# Patient Record
Sex: MALE | Race: WHITE | Employment: OTHER
[De-identification: names, ages, dates, MRNs, and addresses within clinical notes are randomized per-mention and may not be internally consistent; named-entity substitution may affect disease eponyms.]

---

## 2024-03-18 ENCOUNTER — APPOINTMENT (OUTPATIENT)
Facility: HOSPITAL | Age: 79
DRG: 853 | End: 2024-03-18
Payer: MEDICARE

## 2024-03-18 ENCOUNTER — HOSPITAL ENCOUNTER (INPATIENT)
Facility: HOSPITAL | Age: 79
LOS: 5 days | Discharge: HOME | DRG: 853 | End: 2024-03-23
Attending: EMERGENCY MEDICINE | Admitting: STUDENT IN AN ORGANIZED HEALTH CARE EDUCATION/TRAINING PROGRAM
Payer: MEDICARE

## 2024-03-18 DIAGNOSIS — J98.11 COLLAPSE OF LEFT LUNG: Primary | ICD-10-CM

## 2024-03-18 DIAGNOSIS — J69.0 ASPIRATION PNEUMONIA OF LEFT LOWER LOBE, UNSPECIFIED ASPIRATION PNEUMONIA TYPE (HCC): ICD-10-CM

## 2024-03-18 LAB
ALBUMIN SERPL-MCNC: 2.9 G/DL (ref 3.5–5)
ALBUMIN/GLOB SERPL: 1 (ref 1.1–2.2)
ALP SERPL-CCNC: 104 U/L (ref 45–117)
ALT SERPL-CCNC: 21 U/L (ref 12–78)
ANION GAP SERPL CALC-SCNC: 3 MMOL/L (ref 5–15)
AST SERPL-CCNC: 14 U/L (ref 15–37)
BASOPHILS # BLD: 0.1 K/UL (ref 0–0.1)
BASOPHILS NFR BLD: 0 % (ref 0–1)
BILIRUB SERPL-MCNC: 0.3 MG/DL (ref 0.2–1)
BUN SERPL-MCNC: 24 MG/DL (ref 6–20)
BUN/CREAT SERPL: 24 (ref 12–20)
CALCIUM SERPL-MCNC: 8.3 MG/DL (ref 8.5–10.1)
CHLORIDE SERPL-SCNC: 106 MMOL/L (ref 97–108)
CO2 SERPL-SCNC: 26 MMOL/L (ref 21–32)
CREAT SERPL-MCNC: 1.01 MG/DL (ref 0.7–1.3)
DIFFERENTIAL METHOD BLD: ABNORMAL
EOSINOPHIL # BLD: 0 K/UL (ref 0–0.4)
EOSINOPHIL NFR BLD: 0 % (ref 0–7)
ERYTHROCYTE [DISTWIDTH] IN BLOOD BY AUTOMATED COUNT: 14.5 % (ref 11.5–14.5)
GLOBULIN SER CALC-MCNC: 2.9 G/DL (ref 2–4)
GLUCOSE SERPL-MCNC: 122 MG/DL (ref 65–100)
HCT VFR BLD AUTO: 42.4 % (ref 36.6–50.3)
HGB BLD-MCNC: 13.6 G/DL (ref 12.1–17)
IMM GRANULOCYTES # BLD AUTO: 0.1 K/UL (ref 0–0.04)
IMM GRANULOCYTES NFR BLD AUTO: 0 % (ref 0–0.5)
LACTATE BLD-SCNC: 0.51 MMOL/L (ref 0.4–2)
LYMPHOCYTES # BLD: 1.2 K/UL (ref 0.8–3.5)
LYMPHOCYTES NFR BLD: 9 % (ref 12–49)
MAGNESIUM SERPL-MCNC: 1.9 MG/DL (ref 1.6–2.4)
MCH RBC QN AUTO: 32.7 PG (ref 26–34)
MCHC RBC AUTO-ENTMCNC: 32.1 G/DL (ref 30–36.5)
MCV RBC AUTO: 101.9 FL (ref 80–99)
MONOCYTES # BLD: 1 K/UL (ref 0–1)
MONOCYTES NFR BLD: 7 % (ref 5–13)
NEUTS SEG # BLD: 11 K/UL (ref 1.8–8)
NEUTS SEG NFR BLD: 84 % (ref 32–75)
NRBC # BLD: 0 K/UL (ref 0–0.01)
NRBC BLD-RTO: 0 PER 100 WBC
PLATELET # BLD AUTO: 272 K/UL (ref 150–400)
PMV BLD AUTO: 8.2 FL (ref 8.9–12.9)
POTASSIUM SERPL-SCNC: 4.1 MMOL/L (ref 3.5–5.1)
PROCALCITONIN SERPL-MCNC: <0.05 NG/ML
PROT SERPL-MCNC: 5.8 G/DL (ref 6.4–8.2)
RBC # BLD AUTO: 4.16 M/UL (ref 4.1–5.7)
SODIUM SERPL-SCNC: 135 MMOL/L (ref 136–145)
TROPONIN I SERPL HS-MCNC: 9 NG/L (ref 0–76)
WBC # BLD AUTO: 13.4 K/UL (ref 4.1–11.1)

## 2024-03-18 PROCEDURE — 6370000000 HC RX 637 (ALT 250 FOR IP): Performed by: STUDENT IN AN ORGANIZED HEALTH CARE EDUCATION/TRAINING PROGRAM

## 2024-03-18 PROCEDURE — 96374 THER/PROPH/DIAG INJ IV PUSH: CPT

## 2024-03-18 PROCEDURE — 71045 X-RAY EXAM CHEST 1 VIEW: CPT

## 2024-03-18 PROCEDURE — 6360000002 HC RX W HCPCS: Performed by: STUDENT IN AN ORGANIZED HEALTH CARE EDUCATION/TRAINING PROGRAM

## 2024-03-18 PROCEDURE — 84484 ASSAY OF TROPONIN QUANT: CPT

## 2024-03-18 PROCEDURE — 87040 BLOOD CULTURE FOR BACTERIA: CPT

## 2024-03-18 PROCEDURE — 80053 COMPREHEN METABOLIC PANEL: CPT

## 2024-03-18 PROCEDURE — 84145 PROCALCITONIN (PCT): CPT

## 2024-03-18 PROCEDURE — 99285 EMERGENCY DEPT VISIT HI MDM: CPT

## 2024-03-18 PROCEDURE — 83735 ASSAY OF MAGNESIUM: CPT

## 2024-03-18 PROCEDURE — 94640 AIRWAY INHALATION TREATMENT: CPT

## 2024-03-18 PROCEDURE — 2580000003 HC RX 258: Performed by: EMERGENCY MEDICINE

## 2024-03-18 PROCEDURE — 71275 CT ANGIOGRAPHY CHEST: CPT

## 2024-03-18 PROCEDURE — 2580000003 HC RX 258: Performed by: STUDENT IN AN ORGANIZED HEALTH CARE EDUCATION/TRAINING PROGRAM

## 2024-03-18 PROCEDURE — 83605 ASSAY OF LACTIC ACID: CPT

## 2024-03-18 PROCEDURE — 94760 N-INVAS EAR/PLS OXIMETRY 1: CPT

## 2024-03-18 PROCEDURE — 36415 COLL VENOUS BLD VENIPUNCTURE: CPT

## 2024-03-18 PROCEDURE — 6360000002 HC RX W HCPCS: Performed by: EMERGENCY MEDICINE

## 2024-03-18 PROCEDURE — 93005 ELECTROCARDIOGRAM TRACING: CPT | Performed by: EMERGENCY MEDICINE

## 2024-03-18 PROCEDURE — 85025 COMPLETE CBC W/AUTO DIFF WBC: CPT

## 2024-03-18 PROCEDURE — 1100000003 HC PRIVATE W/ TELEMETRY

## 2024-03-18 PROCEDURE — 6360000004 HC RX CONTRAST MEDICATION: Performed by: EMERGENCY MEDICINE

## 2024-03-18 RX ORDER — CALCIUM CARBONATE 500 MG/1
500 TABLET, CHEWABLE ORAL 3 TIMES DAILY PRN
Status: DISCONTINUED | OUTPATIENT
Start: 2024-03-18 | End: 2024-03-23 | Stop reason: HOSPADM

## 2024-03-18 RX ORDER — DONEPEZIL HYDROCHLORIDE 5 MG/1
5 TABLET, FILM COATED ORAL NIGHTLY
Status: DISCONTINUED | OUTPATIENT
Start: 2024-03-18 | End: 2024-03-23 | Stop reason: HOSPADM

## 2024-03-18 RX ORDER — ACETAMINOPHEN 325 MG/1
650 TABLET ORAL EVERY 6 HOURS PRN
Status: DISCONTINUED | OUTPATIENT
Start: 2024-03-18 | End: 2024-03-23 | Stop reason: HOSPADM

## 2024-03-18 RX ORDER — ALBUTEROL SULFATE 90 UG/1
1 AEROSOL, METERED RESPIRATORY (INHALATION) EVERY 6 HOURS PRN
COMMUNITY
Start: 2024-02-13

## 2024-03-18 RX ORDER — ACETAMINOPHEN 325 MG/1
650 TABLET ORAL EVERY 4 HOURS PRN
Status: DISCONTINUED | OUTPATIENT
Start: 2024-03-18 | End: 2024-03-18

## 2024-03-18 RX ORDER — SODIUM CHLORIDE 0.9 % (FLUSH) 0.9 %
5-40 SYRINGE (ML) INJECTION PRN
Status: DISCONTINUED | OUTPATIENT
Start: 2024-03-18 | End: 2024-03-23 | Stop reason: HOSPADM

## 2024-03-18 RX ORDER — ENOXAPARIN SODIUM 100 MG/ML
30 INJECTION SUBCUTANEOUS DAILY
Status: DISCONTINUED | OUTPATIENT
Start: 2024-03-19 | End: 2024-03-23 | Stop reason: HOSPADM

## 2024-03-18 RX ORDER — MIRTAZAPINE 30 MG/1
30 TABLET, FILM COATED ORAL NIGHTLY
COMMUNITY
Start: 2024-03-12

## 2024-03-18 RX ORDER — MIRTAZAPINE 15 MG/1
30 TABLET, FILM COATED ORAL NIGHTLY
Status: DISCONTINUED | OUTPATIENT
Start: 2024-03-18 | End: 2024-03-23 | Stop reason: HOSPADM

## 2024-03-18 RX ORDER — ACETYLCYSTEINE 200 MG/ML
600 SOLUTION ORAL; RESPIRATORY (INHALATION)
Status: DISCONTINUED | OUTPATIENT
Start: 2024-03-18 | End: 2024-03-23 | Stop reason: HOSPADM

## 2024-03-18 RX ORDER — ACETAMINOPHEN 325 MG/1
650 TABLET ORAL EVERY 4 HOURS PRN
Status: DISCONTINUED | OUTPATIENT
Start: 2024-03-18 | End: 2024-03-23 | Stop reason: HOSPADM

## 2024-03-18 RX ORDER — SODIUM CHLORIDE 9 MG/ML
INJECTION, SOLUTION INTRAVENOUS PRN
Status: DISCONTINUED | OUTPATIENT
Start: 2024-03-18 | End: 2024-03-23 | Stop reason: HOSPADM

## 2024-03-18 RX ORDER — MONTELUKAST SODIUM 10 MG/1
10 TABLET ORAL NIGHTLY
COMMUNITY
Start: 2024-03-12

## 2024-03-18 RX ORDER — IPRATROPIUM BROMIDE AND ALBUTEROL SULFATE 2.5; .5 MG/3ML; MG/3ML
1 SOLUTION RESPIRATORY (INHALATION)
Status: DISCONTINUED | OUTPATIENT
Start: 2024-03-18 | End: 2024-03-22

## 2024-03-18 RX ORDER — ONDANSETRON 4 MG/1
4 TABLET, ORALLY DISINTEGRATING ORAL EVERY 8 HOURS PRN
Status: DISCONTINUED | OUTPATIENT
Start: 2024-03-18 | End: 2024-03-23 | Stop reason: HOSPADM

## 2024-03-18 RX ORDER — DONEPEZIL HYDROCHLORIDE 5 MG/1
5 TABLET, FILM COATED ORAL NIGHTLY
COMMUNITY
Start: 2024-03-12

## 2024-03-18 RX ORDER — MONTELUKAST SODIUM 10 MG/1
10 TABLET ORAL NIGHTLY
Status: DISCONTINUED | OUTPATIENT
Start: 2024-03-18 | End: 2024-03-23 | Stop reason: HOSPADM

## 2024-03-18 RX ORDER — MORPHINE SULFATE 2 MG/ML
2 INJECTION, SOLUTION INTRAMUSCULAR; INTRAVENOUS
Status: DISCONTINUED | OUTPATIENT
Start: 2024-03-18 | End: 2024-03-23 | Stop reason: HOSPADM

## 2024-03-18 RX ORDER — TRIAMCINOLONE ACETONIDE 5 MG/G
CREAM TOPICAL PRN
COMMUNITY

## 2024-03-18 RX ORDER — SODIUM CHLORIDE 0.9 % (FLUSH) 0.9 %
5-40 SYRINGE (ML) INJECTION EVERY 12 HOURS SCHEDULED
Status: DISCONTINUED | OUTPATIENT
Start: 2024-03-18 | End: 2024-03-23 | Stop reason: HOSPADM

## 2024-03-18 RX ORDER — FAMOTIDINE 20 MG/1
20 TABLET, FILM COATED ORAL 2 TIMES DAILY
COMMUNITY

## 2024-03-18 RX ORDER — BUDESONIDE AND FORMOTEROL FUMARATE DIHYDRATE 160; 4.5 UG/1; UG/1
2 AEROSOL RESPIRATORY (INHALATION) 2 TIMES DAILY
COMMUNITY
Start: 2024-02-14

## 2024-03-18 RX ORDER — OXYCODONE HYDROCHLORIDE 5 MG/1
5 TABLET ORAL EVERY 4 HOURS PRN
Status: DISCONTINUED | OUTPATIENT
Start: 2024-03-18 | End: 2024-03-23 | Stop reason: HOSPADM

## 2024-03-18 RX ORDER — POLYETHYLENE GLYCOL 3350 17 G/17G
17 POWDER, FOR SOLUTION ORAL DAILY PRN
Status: DISCONTINUED | OUTPATIENT
Start: 2024-03-18 | End: 2024-03-23 | Stop reason: HOSPADM

## 2024-03-18 RX ORDER — ACETAMINOPHEN 650 MG/1
650 SUPPOSITORY RECTAL EVERY 6 HOURS PRN
Status: DISCONTINUED | OUTPATIENT
Start: 2024-03-18 | End: 2024-03-23 | Stop reason: HOSPADM

## 2024-03-18 RX ORDER — KETOROLAC TROMETHAMINE 30 MG/ML
15 INJECTION, SOLUTION INTRAMUSCULAR; INTRAVENOUS
Status: COMPLETED | OUTPATIENT
Start: 2024-03-18 | End: 2024-03-18

## 2024-03-18 RX ORDER — ONDANSETRON 2 MG/ML
4 INJECTION INTRAMUSCULAR; INTRAVENOUS EVERY 6 HOURS PRN
Status: DISCONTINUED | OUTPATIENT
Start: 2024-03-18 | End: 2024-03-23 | Stop reason: HOSPADM

## 2024-03-18 RX ADMIN — MIRTAZAPINE 30 MG: 15 TABLET, FILM COATED ORAL at 20:39

## 2024-03-18 RX ADMIN — ARFORMOTEROL TARTRATE: 15 SOLUTION RESPIRATORY (INHALATION) at 20:45

## 2024-03-18 RX ADMIN — DONEPEZIL HYDROCHLORIDE 5 MG: 5 TABLET, FILM COATED ORAL at 20:39

## 2024-03-18 RX ADMIN — OXYCODONE 5 MG: 5 TABLET ORAL at 22:11

## 2024-03-18 RX ADMIN — MONTELUKAST 10 MG: 10 TABLET, FILM COATED ORAL at 20:39

## 2024-03-18 RX ADMIN — PIPERACILLIN AND TAZOBACTAM 3375 MG: 3; .375 INJECTION, POWDER, LYOPHILIZED, FOR SOLUTION INTRAVENOUS at 20:54

## 2024-03-18 RX ADMIN — SODIUM CHLORIDE, PRESERVATIVE FREE 10 ML: 5 INJECTION INTRAVENOUS at 20:40

## 2024-03-18 RX ADMIN — PIPERACILLIN AND TAZOBACTAM 4500 MG: 4; .5 INJECTION, POWDER, LYOPHILIZED, FOR SOLUTION INTRAVENOUS; PARENTERAL at 16:02

## 2024-03-18 RX ADMIN — ACETYLCYSTEINE 600 MG: 200 INHALANT RESPIRATORY (INHALATION) at 20:40

## 2024-03-18 RX ADMIN — ACETAMINOPHEN 650 MG: 325 TABLET ORAL at 20:39

## 2024-03-18 RX ADMIN — CALCIUM CARBONATE (ANTACID) CHEW TAB 500 MG 500 MG: 500 CHEW TAB at 22:11

## 2024-03-18 RX ADMIN — IOPAMIDOL 100 ML: 755 INJECTION, SOLUTION INTRAVENOUS at 13:59

## 2024-03-18 RX ADMIN — KETOROLAC TROMETHAMINE 15 MG: 30 INJECTION, SOLUTION INTRAMUSCULAR at 16:39

## 2024-03-18 RX ADMIN — IPRATROPIUM BROMIDE AND ALBUTEROL SULFATE 1 DOSE: .5; 3 SOLUTION RESPIRATORY (INHALATION) at 20:40

## 2024-03-18 ASSESSMENT — PAIN DESCRIPTION - PAIN TYPE: TYPE: ACUTE PAIN

## 2024-03-18 ASSESSMENT — PAIN SCALES - GENERAL
PAINLEVEL_OUTOF10: 5
PAINLEVEL_OUTOF10: 5
PAINLEVEL_OUTOF10: 0
PAINLEVEL_OUTOF10: 4

## 2024-03-18 ASSESSMENT — PAIN DESCRIPTION - DESCRIPTORS: DESCRIPTORS: CRAMPING

## 2024-03-18 ASSESSMENT — PAIN DESCRIPTION - FREQUENCY: FREQUENCY: INTERMITTENT

## 2024-03-18 ASSESSMENT — LIFESTYLE VARIABLES
HOW MANY STANDARD DRINKS CONTAINING ALCOHOL DO YOU HAVE ON A TYPICAL DAY: 1 OR 2
HOW OFTEN DO YOU HAVE A DRINK CONTAINING ALCOHOL: 2-4 TIMES A MONTH

## 2024-03-18 ASSESSMENT — PAIN - FUNCTIONAL ASSESSMENT
PAIN_FUNCTIONAL_ASSESSMENT: 0-10
PAIN_FUNCTIONAL_ASSESSMENT: ACTIVITIES ARE NOT PREVENTED

## 2024-03-18 ASSESSMENT — PAIN DESCRIPTION - LOCATION
LOCATION: CHEST
LOCATION: ABDOMEN;CHEST

## 2024-03-18 ASSESSMENT — PAIN DESCRIPTION - ORIENTATION: ORIENTATION: LEFT

## 2024-03-18 NOTE — H&P
Hospitalist Admission Note    NAME:   Anuel Bermudez   : 1945   MRN: [id number]     Date/Time: 3/18/2024 4:08 PM    Patient PCP: Bonifacio Bassett MD    ______________________________________________________________________  Given the patient's current clinical presentation, I have a high level of concern for decompensation if discharged from the emergency department.  Complex decision making was performed, which includes reviewing the patient's available past medical records, laboratory results, and x-ray films.       My assessment of this patient's clinical condition and my plan of care is as follows.    Assessment / Plan:    Left lower lobe lung collapse  Suspected aspiration pneumonia    CT reviewed,  1. No evidence of pulmonary embolus.     2. Left lower lobe collapse. There appears to be secretions within the left  lower lobe bronchus as well as possible mucous plugging in the lingular  bronchus. However the presence of and endobronchial mass is not excluded.    Pulmonary consult  Currently on room air, keep oxygen saturation greater than 90%  Zosyn suspected aspiration pneumonia, reported allergy to penicillin but already received zosyn, which he tolerated.  DuoNebs as needed  Mucomyst inhalation  SLP evaluation    Chronic difficulty swallowing:  Due to cervical verterbal pathology, follows Dr. Guerra  Recommend outpatient follow up  SLP eval here  Had work up done at [facility] during admission in 2024  Takes regular diet with nectar thick liquid at home, continue this for now    History of asthma  Continue nebs/ switched to hospital formulary    Suspected history of dementia:  On Donepezil, continue with this.  Continue mirtazapine      Medical Decision Making:   I personally reviewed labs: CBC, BMP  I personally reviewed imaging:CTA chest  I personally reviewed EKG:  Toxic drug monitoring: iv abx  Discussed case with: ED provider. After discussion I am in agreement that acuity of  K/UL    Differential Type AUTOMATED     Comprehensive Metabolic Panel    Collection Time: 03/18/24 12:26 PM   Result Value Ref Range    Sodium 135 (L) 136 - 145 mmol/L    Potassium 4.1 3.5 - 5.1 mmol/L    Chloride 106 97 - 108 mmol/L    CO2 26 21 - 32 mmol/L    Anion Gap 3 (L) 5 - 15 mmol/L    Glucose 122 (H) 65 - 100 mg/dL    BUN 24 (H) 6 - 20 MG/DL    Creatinine 1.01 0.70 - 1.30 MG/DL    Bun/Cre Ratio 24 (H) 12 - 20      Est, Glom Filt Rate >60 >60 ml/min/1.73m2    Calcium 8.3 (L) 8.5 - 10.1 MG/DL    Total Bilirubin 0.3 0.2 - 1.0 MG/DL    ALT 21 12 - 78 U/L    AST 14 (L) 15 - 37 U/L    Alk Phosphatase 104 45 - 117 U/L    Total Protein 5.8 (L) 6.4 - 8.2 g/dL    Albumin 2.9 (L) 3.5 - 5.0 g/dL    Globulin 2.9 2.0 - 4.0 g/dL    Albumin/Globulin Ratio 1.0 (L) 1.1 - 2.2     Magnesium    Collection Time: 03/18/24 12:26 PM   Result Value Ref Range    Magnesium 1.9 1.6 - 2.4 mg/dL   POC Lactic Acid    Collection Time: 03/18/24  3:48 PM   Result Value Ref Range    POC Lactic Acid 0.51 0.40 - 2.00 mmol/L         CTA CHEST W WO CONTRAST PE Eval    Result Date: 3/18/2024  EXAM:  CTA CHEST W WO CONTRAST INDICATION: Shortness of breath. Left lower lobe collapse COMPARISON: None TECHNIQUE: Helical thin section chest CT following intravenous administration of nonionic contrast 100 mL of isovue 370 according to departmental PE protocol. Coronal and sagittal reformats were performed. 3D post processing was performed.  CT dose reduction was achieved through the use of a standardized protocol tailored for this examination and automatic exposure control for dose modulation. FINDINGS: This is a good quality study for the evaluation of pulmonary embolism to the first subsegmental arterial level. There is no pulmonary embolism to this level. THYROID: No nodule. MEDIASTINUM: No mass or lymphadenopathy. JEFF: No mass or lymphadenopathy. THORACIC AORTA: No aneurysm. HEART: Normal in size. ESOPHAGUS: No wall thickening or dilatation.

## 2024-03-18 NOTE — PLAN OF CARE
Problem: Respiratory - Adult  Goal: Achieves optimal ventilation and oxygenation  Outcome: Progressing  Flowsheets (Taken 3/18/2024 1806)  Achieves optimal ventilation and oxygenation:   Assess for changes in respiratory status   Respiratory therapy support as indicated   Position to facilitate oxygenation and minimize respiratory effort

## 2024-03-18 NOTE — ED NOTES
Frantz Served MD Nye the following:    \"Pt already received Zosyn 4,500 mg at 1602. Did you want to administer additional Zosyn 3,375 mg as ordered now?\"

## 2024-03-18 NOTE — ED NOTES
Attempted to call report to nurse for Rm 3211; JOSE DE JESUS Milner Ext. [phone] is with a pt at this time and will call back.

## 2024-03-18 NOTE — ED PROVIDER NOTES
[facility] EMERGENCY DEPT  EMERGENCY DEPARTMENT ENCOUNTER       Pt Name: Anuel Bermudez  MRN: [id number]  Birthdate 1945  Date of evaluation: 3/18/2024  Provider: Anuel Wood DO   PCP: Bonifacio Bassett MD  Note Started: 3:03 PM EDT 3/18/24     CHIEF COMPLAINT       Chief Complaint   Patient presents with    Shortness of Breath     Pt presents to ED via EMS from home with c/o worsening SOB x1 day. Pt reports he was hospitalized a month ago for SOB and suspect for pneumonia. Pt also c/o chest pain with deep breaths and cramping on right abdomen/rib. H/o COPD and asthma. Pt baseline ambulatory with cane and A&OX4.    Chest Pain     EMS noted ST elevation on EKG; pt given aspirin 342 mg and 1 dose of nitro. Pt denies any blood thinners.        HISTORY OF PRESENT ILLNESS: 1 or more elements      History From: Patient, History limited by: none     Anuel Bermudez is a 78 y.o. male presents to the emergency department for evaluation of shortness of breath       Please See MDM for Additional Details of the HPI/PMH  Nursing Notes were all reviewed and agreed with or any disagreements were addressed in the HPI.     REVIEW OF SYSTEMS        Positives and Pertinent negatives as per HPI.    PAST HISTORY     Past Medical History:  No past medical history on file.    Past Surgical History:  No past surgical history on file.    Family History:  No family history on file.    Social History:       Allergies:  No Known Allergies    CURRENT MEDICATIONS      Previous Medications    No medications on file       SCREENINGS               No data recorded         PHYSICAL EXAM      ED Triage Vitals [03/18/24 1211]   Enc Vitals Group      BP 90/64      Pulse 94      Respirations 23      Temp 97.6 °F (36.4 °C)      Temp Source Oral      SpO2 95 %      Weight - Scale 50.3 kg (110 lb 14.3 oz)      Height 1.651 m (5' 5\")      Head Circumference       Peak Flow       Pain Score       Pain Loc       Pain Edu?       Excl. in GC?

## 2024-03-19 LAB
ANION GAP SERPL CALC-SCNC: 5 MMOL/L (ref 5–15)
BASOPHILS # BLD: 0.1 K/UL (ref 0–0.1)
BASOPHILS NFR BLD: 1 % (ref 0–1)
BUN SERPL-MCNC: 25 MG/DL (ref 6–20)
BUN/CREAT SERPL: 24 (ref 12–20)
CALCIUM SERPL-MCNC: 8.3 MG/DL (ref 8.5–10.1)
CHLORIDE SERPL-SCNC: 108 MMOL/L (ref 97–108)
CO2 SERPL-SCNC: 22 MMOL/L (ref 21–32)
CREAT SERPL-MCNC: 1.05 MG/DL (ref 0.7–1.3)
DIFFERENTIAL METHOD BLD: ABNORMAL
EKG ATRIAL RATE: 104 BPM
EKG DIAGNOSIS: NORMAL
EKG P AXIS: 82 DEGREES
EKG P-R INTERVAL: 126 MS
EKG Q-T INTERVAL: 348 MS
EKG QRS DURATION: 100 MS
EKG QTC CALCULATION (BAZETT): 457 MS
EKG R AXIS: 58 DEGREES
EKG T AXIS: 87 DEGREES
EKG VENTRICULAR RATE: 104 BPM
EOSINOPHIL # BLD: 0.1 K/UL (ref 0–0.4)
EOSINOPHIL NFR BLD: 1 % (ref 0–7)
ERYTHROCYTE [DISTWIDTH] IN BLOOD BY AUTOMATED COUNT: 14.6 % (ref 11.5–14.5)
GLUCOSE SERPL-MCNC: 101 MG/DL (ref 65–100)
HCT VFR BLD AUTO: 37.1 % (ref 36.6–50.3)
HGB BLD-MCNC: 12.5 G/DL (ref 12.1–17)
IMM GRANULOCYTES # BLD AUTO: 0.1 K/UL (ref 0–0.04)
IMM GRANULOCYTES NFR BLD AUTO: 1 % (ref 0–0.5)
LYMPHOCYTES # BLD: 1.6 K/UL (ref 0.8–3.5)
LYMPHOCYTES NFR BLD: 14 % (ref 12–49)
MCH RBC QN AUTO: 33.9 PG (ref 26–34)
MCHC RBC AUTO-ENTMCNC: 33.7 G/DL (ref 30–36.5)
MCV RBC AUTO: 100.5 FL (ref 80–99)
MONOCYTES # BLD: 0.8 K/UL (ref 0–1)
MONOCYTES NFR BLD: 8 % (ref 5–13)
NEUTS SEG # BLD: 8.5 K/UL (ref 1.8–8)
NEUTS SEG NFR BLD: 75 % (ref 32–75)
NRBC # BLD: 0 K/UL (ref 0–0.01)
NRBC BLD-RTO: 0 PER 100 WBC
PLATELET # BLD AUTO: 265 K/UL (ref 150–400)
PMV BLD AUTO: 8.5 FL (ref 8.9–12.9)
POTASSIUM SERPL-SCNC: 4 MMOL/L (ref 3.5–5.1)
RBC # BLD AUTO: 3.69 M/UL (ref 4.1–5.7)
SODIUM SERPL-SCNC: 135 MMOL/L (ref 136–145)
WBC # BLD AUTO: 11.1 K/UL (ref 4.1–11.1)

## 2024-03-19 PROCEDURE — 6370000000 HC RX 637 (ALT 250 FOR IP): Performed by: INTERNAL MEDICINE

## 2024-03-19 PROCEDURE — 94640 AIRWAY INHALATION TREATMENT: CPT

## 2024-03-19 PROCEDURE — 6370000000 HC RX 637 (ALT 250 FOR IP): Performed by: STUDENT IN AN ORGANIZED HEALTH CARE EDUCATION/TRAINING PROGRAM

## 2024-03-19 PROCEDURE — 92610 EVALUATE SWALLOWING FUNCTION: CPT

## 2024-03-19 PROCEDURE — 94761 N-INVAS EAR/PLS OXIMETRY MLT: CPT

## 2024-03-19 PROCEDURE — 36415 COLL VENOUS BLD VENIPUNCTURE: CPT

## 2024-03-19 PROCEDURE — 1100000003 HC PRIVATE W/ TELEMETRY

## 2024-03-19 PROCEDURE — 80048 BASIC METABOLIC PNL TOTAL CA: CPT

## 2024-03-19 PROCEDURE — 6360000002 HC RX W HCPCS: Performed by: STUDENT IN AN ORGANIZED HEALTH CARE EDUCATION/TRAINING PROGRAM

## 2024-03-19 PROCEDURE — 94760 N-INVAS EAR/PLS OXIMETRY 1: CPT

## 2024-03-19 PROCEDURE — 2580000003 HC RX 258: Performed by: STUDENT IN AN ORGANIZED HEALTH CARE EDUCATION/TRAINING PROGRAM

## 2024-03-19 PROCEDURE — 85025 COMPLETE CBC W/AUTO DIFF WBC: CPT

## 2024-03-19 RX ORDER — GUAIFENESIN 600 MG/1
600 TABLET, EXTENDED RELEASE ORAL 2 TIMES DAILY
Status: DISCONTINUED | OUTPATIENT
Start: 2024-03-19 | End: 2024-03-23 | Stop reason: HOSPADM

## 2024-03-19 RX ADMIN — IPRATROPIUM BROMIDE AND ALBUTEROL SULFATE 1 DOSE: .5; 3 SOLUTION RESPIRATORY (INHALATION) at 11:16

## 2024-03-19 RX ADMIN — IPRATROPIUM BROMIDE AND ALBUTEROL SULFATE 1 DOSE: .5; 3 SOLUTION RESPIRATORY (INHALATION) at 15:03

## 2024-03-19 RX ADMIN — PIPERACILLIN AND TAZOBACTAM 3375 MG: 3; .375 INJECTION, POWDER, LYOPHILIZED, FOR SOLUTION INTRAVENOUS at 05:50

## 2024-03-19 RX ADMIN — ENOXAPARIN SODIUM 30 MG: 100 INJECTION SUBCUTANEOUS at 10:04

## 2024-03-19 RX ADMIN — ARFORMOTEROL TARTRATE: 15 SOLUTION RESPIRATORY (INHALATION) at 20:10

## 2024-03-19 RX ADMIN — MIRTAZAPINE 30 MG: 15 TABLET, FILM COATED ORAL at 21:14

## 2024-03-19 RX ADMIN — ARFORMOTEROL TARTRATE: 15 SOLUTION RESPIRATORY (INHALATION) at 07:24

## 2024-03-19 RX ADMIN — ACETYLCYSTEINE 600 MG: 200 INHALANT RESPIRATORY (INHALATION) at 20:10

## 2024-03-19 RX ADMIN — OXYCODONE 5 MG: 5 TABLET ORAL at 03:52

## 2024-03-19 RX ADMIN — SODIUM CHLORIDE, PRESERVATIVE FREE 10 ML: 5 INJECTION INTRAVENOUS at 10:06

## 2024-03-19 RX ADMIN — DONEPEZIL HYDROCHLORIDE 5 MG: 5 TABLET, FILM COATED ORAL at 21:14

## 2024-03-19 RX ADMIN — SODIUM CHLORIDE, PRESERVATIVE FREE 10 ML: 5 INJECTION INTRAVENOUS at 21:15

## 2024-03-19 RX ADMIN — WATER 40 MG: 1 INJECTION INTRAMUSCULAR; INTRAVENOUS; SUBCUTANEOUS at 10:04

## 2024-03-19 RX ADMIN — GUAIFENESIN 600 MG: 600 TABLET, EXTENDED RELEASE ORAL at 10:06

## 2024-03-19 RX ADMIN — GUAIFENESIN 600 MG: 600 TABLET, EXTENDED RELEASE ORAL at 21:14

## 2024-03-19 RX ADMIN — MONTELUKAST 10 MG: 10 TABLET, FILM COATED ORAL at 21:14

## 2024-03-19 RX ADMIN — ACETYLCYSTEINE 600 MG: 200 INHALANT RESPIRATORY (INHALATION) at 07:16

## 2024-03-19 RX ADMIN — IPRATROPIUM BROMIDE AND ALBUTEROL SULFATE 1 DOSE: .5; 3 SOLUTION RESPIRATORY (INHALATION) at 20:10

## 2024-03-19 RX ADMIN — IPRATROPIUM BROMIDE AND ALBUTEROL SULFATE 1 DOSE: .5; 3 SOLUTION RESPIRATORY (INHALATION) at 07:16

## 2024-03-19 RX ADMIN — PIPERACILLIN AND TAZOBACTAM 3375 MG: 3; .375 INJECTION, POWDER, LYOPHILIZED, FOR SOLUTION INTRAVENOUS at 21:39

## 2024-03-19 RX ADMIN — PIPERACILLIN AND TAZOBACTAM 3375 MG: 3; .375 INJECTION, POWDER, LYOPHILIZED, FOR SOLUTION INTRAVENOUS at 13:57

## 2024-03-19 ASSESSMENT — PAIN DESCRIPTION - DESCRIPTORS: DESCRIPTORS: DISCOMFORT

## 2024-03-19 ASSESSMENT — PAIN - FUNCTIONAL ASSESSMENT: PAIN_FUNCTIONAL_ASSESSMENT: ACTIVITIES ARE NOT PREVENTED

## 2024-03-19 ASSESSMENT — PAIN DESCRIPTION - ORIENTATION: ORIENTATION: LEFT

## 2024-03-19 ASSESSMENT — PAIN SCALES - GENERAL
PAINLEVEL_OUTOF10: 5
PAINLEVEL_OUTOF10: 0
PAINLEVEL_OUTOF10: 0

## 2024-03-19 ASSESSMENT — PAIN DESCRIPTION - LOCATION: LOCATION: CHEST

## 2024-03-19 NOTE — PROGRESS NOTES
Hospitalist Progress Note    NAME:   Anuel Bermudez   : 1945   MRN: [id number]     Date/Time: 3/19/2024 2:50 PM  Patient PCP: Bonifacio Bassett MD    Estimated discharge date: 3/21?  Barriers: Pulmonary clearance, bronchoscopy in a.m.      Assessment / Plan:    Left lower lobe lung collapse/atelectasis POA  Suspected aspiration pneumonia/mucous plugging POA     CT reviewed:  1. No evidence of pulmonary embolus.     2. Left lower lobe collapse. There appears to be secretions within the left  lower lobe bronchus as well as possible mucous plugging in the lingular  bronchus. However the presence of and endobronchial mass is not excluded.     IP Pulmonary consulted-Dr. Fry following, plan for bronchoscopy tomorrow  Currently on room air, keep oxygen saturation greater than 90%  Zosyn suspected aspiration pneumonia, reported allergy to penicillin but already received zosyn, which he tolerated.  DuoNebs as needed  Mucomyst inhalation BID  SLP evaluation appreciated-  Regular and mildly thick liquids  --Medications whole with mildly thick liquids   --Upright all PO intake   --Single bites/sips   --Oral hygiene 2-3x/day  --Patient prefers drinking via cup   --Consider repeat instrumental (I.e. MBS) if patient agreeable to determine possible liquid advancement   --Reflux precautions   Mucinex added     Chronic difficulty swallowing:  Due to cervical verterbal pathology, follows Dr. Guerra  Recommend outpatient follow up  SLP hawa here  Had work up done at [facility] during admission in 2024  Takes regular diet with nectar thick liquid at home, continue this for now     History of asthma  Continue nebs/ switched to hospital formulary     Suspected history of dementia:  On Donepezil, continue with this.  Continue mirtazapine        Medical Decision Making:   I personally reviewed labs: CBC, BMP, lactate, blood culture  I personally reviewed imaging:CTA chest  I personally reviewed EKG: None  Toxic  test results   YES  Review and summation of old records today    NO  Reviewed patient's current orders and MAR    YES  PMH/SH reviewed - no change compared to H&P    Procedures: see electronic medical records for all procedures/Xrays and details which were not copied into this note but were reviewed prior to creation of Plan.      LABS:  I reviewed today's most current labs and imaging studies.  Pertinent labs include:  Recent Labs     03/18/24  1226 03/19/24  0341   WBC 13.4* 11.1   HGB 13.6 12.5   HCT 42.4 37.1    265     Recent Labs     03/18/24  1226 03/19/24  0341   * 135*   K 4.1 4.0    108   CO2 26 22   GLUCOSE 122* 101*   BUN 24* 25*   CREATININE 1.01 1.05   CALCIUM 8.3* 8.3*   MG 1.9  --    LABALBU 2.9*  --    BILITOT 0.3  --    AST 14*  --    ALT 21  --        Signed: Emeli Guerra MD    Total Time: 51 mins

## 2024-03-19 NOTE — PROGRESS NOTES
Patient having pain this shift while coughing, on call ordered PRN medications. Given with relief.

## 2024-03-19 NOTE — PROGRESS NOTES
ADULT PROTOCOL: JET AEROSOL ASSESSMENT    Patient  Anuel Bermudez     78 y.o.   male     3/19/2024  8:37 AM    Breath Sounds Pre Procedure: Breath Sounds Pre-Tx JOSELITO: Diminished                                  Breath Sounds Pre-Tx LLL: Diminished        Breath Sounds Pre-Tx RUL: Diminished        Breath Sounds Pre-Tx RML: Diminished        Breath Sounds Pre-Tx RLL: Diminished  Breath Sounds Post Procedure: Breath Sounds Post-Tx JOSELITO: Diminished          Breath Sounds Post-Tx LLL: Diminished          Breath Sounds Post-Tx RUL: Diminished          Breath Sounds Post-Tx RML: Diminished          Breath Sounds Post-Tx RLL: Diminished                                       Heart Rate: Pre procedure Pre-Tx Pulse: 87           Post procedure Post-Tx Pulse: 88    Resp Rate: Pre procedure Pre-Tx Resps: 18           Post procedure Post-Tx Resps: 18      Oxygen: O2 Therapy: Room air        Changed: No    SpO2:  SpO2: 94 %   without Oxygen                Nebulizer Therapy: Current medications Medications: Budesonide/Formoterol      Changed: No      Problem List:   Patient Active Problem List   Diagnosis    Collapse of left lung       Respiratory Therapist: Shauna Carrillo, RT

## 2024-03-19 NOTE — PLAN OF CARE
Problem: Discharge Planning  Goal: Discharge to home or other facility with appropriate resources  Outcome: Progressing     Problem: Respiratory - Adult  Goal: Achieves optimal ventilation and oxygenation  3/19/2024 1050 by Sheyla Weinberg RN  Outcome: Progressing  3/19/2024 0836 by Shauna Desai, RT  Outcome: Progressing     Problem: Skin/Tissue Integrity  Goal: Absence of new skin breakdown  Description: 1.  Monitor for areas of redness and/or skin breakdown  2.  Assess vascular access sites hourly  3.  Every 4-6 hours minimum:  Change oxygen saturation probe site  4.  Every 4-6 hours:  If on nasal continuous positive airway pressure, respiratory therapy assess nares and determine need for appliance change or resting period.  Outcome: Progressing     Problem: Safety - Adult  Goal: Free from fall injury  Outcome: Progressing     Problem: SLP Adult - Impaired Swallowing  Goal: By Discharge: Advance to least restrictive diet without signs or symptoms of aspiration for planned discharge setting.  See evaluation for individualized goals.  Description: Speech Pathology Goals:   Initiated 3/19/2024    1.  Pt will tolerate least restrictive diet without clinical s/s aspiration or respiratory decline within 7 days.  3/19/2024 1041 by Ayse Larson, SLP  Outcome: Progressing

## 2024-03-19 NOTE — PLAN OF CARE
Speech LAnguage Pathology EVALUATION    Patient: Anuel Bermudez (78 y.o. male)  Date: 3/19/2024  Primary Diagnosis: Collapse of left lung [J98.11]  Aspiration pneumonia of left lower lobe, unspecified aspiration pneumonia type (HCC) [J69.0]  Procedure(s) (LRB):  BRONCHOSCOPY (N/A)     Precautions: aspiration precautions, reflux precautions                     ASSESSMENT :  Based on the objective data described below, the patient presents with suspect baseline dysphagia with increased risk of aspiration given h/o cervical vertebral pathology (cervical myeloradiculopathy). Per chart review, patient with MBS completed 1/29/2024 at [facility] with findings of \"Intermittent penetration and small amount of aspiration (x1) occurs during the swallow. Aspirated material clears back above the vocal folds after the swallow.\" At that time, patient was on soft and bite sized diet with mildly thick liquids and later advanced to regular diet with mildly thick liquids while at acute rehab. Oral structures and functions seemingly WNL. Intermittent baseline, dry cough noted prior to PO trials. SLP presented trials of ice chips, thin via cup, mildly thick via cup, puree, and solid. Delayed cough following thin liquid trials via cup X2 (2/6 trials). Delayed cough following mildly thick liquids X1 (1/6 trials). Unable to determine if cough related to baseline cough vs PO. Discussed risks of aspiration and possible repeat instrumental (I.e. MBS) to determine possible liquid advancement. Patient reports he prefers the thickened liquids at this time and pleasantly declined repeat instrumental. Patient also reports occasional globus sensation and symptoms of heartburn. Possible esophageal component. Reflux precautions discussed. Recommend continuation of regular diet with mildly thick liquids and free water protocol. SLP will continue to follow.     Patient will benefit from skilled intervention to address the above

## 2024-03-19 NOTE — CONSULTS
Pulmonary, Critical Care, and Sleep Medicine      Name: Anuel Bermudez MRN: [id number]   : 1945 Hospital: [facility]   Date: 3/19/2024  Admission date: 3/18/2024 Hospital Day: 2   Patient PCP: Bonifacio Bassett MD    History:   Pt is acutely ill and unstable. Medical records and data reviewed. Pt seen in consultation    IMPRESSION:   Chest pain-mid chest anteriorly  Chronic left lower lobe collapse  History of asthma versus COPD not followed by pulmonary; Symbicort with as needed albuterol at home  Long-term tobacco use with cigarettes and cigars quit last year in -high risk for lung cancer  Chronic dysphagia due to cervical vertebral pathology-sees Dr. Guerra  Possible aspiration pneumonitis  Possible dementia on donezepil  Baseline ambulatory with walking cane  Body mass index is 18.45 kg/m².      RECOMMENDATIONS/PLAN:   Agree with bronchodilators   N.p.o. after midnight  I called endoscopy to arrange for bronchoscopy and airway inspection with possible endobronchial biopsies brushings to go along with washings NOON 3/20/24  aspiration precautions  Mucolytic's  Agree with antibiotics for now   Would send patient home with prescription of Breztri or Trelegy   Will follow-up as outpatient with further testing as indicated   DVT prophylaxis  Prescription drug management with home med reconciliation reviewed  Thanks you for asking us to see pt while in the hospital     Interval history:         [x] High complexity decision making was performed  [x] See my orders for details      Initial HPI:      I was asked by Elizabeth Nye MD to see Anuel Bermudez  a 78 y.o.   male in consultation for a chief complaint of left lower lobe collapse    Excerpts from admission 3/18/2024 or consult notes as follows:   \" Anuel Bermudez is a 78 y.o.  male with PMHx significant as below presented with worsening shortness of breath since yesterday.  He had some cough with phlegm for  °C), Max:98.2 °F (36.8 °C)                  Wt Readings from Last 4 Encounters:   03/18/24 50.3 kg (110 lb 14.3 oz)       Patient Vitals for the past 96 hrs (Last 3 readings):   Weight   03/18/24 1211 50.3 kg (110 lb 14.3 oz)       PF Readings from Last 3 Encounters:   No data found for PF    No intake or output data in the 24 hours ending 03/19/24 0851    Last shift:      No intake/output data recorded.  Last 3 shifts: No intake/output data recorded.       Physical Exam:   General: lean. Awake, in no distress;  male; in no respiratory distress and acyanotic, alert, oriented times 3, and cooperative;    HEENT: NCAT, fair dentition, lips and mucosa dry. crowded oropharynx, no thrush  Eyes: anicteric; conjunctiva clear  Neck: no nodes or obvious goiter, no accessory MM use,  no stridor  Chest/ Thorax: no deformity,   Cardiac: regular rate, rhythm; no murmur; no tachycardia  Lungs: Diminished breath sounds on left with no wheezes, no rales,  no rhonchi,   Abd: soft, NT, hypoactive BS, No paradox  MS/Ext: no edema; no joint swelling; No clubbing  : No badillo,   Neuro: fluent,  follows commands;   Psych: no agitation, oriented to person; normal affect and mood,   Skin: normal temperature, dry, no cyanosis;   Pulses: 1-2+ Bilateral pedal, radial  Capillary: brisk; pale      Labs:  ABG Invalid input(s): \"PHI\", \"PCO2I\", \"PO2I\", \"HCO3I\", \"SO2I\", \"FIO2I\"  Invalid input(s): \"PHI\", \"PO2I\", \"PCO2I\"     CBC Recent Labs     03/18/24  1226 03/19/24  0341   WBC 13.4* 11.1   HGB 13.6 12.5   HCT 42.4 37.1    265   .9* 100.5*   MCH 32.7 33.9        Metabolic  Panel Recent Labs     03/18/24  1226 03/19/24  0341   * 135*   K 4.1 4.0    108   CO2 26 22   BUN 24* 25*   MG 1.9  --    ALT 21  --         Pertinent Labs @LABRCNT(CPK:3,CKMB:3,TROPONINI:3,B-NP:3))@  Microbiology:  No results found for: \"SDES\"  No components found for: \"CULT\"     Xray Result (most recent): image(s) personally reviewed  CTA

## 2024-03-20 ENCOUNTER — APPOINTMENT (OUTPATIENT)
Facility: HOSPITAL | Age: 79
DRG: 853 | End: 2024-03-20
Payer: MEDICARE

## 2024-03-20 LAB
ANION GAP SERPL CALC-SCNC: 4 MMOL/L (ref 5–15)
BUN SERPL-MCNC: 17 MG/DL (ref 6–20)
BUN/CREAT SERPL: 21 (ref 12–20)
CALCIUM SERPL-MCNC: 8.5 MG/DL (ref 8.5–10.1)
CHLORIDE SERPL-SCNC: 110 MMOL/L (ref 97–108)
CO2 SERPL-SCNC: 24 MMOL/L (ref 21–32)
CREAT SERPL-MCNC: 0.81 MG/DL (ref 0.7–1.3)
ERYTHROCYTE [DISTWIDTH] IN BLOOD BY AUTOMATED COUNT: 14.6 % (ref 11.5–14.5)
GLUCOSE SERPL-MCNC: 95 MG/DL (ref 65–100)
HCT VFR BLD AUTO: 39.5 % (ref 36.6–50.3)
HGB BLD-MCNC: 12.7 G/DL (ref 12.1–17)
MCH RBC QN AUTO: 32.9 PG (ref 26–34)
MCHC RBC AUTO-ENTMCNC: 32.2 G/DL (ref 30–36.5)
MCV RBC AUTO: 102.3 FL (ref 80–99)
NRBC # BLD: 0 K/UL (ref 0–0.01)
NRBC BLD-RTO: 0 PER 100 WBC
PLATELET # BLD AUTO: 263 K/UL (ref 150–400)
PMV BLD AUTO: 8.3 FL (ref 8.9–12.9)
POTASSIUM SERPL-SCNC: 3.7 MMOL/L (ref 3.5–5.1)
RBC # BLD AUTO: 3.86 M/UL (ref 4.1–5.7)
SODIUM SERPL-SCNC: 138 MMOL/L (ref 136–145)
WBC # BLD AUTO: 12.7 K/UL (ref 4.1–11.1)

## 2024-03-20 PROCEDURE — 6370000000 HC RX 637 (ALT 250 FOR IP): Performed by: STUDENT IN AN ORGANIZED HEALTH CARE EDUCATION/TRAINING PROGRAM

## 2024-03-20 PROCEDURE — 2709999900 HC NON-CHARGEABLE SUPPLY: Performed by: INTERNAL MEDICINE

## 2024-03-20 PROCEDURE — 6360000002 HC RX W HCPCS: Performed by: INTERNAL MEDICINE

## 2024-03-20 PROCEDURE — 87205 SMEAR GRAM STAIN: CPT

## 2024-03-20 PROCEDURE — 2500000003 HC RX 250 WO HCPCS: Performed by: INTERNAL MEDICINE

## 2024-03-20 PROCEDURE — 88342 IMHCHEM/IMCYTCHM 1ST ANTB: CPT

## 2024-03-20 PROCEDURE — 74177 CT ABD & PELVIS W/CONTRAST: CPT

## 2024-03-20 PROCEDURE — 6370000000 HC RX 637 (ALT 250 FOR IP): Performed by: INTERNAL MEDICINE

## 2024-03-20 PROCEDURE — 3600007512: Performed by: INTERNAL MEDICINE

## 2024-03-20 PROCEDURE — 88112 CYTOPATH CELL ENHANCE TECH: CPT

## 2024-03-20 PROCEDURE — 87102 FUNGUS ISOLATION CULTURE: CPT

## 2024-03-20 PROCEDURE — 6360000002 HC RX W HCPCS: Performed by: STUDENT IN AN ORGANIZED HEALTH CARE EDUCATION/TRAINING PROGRAM

## 2024-03-20 PROCEDURE — 1100000003 HC PRIVATE W/ TELEMETRY

## 2024-03-20 PROCEDURE — 87077 CULTURE AEROBIC IDENTIFY: CPT

## 2024-03-20 PROCEDURE — 94640 AIRWAY INHALATION TREATMENT: CPT

## 2024-03-20 PROCEDURE — 2580000003 HC RX 258: Performed by: STUDENT IN AN ORGANIZED HEALTH CARE EDUCATION/TRAINING PROGRAM

## 2024-03-20 PROCEDURE — 99152 MOD SED SAME PHYS/QHP 5/>YRS: CPT | Performed by: INTERNAL MEDICINE

## 2024-03-20 PROCEDURE — 6360000004 HC RX CONTRAST MEDICATION: Performed by: STUDENT IN AN ORGANIZED HEALTH CARE EDUCATION/TRAINING PROGRAM

## 2024-03-20 PROCEDURE — 0BD78ZX EXTRACTION OF LEFT MAIN BRONCHUS, VIA NATURAL OR ARTIFICIAL OPENING ENDOSCOPIC, DIAGNOSTIC: ICD-10-PCS | Performed by: INTERNAL MEDICINE

## 2024-03-20 PROCEDURE — 2580000003 HC RX 258: Performed by: INTERNAL MEDICINE

## 2024-03-20 PROCEDURE — 87186 SC STD MICRODIL/AGAR DIL: CPT

## 2024-03-20 PROCEDURE — 99153 MOD SED SAME PHYS/QHP EA: CPT | Performed by: INTERNAL MEDICINE

## 2024-03-20 PROCEDURE — 3600007502: Performed by: INTERNAL MEDICINE

## 2024-03-20 PROCEDURE — 0BBJ8ZX EXCISION OF LEFT LOWER LUNG LOBE, VIA NATURAL OR ARTIFICIAL OPENING ENDOSCOPIC, DIAGNOSTIC: ICD-10-PCS | Performed by: INTERNAL MEDICINE

## 2024-03-20 PROCEDURE — 36415 COLL VENOUS BLD VENIPUNCTURE: CPT

## 2024-03-20 PROCEDURE — 7100000010 HC PHASE II RECOVERY - FIRST 15 MIN: Performed by: INTERNAL MEDICINE

## 2024-03-20 PROCEDURE — 88305 TISSUE EXAM BY PATHOLOGIST: CPT

## 2024-03-20 PROCEDURE — 88360 TUMOR IMMUNOHISTOCHEM/MANUAL: CPT

## 2024-03-20 PROCEDURE — 88341 IMHCHEM/IMCYTCHM EA ADD ANTB: CPT

## 2024-03-20 PROCEDURE — 87070 CULTURE OTHR SPECIMN AEROBIC: CPT

## 2024-03-20 PROCEDURE — 94761 N-INVAS EAR/PLS OXIMETRY MLT: CPT

## 2024-03-20 PROCEDURE — 80048 BASIC METABOLIC PNL TOTAL CA: CPT

## 2024-03-20 PROCEDURE — 85027 COMPLETE CBC AUTOMATED: CPT

## 2024-03-20 RX ORDER — MIDAZOLAM HYDROCHLORIDE 1 MG/ML
5 INJECTION, SOLUTION INTRAMUSCULAR; INTRAVENOUS ONCE
Status: COMPLETED | OUTPATIENT
Start: 2024-03-20 | End: 2024-03-20

## 2024-03-20 RX ORDER — SODIUM CHLORIDE 0.9 % (FLUSH) 0.9 %
5-40 SYRINGE (ML) INJECTION PRN
Status: DISCONTINUED | OUTPATIENT
Start: 2024-03-20 | End: 2024-03-20 | Stop reason: HOSPADM

## 2024-03-20 RX ORDER — LIDOCAINE HYDROCHLORIDE 20 MG/ML
10 INJECTION, SOLUTION INFILTRATION; PERINEURAL ONCE
Status: COMPLETED | OUTPATIENT
Start: 2024-03-20 | End: 2024-03-20

## 2024-03-20 RX ORDER — LIDOCAINE HYDROCHLORIDE 20 MG/ML
JELLY TOPICAL ONCE
Status: DISCONTINUED | OUTPATIENT
Start: 2024-03-20 | End: 2024-03-20 | Stop reason: HOSPADM

## 2024-03-20 RX ORDER — EPINEPHRINE IN SOD CHLOR,ISO 1 MG/10 ML
SYRINGE (ML) INTRAVENOUS PRN
Status: DISCONTINUED | OUTPATIENT
Start: 2024-03-20 | End: 2024-03-20 | Stop reason: ALTCHOICE

## 2024-03-20 RX ORDER — LIDOCAINE HYDROCHLORIDE 20 MG/ML
JELLY TOPICAL PRN
Status: DISCONTINUED | OUTPATIENT
Start: 2024-03-20 | End: 2024-03-20 | Stop reason: HOSPADM

## 2024-03-20 RX ORDER — SODIUM CHLORIDE 9 MG/ML
INJECTION, SOLUTION INTRAVENOUS PRN
Status: DISCONTINUED | OUTPATIENT
Start: 2024-03-20 | End: 2024-03-20 | Stop reason: HOSPADM

## 2024-03-20 RX ORDER — FENTANYL CITRATE 0.05 MG/ML
INJECTION, SOLUTION INTRAMUSCULAR; INTRAVENOUS PRN
Status: DISCONTINUED | OUTPATIENT
Start: 2024-03-20 | End: 2024-03-20 | Stop reason: ALTCHOICE

## 2024-03-20 RX ORDER — MIDAZOLAM HYDROCHLORIDE 1 MG/ML
INJECTION INTRAMUSCULAR; INTRAVENOUS PRN
Status: DISCONTINUED | OUTPATIENT
Start: 2024-03-20 | End: 2024-03-20 | Stop reason: ALTCHOICE

## 2024-03-20 RX ORDER — SODIUM CHLORIDE 0.9 % (FLUSH) 0.9 %
5-40 SYRINGE (ML) INJECTION EVERY 12 HOURS SCHEDULED
Status: DISCONTINUED | OUTPATIENT
Start: 2024-03-20 | End: 2024-03-20 | Stop reason: HOSPADM

## 2024-03-20 RX ORDER — LIDOCAINE HYDROCHLORIDE 10 MG/ML
40 INJECTION, SOLUTION INFILTRATION; PERINEURAL ONCE
Status: COMPLETED | OUTPATIENT
Start: 2024-03-20 | End: 2024-03-20

## 2024-03-20 RX ORDER — ACETYLCYSTEINE 200 MG/ML
600 SOLUTION ORAL; RESPIRATORY (INHALATION) ONCE
Status: DISCONTINUED | OUTPATIENT
Start: 2024-03-20 | End: 2024-03-23 | Stop reason: HOSPADM

## 2024-03-20 RX ORDER — SODIUM CHLORIDE 9 MG/ML
INJECTION, SOLUTION INTRAVENOUS CONTINUOUS
Status: DISCONTINUED | OUTPATIENT
Start: 2024-03-20 | End: 2024-03-20 | Stop reason: HOSPADM

## 2024-03-20 RX ORDER — FENTANYL CITRATE 50 UG/ML
100 INJECTION, SOLUTION INTRAMUSCULAR; INTRAVENOUS ONCE
Status: COMPLETED | OUTPATIENT
Start: 2024-03-20 | End: 2024-03-20

## 2024-03-20 RX ORDER — LIDOCAINE HYDROCHLORIDE 20 MG/ML
15 SOLUTION OROPHARYNGEAL ONCE
Status: DISCONTINUED | OUTPATIENT
Start: 2024-03-20 | End: 2024-03-20 | Stop reason: HOSPADM

## 2024-03-20 RX ORDER — EPINEPHRINE 1 MG/ML
1 INJECTION, SOLUTION, CONCENTRATE INTRAVENOUS ONCE
Status: DISCONTINUED | OUTPATIENT
Start: 2024-03-20 | End: 2024-03-20 | Stop reason: HOSPADM

## 2024-03-20 RX ORDER — LIDOCAINE HYDROCHLORIDE 40 MG/ML
2.5 INJECTION, SOLUTION RETROBULBAR ONCE
Status: DISCONTINUED | OUTPATIENT
Start: 2024-03-20 | End: 2024-03-20 | Stop reason: HOSPADM

## 2024-03-20 RX ADMIN — DONEPEZIL HYDROCHLORIDE 5 MG: 5 TABLET, FILM COATED ORAL at 20:34

## 2024-03-20 RX ADMIN — GUAIFENESIN 600 MG: 600 TABLET, EXTENDED RELEASE ORAL at 20:30

## 2024-03-20 RX ADMIN — ACETYLCYSTEINE 600 MG: 200 INHALANT RESPIRATORY (INHALATION) at 20:37

## 2024-03-20 RX ADMIN — MIRTAZAPINE 30 MG: 15 TABLET, FILM COATED ORAL at 20:30

## 2024-03-20 RX ADMIN — PIPERACILLIN AND TAZOBACTAM 3375 MG: 3; .375 INJECTION, POWDER, LYOPHILIZED, FOR SOLUTION INTRAVENOUS at 22:01

## 2024-03-20 RX ADMIN — SODIUM CHLORIDE, PRESERVATIVE FREE 10 ML: 5 INJECTION INTRAVENOUS at 11:45

## 2024-03-20 RX ADMIN — FENTANYL CITRATE 25 MCG: 50 INJECTION INTRAMUSCULAR; INTRAVENOUS at 08:51

## 2024-03-20 RX ADMIN — IOPAMIDOL 100 ML: 755 INJECTION, SOLUTION INTRAVENOUS at 15:04

## 2024-03-20 RX ADMIN — CALCIUM CARBONATE (ANTACID) CHEW TAB 500 MG 500 MG: 500 CHEW TAB at 20:34

## 2024-03-20 RX ADMIN — MIDAZOLAM 2 MG: 1 INJECTION INTRAMUSCULAR; INTRAVENOUS at 08:47

## 2024-03-20 RX ADMIN — SODIUM CHLORIDE: 9 INJECTION, SOLUTION INTRAVENOUS at 08:36

## 2024-03-20 RX ADMIN — IPRATROPIUM BROMIDE AND ALBUTEROL SULFATE 1 DOSE: .5; 3 SOLUTION RESPIRATORY (INHALATION) at 16:43

## 2024-03-20 RX ADMIN — LIDOCAINE HYDROCHLORIDE 8 ML: 20 INJECTION, SOLUTION EPIDURAL; INFILTRATION; INTRACAUDAL; PERINEURAL at 07:59

## 2024-03-20 RX ADMIN — PIPERACILLIN AND TAZOBACTAM 3375 MG: 3; .375 INJECTION, POWDER, LYOPHILIZED, FOR SOLUTION INTRAVENOUS at 16:55

## 2024-03-20 RX ADMIN — ENOXAPARIN SODIUM 30 MG: 100 INJECTION SUBCUTANEOUS at 11:45

## 2024-03-20 RX ADMIN — WATER 40 MG: 1 INJECTION INTRAMUSCULAR; INTRAVENOUS; SUBCUTANEOUS at 11:45

## 2024-03-20 RX ADMIN — IPRATROPIUM BROMIDE AND ALBUTEROL SULFATE 1 DOSE: .5; 3 SOLUTION RESPIRATORY (INHALATION) at 20:35

## 2024-03-20 RX ADMIN — LIDOCAINE HYDROCHLORIDE 5 ML: 10 INJECTION, SOLUTION EPIDURAL; INFILTRATION; INTRACAUDAL; PERINEURAL at 08:00

## 2024-03-20 RX ADMIN — PIPERACILLIN AND TAZOBACTAM 3375 MG: 3; .375 INJECTION, POWDER, LYOPHILIZED, FOR SOLUTION INTRAVENOUS at 06:17

## 2024-03-20 RX ADMIN — SODIUM CHLORIDE, PRESERVATIVE FREE 10 ML: 5 INJECTION INTRAVENOUS at 20:30

## 2024-03-20 RX ADMIN — ARFORMOTEROL TARTRATE: 15 SOLUTION RESPIRATORY (INHALATION) at 20:35

## 2024-03-20 RX ADMIN — MONTELUKAST 10 MG: 10 TABLET, FILM COATED ORAL at 20:30

## 2024-03-20 RX ADMIN — IPRATROPIUM BROMIDE AND ALBUTEROL SULFATE 1 DOSE: .5; 3 SOLUTION RESPIRATORY (INHALATION) at 11:27

## 2024-03-20 ASSESSMENT — PAIN SCALES - GENERAL
PAINLEVEL_OUTOF10: 0
PAINLEVEL_OUTOF10: 0

## 2024-03-20 ASSESSMENT — PAIN - FUNCTIONAL ASSESSMENT: PAIN_FUNCTIONAL_ASSESSMENT: NONE - DENIES PAIN

## 2024-03-20 NOTE — PROGRESS NOTES
Spiritual Care Assessment/Progress Note  [facility]    Name: Anuel Bermudez MRN: [id number]    Age: 78 y.o.     Sex: male   Language: English     Date: 3/20/2024            Total Time Calculated: 21 min              Spiritual Assessment begun in [facility] 3 MED TELE  Service Provided For:: Patient and family together  Referral/Consult From:: Multi-disciplinary team  Encounter Overview/Reason : Initial Encounter    Spiritual beliefs:      [] Involved in a maile tradition/spiritual practice:      [] Supported by a maile community:      [] Claims no spiritual orientation:      [] Seeking spiritual identity:           [] Adheres to an individual form of spirituality:      [] Not able to assess:                Identified resources for coping and support system:   Support System: Family members       [] Prayer                  [] Devotional reading               [] Music                  [] Guided Imagery     [] Pet visits                                        [] Other: (COMMENT)     Specific area/focus of visit   Encounter:    Crisis:    Spiritual/Emotional needs: Type: Spiritual Support, Difficult news received, Emotional Distress  Ritual, Rites and Sacraments:    Grief, Loss, and Adjustments: Type: Adjustment to illness  Ethics/Mediation:    Behavioral Health:    Palliative Care:    Advance Care Planning:      Plan/Referrals: Continue Support (comment) (chaplains are available)    Narrative:   Visited was in 3215, where patient's nurse called for emotional/spiritual support. Patient had just received some difficult news and was in distressed and tearful. His daughter, Kirsten, was present during visit. They shared news that was causing distress and process their thoughts and feelings about pt's medical condition. Patient shared that daughter was his only child, and he wouldn't know what he would have done without her. Patient also has two grand children and one great grand.  listened actively

## 2024-03-20 NOTE — PLAN OF CARE
Problem: Respiratory - Adult  Goal: Achieves optimal ventilation and oxygenation  3/19/2024 2013 by Enrique Velasquez RCP  Outcome: Progressing  3/19/2024 1050 by Sheyla Weinberg RN  Outcome: Progressing  3/19/2024 0836 by Shauna Desai, RT  Outcome: Progressing

## 2024-03-20 NOTE — PROGRESS NOTES
Pulmonary, Critical Care, and Sleep Medicine      Name: Anuel Bermudez MRN: [id number]   : 1945 Hospital: [facility]   Date: 3/20/2024  Admission date: 3/18/2024 Hospital Day: 3   Patient PCP: Bonifacio Bassett MD    History:   3/20/24 Pt is acutely ill . Medical records and data reviewed. Ready for bronch.     IMPRESSION:   Chest pain-mid chest anteriorly  Chronic left lower lobe collapse  History of asthma versus COPD not followed by pulmonary; Symbicort with as needed albuterol at home  Long-term tobacco use with cigarettes and cigars quit last year in -high risk for lung cancer  Chronic dysphagia due to cervical vertebral pathology-sees Dr. Guerra  Possible aspiration pneumonitis  Possible dementia on donezepil  Baseline ambulatory with walking cane  Body mass index is 18.45 kg/m².      RECOMMENDATIONS/PLAN:   Agree with bronchodilators   bronchoscopy and airway inspection with possible endobronchial biopsies brushings to go along with washings NOON 3/20/24  aspiration precautions  Mucolytic's  Agree with antibiotics for now   Would send patient home with prescription of Breztri or Trelegy   Will follow-up as outpatient with further testing as indicated   DVT prophylaxis  Prescription drug management with home med reconciliation reviewed  Thanks you for asking us to see pt while in the hospital     Interval history:         [x] High complexity decision making was performed  [x] See my orders for details      Initial HPI:      I was asked by Elizabeth Nye MD to see Anuel Bermudez  a 78 y.o.   male in consultation for a chief complaint of left lower lobe collapse    Excerpts from admission 3/18/2024 or consult notes as follows:   \" Anuel Bermudez is a 78 y.o.  male with PMHx significant as below presented with worsening shortness of breath since yesterday.  He had some cough with phlegm for last few days, however yesterday he had worsening shortness of  images and/or ordered Radiology tests  Called Endoscopy to schedule Diagnostic endoscopies with identified risk factors.  discussed my assessment/management with : Nursing, Hospitalist for coordination of care      Ramez Fry MD

## 2024-03-20 NOTE — PROGRESS NOTES
Patient for Bronchoscopy this morning, called and gave report to the unit, Patient NPO from midnight. On room air, alert and oriented.

## 2024-03-20 NOTE — PROGRESS NOTES
Hospitalist Progress Note    NAME:   Anuel Bermudez   : 1945   MRN: [id number]     Date/Time: 3/20/2024 4:43 PM  Patient PCP: Bonifacio Bassett MD    Estimated discharge date: 3/22  Barriers: Pulmonary clearance, bronchoscopy in a.m.      Assessment / Plan:    Left lower lobe lung collapse/atelectasis POA  Suspected aspiration pneumonia/mucous plugging POA     CT reviewed:  1. No evidence of pulmonary embolus.     2. Left lower lobe collapse. There appears to be secretions within the left  lower lobe bronchus as well as possible mucous plugging in the lingular  bronchus. However the presence of and endobronchial mass is not excluded.     Pulm on board  3/20 bronchoscopy done today by pulmonology and it showed left mainstem mass, tissue biopsy sent to pathology  Currently on room air, keep oxygen saturation greater than 90%  Zosyn suspected aspiration pneumonia, reported allergy to penicillin but already received zosyn, which he tolerated.  DuoNebs as needed  Mucomyst inhalation BID  SLP evaluation appreciated, on regular diet and mildly thick liquid,   reflux precautions  Mucinex for symptomatic management  Heme-onc on board  Planning for staging, CT abdomen and brain MRI ordered     Chronic difficulty swallowing:  Due to cervical verterbal pathology, follows Dr. Guerra  Recommend outpatient follow up  SLP eval here  Had work up done at [facility] during admission in [facility]24  Takes regular diet with nectar thick liquid at home, continue this for now  iet: Regular and mildly thick liquids  --Medications whole with mildly thick liquids   --Upright all PO intake   --Single bites/sips   --Oral hygiene 2-3x/day  --Patient prefers drinking via cup        History of asthma  Continue nebs/ switched to hospital formulary     Suspected history of dementia:  On Donepezil, continue with this.  Continue mirtazapine        Medical Decision Making:   I personally reviewed labs: CBC, BMP, lactate, blood  -- -- 96 30 99 %   03/20/24 0853 121/74 -- -- 100 (!) 31 100 %   03/20/24 0844 (!) 149/64 -- -- 97 (!) 34 100 %   03/20/24 0732 127/88 97.8 °F (36.6 °C) Temporal 97 22 94 %   03/20/24 0322 139/78 97.7 °F (36.5 °C) -- 97 16 97 %   03/19/24 2012 -- -- -- -- -- 96 %   03/19/24 1956 136/78 97.2 °F (36.2 °C) Oral (!) 102 19 96 %         No intake or output data in the 24 hours ending 03/20/24 1643       I had a face to face encounter and independently examined this patient on 3/20/2024, as outlined below:  PHYSICAL EXAM:  General: Alert, cooperative  EENT:  EOMI. Anicteric sclerae.  Resp:  CTA bilaterally, no wheezing or rales.  No accessory muscle use  CV:  Regular  rhythm,  No edema  GI:  Soft, Non distended, Non tender.  +Bowel sounds  Neurologic:  Alert and oriented X 3, normal speech,   Psych:   Good insight. Not anxious nor agitated  Skin:  No rashes.  No jaundice    Reviewed most current lab test results and cultures  YES  Reviewed most current radiology test results   YES  Review and summation of old records today    NO  Reviewed patient's current orders and MAR    YES  PMH/SH reviewed - no change compared to H&P    Procedures: see electronic medical records for all procedures/Xrays and details which were not copied into this note but were reviewed prior to creation of Plan.      LABS:  I reviewed today's most current labs and imaging studies.  Pertinent labs include:  Recent Labs     03/18/24  1226 03/19/24  0341 03/20/24  1207   WBC 13.4* 11.1 12.7*   HGB 13.6 12.5 12.7   HCT 42.4 37.1 39.5    265 263       Recent Labs     03/18/24  1226 03/19/24  0341 03/20/24  1207   * 135* 138   K 4.1 4.0 3.7    108 110*   CO2 26 22 24   GLUCOSE 122* 101* 95   BUN 24* 25* 17   CREATININE 1.01 1.05 0.81   CALCIUM 8.3* 8.3* 8.5   MG 1.9  --   --    LABALBU 2.9*  --   --    BILITOT 0.3  --   --    AST 14*  --   --    ALT 21  --   --          Signed: Alexandra Humphrey MD    Total Time: 51 mins

## 2024-03-20 NOTE — CONSULTS
Systems:  Constitutional No fevers, chills, night sweats, excessive fatigue or weight loss.   Allergic/Immunologic No recent allergic reactions   Eyes No significant visual difficulties. No diplopia.   ENMT No problems with hearing, no sore throat, no sinus drainage.   Endocrine No hot flashes or night sweats. No cold intolerance, polyuria, or polydipsia   Hematologic/Lymphatic No easy bruising or bleeding.  The patient denies any tender or palpable lymph nodes   Breasts No abnormal masses of breast, nipple discharge or pain.   Respiratory + dyspnea on exertion, No orthopnea, chest pain, +cough or hemoptysis.   Cardiovascular No anginal chest pain, irregular heart beat, tachycardia, palpitations or orthopnea.   Gastrointestinal No nausea, vomiting, diarrhea, constipation, cramping, dysphagia, reflux, heartburn, GI bleeding, or early satiety.  No change in bowel habits.   Genitourinary (M) No hematuria, dysuria, increased frequency, urgency, hesitancy or incontinence.   Musculoskeletal No joint pain, swelling or redness. No decreased range of motion.   Integumentary No chronic rashes, inflammation, ulcerations, pruritus, petechiae, purpura, ecchymoses, or skin changes.   Neurologic No headache, blurred vision, and no areas of focal weakness or numbness. Normal gait. No sensory problems.   Psychiatric No insomnia, depression, jennifer or mood swings.  No psychotropic drugs.       Physical Exam:  Constitutional Alert, cooperative, oriented. Mood and affect appropriate. Appears close to chronological age. Well nourished. Well developed.   Head Normocephalic; no scars   Eyes Conjunctivae and sclerae are clear and without icterus. Pupils are reactive and equal.   ENMT Sinuses are nontender.  No oral exudates, ulcers, masses, thrush or mucositis. Oropharynx clear.  Tongue normal.   Neck Supple without masses or thyromegaly. No jugular venous distension.   Hematologic/Lymphatic No petechiae or purpura.  No tender or palpable  lymph nodes in the cervical, supraclavicular, axillary or inguinal area.   Respiratory Decreased throughout left lung fields   Cardiovascular Regular rate and rhythm of heart without murmurs, gallops or rubs.   Chest / Line Site Chest is symmetric with no chest wall deformities.   Abdomen Non-tender, non-distended, no masses, ascites or hepatosplenomegaly. Good bowel sounds. No guarding or rebound tenderness. No pulsatile masses.   Musculoskeletal No tenderness or swelling, normal range of motion without obvious weakness.   Extremities No visible deformities, no cyanosis, clubbing or edema.    Skin No rashes, scars, or lesions suggestive of malignancy. No petechiae, purpura, or ecchymoses. No excoriations.    Neurologic No sensory or motor deficits, normal cerebellar function, normal gait, cranial nerves intact.   Psychiatric Alert and oriented times three. Coherent speech. Verbalizes understanding of our discussions today.

## 2024-03-20 NOTE — OP NOTE
inspected and irregular mucosa at takeoff RLL laterally. RML fishmouthed but no distal lesion seen when looking passed narrowing.  -- The left-sided endobronchial anatomy was completely inspected and distal LMSB mass seen. Fungating irregular, friable lesion seen . No opening of JOSLEITO or LLL seen.     Specimens:   Bronchial washings were sent for  microbiology and cytology  Endobronchial biopsies from the mass left distal mainstem  were sent for surgical pathology  Cytology brushings from left lung mass were sent    Rapid On-Site Evaluation:  NA    Complications: none    Estimated Blood Loss: less than 50     Ramez Fry MD     Electronically signed by Ramez Fry MD on 3/20/2024 at 9:25 AM

## 2024-03-20 NOTE — PROGRESS NOTES
Speech Pathology Contact Note:    SLP following for dysphagia rehab. Patient currently off the floor for bronchoscopy. SLP will follow up as patient appropriate. Thanks!     Ayse Larson CCC-SLP

## 2024-03-20 NOTE — CARE COORDINATION
Care Management Initial Assessment       RUR:  12%   Readmission? No  1st IM letter given? Yes - 3/18/24  1st  letter given: No         03/20/24 7125   Service Assessment   Patient Orientation Other (see comment)  (Pt out of room.  Info obtained from pt's daughterDeja.)   History Provided By Child/Family   Primary Caregiver Self   Support Systems Children;Friends/Neighbors  (sister)   Patient's Healthcare Decision Maker is: Legal Next of Kin   PCP Verified by CM Yes  (Dr. Bonifacio Bassett)   Last Visit to PCP Within last 3 months  (\"in Feb.\")   Prior Functional Level Independent in ADLs/IADLs   Current Functional Level Independent in ADLs/IADLs   Can patient return to prior living arrangement Yes   Financial Resources Medicare   Community Resources Other (Comment)  (Pt was open to [facility] pta.)   Social/Functional History   Lives With Alone   Type of Home House  (1 floor 3 [address]ant[address])   Home Equipment Cane;Walker, rolling   Active  Yes   Discharge Planning   Type of Residence House   Condition of Participation: Discharge Planning   The Patient and/or Patient Representative was provided with a Choice of Provider? Patient Representative   Name of the Patient Representative who was provided with the Choice of Provider and agrees with the Discharge Plan?  Dejajoshua Bowden   The Patient and/Or Patient Representative agree with the Discharge Plan? Yes   Freedom of Choice list was provided with basic dialogue that supports the patient's individualized plan of care/goals, treatment preferences, and shares the quality data associated with the providers?  Yes       CM met with pt's daughter at bedside to complete initial assessment, introduce self/role and verify demographics.  Pt was currently off the floor.  Pt lives alone and was open to [facility] pta.  Pt has a cane and RW.  Pt is an active .  Pt uses the [facility].  Pt was independent with his ADLs pta.  Pt has a hx of IPR at [facility].  Pt

## 2024-03-20 NOTE — PRE SEDATION
Sedation Pre-Procedure Note    Patient Name: Anuel Bermudez   YOB: 1945  Room/Bed: ENDO/PL  Medical Record Number: [id number]  Date: 3/20/2024   Time: 8:41 AM       Indication:  left lung collapse    Consent: I have discussed with the patient and/or the patient representative the indication, alternatives, and the possible risks and/or complications of the planned procedure and the anesthesia methods. The patient and/or patient representative appear to understand and agree to proceed.    Vital Signs:   Vitals:    03/20/24 0732   BP: 127/88   Pulse: 97   Resp: 22   Temp: 97.8 °F (36.6 °C)   SpO2: 94%       Past Medical History:   has no past medical history on file.    Past Surgical History:   has no past surgical history on file.    Medications:   Scheduled Meds:    sodium chloride flush  5-40 mL IntraVENous 2 times per day    lidocaine PF  2.5 mL Other Once    lidocaine   Topical Once    lidocaine viscous hcl  15 mL Mouth/Throat Once    midazolam  5 mg IntraVENous Once    fentanNYL  100 mcg IntraVENous Once    EPINEPHrine  1 mg Endotracheal Once    acetylcysteine  600 mg IntraTRACHeal Once    guaiFENesin  600 mg Oral BID    donepezil  5 mg Oral Nightly    mirtazapine  30 mg Oral Nightly    montelukast  10 mg Oral Nightly    arformoterol 15 mcg-budesonide 0.25 mg neb solution   Nebulization BID RT    piperacillin-tazobactam  3,375 mg IntraVENous q8h    ipratropium 0.5 mg-albuterol 2.5 mg  1 Dose Inhalation Q4H WA RT    methylPREDNISolone  40 mg IntraVENous Daily    acetylcysteine  600 mg Inhalation BID RT    sodium chloride flush  5-40 mL IntraVENous 2 times per day    enoxaparin  30 mg SubCUTAneous Daily     Continuous Infusions:    sodium chloride      sodium chloride 125 mL/hr at 03/20/24 0836    sodium chloride       PRN Meds: sodium chloride flush, sodium chloride, lidocaine, sodium chloride flush, sodium chloride, ondansetron **OR** ondansetron, polyethylene glycol, acetaminophen **OR**

## 2024-03-20 NOTE — PROGRESS NOTES
Mri pending      MRI SCREENING SHEET NEEDS TO BE COMPLETED AND SIGNED    CALL [phone] WHEN THIS IS DONE    FAX  [phone]

## 2024-03-21 ENCOUNTER — APPOINTMENT (OUTPATIENT)
Facility: HOSPITAL | Age: 79
DRG: 853 | End: 2024-03-21
Payer: MEDICARE

## 2024-03-21 LAB
ANION GAP SERPL CALC-SCNC: 4 MMOL/L (ref 5–15)
BUN SERPL-MCNC: 20 MG/DL (ref 6–20)
BUN/CREAT SERPL: 22 (ref 12–20)
CALCIUM SERPL-MCNC: 8.7 MG/DL (ref 8.5–10.1)
CHLORIDE SERPL-SCNC: 111 MMOL/L (ref 97–108)
CO2 SERPL-SCNC: 23 MMOL/L (ref 21–32)
CREAT SERPL-MCNC: 0.92 MG/DL (ref 0.7–1.3)
CYTOLOGY-NON GYN: NORMAL
CYTOLOGY-NON GYN: NORMAL
ERYTHROCYTE [DISTWIDTH] IN BLOOD BY AUTOMATED COUNT: 14.6 % (ref 11.5–14.5)
GLUCOSE SERPL-MCNC: 89 MG/DL (ref 65–100)
HCT VFR BLD AUTO: 38.4 % (ref 36.6–50.3)
HGB BLD-MCNC: 12.6 G/DL (ref 12.1–17)
MCH RBC QN AUTO: 33.6 PG (ref 26–34)
MCHC RBC AUTO-ENTMCNC: 32.8 G/DL (ref 30–36.5)
MCV RBC AUTO: 102.4 FL (ref 80–99)
NRBC # BLD: 0 K/UL (ref 0–0.01)
NRBC BLD-RTO: 0 PER 100 WBC
PLATELET # BLD AUTO: 246 K/UL (ref 150–400)
PMV BLD AUTO: 8.3 FL (ref 8.9–12.9)
POTASSIUM SERPL-SCNC: 3.9 MMOL/L (ref 3.5–5.1)
RBC # BLD AUTO: 3.75 M/UL (ref 4.1–5.7)
SODIUM SERPL-SCNC: 138 MMOL/L (ref 136–145)
WBC # BLD AUTO: 12 K/UL (ref 4.1–11.1)

## 2024-03-21 PROCEDURE — 6360000002 HC RX W HCPCS: Performed by: STUDENT IN AN ORGANIZED HEALTH CARE EDUCATION/TRAINING PROGRAM

## 2024-03-21 PROCEDURE — 85027 COMPLETE CBC AUTOMATED: CPT

## 2024-03-21 PROCEDURE — 80048 BASIC METABOLIC PNL TOTAL CA: CPT

## 2024-03-21 PROCEDURE — 1100000003 HC PRIVATE W/ TELEMETRY

## 2024-03-21 PROCEDURE — 70553 MRI BRAIN STEM W/O & W/DYE: CPT

## 2024-03-21 PROCEDURE — 92526 ORAL FUNCTION THERAPY: CPT

## 2024-03-21 PROCEDURE — 6370000000 HC RX 637 (ALT 250 FOR IP): Performed by: STUDENT IN AN ORGANIZED HEALTH CARE EDUCATION/TRAINING PROGRAM

## 2024-03-21 PROCEDURE — 2700000000 HC OXYGEN THERAPY PER DAY

## 2024-03-21 PROCEDURE — 94640 AIRWAY INHALATION TREATMENT: CPT

## 2024-03-21 PROCEDURE — 6370000000 HC RX 637 (ALT 250 FOR IP): Performed by: INTERNAL MEDICINE

## 2024-03-21 PROCEDURE — 94761 N-INVAS EAR/PLS OXIMETRY MLT: CPT

## 2024-03-21 PROCEDURE — 2580000003 HC RX 258: Performed by: STUDENT IN AN ORGANIZED HEALTH CARE EDUCATION/TRAINING PROGRAM

## 2024-03-21 PROCEDURE — A9579 GAD-BASE MR CONTRAST NOS,1ML: HCPCS | Performed by: INTERNAL MEDICINE

## 2024-03-21 PROCEDURE — 36415 COLL VENOUS BLD VENIPUNCTURE: CPT

## 2024-03-21 PROCEDURE — 6360000004 HC RX CONTRAST MEDICATION: Performed by: INTERNAL MEDICINE

## 2024-03-21 PROCEDURE — 94669 MECHANICAL CHEST WALL OSCILL: CPT

## 2024-03-21 RX ORDER — LORAZEPAM 1 MG/1
1 TABLET ORAL ONCE
Status: COMPLETED | OUTPATIENT
Start: 2024-03-21 | End: 2024-03-21

## 2024-03-21 RX ADMIN — ARFORMOTEROL TARTRATE: 15 SOLUTION RESPIRATORY (INHALATION) at 20:32

## 2024-03-21 RX ADMIN — DONEPEZIL HYDROCHLORIDE 5 MG: 5 TABLET, FILM COATED ORAL at 20:26

## 2024-03-21 RX ADMIN — PIPERACILLIN AND TAZOBACTAM 3375 MG: 3; .375 INJECTION, POWDER, LYOPHILIZED, FOR SOLUTION INTRAVENOUS at 05:41

## 2024-03-21 RX ADMIN — IPRATROPIUM BROMIDE AND ALBUTEROL SULFATE 1 DOSE: .5; 3 SOLUTION RESPIRATORY (INHALATION) at 11:31

## 2024-03-21 RX ADMIN — CALCIUM CARBONATE (ANTACID) CHEW TAB 500 MG 500 MG: 500 CHEW TAB at 20:26

## 2024-03-21 RX ADMIN — ACETYLCYSTEINE 600 MG: 200 INHALANT RESPIRATORY (INHALATION) at 20:32

## 2024-03-21 RX ADMIN — WATER 40 MG: 1 INJECTION INTRAMUSCULAR; INTRAVENOUS; SUBCUTANEOUS at 08:23

## 2024-03-21 RX ADMIN — IPRATROPIUM BROMIDE AND ALBUTEROL SULFATE 1 DOSE: .5; 3 SOLUTION RESPIRATORY (INHALATION) at 20:32

## 2024-03-21 RX ADMIN — PIPERACILLIN AND TAZOBACTAM 3375 MG: 3; .375 INJECTION, POWDER, LYOPHILIZED, FOR SOLUTION INTRAVENOUS at 14:57

## 2024-03-21 RX ADMIN — LORAZEPAM 1 MG: 1 TABLET ORAL at 19:51

## 2024-03-21 RX ADMIN — GADOTERIDOL 10 ML: 279.3 INJECTION, SOLUTION INTRAVENOUS at 21:31

## 2024-03-21 RX ADMIN — SODIUM CHLORIDE, PRESERVATIVE FREE 10 ML: 5 INJECTION INTRAVENOUS at 20:27

## 2024-03-21 RX ADMIN — IPRATROPIUM BROMIDE AND ALBUTEROL SULFATE 1 DOSE: .5; 3 SOLUTION RESPIRATORY (INHALATION) at 08:27

## 2024-03-21 RX ADMIN — GUAIFENESIN 600 MG: 600 TABLET, EXTENDED RELEASE ORAL at 20:26

## 2024-03-21 RX ADMIN — IPRATROPIUM BROMIDE AND ALBUTEROL SULFATE 1 DOSE: .5; 3 SOLUTION RESPIRATORY (INHALATION) at 16:39

## 2024-03-21 RX ADMIN — MIRTAZAPINE 30 MG: 15 TABLET, FILM COATED ORAL at 20:26

## 2024-03-21 RX ADMIN — GUAIFENESIN 600 MG: 600 TABLET, EXTENDED RELEASE ORAL at 08:23

## 2024-03-21 RX ADMIN — PIPERACILLIN AND TAZOBACTAM 3375 MG: 3; .375 INJECTION, POWDER, LYOPHILIZED, FOR SOLUTION INTRAVENOUS at 22:30

## 2024-03-21 RX ADMIN — SODIUM CHLORIDE, PRESERVATIVE FREE 10 ML: 5 INJECTION INTRAVENOUS at 08:23

## 2024-03-21 RX ADMIN — MONTELUKAST 10 MG: 10 TABLET, FILM COATED ORAL at 20:26

## 2024-03-21 RX ADMIN — ARFORMOTEROL TARTRATE: 15 SOLUTION RESPIRATORY (INHALATION) at 08:33

## 2024-03-21 RX ADMIN — ENOXAPARIN SODIUM 30 MG: 100 INJECTION SUBCUTANEOUS at 08:23

## 2024-03-21 RX ADMIN — ACETYLCYSTEINE 600 MG: 200 INHALANT RESPIRATORY (INHALATION) at 08:27

## 2024-03-21 ASSESSMENT — PAIN SCALES - GENERAL
PAINLEVEL_OUTOF10: 0

## 2024-03-21 NOTE — PROGRESS NOTES
Physician Progress Note      PATIENT:               SISI SILVER  [facility] #:                  [id number]  :                       1945  ADMIT DATE:       3/18/2024 12:03 PM  DISCH DATE:  RESPONDING  PROVIDER #:        Alexandra Humphrey MD          QUERY TEXT:    Pt admitted with Suspected aspiration pneumonia, noted to have WBC 13.4, HR   96, and RR 23.    Please document in the progress notes and discharge summary if you are   evaluating and /or treating any of the following:      The medical record reflects the following:  Risk Factors: Suspected aspiration pneumonia  Clinical Indicators: 3/18 WBC 13.4, procal <0.05, POC LA 0.51.  HR 96, RR 23  Treatment: CXR, lab monitoring, Tylenol prn, Zosyn IV started on 3/18      Thank you,  Elizabeth Chavarria RN, CDI, CCDS  [email or web address] or via Perfect Serve  Options provided:  -- Sepsis, present on admission  -- Sepsis, present on admission, now resolved  -- pneumonia without Sepsis  -- Other - I will add my own diagnosis  -- Disagree - Not applicable / Not valid  -- Disagree - Clinically unable to determine / Unknown  -- Refer to Clinical Documentation Reviewer    PROVIDER RESPONSE TEXT:    This patient has sepsis which was present on admission.    Query created by: ELIZABETH CHAVARRIA on 3/19/2024 12:27 PM      Electronically signed by:  Alexandra Humprhey MD 3/21/2024 9:16 AM

## 2024-03-21 NOTE — PROGRESS NOTES
Bedside and Verbal shift change report given to ROBIN Richardson (oncoming nurse) by JOSE DE JESUS Guthrie (offgoing nurse). Report included the following information Nurse Handoff Report, Adult Overview, Intake/Output, MAR, and Recent Results. Patient awaits MRI screening form was faxed.

## 2024-03-21 NOTE — PROGRESS NOTES
-Hematology / Oncology ([facility]) -  -Primary Oncologist-   -CC-    -S-  Feeling well overall, still with SOB/CRAWFORD    -O-    Patient Vitals for the past 24 hrs:   Temp Pulse Resp BP SpO2   03/21/24 0827 -- -- -- -- 94 %   03/21/24 0815 97.5 °F (36.4 °C) 86 18 138/78 95 %   03/21/24 0259 98.6 °F (37 °C) 90 16 131/81 99 %   03/20/24 2029 98.1 °F (36.7 °C) 96 19 (!) 131/53 97 %   03/20/24 1645 97.8 °F (36.6 °C) 92 20 127/79 97 %   03/20/24 1643 -- -- -- -- 97 %   03/20/24 1519 97.9 °F (36.6 °C) 88 20 121/67 97 %   03/20/24 1127 -- -- -- -- 97 %     No intake/output data recorded.    ROS: 12 point obtained and negative other than stated in HPI    PE:  Gen: nad  Chest: bilateral breath sounds present  Cardiac: rrr  Abd: s/nt    -Labs-    Recent Labs     03/18/24  1226 03/19/24  0341 03/20/24  1207 03/21/24  0419   WBC 13.4* 11.1 12.7* 12.0*   HGB 13.6 12.5 12.7 12.6    265 263 246   * 135* 138 138   K 4.1 4.0 3.7 3.9   BUN 24* 25* 17 20   ALT 21  --   --   --    MG 1.9  --   --   --        -Imaging-   CT Result (most recent):  CT ABDOMEN PELVIS W IV CONTRAST 03/20/2024    Narrative  EXAM: CT ABDOMEN PELVIS W IV CONTRAST  ACC#: [id number]    INDICATION: lung cancer staging    COMPARISON: Chest CTA 3/18/2024    CONTRAST: 100 mL of Isovue-370.    ORAL CONTRAST: None.    TECHNIQUE:  Following the intravenous administration of intravenous contrast, axial images  were obtained through the abdomen and pelvis. Coronal and sagittal  reconstructions were generated. CT dose reduction was achieved through use of a  standardized protocol tailored for this examination and automatic exposure  control for dose modulation.    FINDINGS:  Left lower lobe collapse is again noted.    There is diffuse fatty replacement of the liver with no discrete mass.   The  spleen enhances appropriately.   The pancreas enhances homogeneously. No  discrete masses.  No large calcified stones in the gallbladder.  No significant  biliary

## 2024-03-21 NOTE — PLAN OF CARE
Speech LAnguage Pathology TREATMENT/DISCHARGE    Patient: Anuel Bermudez (78 y.o. male)  Date: 3/21/2024  Primary Diagnosis: Collapse of left lung [J98.11]  Aspiration pneumonia of left lower lobe, unspecified aspiration pneumonia type (HCC) [J69.0]  Procedure(s) (LRB):  BRONCHOSCOPY (N/A) 1 Day Post-Op   Precautions: aspiration precautions                     ASSESSMENT :  Patient continues to be followed by SLP for dysphagia. Upon SLP arrival, patient sitting on edge of bed and feeding himself breakfast tray (waffle, sausage link, colmenares, fruit, mildly thick juice). No immediate overt clinical s/s aspiration. Delayed cough x1 noted throughout entire session and question relation to PO. Patient with MBS completed 1/29/[facility] at [facility] with findings of \"Intermittent penetration and small amount of aspiration (x1) occurs during the swallow. Aspirated material clears back above the vocal folds after the swallow.\" Patient continues to reports feeling most comfortable with the mildly thick liquids and continues to pleasantly decline further imaging while in house/wishing to stay on mildly thick liquids. Per chart review, \"bronchoscopy done today by pulmonology and it showed left mainstem mass, tissue biopsy sent to pathology\". SLP reviewed aspiration precautions. Patient verbalized understanding and reports tolerating current diet/wanting to continue to mildly thick liquids. Discussed free water protocol with thin water for comfort/improved hydration. Given patient tolerance of PO diet and request to remain on thickened liquids, SLP will sign off as no further acute SLP services warranted. Please re-consult if concerns arise in the future.     Patient will be discharged from skilled speech-language pathology services at this time.     PLAN :  Recommendations and Planned Interventions:  Diet: Regular and mildly thick liquids  --Medications whole with mildly thick liquids   --Upright all PO intake

## 2024-03-21 NOTE — PROGRESS NOTES
Hospitalist Progress Note    NAME:   Anuel Bermudez   : 1945   MRN: [id number]     Date/Time: 3/21/2024 1:30 PM  Patient PCP: Bonifacio Bassett MD    Estimated discharge date: 3/22  Barriers: MRI, home O2      Assessment / Plan:    Left lower lobe lung collapse/atelectasis POA  Suspected aspiration pneumonia/mucous plugging POA     CT reviewed:  1. No evidence of pulmonary embolus.     2. Left lower lobe collapse. There appears to be secretions within the left  lower lobe bronchus as well as possible mucous plugging in the lingular  bronchus. However the presence of and endobronchial mass is not excluded.     Pulm on board  3/20 bronchoscopy done today by pulmonology and it showed left mainstem mass, tissue biopsy sent to pathology  Currently on room air, keep oxygen saturation greater than 90%  Zosyn suspected aspiration pneumonia, reported allergy to penicillin but already received zosyn, which he tolerated.  DuoNebs as needed  Mucomyst inhalation BID  SLP evaluation appreciated, on regular diet and mildly thick liquid,   reflux precautions  Mucinex for symptomatic management  Heme-onc on board  CT abdomen done  MRI possible to be done today       Chronic difficulty swallowing:  Due to cervical verterbal pathology, follows Dr. Guerra  Recommend outpatient follow up  SLP eval here  Had work up done at [facility] during admission in 2024  Takes regular diet with nectar thick liquid at home, continue this for now  iet: Regular and mildly thick liquids  --Medications whole with mildly thick liquids   --Upright all PO intake   --Single bites/sips   --Oral hygiene 2-3x/day  --Patient prefers drinking via cup        History of asthma  Continue nebs/ switched to hospital formulary     Suspected history of dementia:  On Donepezil, continue with this.  Continue mirtazapine        Medical Decision Making:   I personally reviewed labs: CBC, BMP, lactate, blood culture  I personally reviewed  imaging:CTA chest  I personally reviewed EKG: None  Toxic drug monitoring: none  Discussed case with: Patient, RN, case management on IDR's,         Code Status: DNR, as per his wish, confirmed  DVT Prophylaxis: lovneox  Baseline: Ambulatory with cane at home      Subjective:     Chief Complaint / Reason for Physician Visit:  F/U for left lower lobe lung collapse/atelectasis due to mucous plugging, asthma/COPD, chronic dysphagia due to cervical vertebral pathology  \" Patient seen and examined at the bedside, he feels better than yesterday, more encouragement today, he will go for MRI\".  Discussed with RN events overnight.       Objective:     VITALS:   Last 24hrs VS reviewed since prior progress note. Most recent are:  Patient Vitals for the past 24 hrs:   BP Temp Temp src Pulse Resp SpO2   03/21/24 1131 -- -- -- -- -- 95 %   03/21/24 0827 -- -- -- -- -- 94 %   03/21/24 0815 138/78 97.5 °F (36.4 °C) -- 86 18 95 %   03/21/24 0259 131/81 98.6 °F (37 °C) Oral 90 16 99 %   03/20/24 2029 (!) 131/53 98.1 °F (36.7 °C) Oral 96 19 97 %   03/20/24 1645 127/79 97.8 °F (36.6 °C) Oral 92 20 97 %   03/20/24 1643 -- -- -- -- -- 97 %   03/20/24 1519 121/67 97.9 °F (36.6 °C) Oral 88 20 97 %           Intake/Output Summary (Last 24 hours) at 3/21/2024 1330  Last data filed at 3/20/2024 1645  Gross per 24 hour   Intake 240 ml   Output 700 ml   Net -460 ml          I had a face to face encounter and independently examined this patient on 3/21/2024, as outlined below:  PHYSICAL EXAM:  General: Alert, cooperative  EENT:  EOMI. Anicteric sclerae.  Resp:  CTA bilaterally, no wheezing or rales.  No accessory muscle use  CV:  Regular  rhythm,  No edema  GI:  Soft, Non distended, Non tender.  +Bowel sounds  Neurologic:  Alert and oriented X 3, normal speech,   Psych:   Good insight. Not anxious nor agitated  Skin:  No rashes.  No jaundice    Reviewed most current lab test results and cultures  YES  Reviewed most current radiology test results

## 2024-03-21 NOTE — PROGRESS NOTES
Pulmonary, Critical Care, and Sleep Medicine      Name: Anuel Bermudez MRN: [id number]   : 1945 Hospital: [facility]   Date: 3/21/2024  Admission date: 3/18/2024 Hospital Day: 4   Patient PCP: Bonifacio Bassett MD    History:   3/20/24 Pt is acutely ill . Medical records and data reviewed.  Bronchoscopy performed .     3/21/2024: Results of bronchoscopy discussed with patient at bedside and daughter by phone.  Also discussed with attending's and consulting.  Patient remains on 3 L/min nasal cannula oxygen.  No shortness of breath at rest.  Daughter says patient just completed physical therapy and was getting stronger.  She does not want him to get weak while here.    I spoke with [facility] interventional pulmonology.  They are willing to see patient, reviewed case and images to see if there are any treatment options for his obstructing lesion.  Would probably be palliative until he could undergo definitive treatment.  Further staging in process per Dr. Ruiz.  Demographics and referral sent to [facility].  Patient and daughter aware.  Appointment to be made.    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 Guerra  Possible aspiration pneumonitis  Possible dementia on donezepil  Baseline ambulatory with walking cane  Body mass index is 18.45 kg/m².      RECOMMENDATIONS/PLAN:   Would send patient home with prescription of Breztri or Trelegy; with as needed albuterol  Patient should complete course of oral antibiotics in  the right lung. The left lower lobe is completely collapsed. Left upper lobe and lingula are aerated. Right lung is hyperinflated and clear of acute process. Old left lateral rib fracture is noted.     1. The left lower lobe is completely collapsed. This causes overall decrease in size of the left lung when compared the right lung. Right lung is hyperinflated. There is mediastinal shift to the left due to the volume loss in the left hemithorax. CT scan of the chest with contrast would better evaluate the collapsed left lower lobe.      I personally reviewed laboratory testing, pulmonary imaging, radiology reports, and pulse oximetry data.        Please note that this dictation was completed with [employer], the computer voice recognition software.  Quite often unanticipated grammatical, syntax, homophones, and other interpretive errors are inadvertently transcribed by the computer software.  Please disregard these errors.  Please excuse any errors that have escaped final proofreading  ______________________________________________________________________      Thank you for allowing us to participate in the care of this patient.      This care involved high complexity medical decision making: I personally:  Reviewed the flowsheet and previous days notes  Reviewed and summarized records or history from previous days note or discussions with staff, family  High Risk Drug therapy requiring intensive monitoring for toxicity: eg steroids, antibiotics  Reviewed and/or ordered Clinical lab tests  Reviewed images and/or ordered Radiology tests  Called Endoscopy to schedule Diagnostic endoscopies with identified risk factors.  discussed my assessment/management with : Nursing, Hospitalist for coordination of care      Ramez Fry MD

## 2024-03-22 LAB
ANION GAP SERPL CALC-SCNC: 5 MMOL/L (ref 5–15)
BACTERIA SPEC CULT: ABNORMAL
BACTERIA SPEC CULT: ABNORMAL
BUN SERPL-MCNC: 25 MG/DL (ref 6–20)
BUN/CREAT SERPL: 27 (ref 12–20)
CALCIUM SERPL-MCNC: 8.6 MG/DL (ref 8.5–10.1)
CHLORIDE SERPL-SCNC: 111 MMOL/L (ref 97–108)
CO2 SERPL-SCNC: 24 MMOL/L (ref 21–32)
CREAT SERPL-MCNC: 0.93 MG/DL (ref 0.7–1.3)
ERYTHROCYTE [DISTWIDTH] IN BLOOD BY AUTOMATED COUNT: 14.5 % (ref 11.5–14.5)
GLUCOSE SERPL-MCNC: 94 MG/DL (ref 65–100)
GRAM STN SPEC: ABNORMAL
HCT VFR BLD AUTO: 38 % (ref 36.6–50.3)
HGB BLD-MCNC: 12.1 G/DL (ref 12.1–17)
MCH RBC QN AUTO: 32.7 PG (ref 26–34)
MCHC RBC AUTO-ENTMCNC: 31.8 G/DL (ref 30–36.5)
MCV RBC AUTO: 102.7 FL (ref 80–99)
NRBC # BLD: 0 K/UL (ref 0–0.01)
NRBC BLD-RTO: 0 PER 100 WBC
PLATELET # BLD AUTO: 297 K/UL (ref 150–400)
PMV BLD AUTO: 8.6 FL (ref 8.9–12.9)
POTASSIUM SERPL-SCNC: 3.4 MMOL/L (ref 3.5–5.1)
RBC # BLD AUTO: 3.7 M/UL (ref 4.1–5.7)
SERVICE CMNT-IMP: ABNORMAL
SODIUM SERPL-SCNC: 140 MMOL/L (ref 136–145)
WBC # BLD AUTO: 9.4 K/UL (ref 4.1–11.1)

## 2024-03-22 PROCEDURE — 97166 OT EVAL MOD COMPLEX 45 MIN: CPT

## 2024-03-22 PROCEDURE — 6360000002 HC RX W HCPCS: Performed by: STUDENT IN AN ORGANIZED HEALTH CARE EDUCATION/TRAINING PROGRAM

## 2024-03-22 PROCEDURE — 97116 GAIT TRAINING THERAPY: CPT

## 2024-03-22 PROCEDURE — 97535 SELF CARE MNGMENT TRAINING: CPT

## 2024-03-22 PROCEDURE — 2580000003 HC RX 258: Performed by: STUDENT IN AN ORGANIZED HEALTH CARE EDUCATION/TRAINING PROGRAM

## 2024-03-22 PROCEDURE — 1100000003 HC PRIVATE W/ TELEMETRY

## 2024-03-22 PROCEDURE — 97161 PT EVAL LOW COMPLEX 20 MIN: CPT

## 2024-03-22 PROCEDURE — 97530 THERAPEUTIC ACTIVITIES: CPT

## 2024-03-22 PROCEDURE — 36415 COLL VENOUS BLD VENIPUNCTURE: CPT

## 2024-03-22 PROCEDURE — 85027 COMPLETE CBC AUTOMATED: CPT

## 2024-03-22 PROCEDURE — 80048 BASIC METABOLIC PNL TOTAL CA: CPT

## 2024-03-22 PROCEDURE — 6370000000 HC RX 637 (ALT 250 FOR IP): Performed by: INTERNAL MEDICINE

## 2024-03-22 PROCEDURE — 6370000000 HC RX 637 (ALT 250 FOR IP): Performed by: STUDENT IN AN ORGANIZED HEALTH CARE EDUCATION/TRAINING PROGRAM

## 2024-03-22 PROCEDURE — 94640 AIRWAY INHALATION TREATMENT: CPT

## 2024-03-22 PROCEDURE — 94761 N-INVAS EAR/PLS OXIMETRY MLT: CPT

## 2024-03-22 RX ORDER — FLUTICASONE FUROATE, UMECLIDINIUM BROMIDE AND VILANTEROL TRIFENATATE 100; 62.5; 25 UG/1; UG/1; UG/1
1 POWDER RESPIRATORY (INHALATION) DAILY
Qty: 1 EACH | Refills: 0 | Status: SHIPPED | OUTPATIENT
Start: 2024-03-22

## 2024-03-22 RX ORDER — CEFDINIR 300 MG/1
600 CAPSULE ORAL DAILY
Qty: 14 CAPSULE | Refills: 0 | Status: SHIPPED | OUTPATIENT
Start: 2024-03-22 | End: 2024-03-29

## 2024-03-22 RX ORDER — IPRATROPIUM BROMIDE AND ALBUTEROL SULFATE 2.5; .5 MG/3ML; MG/3ML
1 SOLUTION RESPIRATORY (INHALATION) EVERY 4 HOURS PRN
Status: DISCONTINUED | OUTPATIENT
Start: 2024-03-22 | End: 2024-03-23 | Stop reason: HOSPADM

## 2024-03-22 RX ORDER — ALBUTEROL SULFATE 90 UG/1
2 AEROSOL, METERED RESPIRATORY (INHALATION) 4 TIMES DAILY PRN
Qty: 18 G | Refills: 0 | Status: SHIPPED | OUTPATIENT
Start: 2024-03-22

## 2024-03-22 RX ADMIN — WATER 40 MG: 1 INJECTION INTRAMUSCULAR; INTRAVENOUS; SUBCUTANEOUS at 10:09

## 2024-03-22 RX ADMIN — PIPERACILLIN AND TAZOBACTAM 3375 MG: 3; .375 INJECTION, POWDER, LYOPHILIZED, FOR SOLUTION INTRAVENOUS at 06:15

## 2024-03-22 RX ADMIN — ARFORMOTEROL TARTRATE: 15 SOLUTION RESPIRATORY (INHALATION) at 07:41

## 2024-03-22 RX ADMIN — GUAIFENESIN 600 MG: 600 TABLET, EXTENDED RELEASE ORAL at 10:09

## 2024-03-22 RX ADMIN — PIPERACILLIN AND TAZOBACTAM 3375 MG: 3; .375 INJECTION, POWDER, LYOPHILIZED, FOR SOLUTION INTRAVENOUS at 21:23

## 2024-03-22 RX ADMIN — ACETYLCYSTEINE 600 MG: 200 INHALANT RESPIRATORY (INHALATION) at 21:01

## 2024-03-22 RX ADMIN — GUAIFENESIN 600 MG: 600 TABLET, EXTENDED RELEASE ORAL at 20:50

## 2024-03-22 RX ADMIN — SODIUM CHLORIDE, PRESERVATIVE FREE 10 ML: 5 INJECTION INTRAVENOUS at 10:17

## 2024-03-22 RX ADMIN — ARFORMOTEROL TARTRATE: 15 SOLUTION RESPIRATORY (INHALATION) at 21:01

## 2024-03-22 RX ADMIN — ENOXAPARIN SODIUM 30 MG: 100 INJECTION SUBCUTANEOUS at 10:09

## 2024-03-22 RX ADMIN — PIPERACILLIN AND TAZOBACTAM 3375 MG: 3; .375 INJECTION, POWDER, LYOPHILIZED, FOR SOLUTION INTRAVENOUS at 14:25

## 2024-03-22 RX ADMIN — CALCIUM CARBONATE (ANTACID) CHEW TAB 500 MG 500 MG: 500 CHEW TAB at 20:49

## 2024-03-22 RX ADMIN — MIRTAZAPINE 30 MG: 15 TABLET, FILM COATED ORAL at 20:50

## 2024-03-22 RX ADMIN — ACETYLCYSTEINE 600 MG: 200 INHALANT RESPIRATORY (INHALATION) at 07:41

## 2024-03-22 RX ADMIN — SODIUM CHLORIDE, PRESERVATIVE FREE 10 ML: 5 INJECTION INTRAVENOUS at 20:51

## 2024-03-22 RX ADMIN — DONEPEZIL HYDROCHLORIDE 5 MG: 5 TABLET, FILM COATED ORAL at 20:50

## 2024-03-22 RX ADMIN — SODIUM CHLORIDE: 9 INJECTION, SOLUTION INTRAVENOUS at 14:22

## 2024-03-22 RX ADMIN — MONTELUKAST 10 MG: 10 TABLET, FILM COATED ORAL at 20:50

## 2024-03-22 ASSESSMENT — PAIN SCALES - GENERAL: PAINLEVEL_OUTOF10: 0

## 2024-03-22 NOTE — RT PROTOCOL NOTE
ADULT PROTOCOL: JET AEROSOL ASSESSMENT    Patient  Anuel Bermudez     78 y.o.   male     3/22/2024  7:04 AM    Breath Sounds Pre Procedure: Breath Sounds Pre-Tx JOSELITO: Diminished                                  Breath Sounds Pre-Tx LLL: Diminished        Breath Sounds Pre-Tx RUL: Diminished        Breath Sounds Pre-Tx RML: Diminished        Breath Sounds Pre-Tx RLL: Diminished  Breath Sounds Post Procedure: Breath Sounds Post-Tx JOSELITO: Diminished          Breath Sounds Post-Tx LLL: Diminished          Breath Sounds Post-Tx RUL: Diminished          Breath Sounds Post-Tx RML: Diminished          Breath Sounds Post-Tx RLL: Diminished                                       Heart Rate: Pre procedure Pre-Tx Pulse: 97           Post procedure Post-Tx Pulse: 95    Resp Rate: Pre procedure Pre-Tx Resps: 17           Post procedure Post-Tx Resps: 17      Oxygen: O2 Therapy: Room air        Changed: No    SpO2:  SpO2: 97 %   with Oxygen                Nebulizer Therapy: Current medications Medications: Arformoterol, Budesonide      Changed: Yes    Comments: Made Duonebs Prn per Dr. Yang Note. No wheezing noted. On RA. Will continue Mucomyst BID.     Problem List:   Patient Active Problem List   Diagnosis    Collapse of left lung       Respiratory Therapist: Norman Gupta, RT

## 2024-03-22 NOTE — PROGRESS NOTES
Pulmonary, Critical Care, and Sleep Medicine      Name: Anuel Bermudez MRN: [id number]   : 1945 Hospital: [facility]   Date: 3/22/2024  Admission date: 3/18/2024 Hospital Day: 5   Patient PCP: Bonifacio Bassett MD    History:   3/20/24 Pt is acutely ill . Medical records and data reviewed.  Bronchoscopy performed .     3/21/2024: Results of bronchoscopy discussed with patient at bedside and daughter by phone.  Also discussed with attending's and consulting.  Patient remains on 3 L/min nasal cannula oxygen.  No shortness of breath at rest.  Daughter says patient just completed physical therapy and was getting stronger.  She does not want him to get weak while here.    I spoke with [facility] interventional pulmonology.  They are willing to see patient, reviewed case and images to see if there are any treatment options for his obstructing lesion.  Would probably be palliative until he could undergo definitive treatment.  Further staging in process per Dr. Ruiz.  Demographics and referral sent to [facility].  Patient and daughter aware.  Appointment to be made.    3/22/2024: Sitting up having breakfast with no oxygen.  Breathing better.  MRI completed.  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  At risk for postobstructive pneumonia.  Seems to have improved on Zosyn.  Chart shows allergy to penicillin?  Chronic left lower lobe collapse  History of asthma versus COPD not followed by pulmonary; Symbicort with as needed albuterol at home  Long-term tobacco use with cigarettes and cigars quit last year in -high risk for lung cancer  Chronic dysphagia due to cervical vertebral pathology-sees

## 2024-03-22 NOTE — DISCHARGE SUMMARY
Hospitalist Discharge Summary     Patient ID:  Anuel Bermudez  [id number]  78 y.o.  1945  3/18/2024    PCP on record: Bonifacio Bassett MD    Admit date: 3/18/2024  Discharge date and time: 3/22/2024    DISCHARGE DIAGNOSIS:    ***    CONSULTATIONS:  IP CONSULT TO HOSPITALIST  IP CONSULT TO PULMONOLOGY  IP CONSULT TO ONCOLOGY    Excerpted HPI from H&P of Elizabeth Nye MD:  ***    ______________________________________________________________________  DISCHARGE SUMMARY/HOSPITAL COURSE:  for full details see H&P, daily progress notes, labs, consult notes.             _______________________________________________________________________  Patient seen and examined by me on discharge day.  Pertinent Findings:  Gen:    Not in distress  Chest: Clear lungs  CVS:   Regular rhythm.  No edema  Abd:  Soft, not distended, not tender  Neuro:  Alert,   _______________________________________________________________________  DISCHARGE MEDICATIONS:      Medication List        START taking these medications      cefdinir 300 MG capsule  Commonly known as: OMNICEF  Take 2 capsules by mouth daily for 7 days     Trelegy Ellipta 100-62.5-25 MCG/ACT Aepb inhaler  Generic drug: fluticasone-umeclidin-vilant  Inhale 1 puff into the lungs daily            CHANGE how you take these medications      * albuterol sulfate  (90 Base) MCG/ACT inhaler  Commonly known as: PROVENTIL;VENTOLIN;PROAIR  What changed: Another medication with the same name was added. Make sure you understand how and when to take each.     * albuterol sulfate  (90 Base) MCG/ACT inhaler  Commonly known as: Ventolin HFA  Inhale 2 puffs into the lungs 4 times daily as needed for Wheezing  What changed: You were already taking a medication with the same name, and this prescription was added. Make sure you understand how and when to take each.           * This list has 2 medication(s) that are the same as other medications prescribed  Stable  __________________________________________________________________    Disposition  Home with family and home health services    ____________________________________________________________________    Code Status: DNR/DNI  ___________________________________________________________________      Total time in minutes spent coordinating this discharge (includes going over instructions, follow-up, prescriptions, and preparing report for sign off to her PCP) :  35 minutes    Signed:  Alexandra Humphrey MD

## 2024-03-22 NOTE — PROGRESS NOTES
Received a call from MRI department to premedicate the patient at 1930, same was done as directed. Called MRI department to remind them that patient have already received premedication.  2045 Transport came up to  patient but patient is already on nebulization treatment.

## 2024-03-22 NOTE — PROGRESS NOTES
PCP hospital follow-up transitional care appointment has been scheduled with Dr. Raphael Bassett on 3/25/24 1233. Pending patient discharge.  Maddie Handy, Care Management Assistant

## 2024-03-22 NOTE — CARE COORDINATION
Chart reviewed. Home oxygen needed.    CM contacted [employer] at [phone] to provide oxygen. Information faxed to [phone]. To deliver portable tank this evening.    CM met with patient and gave information on oxygen company.    No other dc needs identified.       03/22/24 2679   Services At/After Discharge   Transition of Care Consult (CM Consult) DME/Supply Assistance  (oxygen)   Services At/After Discharge DME  ([employer] supplying oygen)    Resource Information Provided? No   Mode of Transport at Discharge Other (see comment)  (family)   Condition of Participation: Discharge Planning   The Plan for Transition of Care is related to the following treatment goals: Family to transport home, once home oxygen delivered.   The Patient and/or Patient Representative was provided with a Choice of Provider? Patient   The Patient and/Or Patient Representative agree with the Discharge Plan? Yes     Taylor Gayle, RN  Care Manager  [id number]

## 2024-03-22 NOTE — PROGRESS NOTES
Pulse oximetry assessment   99% at rest on room air (if 88% or less, skip next steps)  85% while ambulating on room air  97% at rest on 2 LPM  94% while ambulating on 2 LPM

## 2024-03-22 NOTE — PROGRESS NOTES
-Hematology / Oncology ([facility]) -  -Primary Oncologist- Dr Ruiz  -CC-\"I am still short of breath\"    -S-  sitting in chair, still feels SOB    -O-    Patient Vitals for the past 24 hrs:   Temp Pulse Resp BP SpO2   03/22/24 0813 97.7 °F (36.5 °C) 87 -- 128/76 94 %   03/22/24 0743 -- -- -- -- 95 %   03/22/24 0315 97.3 °F (36.3 °C) 88 18 132/82 97 %   03/21/24 1954 97.3 °F (36.3 °C) 97 17 (!) 116/97 96 %   03/21/24 1639 -- -- -- -- 98 %   03/21/24 1520 98.1 °F (36.7 °C) 93 16 137/78 99 %   03/21/24 1131 -- -- -- -- 95 %     No intake/output data recorded.    ROS: 12 point obtained and negative other than stated in HPI    PE:  Gen: nad  Chest: bilateral breath sounds present  Cardiac: rrr  Abd: s/nt    -Labs-    Recent Labs     03/20/24  1207 03/21/24  0419 03/22/24  0344   WBC 12.7* 12.0* 9.4   HGB 12.7 12.6 12.1    246 297    138 140   K 3.7 3.9 3.4*   BUN 17 20 25*       -Imaging-   CT Result (most recent):  CT ABDOMEN PELVIS W IV CONTRAST 03/20/2024    Narrative  EXAM: CT ABDOMEN PELVIS W IV CONTRAST  ACC#: [id number]    INDICATION: lung cancer staging    COMPARISON: Chest CTA 3/18/2024    CONTRAST: 100 mL of Isovue-370.    ORAL CONTRAST: None.    TECHNIQUE:  Following the intravenous administration of intravenous contrast, axial images  were obtained through the abdomen and pelvis. Coronal and sagittal  reconstructions were generated. CT dose reduction was achieved through use of a  standardized protocol tailored for this examination and automatic exposure  control for dose modulation.    FINDINGS:  Left lower lobe collapse is again noted.    There is diffuse fatty replacement of the liver with no discrete mass.   The  spleen enhances appropriately.   The pancreas enhances homogeneously. No  discrete masses.  No large calcified stones in the gallbladder.  No significant  biliary dilatation.    The right adrenal gland appears normal. There is a 1.5 cm left adrenal nodule  which is  pathology- Follows with NSG, Dr Guerra  - seen by speech here, is on thickened liquid diet.  *) ?Dementia    Please call with questions over weekend if still here, if here Monday will have my partner continue to follow.

## 2024-03-22 NOTE — PROGRESS NOTES
OCCUPATIONAL THERAPY EVALUATION/DISCHARGE  Patient: Anuel Bermudez (78 y.o. male)  Date: 3/22/2024  Primary Diagnosis: Collapse of left lung [J98.11]  Aspiration pneumonia of left lower lobe, unspecified aspiration pneumonia type (HCC) [J69.0]  Procedure(s) (LRB):  BRONCHOSCOPY (N/A) 2 Days Post-Op     Precautions:                    ASSESSMENT :  Based on the objective data below, the patient is functioning close to his baseline for ADLs and functional mobility. He was received semisupine in bed on RA and cleared for therapy by nursing. Patient completed transfers/mobility with additional time and stand-by assist, tolerating well. He demonstrated improved balance while using rolling walker compared to SPC and reported having one available at home. Patient walked into bathroom to complete toileting/grooming tasks, tolerating well. Education provided on energy conservation and safety awareness while using AD in bathroom. Patient ambulated in hallway with PT using rolling walker and tolerated well. He was left sitting in recliner chair with all needs met, chair alarmed, and VSS. His SpO2 was 90-91% with activity on RA and PT educated patient on incentive spirometer. Anticipate patient can return home with no OT needs.     Functional Outcome Measure:  The patient scored 24/24 on the [facility] outcome measure which is indicative of patient functioning at his baseline for ADLs.      Further skilled acute occupational therapy is not indicated at this time.     PLAN :  Recommend with staff: OOB for all meals, supervision to bathroom using rolling walker    Recommendation for discharge: (in order for the patient to meet his/her long term goals): No skilled occupational therapy    Other factors to consider for discharge: no additional factors    IF patient discharges home will need the following DME: patient owns DME required for discharge     SUBJECTIVE:   Patient stated, “My dog likes to tease me.”    OBJECTIVE DATA SUMMARY:

## 2024-03-22 NOTE — PROGRESS NOTES
Bedside and Verbal shift change report given to JOSE DE JESUS Fernandez (oncoming nurse) by JOSE DE JESUS Guthrie (offgoing nurse). Report included the following information Nurse Handoff Report, Adult Overview, Intake/Output, MAR, Recent Results, and Cardiac Rhythm sinus rhythm .Patient was asleep during report.

## 2024-03-22 NOTE — PROGRESS NOTES
PHYSICAL THERAPY EVALUATION/DISCHARGE    Patient: Anuel Bermudez (78 y.o. male)  Date: 3/22/2024  Primary Diagnosis: Collapse of left lung [J98.11]  Aspiration pneumonia of left lower lobe, unspecified aspiration pneumonia type (HCC) [J69.0]  Procedure(s) (LRB):  BRONCHOSCOPY (N/A) 2 Days Post-Op   Precautions:                        ASSESSMENT AND RECOMMENDATIONS:  Based on the objective data below, the patient presents with decreased tolerance of activity and general weakness following admission for pneumonia.  Patient received in bed and agreeable to participate, is able to demonstrate bed mobility and transfers at SBA level.  Patient instruction on pacing and progression of activity, energy conservation techniques, falls prevention and safety.  Very attentive to education and follows direction well.  Patient ambulated x approx 130 feet with RW and SBA/CGA including into bathroom to toilet and stand at sink for functional reaching activities.  Gait is steady with decreased sandra, does require RW for safety.  Sats at 91% post activity on RA.  Left up in chair with call bell in reach.  Recommend HHPT to follow.     Functional Outcome Measure:  The patient scored 23/24 on the [facility] outcome measure which is indicative of Cutoff score ?171,2,3 had higher odds of discharging home with home health or need of SNF/IPR..          Further skilled acute physical therapy is not indicated at this time.       PLAN :  Recommendation for discharge: (in order for the patient to meet his/her long term goals): Therapy 2 days/week in the home and also see \"other factors to consider\" below for additional discharge concerns/needs    Other factors to consider for discharge: lives alone    IF patient discharges home will need the following DME: patient owns DME required for discharge       SUBJECTIVE:   Patient stated “I have been feeling tired since the test last night.”    OBJECTIVE DATA SUMMARY:   History reviewed. No pertinent  130 Feet  Assistive Device: Gait belt;Walker, rolling  Interventions: Safety awareness training;Verbal cues  Base of Support: Widened  Speed/Carolyn: Pace decreased (< 100 feet/min)  Step Length: Right shortened;Left shortened  Gait Abnormalities: Decreased step clearance                                                                                                                                                                                                                                                        [facility]-PAC®      Basic Mobility Inpatient Short Form (6-Clicks) Version 2  How much HELP from another person do you currently need... (If the patient hasn't done an activity recently, how much help from another person do you think they would need if they tried?) Total A Lot A Little None   1.  Turning from your back to your side while in a flat bed without using bedrails? []  1 []  2 []  3  [x]  4   2.  Moving from lying on your back to sitting on the side of a flat bed without using bedrails? []  1 []  2 []  3  [x]  4   3.  Moving to and from a bed to a chair (including a wheelchair)? []  1 []  2 []  3  [x]  4   4. Standing up from a chair using your arms (e.g. wheelchair or bedside chair)? []  1 []  2 []  3  [x]  4   5.  Walking in hospital room? []  1 []  2 []  3  [x]  4   6.  Climbing 3-5 steps with a railing? []  1 []  2 [x]  3  []  4     Raw Score: 23/24                            Cutoff score ?171,2,3 had higher odds of discharging home with home health or need of SNF/IPR.    1. Leticia Blackburn, Flores Smart, Reji Aguirre, Melina Álvarez, Obinna Rea, Adi Blackburn.  Validity of the -PAC “6-Clicks” Inpatient Daily Activity and Basic Mobility Short Forms. Physical Therapy Mar 2014, [phone]; DOI: 10.2522/ptj.92671739  2. Quang CERVANTES, Toma JOHN, Elsi JOHN, Zachary JOHN. Association of -PAC \"6-Clicks\" Basic Mobility and Daily Activity Scores With Discharge

## 2024-03-23 VITALS
HEART RATE: 84 BPM | WEIGHT: 110.89 LBS | RESPIRATION RATE: 16 BRPM | BODY MASS INDEX: 18.48 KG/M2 | OXYGEN SATURATION: 92 % | TEMPERATURE: 97.6 F | DIASTOLIC BLOOD PRESSURE: 86 MMHG | SYSTOLIC BLOOD PRESSURE: 135 MMHG | HEIGHT: 65 IN

## 2024-03-23 LAB
ANION GAP SERPL CALC-SCNC: 7 MMOL/L (ref 5–15)
BUN SERPL-MCNC: 28 MG/DL (ref 6–20)
BUN/CREAT SERPL: 32 (ref 12–20)
CALCIUM SERPL-MCNC: 8 MG/DL (ref 8.5–10.1)
CHLORIDE SERPL-SCNC: 113 MMOL/L (ref 97–108)
CO2 SERPL-SCNC: 19 MMOL/L (ref 21–32)
CREAT SERPL-MCNC: 0.88 MG/DL (ref 0.7–1.3)
ERYTHROCYTE [DISTWIDTH] IN BLOOD BY AUTOMATED COUNT: 14.2 % (ref 11.5–14.5)
GLUCOSE SERPL-MCNC: 86 MG/DL (ref 65–100)
HCT VFR BLD AUTO: 36.1 % (ref 36.6–50.3)
HGB BLD-MCNC: 11.9 G/DL (ref 12.1–17)
MCH RBC QN AUTO: 33.7 PG (ref 26–34)
MCHC RBC AUTO-ENTMCNC: 33 G/DL (ref 30–36.5)
MCV RBC AUTO: 102.3 FL (ref 80–99)
NRBC # BLD: 0 K/UL (ref 0–0.01)
NRBC BLD-RTO: 0 PER 100 WBC
PLATELET # BLD AUTO: 272 K/UL (ref 150–400)
PMV BLD AUTO: 8.4 FL (ref 8.9–12.9)
POTASSIUM SERPL-SCNC: 3.7 MMOL/L (ref 3.5–5.1)
RBC # BLD AUTO: 3.53 M/UL (ref 4.1–5.7)
SODIUM SERPL-SCNC: 139 MMOL/L (ref 136–145)
WBC # BLD AUTO: 8 K/UL (ref 4.1–11.1)

## 2024-03-23 PROCEDURE — 2580000003 HC RX 258: Performed by: STUDENT IN AN ORGANIZED HEALTH CARE EDUCATION/TRAINING PROGRAM

## 2024-03-23 PROCEDURE — 36415 COLL VENOUS BLD VENIPUNCTURE: CPT

## 2024-03-23 PROCEDURE — 85027 COMPLETE CBC AUTOMATED: CPT

## 2024-03-23 PROCEDURE — 80048 BASIC METABOLIC PNL TOTAL CA: CPT

## 2024-03-23 PROCEDURE — 6360000002 HC RX W HCPCS: Performed by: STUDENT IN AN ORGANIZED HEALTH CARE EDUCATION/TRAINING PROGRAM

## 2024-03-23 PROCEDURE — 94640 AIRWAY INHALATION TREATMENT: CPT

## 2024-03-23 PROCEDURE — 6370000000 HC RX 637 (ALT 250 FOR IP): Performed by: INTERNAL MEDICINE

## 2024-03-23 RX ADMIN — WATER 40 MG: 1 INJECTION INTRAMUSCULAR; INTRAVENOUS; SUBCUTANEOUS at 10:19

## 2024-03-23 RX ADMIN — PIPERACILLIN AND TAZOBACTAM 3375 MG: 3; .375 INJECTION, POWDER, LYOPHILIZED, FOR SOLUTION INTRAVENOUS at 06:35

## 2024-03-23 RX ADMIN — GUAIFENESIN 600 MG: 600 TABLET, EXTENDED RELEASE ORAL at 10:19

## 2024-03-23 RX ADMIN — ARFORMOTEROL TARTRATE: 15 SOLUTION RESPIRATORY (INHALATION) at 07:47

## 2024-03-23 RX ADMIN — ENOXAPARIN SODIUM 30 MG: 100 INJECTION SUBCUTANEOUS at 10:19

## 2024-03-23 RX ADMIN — ACETYLCYSTEINE 600 MG: 200 INHALANT RESPIRATORY (INHALATION) at 07:47

## 2024-03-23 RX ADMIN — SODIUM CHLORIDE, PRESERVATIVE FREE 10 ML: 5 INJECTION INTRAVENOUS at 10:19

## 2024-03-23 NOTE — PLAN OF CARE
Problem: Discharge Planning  Goal: Discharge to home or other facility with appropriate resources  Outcome: Progressing     Problem: Respiratory - Adult  Goal: Achieves optimal ventilation and oxygenation  Outcome: Progressing     Problem: Skin/Tissue Integrity  Goal: Absence of new skin breakdown  Description: 1.  Monitor for areas of redness and/or skin breakdown  2.  Assess vascular access sites hourly  3.  Every 4-6 hours minimum:  Change oxygen saturation probe site  4.  Every 4-6 hours:  If on nasal continuous positive airway pressure, respiratory therapy assess nares and determine need for appliance change or resting period.  Outcome: Progressing     Problem: Safety - Adult  Goal: Free from fall injury  Outcome: Progressing     Problem: Pain  Goal: Verbalizes/displays adequate comfort level or baseline comfort level  Outcome: Progressing

## 2024-03-23 NOTE — PROGRESS NOTES
Bedside shift change report given to Jim RN (oncoming nurse) by Jeanette DELA CRUZ (offgoing nurse). Report included the following information Nurse Handoff Report, Index, ED Encounter Summary, ED SBAR, Adult Overview, Surgery Report, Intake/Output, MAR, Recent Results, Med Rec Status, and Cardiac Rhythm (NSR) .        Pt at bedside shift report was awake in bed.No complaints made at this time.

## 2024-03-23 NOTE — PLAN OF CARE
Problem: Discharge Planning  Goal: Discharge to home or other facility with appropriate resources  3/23/2024 1211 by Carlos Guerra RN  Outcome: Completed  3/23/2024 0008 by Jeanette Rothman RN  Outcome: Progressing     Problem: Respiratory - Adult  Goal: Achieves optimal ventilation and oxygenation  3/23/2024 1211 by Carlos Guerra RN  Outcome: Completed  3/23/2024 0749 by Norman Mohan, RT  Outcome: Progressing  3/23/2024 0039 by Yaniv Alford RCP  Outcome: Progressing  3/23/2024 0008 by Jeanette Rothman RN  Outcome: Progressing     Problem: Skin/Tissue Integrity  Goal: Absence of new skin breakdown  Description: 1.  Monitor for areas of redness and/or skin breakdown  2.  Assess vascular access sites hourly  3.  Every 4-6 hours minimum:  Change oxygen saturation probe site  4.  Every 4-6 hours:  If on nasal continuous positive airway pressure, respiratory therapy assess nares and determine need for appliance change or resting period.  3/23/2024 1211 by Carlos Guerra RN  Outcome: Completed  3/23/2024 0008 by Jeanette Rothman RN  Outcome: Progressing     Problem: Safety - Adult  Goal: Free from fall injury  3/23/2024 1211 by Carlos Guerra RN  Outcome: Completed  3/23/2024 0008 by Jeanette Rothman RN  Outcome: Progressing     Problem: Pain  Goal: Verbalizes/displays adequate comfort level or baseline comfort level  3/23/2024 1211 by Carlos Guerra RN  Outcome: Completed  3/23/2024 0008 by Jeanette Rothman RN  Outcome: Progressing

## 2024-03-23 NOTE — CARE COORDINATION
Cleared for D/C from CM standpoint    Transition of Care Plan:    RUR: 10%  Prior Level of Functioning: independent   Disposition: home with [facility] O2 ([employer])  Follow up appointments: PCP, specialists as indicated   DME needed: home O2- portable tank has been delivered at the bedside   Transportation at discharge: daughter, ETA 10AM  IM/IMM Medicare/ letter given: given on 3/23  Is patient a  and connected with VA? no   If yes, was  transfer form completed and VA notified?   Caregiver Contact: daughter   Discharge Caregiver contacted prior to discharge? Pt to contact   Care Conference needed? No   Barriers to discharge:  none    Chart reviewed. CM aware of discharge order. Met with pt at bedside to finalize and review d/c plan. 2nd IM given and reviewed today. Daughter will transport home, expected to arrive at 10AM. Spoke with Shaji @ [employer] today- [employer] needing supporting documents and order attached via [employer]. CM submitted information requested. Portable O2 tank was delivered at bedside yesterday for pt to use to travel home with. [employer] will contact pt to schedule home delivery of oxygen supplies upon d/c. CM submitted [facility] orders to [facility] and notified of d/c. Contact information for d/c services added to AVS for review. No further CM needs identified.       03/23/24 1113   Services At/After Discharge   Transition of Care Consult (CM Consult) Discharge Planning   Services At/After Discharge [facility];DME   [place] Resource Information Provided? No   Mode of Transport at Discharge Other (see comment)  (daughter)   Hospital Transport Time of Discharge 1000   Confirm Follow Up Transport Family   Condition of Participation: Discharge Planning   The Plan for Transition of Care is related to the following treatment goals: return home   The Patient and/or Patient Representative was provided with a Choice of Provider? Patient   The Patient and/Or

## 2024-03-24 LAB
BACTERIA SPEC CULT: NORMAL
BACTERIA SPEC CULT: NORMAL
SERVICE CMNT-IMP: NORMAL
SERVICE CMNT-IMP: NORMAL

## 2024-03-25 ENCOUNTER — TRANSCRIBE ORDERS (OUTPATIENT)
Facility: HOSPITAL | Age: 79
End: 2024-03-25

## 2024-03-25 DIAGNOSIS — C34.02 MALIGNANT NEOPLASM OF LEFT MAIN BRONCHUS (HCC): ICD-10-CM

## 2024-03-25 DIAGNOSIS — C34.92 MALIGNANT NEOPLASM OF LEFT LUNG, UNSPECIFIED PART OF LUNG (HCC): Primary | ICD-10-CM

## 2024-03-25 LAB
BACTERIA SPEC CULT: NORMAL
SERVICE CMNT-IMP: NORMAL

## 2024-03-28 NOTE — PROGRESS NOTES
Physician Progress Note      PATIENT:               SISI SILVER  [facility] #:                  [id number]  :                       1945  ADMIT DATE:       3/18/2024 12:03 PM  DISCH DATE:        3/23/2024 12:12 PM  RESPONDING  PROVIDER #:        Alexandra Humphrey MD          QUERY TEXT:    Dr Humphrey and/or Dr Fry:    Patient admitted with Suspected aspiration pneumonia.    There is noted documentation of acute respiratory failure with physical exam   noting \"in no distress\" in Dr Fry's 3/21 pn. In order to support the   diagnosis of acute respiratory failure, please include additional clinical   indicators in your documentation.  Or please document if the diagnosis of   acute respiratory failure has been ruled out after further study.    The medical record reflects the following:  Risk Factors: Left sided lung mass, underwent a bronchoscopy on 3/20    Clinical Indicators:  3/21 Dr Fry: No shortness of breath at rest. Physical exam notes \"in no   distress\"    3/21 Dr Humphrey: Resp: CTA bilaterally, no wheezing or rales. No accessory   muscle use    3/22 Kimberlee RT pn: Made Duonebs Prn per Dr. Yang Note. No wheezing noted. On   RA.    3/22/2024 Dr Fry: Sitting up having breakfast with no oxygen.    Treatment: SpO2 monitoring, supplemental O2 at 2-3L NC. Arformoterol,   Budesonide      Reference:  Acute Respiratory Failure Clinical Indicators per [employer]-DR[employer] Training Guide and   Quick Reference Guide:  pO2 < 60 mmHg or SpO2 (pulse oximetry) < 91% breathing room air  pCO2 > 50 and pH < 7.35  P/F ratio (pO2 / FIO2) < 300  pO2 decrease or pCO2 increase by 10 mmHg from baseline (if known)  Supplemental oxygen of 40% or more  Presence of respiratory distress, tachypnea, dyspnea, shortness of breath,   wheezing  Unable to speak in complete sentences  Use of accessory muscles to breathe  Extreme anxiety and feeling of impending doom  Tripod position  Confusion/altered mental status/obtunded      Thank you,  Amarilys

## 2024-04-01 LAB
BACTERIA SPEC CULT: NORMAL
SERVICE CMNT-IMP: NORMAL

## 2024-04-08 LAB
BACTERIA SPEC CULT: NORMAL
SERVICE CMNT-IMP: NORMAL

## 2024-04-09 ENCOUNTER — HOSPITAL ENCOUNTER (OUTPATIENT)
Facility: HOSPITAL | Age: 79
Discharge: HOME | End: 2024-04-12
Attending: INTERNAL MEDICINE
Payer: MEDICARE

## 2024-04-09 DIAGNOSIS — C34.02 MALIGNANT NEOPLASM OF LEFT MAIN BRONCHUS (HCC): ICD-10-CM

## 2024-04-09 LAB
GLUCOSE BLD STRIP.AUTO-MCNC: 100 MG/DL (ref 65–117)
SERVICE CMNT-IMP: NORMAL

## 2024-04-09 PROCEDURE — 82962 GLUCOSE BLOOD TEST: CPT

## 2024-04-09 PROCEDURE — 3430000000 HC RX DIAGNOSTIC RADIOPHARMACEUTICAL: Performed by: INTERNAL MEDICINE

## 2024-04-09 PROCEDURE — 78815 PET IMAGE W/CT SKULL-THIGH: CPT

## 2024-04-09 PROCEDURE — A9609 HC RX DIAGNOSTIC RADIOPHARMACEUTICAL: HCPCS | Performed by: INTERNAL MEDICINE

## 2024-04-09 RX ORDER — FLUDEOXYGLUCOSE F-18 500 MCI/ML
10 INJECTION INTRAVENOUS
Status: COMPLETED | OUTPATIENT
Start: 2024-04-09 | End: 2024-04-09

## 2024-04-09 RX ADMIN — FLUDEOXYGLUCOSE F-18 10 MILLICURIE: 500 INJECTION INTRAVENOUS at 07:10

## 2024-04-15 LAB
BACTERIA SPEC CULT: NORMAL
SERVICE CMNT-IMP: NORMAL

## 2024-04-18 ENCOUNTER — HOSPITAL ENCOUNTER (OUTPATIENT)
Facility: HOSPITAL | Age: 79
End: 2024-04-18
Attending: INTERNAL MEDICINE
Payer: MEDICARE

## 2024-04-18 VITALS
HEIGHT: 63 IN | BODY MASS INDEX: 18.61 KG/M2 | RESPIRATION RATE: 16 BRPM | OXYGEN SATURATION: 99 % | DIASTOLIC BLOOD PRESSURE: 65 MMHG | SYSTOLIC BLOOD PRESSURE: 120 MMHG | HEART RATE: 80 BPM | WEIGHT: 105 LBS | TEMPERATURE: 98 F

## 2024-04-18 DIAGNOSIS — C34.02 MALIGNANT NEOPLASM OF LEFT MAIN BRONCHUS (HCC): ICD-10-CM

## 2024-04-18 PROCEDURE — 6360000002 HC RX W HCPCS: Performed by: STUDENT IN AN ORGANIZED HEALTH CARE EDUCATION/TRAINING PROGRAM

## 2024-04-18 PROCEDURE — 2709999900 IR PORT PLACEMENT > 5 YEARS

## 2024-04-18 PROCEDURE — 2500000003 HC RX 250 WO HCPCS: Performed by: STUDENT IN AN ORGANIZED HEALTH CARE EDUCATION/TRAINING PROGRAM

## 2024-04-18 PROCEDURE — 2580000003 HC RX 258: Performed by: STUDENT IN AN ORGANIZED HEALTH CARE EDUCATION/TRAINING PROGRAM

## 2024-04-18 RX ORDER — HEPARIN SODIUM 200 [USP'U]/100ML
200 INJECTION, SOLUTION INTRAVENOUS ONCE
Status: DISCONTINUED | OUTPATIENT
Start: 2024-04-18 | End: 2024-04-22 | Stop reason: HOSPADM

## 2024-04-18 RX ORDER — HEPARIN 100 UNIT/ML
300 SYRINGE INTRAVENOUS ONCE
Status: COMPLETED | OUTPATIENT
Start: 2024-04-18 | End: 2024-04-18

## 2024-04-18 RX ORDER — LIDOCAINE HCL/EPINEPHRINE/PF 2%-1:200K
20 VIAL (ML) INJECTION ONCE
Status: COMPLETED | OUTPATIENT
Start: 2024-04-18 | End: 2024-04-18

## 2024-04-18 RX ORDER — MIDAZOLAM HYDROCHLORIDE 1 MG/ML
5 INJECTION, SOLUTION INTRAMUSCULAR; INTRAVENOUS
Status: DISCONTINUED | OUTPATIENT
Start: 2024-04-18 | End: 2024-04-18

## 2024-04-18 RX ORDER — SODIUM CHLORIDE 9 MG/ML
INJECTION, SOLUTION INTRAVENOUS CONTINUOUS
Status: DISCONTINUED | OUTPATIENT
Start: 2024-04-18 | End: 2024-04-22 | Stop reason: HOSPADM

## 2024-04-18 RX ORDER — LIDOCAINE HYDROCHLORIDE 20 MG/ML
20 INJECTION, SOLUTION INFILTRATION; PERINEURAL ONCE
Status: COMPLETED | OUTPATIENT
Start: 2024-04-18 | End: 2024-04-18

## 2024-04-18 RX ORDER — FENTANYL CITRATE 50 UG/ML
100 INJECTION, SOLUTION INTRAMUSCULAR; INTRAVENOUS
Status: DISCONTINUED | OUTPATIENT
Start: 2024-04-18 | End: 2024-04-18

## 2024-04-18 RX ADMIN — Medication 300 UNITS: at 08:57

## 2024-04-18 RX ADMIN — MIDAZOLAM 1 MG: 1 INJECTION INTRAMUSCULAR; INTRAVENOUS at 08:55

## 2024-04-18 RX ADMIN — SODIUM CHLORIDE: 9 INJECTION, SOLUTION INTRAVENOUS at 08:26

## 2024-04-18 RX ADMIN — MIDAZOLAM 1 MG: 1 INJECTION INTRAMUSCULAR; INTRAVENOUS at 08:50

## 2024-04-18 RX ADMIN — WATER 2000 MG: 1 INJECTION INTRAMUSCULAR; INTRAVENOUS; SUBCUTANEOUS at 08:24

## 2024-04-18 RX ADMIN — LIDOCAINE HYDROCHLORIDE 10 ML: 20 INJECTION, SOLUTION INFILTRATION; PERINEURAL at 08:58

## 2024-04-18 RX ADMIN — LIDOCAINE HYDROCHLORIDE,EPINEPHRINE BITARTRATE 10 ML: 20; .005 INJECTION, SOLUTION EPIDURAL; INFILTRATION; INTRACAUDAL; PERINEURAL at 08:58

## 2024-04-18 RX ADMIN — FENTANYL CITRATE 50 MCG: 50 INJECTION INTRAMUSCULAR; INTRAVENOUS at 08:52

## 2024-04-18 ASSESSMENT — PAIN - FUNCTIONAL ASSESSMENT: PAIN_FUNCTIONAL_ASSESSMENT: NONE - DENIES PAIN

## 2024-04-18 NOTE — DISCHARGE INSTRUCTIONS
[facility]  Angiography Department      Radiologist:  Dr. Marcos Harrison / Anastasia Chavez NP       Date:   04/18/2023      Portacath Discharge Instructions      Watch for signs of infection:    Redness,   Fever, chills,   Increased pain, and/or drainage from the site.    If this occurs, call your physician at once.    If you have an appointment with a provider or at the infusion center in the upcoming week,  they may check your site and change the dressing for you.      Keep your dressing clean and dry.  Leave the dressing in place and change after 3 days.     Continue your previous diet and restart your regularly prescribed medications.     You may take Tylenol, as directed on the label, for pain if needed.   Avoid ibuprofen (Advil, Motrin) and aspirin as they may cause you to bleed.    Because you received sedation, you are not to drive or sign any legal documents for the next 24 hours.    Do not lift anything heavier than 5 pounds with the affected arm and avoid pushing and pulling movements for several days.     If you have any questions or concerns, please call our radiology department at [phone].

## 2024-04-18 NOTE — PROGRESS NOTES
0740- Patient ambulatory back to Angio holding with use of cane. Patient is AxOx4. Sister Lorna present in waiting room awaiting discharge.     0810- Patient prepped and ready for port placement procedure. Anastasia Chavez NP present at bedside for consent.    Name of procedure: Port Placement    Sedation medications given: Yes    Versed: 2 mg    Fentanyl: 50 mcg    Reversal Agent Used: NO    Sedation tolerated: Well    Sedation start:  0850    Sedation end:  0903    Vital Signs: Stable    Fluids removed: NO    Samples sent to lab: NO    Any complications related to procedure: NO    Post Procedure Care Needed/order sets placed in [facility] care.     Patient is at increased fall risk due to medication given.

## 2024-04-18 NOTE — PROGRESS NOTES
0740- Patient ambulatory back to Angio holding with use of cane. Patient is AxOx4. Sister Lorna present in waiting room awaiting discharge.     0810- Patient prepped and ready for port placement procedure. Anastasia Chavez NP present at bedside for consent.    Name of procedure: Port Placement    Sedation medications given: Yes    Versed: 2 mg    Fentanyl: 50 mcg    Reversal Agent Used: NO    Sedation tolerated: Well    Sedation start:  0850    Sedation end:  0903    Vital Signs: Stable    Fluids removed: NO    Samples sent to lab: NO    Any complications related to procedure: NO    Post Procedure Care Needed/order sets placed in [facility] care.     Patient is at increased fall risk due to medication given.     0940- Patient AxOx4 with stable VS. Pt ready for discharge.

## 2024-04-18 NOTE — H&P
INTERVENTIONAL RADIOLOGY  Preoperative History and Physical      Patient:  Anuel Bermudez  :  1945  Age:  78 y.o.  MRN:  [id number]  Today's Date:  2024      CC / HPI   Anuel Bermudez is a 78 y.o. male with malignant neoplasm of left main bronchus  who presents for port placement for chemotherapy.    PAST MEDICAL HISTORY  No past medical history on file.    PAST SURGICAL HISTORY  Past Surgical History:   Procedure Laterality Date    BRONCHOSCOPY N/A 3/20/2024    BRONCHOSCOPY performed by Ramez Fry MD at [facility] ENDOSCOPY       SOCIAL HISTORY  Social History     Socioeconomic History    Marital status:      Spouse name: Not on file    Number of children: Not on file    Years of education: Not on file    Highest education level: Not on file   Occupational History    Not on file   Tobacco Use    Smoking status: Former     Types: Cigars     Quit date:      Years since quittin.2    Smokeless tobacco: Never   Substance and Sexual Activity    Alcohol use: Not on file    Drug use: Not on file    Sexual activity: Not on file   Other Topics Concern    Not on file   Social History Narrative    Not on file     Social Determinants of Health     Financial Resource Strain: Not on file   Food Insecurity: No Food Insecurity (3/18/2024)    Hunger Vital Sign     Worried About Running Out of Food in the Last Year: Never true     Ran Out of Food in the Last Year: Never true   Transportation Needs: No Transportation Needs (3/18/2024)    PRAPARE - Transportation     Lack of Transportation (Medical): No     Lack of Transportation (Non-Medical): No   Physical Activity: Not on file   Stress: Not on file   Social Connections: Not on file   Intimate Partner Violence: Not on file   Housing Stability: Low Risk  (3/18/2024)    Housing Stability Vital Sign     Unable to Pay for Housing in the Last Year: No     Number of Places Lived in the Last Year: 1     Unstable Housing in the Last Year: No       FAMILY  03/23/2024 01:40 AM     03/23/2024 01:40 AM    CO2 19 03/23/2024 01:40 AM    BUN 28 03/23/2024 01:40 AM     No results found for: \"INR\", \"PTMR\", \"PT1\"      PHYSICAL EXAM   Patient Vitals for the past 24 hrs:   BP Temp Temp src Pulse Resp SpO2 Height Weight   04/18/24 0940 120/65 -- -- 80 16 99 % -- --   04/18/24 0930 104/75 -- -- 73 15 98 % -- --   04/18/24 0925 138/60 -- -- 75 16 100 % -- --   04/18/24 0920 (!) 117/59 -- -- 75 16 98 % -- --   04/18/24 0915 (!) 119/57 -- -- 73 17 100 % -- --   04/18/24 0910 (!) 104/57 -- -- 72 19 100 % -- --   04/18/24 0905 (!) 109/56 -- -- 70 19 100 % -- --   04/18/24 0900 114/61 -- -- 71 20 100 % -- --   04/18/24 0855 113/62 -- -- 67 12 100 % -- --   04/18/24 0850 117/63 -- -- 71 19 100 % -- --   04/18/24 0845 121/68 -- -- 70 16 100 % -- --   04/18/24 0840 132/74 -- -- 70 17 100 % -- --   04/18/24 0805 123/70 98 °F (36.7 °C) Oral 79 16 99 % 1.6 m (5' 3\") 47.6 kg (105 lb)      General:  NAD  Heart:  RRR  Lungs:  NWOB  Neurological:  AAOX3      ASSESSMENT / PLAN   Procedure to be performed:  Port placement  Plan for sedation:  Conscious sedation  Mallampati Airway Assessment:  I (soft palate, uvula, fauces, tonsillar pillars visible)  ASA Classification:  ASA 2 - Patient with mild systemic disease with no functional limitations  Post procedure plan:  observation per protocol  Informed consent:  indication, risks and alternatives of the planned procedure and sedation methods reviewed with the patient / family who agree to proceed  Code status:  Full      ENRIQUETA QUIROZ - NP  [facility]

## 2024-04-22 LAB
BACTERIA SPEC CULT: NORMAL
SERVICE CMNT-IMP: NORMAL

## 2024-07-16 ENCOUNTER — HOSPITAL ENCOUNTER (OUTPATIENT)
Facility: HOSPITAL | Age: 79
Discharge: HOME | End: 2024-07-19
Attending: INTERNAL MEDICINE
Payer: MEDICARE

## 2024-07-16 VITALS — WEIGHT: 102 LBS | BODY MASS INDEX: 18.07 KG/M2

## 2024-07-16 DIAGNOSIS — C34.02 MALIGNANT NEOPLASM OF LEFT MAIN BRONCHUS (HCC): ICD-10-CM

## 2024-07-16 LAB
GLUCOSE BLD STRIP.AUTO-MCNC: 85 MG/DL (ref 65–117)
SERVICE CMNT-IMP: NORMAL

## 2024-07-16 PROCEDURE — 3430000000 HC RX DIAGNOSTIC RADIOPHARMACEUTICAL: Performed by: INTERNAL MEDICINE

## 2024-07-16 PROCEDURE — A9609 HC RX DIAGNOSTIC RADIOPHARMACEUTICAL: HCPCS | Performed by: INTERNAL MEDICINE

## 2024-07-16 PROCEDURE — 78815 PET IMAGE W/CT SKULL-THIGH: CPT

## 2024-07-16 PROCEDURE — 82962 GLUCOSE BLOOD TEST: CPT

## 2024-07-16 RX ORDER — FLUDEOXYGLUCOSE F-18 500 MCI/ML
10 INJECTION INTRAVENOUS
Status: COMPLETED | OUTPATIENT
Start: 2024-07-16 | End: 2024-07-16

## 2024-07-16 RX ADMIN — FLUDEOXYGLUCOSE F-18 10 MILLICURIE: 500 INJECTION INTRAVENOUS at 07:38

## 2024-08-11 ENCOUNTER — APPOINTMENT (OUTPATIENT)
Facility: HOSPITAL | Age: 79
DRG: 326 | End: 2024-08-11
Payer: MEDICARE

## 2024-08-11 ENCOUNTER — ANESTHESIA EVENT (OUTPATIENT)
Facility: HOSPITAL | Age: 79
End: 2024-08-11
Payer: MEDICARE

## 2024-08-11 ENCOUNTER — HOSPITAL ENCOUNTER (INPATIENT)
Facility: HOSPITAL | Age: 79
LOS: 16 days | Discharge: HOME HEALTH | DRG: 326 | End: 2024-08-27
Attending: EMERGENCY MEDICINE | Admitting: INTERNAL MEDICINE
Payer: MEDICARE

## 2024-08-11 ENCOUNTER — ANESTHESIA (OUTPATIENT)
Facility: HOSPITAL | Age: 79
End: 2024-08-11
Payer: MEDICARE

## 2024-08-11 DIAGNOSIS — R74.8 ELEVATED LIPASE: ICD-10-CM

## 2024-08-11 DIAGNOSIS — K66.8 PNEUMOPERITONEUM: Primary | ICD-10-CM

## 2024-08-11 PROBLEM — K63.1 BOWEL PERFORATION (HCC): Status: ACTIVE | Noted: 2024-08-11

## 2024-08-11 LAB
ABO + RH BLD: NORMAL
ALBUMIN SERPL-MCNC: 3.4 G/DL (ref 3.5–5)
ALBUMIN/GLOB SERPL: 1 (ref 1.1–2.2)
ALP SERPL-CCNC: 99 U/L (ref 45–117)
ALT SERPL-CCNC: 25 U/L (ref 12–78)
ANION GAP SERPL CALC-SCNC: 8 MMOL/L (ref 5–15)
ANION GAP SERPL CALC-SCNC: 9 MMOL/L (ref 5–15)
APPEARANCE UR: CLEAR
AST SERPL-CCNC: 24 U/L (ref 15–37)
BACTERIA URNS QL MICRO: NEGATIVE /HPF
BASOPHILS # BLD: 0.1 K/UL (ref 0–0.1)
BASOPHILS NFR BLD: 1 % (ref 0–1)
BILIRUB SERPL-MCNC: 0.6 MG/DL (ref 0.2–1)
BILIRUB UR QL: NEGATIVE
BLOOD GROUP ANTIBODIES SERPL: NORMAL
BUN SERPL-MCNC: 15 MG/DL (ref 6–20)
BUN SERPL-MCNC: 16 MG/DL (ref 6–20)
BUN/CREAT SERPL: 19 (ref 12–20)
BUN/CREAT SERPL: 20 (ref 12–20)
CALCIUM SERPL-MCNC: 8.2 MG/DL (ref 8.5–10.1)
CALCIUM SERPL-MCNC: 8.9 MG/DL (ref 8.5–10.1)
CHLORIDE SERPL-SCNC: 100 MMOL/L (ref 97–108)
CHLORIDE SERPL-SCNC: 103 MMOL/L (ref 97–108)
CO2 SERPL-SCNC: 21 MMOL/L (ref 21–32)
CO2 SERPL-SCNC: 22 MMOL/L (ref 21–32)
COLOR UR: ABNORMAL
COMMENT:: NORMAL
CREAT SERPL-MCNC: 0.75 MG/DL (ref 0.7–1.3)
CREAT SERPL-MCNC: 0.84 MG/DL (ref 0.7–1.3)
DIFFERENTIAL METHOD BLD: ABNORMAL
EOSINOPHIL # BLD: 0 K/UL (ref 0–0.4)
EOSINOPHIL NFR BLD: 0 % (ref 0–7)
EPITH CASTS URNS QL MICRO: ABNORMAL /LPF
ERYTHROCYTE [DISTWIDTH] IN BLOOD BY AUTOMATED COUNT: 15.6 % (ref 11.5–14.5)
ERYTHROCYTE [DISTWIDTH] IN BLOOD BY AUTOMATED COUNT: 15.8 % (ref 11.5–14.5)
GLOBULIN SER CALC-MCNC: 3.5 G/DL (ref 2–4)
GLUCOSE BLD STRIP.AUTO-MCNC: 127 MG/DL (ref 65–117)
GLUCOSE SERPL-MCNC: 101 MG/DL (ref 65–100)
GLUCOSE SERPL-MCNC: 108 MG/DL (ref 65–100)
GLUCOSE UR STRIP.AUTO-MCNC: NEGATIVE MG/DL
HCT VFR BLD AUTO: 34.6 % (ref 36.6–50.3)
HCT VFR BLD AUTO: 40.9 % (ref 36.6–50.3)
HGB BLD-MCNC: 11.5 G/DL (ref 12.1–17)
HGB BLD-MCNC: 13.8 G/DL (ref 12.1–17)
HGB UR QL STRIP: NEGATIVE
HYALINE CASTS URNS QL MICRO: ABNORMAL /LPF (ref 0–2)
IMM GRANULOCYTES # BLD AUTO: 0 K/UL (ref 0–0.04)
IMM GRANULOCYTES NFR BLD AUTO: 0 % (ref 0–0.5)
KETONES UR QL STRIP.AUTO: 15 MG/DL
LEUKOCYTE ESTERASE UR QL STRIP.AUTO: NEGATIVE
LIPASE SERPL-CCNC: 153 U/L (ref 13–75)
LYMPHOCYTES # BLD: 0.6 K/UL (ref 0.8–3.5)
LYMPHOCYTES NFR BLD: 7 % (ref 12–49)
MAGNESIUM SERPL-MCNC: 1.6 MG/DL (ref 1.6–2.4)
MCH RBC QN AUTO: 32.8 PG (ref 26–34)
MCH RBC QN AUTO: 33.5 PG (ref 26–34)
MCHC RBC AUTO-ENTMCNC: 33.2 G/DL (ref 30–36.5)
MCHC RBC AUTO-ENTMCNC: 33.7 G/DL (ref 30–36.5)
MCV RBC AUTO: 98.6 FL (ref 80–99)
MCV RBC AUTO: 99.3 FL (ref 80–99)
MONOCYTES # BLD: 0.3 K/UL (ref 0–1)
MONOCYTES NFR BLD: 3 % (ref 5–13)
NEUTS SEG # BLD: 7.8 K/UL (ref 1.8–8)
NEUTS SEG NFR BLD: 89 % (ref 32–75)
NITRITE UR QL STRIP.AUTO: NEGATIVE
NRBC # BLD: 0 K/UL (ref 0–0.01)
NRBC # BLD: 0 K/UL (ref 0–0.01)
NRBC BLD-RTO: 0 PER 100 WBC
NRBC BLD-RTO: 0 PER 100 WBC
PH UR STRIP: 5.5 (ref 5–8)
PHOSPHATE SERPL-MCNC: 3.1 MG/DL (ref 2.6–4.7)
PLATELET # BLD AUTO: 211 K/UL (ref 150–400)
PLATELET # BLD AUTO: 262 K/UL (ref 150–400)
PMV BLD AUTO: 8.5 FL (ref 8.9–12.9)
PMV BLD AUTO: 8.6 FL (ref 8.9–12.9)
POTASSIUM SERPL-SCNC: 4.1 MMOL/L (ref 3.5–5.1)
POTASSIUM SERPL-SCNC: 4.3 MMOL/L (ref 3.5–5.1)
PROT SERPL-MCNC: 6.9 G/DL (ref 6.4–8.2)
PROT UR STRIP-MCNC: NEGATIVE MG/DL
RBC # BLD AUTO: 3.51 M/UL (ref 4.1–5.7)
RBC # BLD AUTO: 4.12 M/UL (ref 4.1–5.7)
RBC #/AREA URNS HPF: ABNORMAL /HPF (ref 0–5)
RBC MORPH BLD: ABNORMAL
SERVICE CMNT-IMP: ABNORMAL
SODIUM SERPL-SCNC: 131 MMOL/L (ref 136–145)
SODIUM SERPL-SCNC: 132 MMOL/L (ref 136–145)
SP GR UR REFRACTOMETRY: 1.01 (ref 1–1.03)
SPECIMEN EXP DATE BLD: NORMAL
SPECIMEN HOLD: NORMAL
URINE CULTURE IF INDICATED: ABNORMAL
UROBILINOGEN UR QL STRIP.AUTO: 0.2 EU/DL (ref 0.2–1)
WBC # BLD AUTO: 13.3 K/UL (ref 4.1–11.1)
WBC # BLD AUTO: 8.8 K/UL (ref 4.1–11.1)
WBC URNS QL MICRO: ABNORMAL /HPF (ref 0–4)

## 2024-08-11 PROCEDURE — 6360000004 HC RX CONTRAST MEDICATION: Performed by: EMERGENCY MEDICINE

## 2024-08-11 PROCEDURE — 6360000002 HC RX W HCPCS: Performed by: NURSE PRACTITIONER

## 2024-08-11 PROCEDURE — 80053 COMPREHEN METABOLIC PANEL: CPT

## 2024-08-11 PROCEDURE — 2709999900 HC NON-CHARGEABLE SUPPLY: Performed by: STUDENT IN AN ORGANIZED HEALTH CARE EDUCATION/TRAINING PROGRAM

## 2024-08-11 PROCEDURE — 74177 CT ABD & PELVIS W/CONTRAST: CPT

## 2024-08-11 PROCEDURE — 86850 RBC ANTIBODY SCREEN: CPT

## 2024-08-11 PROCEDURE — 2580000003 HC RX 258: Performed by: EMERGENCY MEDICINE

## 2024-08-11 PROCEDURE — P9045 ALBUMIN (HUMAN), 5%, 250 ML: HCPCS | Performed by: NURSE ANESTHETIST, CERTIFIED REGISTERED

## 2024-08-11 PROCEDURE — 82962 GLUCOSE BLOOD TEST: CPT

## 2024-08-11 PROCEDURE — 6360000002 HC RX W HCPCS: Performed by: NURSE ANESTHETIST, CERTIFIED REGISTERED

## 2024-08-11 PROCEDURE — 3700000000 HC ANESTHESIA ATTENDED CARE: Performed by: STUDENT IN AN ORGANIZED HEALTH CARE EDUCATION/TRAINING PROGRAM

## 2024-08-11 PROCEDURE — 96368 THER/DIAG CONCURRENT INF: CPT

## 2024-08-11 PROCEDURE — 7100000000 HC PACU RECOVERY - FIRST 15 MIN: Performed by: STUDENT IN AN ORGANIZED HEALTH CARE EDUCATION/TRAINING PROGRAM

## 2024-08-11 PROCEDURE — 83690 ASSAY OF LIPASE: CPT

## 2024-08-11 PROCEDURE — 2580000003 HC RX 258: Performed by: NURSE PRACTITIONER

## 2024-08-11 PROCEDURE — 43659 UNLISTED LAPS PX STOMACH: CPT | Performed by: STUDENT IN AN ORGANIZED HEALTH CARE EDUCATION/TRAINING PROGRAM

## 2024-08-11 PROCEDURE — 85025 COMPLETE CBC W/AUTO DIFF WBC: CPT

## 2024-08-11 PROCEDURE — 85027 COMPLETE CBC AUTOMATED: CPT

## 2024-08-11 PROCEDURE — 81001 URINALYSIS AUTO W/SCOPE: CPT

## 2024-08-11 PROCEDURE — 0DQ74ZZ REPAIR STOMACH, PYLORUS, PERCUTANEOUS ENDOSCOPIC APPROACH: ICD-10-PCS | Performed by: STUDENT IN AN ORGANIZED HEALTH CARE EDUCATION/TRAINING PROGRAM

## 2024-08-11 PROCEDURE — 3600000013 HC SURGERY LEVEL 3 ADDTL 15MIN: Performed by: STUDENT IN AN ORGANIZED HEALTH CARE EDUCATION/TRAINING PROGRAM

## 2024-08-11 PROCEDURE — 86900 BLOOD TYPING SEROLOGIC ABO: CPT

## 2024-08-11 PROCEDURE — 93005 ELECTROCARDIOGRAM TRACING: CPT | Performed by: EMERGENCY MEDICINE

## 2024-08-11 PROCEDURE — 2580000003 HC RX 258: Performed by: NURSE ANESTHETIST, CERTIFIED REGISTERED

## 2024-08-11 PROCEDURE — 83735 ASSAY OF MAGNESIUM: CPT

## 2024-08-11 PROCEDURE — 2000000000 HC ICU R&B

## 2024-08-11 PROCEDURE — 99285 EMERGENCY DEPT VISIT HI MDM: CPT

## 2024-08-11 PROCEDURE — 94640 AIRWAY INHALATION TREATMENT: CPT

## 2024-08-11 PROCEDURE — 6360000002 HC RX W HCPCS: Performed by: STUDENT IN AN ORGANIZED HEALTH CARE EDUCATION/TRAINING PROGRAM

## 2024-08-11 PROCEDURE — 96365 THER/PROPH/DIAG IV INF INIT: CPT

## 2024-08-11 PROCEDURE — 6360000002 HC RX W HCPCS: Performed by: ANESTHESIOLOGY

## 2024-08-11 PROCEDURE — 6370000000 HC RX 637 (ALT 250 FOR IP): Performed by: NURSE PRACTITIONER

## 2024-08-11 PROCEDURE — 3600000003 HC SURGERY LEVEL 3 BASE: Performed by: STUDENT IN AN ORGANIZED HEALTH CARE EDUCATION/TRAINING PROGRAM

## 2024-08-11 PROCEDURE — 3700000001 HC ADD 15 MINUTES (ANESTHESIA): Performed by: STUDENT IN AN ORGANIZED HEALTH CARE EDUCATION/TRAINING PROGRAM

## 2024-08-11 PROCEDURE — 84100 ASSAY OF PHOSPHORUS: CPT

## 2024-08-11 PROCEDURE — 7100000001 HC PACU RECOVERY - ADDTL 15 MIN: Performed by: STUDENT IN AN ORGANIZED HEALTH CARE EDUCATION/TRAINING PROGRAM

## 2024-08-11 PROCEDURE — 6360000002 HC RX W HCPCS: Performed by: EMERGENCY MEDICINE

## 2024-08-11 PROCEDURE — 86901 BLOOD TYPING SEROLOGIC RH(D): CPT

## 2024-08-11 PROCEDURE — 2500000003 HC RX 250 WO HCPCS: Performed by: NURSE ANESTHETIST, CERTIFIED REGISTERED

## 2024-08-11 PROCEDURE — 96375 TX/PRO/DX INJ NEW DRUG ADDON: CPT

## 2024-08-11 PROCEDURE — 36415 COLL VENOUS BLD VENIPUNCTURE: CPT

## 2024-08-11 PROCEDURE — 2580000003 HC RX 258: Performed by: ANESTHESIOLOGY

## 2024-08-11 PROCEDURE — 43840 GSTRRPHY SUTR DUOL/GSTR ULCR: CPT | Performed by: STUDENT IN AN ORGANIZED HEALTH CARE EDUCATION/TRAINING PROGRAM

## 2024-08-11 PROCEDURE — 94664 DEMO&/EVAL PT USE INHALER: CPT

## 2024-08-11 PROCEDURE — 49905 OMENTAL FLAP INTRA-ABDOM: CPT | Performed by: STUDENT IN AN ORGANIZED HEALTH CARE EDUCATION/TRAINING PROGRAM

## 2024-08-11 RX ORDER — DEXAMETHASONE SODIUM PHOSPHATE 4 MG/ML
4 INJECTION, SOLUTION INTRA-ARTICULAR; INTRALESIONAL; INTRAMUSCULAR; INTRAVENOUS; SOFT TISSUE
Status: DISCONTINUED | OUTPATIENT
Start: 2024-08-11 | End: 2024-08-11 | Stop reason: HOSPADM

## 2024-08-11 RX ORDER — ONDANSETRON 2 MG/ML
4 INJECTION INTRAMUSCULAR; INTRAVENOUS ONCE
Status: COMPLETED | OUTPATIENT
Start: 2024-08-11 | End: 2024-08-11

## 2024-08-11 RX ORDER — FAMOTIDINE 10 MG/ML
INJECTION, SOLUTION INTRAVENOUS PRN
Status: DISCONTINUED | OUTPATIENT
Start: 2024-08-11 | End: 2024-08-11 | Stop reason: SDUPTHER

## 2024-08-11 RX ORDER — ALBUTEROL SULFATE 0.83 MG/ML
2.5 SOLUTION RESPIRATORY (INHALATION) EVERY 6 HOURS PRN
Status: DISCONTINUED | OUTPATIENT
Start: 2024-08-11 | End: 2024-08-27 | Stop reason: HOSPADM

## 2024-08-11 RX ORDER — SODIUM CHLORIDE 0.9 % (FLUSH) 0.9 %
5-40 SYRINGE (ML) INJECTION PRN
Status: DISCONTINUED | OUTPATIENT
Start: 2024-08-11 | End: 2024-08-27 | Stop reason: HOSPADM

## 2024-08-11 RX ORDER — CASTOR OIL AND BALSAM, PERU 788; 87 MG/G; MG/G
OINTMENT TOPICAL 2 TIMES DAILY
Status: DISCONTINUED | OUTPATIENT
Start: 2024-08-11 | End: 2024-08-27 | Stop reason: HOSPADM

## 2024-08-11 RX ORDER — ACETAMINOPHEN 325 MG/1
650 TABLET ORAL EVERY 6 HOURS PRN
Status: DISCONTINUED | OUTPATIENT
Start: 2024-08-11 | End: 2024-08-27 | Stop reason: HOSPADM

## 2024-08-11 RX ORDER — ALBUMIN, HUMAN INJ 5% 5 %
SOLUTION INTRAVENOUS PRN
Status: DISCONTINUED | OUTPATIENT
Start: 2024-08-11 | End: 2024-08-11 | Stop reason: SDUPTHER

## 2024-08-11 RX ORDER — ROCURONIUM BROMIDE 10 MG/ML
INJECTION, SOLUTION INTRAVENOUS PRN
Status: DISCONTINUED | OUTPATIENT
Start: 2024-08-11 | End: 2024-08-11 | Stop reason: SDUPTHER

## 2024-08-11 RX ORDER — NALOXONE HYDROCHLORIDE 0.4 MG/ML
INJECTION, SOLUTION INTRAMUSCULAR; INTRAVENOUS; SUBCUTANEOUS PRN
Status: DISCONTINUED | OUTPATIENT
Start: 2024-08-11 | End: 2024-08-11 | Stop reason: HOSPADM

## 2024-08-11 RX ORDER — ENOXAPARIN SODIUM 100 MG/ML
30 INJECTION SUBCUTANEOUS DAILY
Status: DISCONTINUED | OUTPATIENT
Start: 2024-08-12 | End: 2024-08-25

## 2024-08-11 RX ORDER — SODIUM CHLORIDE 9 MG/ML
INJECTION, SOLUTION INTRAVENOUS PRN
Status: DISCONTINUED | OUTPATIENT
Start: 2024-08-11 | End: 2024-08-11 | Stop reason: HOSPADM

## 2024-08-11 RX ORDER — SODIUM CHLORIDE 0.9 % (FLUSH) 0.9 %
5-40 SYRINGE (ML) INJECTION EVERY 12 HOURS SCHEDULED
Status: DISCONTINUED | OUTPATIENT
Start: 2024-08-11 | End: 2024-08-27 | Stop reason: HOSPADM

## 2024-08-11 RX ORDER — OXYCODONE HYDROCHLORIDE 5 MG/1
5 TABLET ORAL
Status: DISCONTINUED | OUTPATIENT
Start: 2024-08-11 | End: 2024-08-11 | Stop reason: HOSPADM

## 2024-08-11 RX ORDER — SODIUM CHLORIDE 0.9 % (FLUSH) 0.9 %
5-40 SYRINGE (ML) INJECTION EVERY 12 HOURS SCHEDULED
Status: DISCONTINUED | OUTPATIENT
Start: 2024-08-11 | End: 2024-08-11 | Stop reason: HOSPADM

## 2024-08-11 RX ORDER — ONDANSETRON 4 MG/1
4 TABLET, ORALLY DISINTEGRATING ORAL EVERY 8 HOURS PRN
Status: DISCONTINUED | OUTPATIENT
Start: 2024-08-11 | End: 2024-08-27 | Stop reason: HOSPADM

## 2024-08-11 RX ORDER — METRONIDAZOLE 500 MG/100ML
500 INJECTION, SOLUTION INTRAVENOUS EVERY 8 HOURS
Status: DISCONTINUED | OUTPATIENT
Start: 2024-08-12 | End: 2024-08-19

## 2024-08-11 RX ORDER — SODIUM CHLORIDE, SODIUM LACTATE, POTASSIUM CHLORIDE, CALCIUM CHLORIDE 600; 310; 30; 20 MG/100ML; MG/100ML; MG/100ML; MG/100ML
INJECTION, SOLUTION INTRAVENOUS CONTINUOUS PRN
Status: DISCONTINUED | OUTPATIENT
Start: 2024-08-11 | End: 2024-08-11 | Stop reason: SDUPTHER

## 2024-08-11 RX ORDER — SODIUM CHLORIDE 9 MG/ML
INJECTION, SOLUTION INTRAVENOUS PRN
Status: DISCONTINUED | OUTPATIENT
Start: 2024-08-11 | End: 2024-08-27 | Stop reason: HOSPADM

## 2024-08-11 RX ORDER — FAMOTIDINE 10 MG/ML
INJECTION, SOLUTION INTRAVENOUS
Status: COMPLETED
Start: 2024-08-11 | End: 2024-08-11

## 2024-08-11 RX ORDER — ACETAMINOPHEN 650 MG/1
650 SUPPOSITORY RECTAL EVERY 6 HOURS PRN
Status: DISCONTINUED | OUTPATIENT
Start: 2024-08-11 | End: 2024-08-27 | Stop reason: HOSPADM

## 2024-08-11 RX ORDER — FLUCONAZOLE 2 MG/ML
400 INJECTION, SOLUTION INTRAVENOUS EVERY 24 HOURS
Status: DISCONTINUED | OUTPATIENT
Start: 2024-08-12 | End: 2024-08-12

## 2024-08-11 RX ORDER — MORPHINE SULFATE 4 MG/ML
4 INJECTION, SOLUTION INTRAMUSCULAR; INTRAVENOUS
Status: COMPLETED | OUTPATIENT
Start: 2024-08-11 | End: 2024-08-11

## 2024-08-11 RX ORDER — HYDRALAZINE HYDROCHLORIDE 20 MG/ML
10 INJECTION INTRAMUSCULAR; INTRAVENOUS
Status: DISCONTINUED | OUTPATIENT
Start: 2024-08-11 | End: 2024-08-11 | Stop reason: HOSPADM

## 2024-08-11 RX ORDER — DEXTROSE MONOHYDRATE AND SODIUM CHLORIDE 5; .9 G/100ML; G/100ML
INJECTION, SOLUTION INTRAVENOUS CONTINUOUS
Status: ACTIVE | OUTPATIENT
Start: 2024-08-11 | End: 2024-08-12

## 2024-08-11 RX ORDER — BUPIVACAINE HYDROCHLORIDE 5 MG/ML
INJECTION, SOLUTION EPIDURAL; INTRACAUDAL PRN
Status: DISCONTINUED | OUTPATIENT
Start: 2024-08-11 | End: 2024-08-11 | Stop reason: ALTCHOICE

## 2024-08-11 RX ORDER — SUCCINYLCHOLINE CHLORIDE 20 MG/ML
INJECTION INTRAMUSCULAR; INTRAVENOUS PRN
Status: DISCONTINUED | OUTPATIENT
Start: 2024-08-11 | End: 2024-08-11 | Stop reason: SDUPTHER

## 2024-08-11 RX ORDER — FENTANYL CITRATE 50 UG/ML
25 INJECTION, SOLUTION INTRAMUSCULAR; INTRAVENOUS EVERY 5 MIN PRN
Status: DISCONTINUED | OUTPATIENT
Start: 2024-08-11 | End: 2024-08-11 | Stop reason: HOSPADM

## 2024-08-11 RX ORDER — IOPAMIDOL 755 MG/ML
100 INJECTION, SOLUTION INTRAVASCULAR
Status: COMPLETED | OUTPATIENT
Start: 2024-08-11 | End: 2024-08-11

## 2024-08-11 RX ORDER — 0.9 % SODIUM CHLORIDE 0.9 %
500 INTRAVENOUS SOLUTION INTRAVENOUS ONCE
Status: COMPLETED | OUTPATIENT
Start: 2024-08-11 | End: 2024-08-11

## 2024-08-11 RX ORDER — DEXAMETHASONE SODIUM PHOSPHATE 4 MG/ML
INJECTION, SOLUTION INTRA-ARTICULAR; INTRALESIONAL; INTRAMUSCULAR; INTRAVENOUS; SOFT TISSUE PRN
Status: DISCONTINUED | OUTPATIENT
Start: 2024-08-11 | End: 2024-08-11 | Stop reason: SDUPTHER

## 2024-08-11 RX ORDER — FENTANYL CITRATE 50 UG/ML
50 INJECTION, SOLUTION INTRAMUSCULAR; INTRAVENOUS
Status: COMPLETED | OUTPATIENT
Start: 2024-08-11 | End: 2024-08-11

## 2024-08-11 RX ORDER — PROCHLORPERAZINE EDISYLATE 5 MG/ML
5 INJECTION INTRAMUSCULAR; INTRAVENOUS
Status: DISCONTINUED | OUTPATIENT
Start: 2024-08-11 | End: 2024-08-11 | Stop reason: HOSPADM

## 2024-08-11 RX ORDER — ONDANSETRON 2 MG/ML
4 INJECTION INTRAMUSCULAR; INTRAVENOUS EVERY 6 HOURS PRN
Status: DISCONTINUED | OUTPATIENT
Start: 2024-08-11 | End: 2024-08-27 | Stop reason: HOSPADM

## 2024-08-11 RX ORDER — METRONIDAZOLE 500 MG/100ML
500 INJECTION, SOLUTION INTRAVENOUS
Status: COMPLETED | OUTPATIENT
Start: 2024-08-11 | End: 2024-08-11

## 2024-08-11 RX ORDER — PHENYLEPHRINE HCL IN 0.9% NACL 0.4MG/10ML
SYRINGE (ML) INTRAVENOUS PRN
Status: DISCONTINUED | OUTPATIENT
Start: 2024-08-11 | End: 2024-08-11 | Stop reason: SDUPTHER

## 2024-08-11 RX ORDER — SODIUM CHLORIDE, SODIUM LACTATE, POTASSIUM CHLORIDE, AND CALCIUM CHLORIDE .6; .31; .03; .02 G/100ML; G/100ML; G/100ML; G/100ML
1000 INJECTION, SOLUTION INTRAVENOUS
Status: COMPLETED | OUTPATIENT
Start: 2024-08-11 | End: 2024-08-11

## 2024-08-11 RX ORDER — FENTANYL CITRATE 50 UG/ML
INJECTION, SOLUTION INTRAMUSCULAR; INTRAVENOUS PRN
Status: DISCONTINUED | OUTPATIENT
Start: 2024-08-11 | End: 2024-08-11 | Stop reason: SDUPTHER

## 2024-08-11 RX ORDER — POLYETHYLENE GLYCOL 3350 17 G/17G
17 POWDER, FOR SOLUTION ORAL DAILY PRN
Status: DISCONTINUED | OUTPATIENT
Start: 2024-08-11 | End: 2024-08-16

## 2024-08-11 RX ORDER — SODIUM CHLORIDE 0.9 % (FLUSH) 0.9 %
5-40 SYRINGE (ML) INJECTION PRN
Status: DISCONTINUED | OUTPATIENT
Start: 2024-08-11 | End: 2024-08-11 | Stop reason: HOSPADM

## 2024-08-11 RX ORDER — ACETAMINOPHEN 325 MG/1
650 TABLET ORAL
Status: DISCONTINUED | OUTPATIENT
Start: 2024-08-11 | End: 2024-08-11 | Stop reason: HOSPADM

## 2024-08-11 RX ORDER — ONDANSETRON 2 MG/ML
INJECTION INTRAMUSCULAR; INTRAVENOUS PRN
Status: DISCONTINUED | OUTPATIENT
Start: 2024-08-11 | End: 2024-08-11 | Stop reason: SDUPTHER

## 2024-08-11 RX ORDER — HYDROMORPHONE HYDROCHLORIDE 1 MG/ML
0.25 INJECTION, SOLUTION INTRAMUSCULAR; INTRAVENOUS; SUBCUTANEOUS EVERY 5 MIN PRN
Status: DISCONTINUED | OUTPATIENT
Start: 2024-08-11 | End: 2024-08-11 | Stop reason: HOSPADM

## 2024-08-11 RX ADMIN — DEXAMETHASONE SODIUM PHOSPHATE 8 MG: 4 INJECTION, SOLUTION INTRAMUSCULAR; INTRAVENOUS at 18:45

## 2024-08-11 RX ADMIN — SODIUM CHLORIDE, PRESERVATIVE FREE 10 ML: 5 INJECTION INTRAVENOUS at 22:10

## 2024-08-11 RX ADMIN — SUGAMMADEX 100 MG: 100 INJECTION, SOLUTION INTRAVENOUS at 20:05

## 2024-08-11 RX ADMIN — SODIUM CHLORIDE, POTASSIUM CHLORIDE, SODIUM LACTATE AND CALCIUM CHLORIDE: 600; 310; 30; 20 INJECTION, SOLUTION INTRAVENOUS at 18:22

## 2024-08-11 RX ADMIN — Medication: at 22:05

## 2024-08-11 RX ADMIN — SODIUM CHLORIDE 500 ML: 9 INJECTION, SOLUTION INTRAVENOUS at 22:27

## 2024-08-11 RX ADMIN — ROCURONIUM BROMIDE 10 MG: 10 INJECTION INTRAVENOUS at 19:15

## 2024-08-11 RX ADMIN — ROCURONIUM BROMIDE 25 MG: 10 INJECTION INTRAVENOUS at 18:35

## 2024-08-11 RX ADMIN — SODIUM CHLORIDE, POTASSIUM CHLORIDE, SODIUM LACTATE AND CALCIUM CHLORIDE: 600; 310; 30; 20 INJECTION, SOLUTION INTRAVENOUS at 20:10

## 2024-08-11 RX ADMIN — FENTANYL CITRATE 50 MCG: 50 INJECTION, SOLUTION INTRAMUSCULAR; INTRAVENOUS at 20:07

## 2024-08-11 RX ADMIN — MORPHINE SULFATE 4 MG: 4 INJECTION, SOLUTION INTRAMUSCULAR; INTRAVENOUS at 13:51

## 2024-08-11 RX ADMIN — PHENYLEPHRINE HYDROCHLORIDE 40 MCG/MIN: 10 INJECTION INTRAVENOUS at 18:44

## 2024-08-11 RX ADMIN — ONDANSETRON 4 MG: 2 INJECTION INTRAMUSCULAR; INTRAVENOUS at 13:59

## 2024-08-11 RX ADMIN — ALBUMIN (HUMAN) 12.5 G: 12.5 INJECTION, SOLUTION INTRAVENOUS at 19:28

## 2024-08-11 RX ADMIN — SUCCINYLCHOLINE CHLORIDE 80 MG: 20 INJECTION, SOLUTION INTRAMUSCULAR; INTRAVENOUS at 18:30

## 2024-08-11 RX ADMIN — HYDROMORPHONE HYDROCHLORIDE 0.25 MG: 1 INJECTION, SOLUTION INTRAMUSCULAR; INTRAVENOUS; SUBCUTANEOUS at 20:55

## 2024-08-11 RX ADMIN — FENTANYL CITRATE 25 MCG: 50 INJECTION, SOLUTION INTRAMUSCULAR; INTRAVENOUS at 18:30

## 2024-08-11 RX ADMIN — ONDANSETRON HYDROCHLORIDE 4 MG: 2 INJECTION, SOLUTION INTRAMUSCULAR; INTRAVENOUS at 20:00

## 2024-08-11 RX ADMIN — SODIUM CHLORIDE, POTASSIUM CHLORIDE, SODIUM LACTATE AND CALCIUM CHLORIDE 1000 ML: 600; 310; 30; 20 INJECTION, SOLUTION INTRAVENOUS at 14:59

## 2024-08-11 RX ADMIN — PROPOFOL 50 MG: 10 INJECTION, EMULSION INTRAVENOUS at 18:30

## 2024-08-11 RX ADMIN — FENTANYL CITRATE 50 MCG: 50 INJECTION INTRAMUSCULAR; INTRAVENOUS at 16:09

## 2024-08-11 RX ADMIN — FENTANYL CITRATE 25 MCG: 50 INJECTION, SOLUTION INTRAMUSCULAR; INTRAVENOUS at 18:55

## 2024-08-11 RX ADMIN — LIDOCAINE HYDROCHLORIDE 40 MG: 20 INJECTION, SOLUTION EPIDURAL; INFILTRATION; INTRACAUDAL; PERINEURAL at 18:30

## 2024-08-11 RX ADMIN — Medication 60 MCG: at 18:30

## 2024-08-11 RX ADMIN — FLUCONAZOLE 800 MG: 400 INJECTION, SOLUTION INTRAVENOUS at 22:40

## 2024-08-11 RX ADMIN — METRONIDAZOLE 500 MG: 500 INJECTION, SOLUTION INTRAVENOUS at 23:55

## 2024-08-11 RX ADMIN — ROCURONIUM BROMIDE 5 MG: 10 INJECTION INTRAVENOUS at 18:30

## 2024-08-11 RX ADMIN — HYDROMORPHONE HYDROCHLORIDE 0.25 MG: 1 INJECTION, SOLUTION INTRAMUSCULAR; INTRAVENOUS; SUBCUTANEOUS at 20:50

## 2024-08-11 RX ADMIN — SODIUM CHLORIDE, PRESERVATIVE FREE 10 ML: 5 INJECTION INTRAVENOUS at 21:51

## 2024-08-11 RX ADMIN — IOPAMIDOL 100 ML: 755 INJECTION, SOLUTION INTRAVENOUS at 15:19

## 2024-08-11 RX ADMIN — DEXTROSE AND SODIUM CHLORIDE: 5; 900 INJECTION, SOLUTION INTRAVENOUS at 22:16

## 2024-08-11 RX ADMIN — FAMOTIDINE 20 MG: 10 INJECTION INTRAVENOUS at 18:40

## 2024-08-11 RX ADMIN — ARFORMOTEROL TARTRATE: 15 SOLUTION RESPIRATORY (INHALATION) at 22:37

## 2024-08-11 RX ADMIN — SODIUM CHLORIDE 80 MG: 9 INJECTION INTRAMUSCULAR; INTRAVENOUS; SUBCUTANEOUS at 16:09

## 2024-08-11 RX ADMIN — CEFEPIME 2000 MG: 2 INJECTION, POWDER, FOR SOLUTION INTRAVENOUS at 16:12

## 2024-08-11 RX ADMIN — METRONIDAZOLE 500 MG: 500 INJECTION, SOLUTION INTRAVENOUS at 16:12

## 2024-08-11 RX ADMIN — SODIUM CHLORIDE, PRESERVATIVE FREE 40 MG: 5 INJECTION INTRAVENOUS at 22:10

## 2024-08-11 ASSESSMENT — PAIN DESCRIPTION - DESCRIPTORS
DESCRIPTORS: DULL
DESCRIPTORS: ACHING
DESCRIPTORS: ACHING
DESCRIPTORS: SHARP
DESCRIPTORS: SHARP
DESCRIPTORS: CRAMPING;ACHING
DESCRIPTORS: SHARP
DESCRIPTORS: CRAMPING
DESCRIPTORS: ACHING

## 2024-08-11 ASSESSMENT — PAIN DESCRIPTION - ONSET
ONSET: SUDDEN

## 2024-08-11 ASSESSMENT — PAIN DESCRIPTION - FREQUENCY
FREQUENCY: CONTINUOUS

## 2024-08-11 ASSESSMENT — PAIN DESCRIPTION - LOCATION
LOCATION: ABDOMEN

## 2024-08-11 ASSESSMENT — PAIN DESCRIPTION - PAIN TYPE
TYPE: ACUTE PAIN

## 2024-08-11 ASSESSMENT — PAIN DESCRIPTION - ORIENTATION
ORIENTATION: RIGHT

## 2024-08-11 ASSESSMENT — PAIN SCALES - GENERAL
PAINLEVEL_OUTOF10: 3
PAINLEVEL_OUTOF10: 10
PAINLEVEL_OUTOF10: 8
PAINLEVEL_OUTOF10: 5
PAINLEVEL_OUTOF10: 3
PAINLEVEL_OUTOF10: 8
PAINLEVEL_OUTOF10: 3
PAINLEVEL_OUTOF10: 5
PAINLEVEL_OUTOF10: 10
PAINLEVEL_OUTOF10: 6
PAINLEVEL_OUTOF10: 5

## 2024-08-11 ASSESSMENT — PAIN - FUNCTIONAL ASSESSMENT

## 2024-08-11 ASSESSMENT — LIFESTYLE VARIABLES
HOW OFTEN DO YOU HAVE A DRINK CONTAINING ALCOHOL: NEVER
HOW MANY STANDARD DRINKS CONTAINING ALCOHOL DO YOU HAVE ON A TYPICAL DAY: PATIENT DOES NOT DRINK

## 2024-08-11 NOTE — ANESTHESIA PRE PROCEDURE
Department of Anesthesiology  Preprocedure Note       Name:  Anuel Bermudez   Age:  78 y.o.  :  1945                                          MRN:  [id number]         Date:  2024      Surgeon: Surgeon(s):  Rafael Hall MD    Procedure: Procedure(s):  LAPAROSCOPY DIAGNOSTIC, POSSIBLE OPEN    Medications prior to admission:   Prior to Admission medications    Medication Sig Start Date End Date Taking? Authorizing Provider   fluticasone-umeclidin-vilant (TRELEGY ELLIPTA) 100-62.5-25 MCG/ACT AEPB inhaler Inhale 1 puff into the lungs daily 3/22/24   Alexandra Humphrey MD   albuterol sulfate HFA (VENTOLIN HFA) 108 (90 Base) MCG/ACT inhaler Inhale 2 puffs into the lungs 4 times daily as needed for Wheezing 3/22/24   Alexandra Humphrey MD   albuterol sulfate HFA (PROVENTIL;VENTOLIN;PROAIR) 108 (90 Base) MCG/ACT inhaler Inhale 1 puff into the lungs every 6 hours as needed for Wheezing or Shortness of Breath 24   Ava Landin MD   budesonide-formoterol (SYMBICORT) 160-4.5 MCG/ACT AERO Inhale 2 puffs into the lungs 2 times daily 24   Ava Landin MD   donepezil (ARICEPT) 5 MG tablet Take 1 tablet by mouth nightly at bedtime 3/12/24   Ava Landin MD   mirtazapine (REMERON) 30 MG tablet Take 1 tablet by mouth nightly 3/12/24   Ava Landin MD   montelukast (SINGULAIR) 10 MG tablet Take 1 tablet by mouth nightly 3/12/24   Ava Landin MD   triamcinolone (ARISTOCORT) 0.5 % cream Apply topically as needed 1 application to hands externally twice a day    Ava Lanidn MD   ASPIRIN 81 PO Take 81 mg by mouth daily    Ava Landin MD   famotidine (PEPCID) 20 MG tablet Take 1 tablet by mouth 2 times daily    Ava Landin MD       Current medications:    Current Facility-Administered Medications   Medication Dose Route Frequency Provider Last Rate Last Admin   • famotidine (PEPCID) 20 MG/2ML injection                Allergies:

## 2024-08-11 NOTE — ED PROVIDER NOTES
[facility] EMERGENCY DEPT  EMERGENCY DEPARTMENT ENCOUNTER       Pt Name: Anuel Bermudez  MRN: [id number]  Birthdate 1945  Date of evaluation: 2024  Provider: Darrell Beard MD   PCP: Bonifacio Bassett MD  Note Started: 1:43 PM EDT 24     CHIEF COMPLAINT       Chief Complaint   Patient presents with    Abdominal Pain        HISTORY OF PRESENT ILLNESS: 1 or more elements      History From: patient, History limited by: none     Anuel Bermudez is a 78 y.o. male with a history of lung cancer with port in place, last chemotherapy 1 month ago presents emerged part with a chief complaint of abdominal pain.    Patient reports symptoms began this morning.  Reports right upper quadrant pain with nausea.  No vomiting.  No urinary symptoms.  No diarrhea.    Patient reports he has had pain on and off for a few weeks but this is the most severe.  No chest pain.  No shortness of breath.    Please See MDM for Additional Details of the HPI/PMH  Nursing Notes were all reviewed and agreed with or any disagreements were addressed in the HPI.     REVIEW OF SYSTEMS        Positives and Pertinent negatives as per HPI.    PAST HISTORY     Past Medical History:  No past medical history on file.    Past Surgical History:  Past Surgical History:   Procedure Laterality Date    BRONCHOSCOPY N/A 3/20/2024    BRONCHOSCOPY performed by Ramez Fry MD at [facility] ENDOSCOPY    IR PORT PLACEMENT EQUAL OR GREATER THAN 5 YEARS  2024    IR PORT PLACEMENT EQUAL OR GREATER THAN 5 YEARS 2024 Anastasia Chavez APRN - NP [facility] RAD ANGIO IR       Family History:  No family history on file.    Social History:  Social History     Tobacco Use    Smoking status: Former     Types: Cigars     Quit date:      Years since quittin.6    Smokeless tobacco: Never       Allergies:  Allergies   Allergen Reactions    Other Anaphylaxis     Eggs and fish    Penicillins Hives       CURRENT MEDICATIONS      Previous Medications    ALBUTEROL SULFATE  changes  MANAGEMENT- Bedside Assessment  INTERPRETATION -  CT Scan and Blood Gases  INTERVENTIONS - Metabolic interventions  CASE REVIEW - Medical Sub-Specialist, Nursing, and Family  TREATMENT RESPONSE -Improved  PERFORMED BY - Self    NOTES:  I have spent 46 minutes of critical care time involved in lab review, consultations with specialist, family decision- making, bedside attention and documentation. Time is exclusive of EKG interpretation, imaging interpretation and separately billed procedures.  During this entire length of time I was immediately available to the patient.  Darrell Beard MD      EMERGENCY DEPARTMENT COURSE and DIFFERENTIAL DIAGNOSIS/MDM   Vitals:    Vitals:    08/11/24 1400 08/11/24 1430 08/11/24 1452 08/11/24 1609   BP: 107/65  (!) 95/55    Pulse: 93  93    Resp:   18 20   Temp:       TempSrc:       SpO2: 94% 95% 97%    Weight:       Height:            Patient was given the following medications:  Medications   lactated ringers bolus 1,000 mL (1,000 mLs IntraVENous New Bag 8/11/24 1459)   ceFEPIme (MAXIPIME) 2,000 mg in sodium chloride 0.9 % 100 mL IVPB (mini-bag) (2,000 mg IntraVENous New Bag 8/11/24 1612)   metroNIDAZOLE (FLAGYL) 500 mg in 0.9% NaCl 100 mL IVPB premix (500 mg IntraVENous New Bag 8/11/24 1612)   morphine sulfate (PF) injection 4 mg (4 mg IntraVENous Given 8/11/24 1351)   ondansetron (ZOFRAN) injection 4 mg (4 mg IntraVENous Given 8/11/24 1359)   iopamidol (ISOVUE-370) 76 % injection 100 mL (100 mLs IntraVENous Given 8/11/24 1519)   pantoprazole (PROTONIX) 80 mg in sodium chloride (PF) 0.9 % 20 mL injection (80 mg IntraVENous Given 8/11/24 1609)   fentaNYL (SUBLIMAZE) injection 50 mcg (50 mcg IntraVENous Given 8/11/24 1609)       Medical Decision Making  78-year-old male presents emerged from with a chief complaint of abdominal pain.  Vitals are unremarkable.  Will check CT has been pelvis to rule out biliary pathology, obstruction, constipation.  Check basic labs.  Observe

## 2024-08-12 PROBLEM — E43 SEVERE PROTEIN-CALORIE MALNUTRITION (HCC): Status: ACTIVE | Noted: 2024-08-12

## 2024-08-12 PROBLEM — G89.18 PAIN AT SURGICAL SITE: Status: ACTIVE | Noted: 2024-08-12

## 2024-08-12 PROBLEM — Z71.89 GOALS OF CARE, COUNSELING/DISCUSSION: Status: ACTIVE | Noted: 2024-08-12

## 2024-08-12 PROBLEM — R54 FRAILTY: Status: ACTIVE | Noted: 2024-08-12

## 2024-08-12 PROBLEM — Z71.89 DNR (DO NOT RESUSCITATE) DISCUSSION: Status: ACTIVE | Noted: 2024-08-12

## 2024-08-12 LAB
ANION GAP SERPL CALC-SCNC: 8 MMOL/L (ref 5–15)
BUN SERPL-MCNC: 14 MG/DL (ref 6–20)
BUN/CREAT SERPL: 19 (ref 12–20)
CALCIUM SERPL-MCNC: 7.7 MG/DL (ref 8.5–10.1)
CHLORIDE SERPL-SCNC: 106 MMOL/L (ref 97–108)
CO2 SERPL-SCNC: 20 MMOL/L (ref 21–32)
CREAT SERPL-MCNC: 0.75 MG/DL (ref 0.7–1.3)
ERYTHROCYTE [DISTWIDTH] IN BLOOD BY AUTOMATED COUNT: 15.5 % (ref 11.5–14.5)
FOLATE SERPL-MCNC: 13.9 NG/ML (ref 5–21)
GLUCOSE BLD STRIP.AUTO-MCNC: 123 MG/DL (ref 65–117)
GLUCOSE SERPL-MCNC: 166 MG/DL (ref 65–100)
HCT VFR BLD AUTO: 31.6 % (ref 36.6–50.3)
HGB BLD-MCNC: 10.6 G/DL (ref 12.1–17)
MAGNESIUM SERPL-MCNC: 1.5 MG/DL (ref 1.6–2.4)
MCH RBC QN AUTO: 33 PG (ref 26–34)
MCHC RBC AUTO-ENTMCNC: 33.5 G/DL (ref 30–36.5)
MCV RBC AUTO: 98.4 FL (ref 80–99)
NRBC # BLD: 0 K/UL (ref 0–0.01)
NRBC BLD-RTO: 0 PER 100 WBC
PHOSPHATE SERPL-MCNC: 2.7 MG/DL (ref 2.6–4.7)
PLATELET # BLD AUTO: 191 K/UL (ref 150–400)
PMV BLD AUTO: 8.5 FL (ref 8.9–12.9)
POTASSIUM SERPL-SCNC: 4 MMOL/L (ref 3.5–5.1)
RBC # BLD AUTO: 3.21 M/UL (ref 4.1–5.7)
SERVICE CMNT-IMP: ABNORMAL
SODIUM SERPL-SCNC: 134 MMOL/L (ref 136–145)
VANCOMYCIN SERPL-MCNC: 6.5 UG/ML
VIT B12 SERPL-MCNC: 1933 PG/ML (ref 193–986)
WBC # BLD AUTO: 11.8 K/UL (ref 4.1–11.1)

## 2024-08-12 PROCEDURE — 82607 VITAMIN B-12: CPT

## 2024-08-12 PROCEDURE — 2580000003 HC RX 258: Performed by: NURSE PRACTITIONER

## 2024-08-12 PROCEDURE — 2500000003 HC RX 250 WO HCPCS: Performed by: INTERNAL MEDICINE

## 2024-08-12 PROCEDURE — 94640 AIRWAY INHALATION TREATMENT: CPT

## 2024-08-12 PROCEDURE — 6370000000 HC RX 637 (ALT 250 FOR IP): Performed by: NURSE PRACTITIONER

## 2024-08-12 PROCEDURE — 80048 BASIC METABOLIC PNL TOTAL CA: CPT

## 2024-08-12 PROCEDURE — 2580000003 HC RX 258: Performed by: INTERNAL MEDICINE

## 2024-08-12 PROCEDURE — 36415 COLL VENOUS BLD VENIPUNCTURE: CPT

## 2024-08-12 PROCEDURE — 84100 ASSAY OF PHOSPHORUS: CPT

## 2024-08-12 PROCEDURE — 6360000002 HC RX W HCPCS: Performed by: INTERNAL MEDICINE

## 2024-08-12 PROCEDURE — C1751 CATH, INF, PER/CENT/MIDLINE: HCPCS

## 2024-08-12 PROCEDURE — 76937 US GUIDE VASCULAR ACCESS: CPT

## 2024-08-12 PROCEDURE — 02HV33Z INSERTION OF INFUSION DEVICE INTO SUPERIOR VENA CAVA, PERCUTANEOUS APPROACH: ICD-10-PCS | Performed by: INTERNAL MEDICINE

## 2024-08-12 PROCEDURE — 2500000003 HC RX 250 WO HCPCS: Performed by: NURSE PRACTITIONER

## 2024-08-12 PROCEDURE — 80202 ASSAY OF VANCOMYCIN: CPT

## 2024-08-12 PROCEDURE — 6360000002 HC RX W HCPCS: Performed by: NURSE PRACTITIONER

## 2024-08-12 PROCEDURE — 99222 1ST HOSP IP/OBS MODERATE 55: CPT | Performed by: NURSE PRACTITIONER

## 2024-08-12 PROCEDURE — 82962 GLUCOSE BLOOD TEST: CPT

## 2024-08-12 PROCEDURE — 83735 ASSAY OF MAGNESIUM: CPT

## 2024-08-12 PROCEDURE — 82746 ASSAY OF FOLIC ACID SERUM: CPT

## 2024-08-12 PROCEDURE — 2060000000 HC ICU INTERMEDIATE R&B

## 2024-08-12 PROCEDURE — 85027 COMPLETE CBC AUTOMATED: CPT

## 2024-08-12 PROCEDURE — 36568 INSJ PICC <5 YR W/O IMAGING: CPT

## 2024-08-12 PROCEDURE — 2700000000 HC OXYGEN THERAPY PER DAY

## 2024-08-12 RX ORDER — MAGNESIUM SULFATE IN WATER 40 MG/ML
2000 INJECTION, SOLUTION INTRAVENOUS ONCE
Status: COMPLETED | OUTPATIENT
Start: 2024-08-12 | End: 2024-08-12

## 2024-08-12 RX ORDER — CASTOR OIL AND BALSAM, PERU 788; 87 MG/G; MG/G
OINTMENT TOPICAL 2 TIMES DAILY
Status: DISCONTINUED | OUTPATIENT
Start: 2024-08-12 | End: 2024-08-12 | Stop reason: SDUPTHER

## 2024-08-12 RX ORDER — HYDROMORPHONE HYDROCHLORIDE 1 MG/ML
0.25 INJECTION, SOLUTION INTRAMUSCULAR; INTRAVENOUS; SUBCUTANEOUS EVERY 4 HOURS PRN
Status: DISCONTINUED | OUTPATIENT
Start: 2024-08-12 | End: 2024-08-18

## 2024-08-12 RX ORDER — INSULIN LISPRO 100 [IU]/ML
0-4 INJECTION, SOLUTION INTRAVENOUS; SUBCUTANEOUS EVERY 6 HOURS SCHEDULED
Status: DISCONTINUED | OUTPATIENT
Start: 2024-08-12 | End: 2024-08-27 | Stop reason: HOSPADM

## 2024-08-12 RX ORDER — FLUCONAZOLE 2 MG/ML
200 INJECTION, SOLUTION INTRAVENOUS EVERY 24 HOURS
Status: DISCONTINUED | OUTPATIENT
Start: 2024-08-12 | End: 2024-08-19

## 2024-08-12 RX ADMIN — FAMOTIDINE 20 MG: 10 INJECTION, SOLUTION INTRAVENOUS at 09:05

## 2024-08-12 RX ADMIN — SODIUM CHLORIDE: 9 INJECTION, SOLUTION INTRAVENOUS at 22:01

## 2024-08-12 RX ADMIN — ARFORMOTEROL TARTRATE: 15 SOLUTION RESPIRATORY (INHALATION) at 09:44

## 2024-08-12 RX ADMIN — HYDROMORPHONE HYDROCHLORIDE 0.25 MG: 1 INJECTION, SOLUTION INTRAMUSCULAR; INTRAVENOUS; SUBCUTANEOUS at 10:26

## 2024-08-12 RX ADMIN — PHENOL 1 SPRAY: 1.4 SPRAY ORAL at 22:04

## 2024-08-12 RX ADMIN — CEFEPIME 2000 MG: 2 INJECTION, POWDER, FOR SOLUTION INTRAVENOUS at 03:16

## 2024-08-12 RX ADMIN — VANCOMYCIN HYDROCHLORIDE 500 MG: 500 INJECTION, POWDER, LYOPHILIZED, FOR SOLUTION INTRAVENOUS at 12:22

## 2024-08-12 RX ADMIN — CEFEPIME 2000 MG: 2 INJECTION, POWDER, FOR SOLUTION INTRAVENOUS at 17:42

## 2024-08-12 RX ADMIN — DEXTROSE AND SODIUM CHLORIDE: 5; 900 INJECTION, SOLUTION INTRAVENOUS at 12:14

## 2024-08-12 RX ADMIN — METRONIDAZOLE 500 MG: 500 INJECTION, SOLUTION INTRAVENOUS at 17:46

## 2024-08-12 RX ADMIN — HYDROMORPHONE HYDROCHLORIDE 0.25 MG: 1 INJECTION, SOLUTION INTRAMUSCULAR; INTRAVENOUS; SUBCUTANEOUS at 03:10

## 2024-08-12 RX ADMIN — PANTOPRAZOLE SODIUM 40 MG: 40 INJECTION, POWDER, FOR SOLUTION INTRAVENOUS at 09:04

## 2024-08-12 RX ADMIN — POTASSIUM PHOSPHATE, MONOBASIC POTASSIUM PHOSPHATE, DIBASIC: 224; 236 INJECTION, SOLUTION, CONCENTRATE INTRAVENOUS at 17:35

## 2024-08-12 RX ADMIN — METRONIDAZOLE 500 MG: 500 INJECTION, SOLUTION INTRAVENOUS at 09:02

## 2024-08-12 RX ADMIN — Medication: at 12:13

## 2024-08-12 RX ADMIN — METRONIDAZOLE 500 MG: 500 INJECTION, SOLUTION INTRAVENOUS at 23:12

## 2024-08-12 RX ADMIN — SODIUM CHLORIDE, PRESERVATIVE FREE 10 ML: 5 INJECTION INTRAVENOUS at 09:09

## 2024-08-12 RX ADMIN — VANCOMYCIN HYDROCHLORIDE 1000 MG: 1 INJECTION, POWDER, LYOPHILIZED, FOR SOLUTION INTRAVENOUS at 00:31

## 2024-08-12 RX ADMIN — Medication: at 21:48

## 2024-08-12 RX ADMIN — ENOXAPARIN SODIUM 30 MG: 100 INJECTION SUBCUTANEOUS at 09:02

## 2024-08-12 RX ADMIN — ARFORMOTEROL TARTRATE: 15 SOLUTION RESPIRATORY (INHALATION) at 20:31

## 2024-08-12 RX ADMIN — FLUCONAZOLE 200 MG: 200 INJECTION, SOLUTION INTRAVENOUS at 22:04

## 2024-08-12 RX ADMIN — PHENOL 1 SPRAY: 1.4 SPRAY ORAL at 03:16

## 2024-08-12 RX ADMIN — Medication: at 10:26

## 2024-08-12 RX ADMIN — MAGNESIUM SULFATE HEPTAHYDRATE 2000 MG: 40 INJECTION, SOLUTION INTRAVENOUS at 07:18

## 2024-08-12 RX ADMIN — SODIUM CHLORIDE, PRESERVATIVE FREE 10 ML: 5 INJECTION INTRAVENOUS at 21:57

## 2024-08-12 RX ADMIN — HYDROMORPHONE HYDROCHLORIDE 0.25 MG: 1 INJECTION, SOLUTION INTRAMUSCULAR; INTRAVENOUS; SUBCUTANEOUS at 18:50

## 2024-08-12 RX ADMIN — PANTOPRAZOLE SODIUM 40 MG: 40 INJECTION, POWDER, FOR SOLUTION INTRAVENOUS at 21:44

## 2024-08-12 ASSESSMENT — PAIN SCALES - GENERAL
PAINLEVEL_OUTOF10: 3
PAINLEVEL_OUTOF10: 8
PAINLEVEL_OUTOF10: 5
PAINLEVEL_OUTOF10: 4
PAINLEVEL_OUTOF10: 8
PAINLEVEL_OUTOF10: 0
PAINLEVEL_OUTOF10: 0

## 2024-08-12 ASSESSMENT — PAIN DESCRIPTION - DESCRIPTORS
DESCRIPTORS: SORE
DESCRIPTORS: ACHING;SORE

## 2024-08-12 ASSESSMENT — PAIN DESCRIPTION - ORIENTATION
ORIENTATION: MID
ORIENTATION: UPPER

## 2024-08-12 ASSESSMENT — PAIN DESCRIPTION - LOCATION
LOCATION: ABDOMEN
LOCATION: THROAT

## 2024-08-12 ASSESSMENT — PAIN - FUNCTIONAL ASSESSMENT: PAIN_FUNCTIONAL_ASSESSMENT: ACTIVITIES ARE NOT PREVENTED

## 2024-08-12 NOTE — CONSULTS
Ann-Marie Mills, NP-C                       [phone] cell                  Monday-Thursday 7:30-5:00                           Gastroenterology Consultation Note      Admit Date: 8/11/2024  Consult Date: 8/12/2024   I greatly appreciate your asking me to see Anuel Bermudez, thank you very much for the opportunity to participate in his care.    Narrative Assessment and Plan   GI consultation for PUD.  78-year-old male with PMH COPD, chronic respiratory failure on chronic oxygen, NSCLC, squamous cell carcinoma of the lung status post chemo on maintenance Keytruda.  Presented to ER with abdominal pain found to have pneumoperitoneum on CT and was taken to the OR for ex lap.  Had perforated antral ulcer and is status post Adarsh patch repair.  Now on stepdown.  Was taking Excedrin prior to admission.  History of  on EGD in 2004 but denies any abdominal issues up until this abrupt onset of abdominal pain before admission.    Impression:  Perforated  s/p ex lap with Adarsh patch  NSCLC, squamous cell lung CA  COPD  CRF on chronic O2    Nothing really to add from GI perspective since he has had repair of perforated ulcer. Continue PPI BID. Suspect NSAIDs contributed as he admits to taking them daily. Will check HPY stool Ag as well.  Further recommendations per surgery team    GI will sign off and see again upon request.     Subjective:     Chief Complaint: PUD    History of Present Illness: GI consultation for PUD.  78-year-old male with PMH COPD, chronic respiratory failure on chronic oxygen, NSCLC, squamous cell carcinoma of the lung status post chemo on maintenance Keytruda.  Presented to the ER with abdominal pain found to have pneumoperitoneum on CT was taken to the OR for ex lap with perforated antral ulcer status post Adarsh patch repair.  Was initially in ICU and is now transferred out to stepdown.  He was 
      Hospitalist Progress Note    NAME:   Anuel Bermudez   : 1945   MRN: [id number]     Date/Time: 2024 11:39 AM  Patient PCP: Bonifacio Bassett MD    Estimated discharge date: ?  HH/?SNF TBD  Barriers: Surgical clearance, NGT, TPN/NPO      Assessment / Plan:    Pneumoperitoneum d/t perforated peptic/antral ulcer s/p laparoscopic Adarsh patch repair by Gen Surgery  CT demonstrated pneumoperitoneum and thickening in the gastric antrum    Follow surgery recommendations- keep NPO with NGT  Continue antibiotics and antifungal empirically, continue to follow WBC-trending down to 11.8 today  Cont BID PPI  IP GI consulted for PUD follow up (duration of treatment, need for H.pylori, follow up of pathology etc) once out of ICU-  recommended to avoid NSAID use in the future, check H. pylori stool antigen and treat if positive per protocol.No acute endoscopic intervention indicated from a GI perspective. Use PPI BID x 8 weeks and then qd afterwards.       Protein calorie malnutrition  Hypomagnesemia- 1.5 today  Dietitian consult noted- TPN ordered, PICC line ordered to start TPN   Replenish potassium IV today     Chronic hypoxic respiratory failure  On as needed home 1-2 L nasal cannula  Continue bronchodilators     Hx of NSCLC, squamous cell carcinoma (f/b VCI)  S/p laser to L endobronchial lung lesion, s/p chemo  Started on maintenance Keytruda 24             Medical Decision Making:   I personally reviewed labs: BMP, Mag,   I personally reviewed imaging: CT A/P  I personally reviewed EKG: none  Toxic drug monitoring: none  Discussed case with: Pt, RN, CM on iDRs        Code Status: Partial Code- intubation ok otherwise DNR per pt with palliative today  DVT Prophylaxis: SCDs  GI Prophylaxis:PPI    Subjective:     Chief Complaint / Reason for Physician Visit:  F/U for Gastric ulcer with perforation s/p repair surgical, non-small cell lung cancer, squamous cell carcinoma on Keytruda  \"I am ok\".  
PICC Education: Explained reason and rationale for PICC placement along with providing education in order to make an informed consent including nature, benefits, risks and potential complications (including but not limited to infection, air embolism, blood clots, arterial puncture, nerve damage, and catheter malpositioning), and care and maintenance of PICC line. The opportunity for questions or concerns was given. Patient  gave written consent for PICC procedure to be done at the bedside. Patient  verbalizes understanding at this time.       Procedure:  Time out completed.  Pre procedure assessment done.  Maximum sterile barrier precautions observed throughout procedure.  Lidocaine 1% 3ml sc given prior to cannulation  Cannulated left brachial vein using ultrasound guidance and modified seldinger technique.  Inserted double lumen 5 fr PICC in  left arm using, Taumat[employer] locations system and 3CG   Patient has sinus rhythm.  3CG Tip Positioning System indicating tall P wave and no negative deflection before P wave which would indicate the PICC tip is properly placed in the distal SVC or the CAJ.  PICC tip was confirmed by 2 PICC nurses and 3CG printout was placed on patient’s chart.  Blood return verified and flushed with 20ml NS in each port.  Sterile dressing applied with CHG impregnated occlusive dressing and statlock, per protocol.  Alcohol impregnated caps applied to each port.  Reason for access (TPN).  Complications related to insertion (right chest port a cath for chemo).  Patient tolerated procedure well with minimal blood loss.   PICC procedure performed by: Mary Jo Bonilla, RN [facility] / Vascular Access Nurse  Assisted by: VERNELL Guo, RN [facility] Vascular Access Nurse  Left  arm circumference: 22 cm.   Total Catheter Length:  38 cm  Catheter internal length:  38 cm  Catheter external length: 0 cm  PICC catheter occupies 10% of vein  Brand of catheter:  BARD POWER PICC SOLO  Lot #[id number]   REF# [id number]    EXP 
Palliative Medicine  Patient Name: Anuel Bermudez  YOB: 1945  MRN: [id number]  Age: 78 y.o.  Gender: male    Date of Initial Consult: 8/12/24  Date of Service: 8/12/2024  Time: 1:04 PM  Provider: ENRIQUETA Simms NP  Hospital Day: 2  Admit Date: 8/11/2024  Referring Provider:  Darrell Beard MD    Reasons for Consultation:  Goals of Care    HISTORY OF PRESENT ILLNESS (HPI):   Anuel Bermudez is a 78 y.o. male with a past medical history of COPD on oxygen chronically, NSCLC s/p chemo earlier this year, now on maintenance Ketruda since July, who was admitted on 8/11/2024 from home with a diagnosis of pneumoperitoneum d/t perforated peptic/antral ulcer.  On admission he underwent a laparoscopic Adarsh patch repair.       PALLIATIVE DIAGNOSES:    Goals of care  DNR Discussion  Post surgical pain  Pneumoperitoneum  Frailty  Malnutrition    ASSESSMENT AND PLAN:   Met with Mr Bermudez after reviewing his chart including notes from yesterday/or notes, and his latest PCP visit.   He tells me he feels much better now that the surgery is completed- some discomfort in his surgical site especially when coughing.  I coached him on splinting when he coughs to help with pain.  He confirmed his DNR status- he is OK with intubation for procedures, or short term for potentially reversible respiratory failure, but no long term intubation  Will do a POST with him later on this admission when he is feeling better and closer to discharge  Please call with any palliative questions or concerns.  Palliative Care Team is available via perfect serve or via phone.    Referrals to:   [] Outpatient Palliative Care  [] Home Based Palliative Care  [] Home Based Primary Care  [] Hospice    ADVANCE CARE PLANNING:   [x] The [facility] Interdisciplinary Team has updated the ACP Navigator with Health Care Decision Maker and Patient Capacity    Primary Decision Maker: Deja Bowden - Child - [phone]         Current Code 
MD Ava   triamcinolone (ARISTOCORT) 0.5 % cream Apply topically as needed 1 application to hands externally twice a day    Ava Landin MD   ASPIRIN 81 PO Take 81 mg by mouth daily    Ava Landin MD   famotidine (PEPCID) 20 MG tablet Take 1 tablet by mouth 2 times daily    Ava Landin MD         ALLERGIES:    Allergies   Allergen Reactions    Other Anaphylaxis     Eggs and fish    Penicillins Hives       Objective:   Blood pressure 126/70, pulse (!) 107, temperature 97.8 °F (36.6 °C), temperature source Oral, resp. rate 18, height 1.6 m (5' 3\"), weight 40.7 kg (89 lb 11.6 oz), SpO2 95%.  Temp (24hrs), Av.1 °F (36.7 °C), Min:97.8 °F (36.6 °C), Max:98.4 °F (36.9 °C)      Body mass index is 15.89 kg/m².    Physical Exam:    General: Cachectic, malnourished   HEENT: Extraocular muscles intact, trachea midline, normal range of motion  Lungs: Normal work of breathing, nontachypneic  Heart: Regular rate and rhythm  Abdomen: Rigid with involuntary guarding, very tender to palpation throughout. Exam consistent with generalized peritonitis and acute abdomen.   Extremities: Warm, well-perfused  Neuro: Grossly intact  Psych: Appropriate      LABS:  Recent Labs     24  1344   WBC 8.8   HGB 13.8   HCT 40.9        Recent Labs     24  1344   *   K 4.1      CO2 22   BUN 16     Recent Labs     24  1344   GLOB 3.5     No results for input(s): \"INR\", \"APTT\" in the last 72 hours.    Invalid input(s): \"PTP\"    CT images reviewed      Signed By: Rafael Hall MD     2024

## 2024-08-12 NOTE — PROGRESS NOTES
0700: Bedside shift report received from JOSE DE JESUS Reyna    0800: Shift assessment completed. Pt AxOx4, follows commands. On 2LNC, diminished breath sounds. NGT, MARILU drain, badillo in place. Palpable pulses in all extremities. 3 surgical sites. See flow sheet/MAR for details.     1000: IDR completed.  Pt goals  -Transfer  -TPN/PPN  -PICC team consult  -PT/OT consult  -Badillo removal    1040: Transfer report given to PCU, RN    1120: Pt transferred to PCU, [id number]

## 2024-08-12 NOTE — PROGRESS NOTES
Attended Interdisciplinary rounds in CCU; patient care was discussed.    ASHER Ortega, BCC, Staff [facility] Paging Service  [phone])

## 2024-08-12 NOTE — OP NOTE
Operative Note      Patient: Anuel Bermudez  YOB: 1945  MRN: [id number]    Date of Procedure: 8/11/2024    Pre-Op Diagnosis Codes:      * Perforated stomach (HCC) [K25.5]    Post-Op Diagnosis:  Perforated antral ulcer        Procedure(s):  LAPAROSCOPY DIAGNOSTIC, POSSIBLE OPEN    Surgeon(s):  Rafael Hall MD    Assistant:   Surgical Assistant: Guille Felix    Anesthesia: General    Estimated Blood Loss (mL): less than 50     Complications: None apparent    Specimens:   * No specimens in log *    Implants:  * No implants in log *      Drains:   Closed/Suction Drain Right Abdomen Bulb (Active)       NG/OG/NJ/NE Tube Nasogastric 18 fr Left nostril (Active)       Urinary Catheter 08/11/24 2 Way;Laguna (Active)       Findings:  Infection Present At Time Of Surgery (PATOS) (choose all levels that have infection present):  - Organ Space infection (below fascia) present as evidenced by purulent fluid  Other Findings: Perforated antral ulcer   Ulcer closed and imbricated  Adarsh patch  19 Fr round channel drain on site of repair     Detailed Description of Procedure:   The patient was taken to the operating room and placed in the supine position.  General endotracheal anesthesia was induced.  A Laguna catheter was placed.  The abdomen was prepped and draped in the usual sterile fashion.  Preoperative antibiotics were administered.  After timeout, a 5 mm infraumbilical incision was made after the infiltration of local anesthesia.  The fascia was elevated with the use of a tracheostomy hook and a Veress needle inserted against countertraction in a very shallow fashion into the peritoneal cavity.  Insufflation was achieved to 15 mmHg using CO2.  After this a 5 mm port was placed and inspection of the entry site was pristine without any evidence of injury.  One additional 5 mm port was placed in the right lower quadrant.  We turned our attention to the stomach and noted purulent and bilious fluid

## 2024-08-12 NOTE — PROGRESS NOTES
Spiritual Health Assessment/Progress Note  [facility]    Attempted Encounter,  ,  ,      Name: Anuel Bermudez MRN: [id number]    Age: 78 y.o.     Sex: male   Language: English   Quaker: Buddhism   Pneumoperitoneum     Date: 8/12/2024            Total Time Calculated: 13 min              Spiritual Assessment began in [facility] 2 CRITICAL CARE        Referral/Consult From: Rounding   Encounter Overview/Reason: Attempted Encounter  Service Provided For: Patient not available    Mariana, Belief, Meaning:   Patient Other:    Family/Friends No family/friends present      Importance and Influence:  Patient Other: unable to assess  Family/Friends no family/friends present    Community:  Patient Other: unable to assess  Family/Friends     Assessment and Plan of Care:     Patient Interventions include: Other: unable to assess  Family/Friends Interventions include:     Patient Plan of Care: Other: unable to assess  Family/Friends Plan of Care:     Electronically signed by Chaplain FILOMENA [facility] Bisi 8/12/2024 at 9:41 AM

## 2024-08-12 NOTE — PROGRESS NOTES
1130 pt transferred into room, MARILU drain dressing changed, skin assessed. Venelex ordered for dark spot on sacrum. NGT at 60 at nares.    1630 PICC line placed in upper left arm    1730 TPN initiated     End of Shift Note    Bedside shift change report given to Carolann (oncoming nurse) by KELSY WALTER RN (offgoing nurse).  Report included the following information SBAR, Kardex, and MAR    Shift worked:  7a7p     Shift summary and any significant changes:     Pt transferred in to PCU from CCU with and NGT set to low intermediate suction. Picc line established for TPN initiation.Q 6 BG         Zone phone for oncoming shift:          Activity:     Number times ambulated in hallways past shift: 0  Number of times OOB to chair past shift: 0    Cardiac:   Cardiac Monitoring: Yes           Access:  Current line(s): PIV, PICC, and port     Genitourinary:   Urinary status: voiding and external catheter    Respiratory:      Chronic home O2 use?: NO  Incentive spirometer at bedside: YES       GI:     Current diet:  Diet NPO  PN-Adult 2-in-1 Peripheral Line (Custom)  Passing flatus: YES  Tolerating current diet: YES       Pain Management:   Patient states pain is manageable on current regimen: YES    Skin:     Interventions: turn team, float heels, foam dressing, and internal/external urinary devices    Patient Safety:  Fall Score:    Interventions: bed/chair alarm, assistive device (walker, cane. etc), gripper socks, and pt to call before getting OOB       Length of Stay:  Expected LOS: 7  Actual LOS: 1      KELSY WALTER RN

## 2024-08-12 NOTE — H&P
CRITICAL CARE NOTE      Name: Anuel Bermudez   : 1945   MRN: [id number]   Date: 2024      REASON FOR ICU ADMISSION: Pneumoperitoneum    PRINCIPAL ICU DIAGNOSIS   Pneumoperitoneum  Perforated peptic/antral ulcer s/p Adarsh patch repair  Protein calorie malnutrition    BRIEF PATIENT SUMMARY   79 y/o male PMHx of chronic dysphagia, COPD, chronic respiratory failure on chronic oxygen, NSCLC, squamous cell carcinoma of the lung dx (3/2024) s/p chemo now on maintenance Keytruda (24)  presented to the ED with abdominal pain found to have pneumoperitoneum on CT was taken urgently to the OR s/p ex lap for perforated peptic/antral ulcer s/p Adarsh patch repair.  Transferred to the ICU postoperatively for close monitoring.    ICU evaluation: Awake/alert, on NC 2L, hemodynamically stable, not on vasopressors.  Code Status discussed with patient and daughter at bedside at this time Limited code-no CPR, okay with short-term intubation.     COMPREHENSIVE ASSESSMENT & PLAN:SYSTEM BASED     Pneumoperitoneum d/t perforated peptic/antral ulcer s/p laparoscopic Adarsh patch repair  CT demonstrated pneumoperitoneum and thickening in the gastric antrum  Per surgery  Keep NGT to suction and flush Q 1-2 hours  Start TPN (dietary consult in a.m.)   Continue antibiotics and antifungal  High-dose PPI    Protein calorie malnutrition  Will need PICC line placed for initiation of TPN  Dietitian consult in a.m.    Chronic hypoxic respiratory failure  On as needed home 1-2 L nasal cannula  Continue bronchodilators    Hx of NSCLC, squamous cell carcinoma (f/b VCI)  S/p laser to L endobronchial lung lesion, s/p chemo  Started on maintenance Keytruda 1724    CODE STATUS   Code Status: Limited-no CPR, okay with short-term intubation  Patient/Family Updated: Patient and daughter updated at bedside 24 @ 2200    SUBJECTIVE   Review of Systems       OBJECTIVE   Physical Exam  Vitals and nursing note reviewed.

## 2024-08-12 NOTE — PROGRESS NOTES
Surgery Brief    Patient is s/p a laparoscopic Adarsh patch repair of perforated peptic/antral ulcer. Patient is malnourished and on chemotherapy and at high risk for wound healing issues.     Family updated.    Please:  -Keep NGT to suction and flush q1-2 hours  -Start TPN  -Continue abx to include anti fungal  -High dose PPI  -Will obtain fluoro upper GI in 2-3 days prior to starting diet  -Full liquid diet for 2 weeks minimum      Rafael Hall MD  General Surgery

## 2024-08-12 NOTE — PROGRESS NOTES
Comprehensive Nutrition Assessment    Type and Reason for Visit:  Initial, Consult (low BMI)    Nutrition Recommendations/Plan:   Initiate 1L PPN tonight (provides 510kcals, 12.5kcals/kg)  Pt is severe refeeding risk, would monitor lytes closely and replete aggressively  Would not advance past this kcal allotment (500 kcals) for at least 48h  If/when central line placed, would transition to 60g AA and 100g Dextrose (provides 580 kcals, 14kcals/kg)   Unable to add lipids due to egg and fish allergies, pt is aware  RD to provide additional recommendations re: PPN/TPN advancement pending leak test later on this week   Stop dextrose containing maintenance IVF's outside of TPN     Malnutrition Assessment:  Malnutrition Status:  Severe malnutrition (08/12/24 1444)    Context:  Chronic Illness     Findings of the 6 clinical characteristics of malnutrition:  Energy Intake:  75% or less estimated energy requirements for 1 month or longer  Weight Loss:  Greater than 10% over 6 months     Body Fat Loss:  Severe body fat loss Fat Overlying Ribs, Triceps   Muscle Mass Loss:  Severe muscle mass loss Clavicles (pectoralis & deltoids), Thigh (quadriceps)  Fluid Accumulation:  No significant fluid accumulation     Strength:  Not Performed    Nutrition Assessment:  Pt admitted with pneumoperitoneum.  PMH: COPD, lung CA (on chemo), chronic dysphagia.  Consult received, chart reviewed and case discussed during CCU rounds.  Pt is s/p perfed antral ulcer with letitia patch repair on 8/11.  He is strict NPO until leak test can be conducted (usually 5 days postop for letitia patch repairs, surgeon is indicating sooner).  NGT in place with scant OP.  Plan is to start parenteral nutrition tonight.  Pt is severely malnourished per criteria above.  He reports a decline in his appetite, he has been consuming <50% of usual meal intake for the last 5 to 6 months and has had a significant 14% wt loss since April.  Pt is quite cachectic.

## 2024-08-12 NOTE — PROGRESS NOTES
ICU Progress Note        Subjective:    - lying comfortably on the bed.       Vital Signs:    BP (!) 97/56   Pulse 69   Temp 98.3 °F (36.8 °C) (Oral)   Resp 14   Ht 1.6 m (5' 3\")   Wt 40.7 kg (89 lb 11.6 oz)   SpO2 100%   BMI 15.89 kg/m²             Temp (24hrs), Av.8 °F (37.1 °C), Min:97.8 °F (36.6 °C), Max:100.1 °F (37.8 °C)       Intake/Output:   Last shift:      701 - 1900  In: -   Out: 10 [Drains:10]  Last 3 shifts: 08/10 1901 -  0700  In: 2643.4 [I.V.:1200]  Out: 1195 [Urine:1045; Drains:100]    Intake/Output Summary (Last 24 hours) at 2024 0852  Last data filed at 2024 0800  Gross per 24 hour   Intake 2643.39 ml   Output 1205 ml   Net 1438.39 ml       Physical Exam:    General: Alert, awake and oriented. Not in acute distress  HEENT:  Anicteric sclerae; pink palpebral conjunctivae; mucosa moist  Resp:  Bilateral air entry +, no crackles or wheeze  CV:  S1, S2 present  GI:  Abdomen soft, some tenderness +  Extremities:  +2 pulses on all extremities; no edema/ cyanosis/ clubbing noted  Skin:  Warm; no rashes/ lesions noted  Neurologic:  alert, awake and following commands.    DATA:     Current Facility-Administered Medications   Medication Dose Route Frequency    phenol 1.4 % mouth spray 1 spray  1 spray Mouth/Throat Q2H PRN    HYDROmorphone HCl PF (DILAUDID) injection 0.25 mg  0.25 mg IntraVENous Q4H PRN    magnesium sulfate 2000 mg in 50 mL IVPB premix  2,000 mg IntraVENous Once    balsum peru-castor oil (VENELEX) ointment   Topical BID    sodium chloride flush 0.9 % injection 5-40 mL  5-40 mL IntraVENous 2 times per day    sodium chloride flush 0.9 % injection 5-40 mL  5-40 mL IntraVENous PRN    0.9 % sodium chloride infusion   IntraVENous PRN    enoxaparin Sodium (LOVENOX) injection 30 mg  30 mg SubCUTAneous Daily    ondansetron (ZOFRAN-ODT) disintegrating tablet 4 mg  4 mg Oral Q8H PRN    Or    ondansetron (ZOFRAN) injection 4 mg  4 mg IntraVENous Q6H PRN

## 2024-08-12 NOTE — ANESTHESIA POSTPROCEDURE EVALUATION
Department of Anesthesiology  Postprocedure Note    Patient: Anuel Bermudez  MRN: [id number]  YOB: 1945  Date of evaluation: 8/12/2024    Procedure Summary       Date: 08/11/24 Room / Location: [facility] MAIN OR  / [facility] MAIN OR    Anesthesia Start: 1822 Anesthesia Stop: 2032    Procedure: LAPAROSCOPIC REPAIR OF PERFORATED ANTRAL ULCER WITH GRAM PATCH (Abdomen) Diagnosis:       Perforated stomach (HCC)      (Perforated stomach (HCC) [K25.5])    Providers: Rafael Hall MD Responsible Provider: Myles Uribe MD    Anesthesia Type: General ASA Status: 2 - Emergent            Anesthesia Type: General    Delphine Phase I: Delphine Score: 8    Delphine Phase II:      Anesthesia Post Evaluation    Patient location during evaluation: PACU  Patient participation: complete - patient participated  Level of consciousness: sleepy but conscious and responsive to verbal stimuli  Pain score: 2  Airway patency: patent  Nausea & Vomiting: no vomiting and no nausea  Cardiovascular status: blood pressure returned to baseline and hemodynamically stable  Respiratory status: acceptable  Hydration status: stable  Multimodal analgesia pain management approach  Pain management: adequate    No notable events documented.

## 2024-08-12 NOTE — PROGRESS NOTES
SURGERY PROGRESS NOTE      Admit Date: 2024    POD 1 Day Post-Op    Procedure: Procedure(s):  LAPAROSCOPIC REPAIR OF PERFORATED ANTRAL ULCER WITH GRAM PATCH      Subjective:     Patient states pain is controlled.  He denies nausea.        Objective:     BP 99/60   Pulse 72   Temp 99 °F (37.2 °C) (Axillary)   Resp 16   Ht 1.6 m (5' 3\")   Wt 40.7 kg (89 lb 11.6 oz)   SpO2 99%   BMI 15.89 kg/m²      Temp (24hrs), Av.9 °F (37.2 °C), Min:97.8 °F (36.6 °C), Max:100.1 °F (37.8 °C)      No intake/output data recorded.  08/10 1901 -  0700  In: 2643.4 [I.V.:1200]  Out: 1195 [Urine:1045; Drains:100]    Physical Exam:    General:  alert, appears stated age, cooperative, no distress, and cachetic    Abdomen: Soft, non-tender, hypoactive bowel sounds    Incision:   healing well, no drainage, no erythema, no hernia, no seroma, no swelling, no dehiscence, incision well approximated         CBC:   Lab Results   Component Value Date/Time    WBC 11.8 2024 03:06 AM    RBC 3.21 2024 03:06 AM    HGB 10.6 2024 03:06 AM    HCT 31.6 2024 03:06 AM    MCV 98.4 2024 03:06 AM    MCH 33.0 2024 03:06 AM    MCHC 33.5 2024 03:06 AM    RDW 15.5 2024 03:06 AM     2024 03:06 AM    MPV 8.5 2024 03:06 AM     BMP:    Lab Results   Component Value Date/Time     2024 03:06 AM    K 4.0 2024 03:06 AM     2024 03:06 AM    CO2 20 2024 03:06 AM    BUN 14 2024 03:06 AM    CREATININE 0.75 2024 03:06 AM    CALCIUM 7.7 2024 03:06 AM    LABGLOM >90 2024 03:06 AM    LABGLOM >60 2024 01:40 AM    GLUCOSE 166 2024 03:06 AM       Assessment:     Principal Problem:    Pneumoperitoneum  Active Problems:    Bowel perforation (HCC)  Resolved Problems:    * No resolved hospital problems. *    Recovering as expected.      Plan:       Continue present treatment

## 2024-08-12 NOTE — PROGRESS NOTES
Report received from Isabelle MALONEY    2137: Patient arrived via stretcher to room 5361 0064: Labs drawn and sent     Shift report given to RN

## 2024-08-12 NOTE — PROGRESS NOTES
1145 4 Eyes Skin Assessment     NAME:  Anuel Bermudez  YOB: 1945  MEDICAL RECORD NUMBER:  [id number]    The patient is being assessed for  Transfer to New Unit    I agree that at least one RN has performed a thorough Head to Toe Skin Assessment on the patient. ALL assessment sites listed below have been assessed.      Areas assessed by both nurses:    Head, Face, Ears, Shoulders, Back, Chest, Arms, Elbows, Hands, Sacrum. Buttock, Coccyx, Ischium, Legs. Feet and Heels, and Under Medical Devices         Does the Patient have a Wound? Skin tears to arms       Eric Prevention initiated by RN: Yes  Wound Care Orders initiated by RN: Yes    Pressure Injury (Stage 3,4, Unstageable, DTI, NWPT, and Complex wounds) if present, place Wound referral order by RN under : No    New Ostomies, if present place, Ostomy referral order under : No     Nurse 1 eSignature: Electronically signed by KELSY WALTER RN on 8/12/24 at 11:55 AM EDT    **SHARE this note so that the co-signing nurse can place an eSignature**    Nurse 2 eSignature: {Esignature:[id number]}

## 2024-08-12 NOTE — PROGRESS NOTES
Pharmacy TPN Management Note    TPN indication: perforated antral ulcer, NPO, malnutrition    TPN type: Peripheral    Macronutrients:  Protein: 42.5g  Dextrose: 100g  Lipids: held; need to clarify egg/fish allergy to see if he can tolerate    Rate: 42 mL/hr    Labs:  Recent Labs     Units 24  0306 24  2143 24  1344   NA mmol/L 134* 132* 131*   K mmol/L 4.0 4.3 4.1   CL mmol/L 106 103 100   CO2 mmol/L 20* 21 22   MG mg/dL 1.5* 1.6  --    PHOS MG/DL 2.7 3.1  --    BUN MG/DL 14 15 16     Recent Labs     Units 24  1344   AST U/L 24   ALT U/L 25     Recent Labs     Units 24  0306   WBC K/uL 11.8*   HGB g/dL 10.6*   HCT % 31.6*       Electrolytes (dosed per LITER):  Na: 35 meq/L; K: 30 meq/L; Phos:15 mmol/L; M meq/L; Ca 4.5 meq/L    Other additives in TPN: multivitamins  and trace elements    Impression/Plan:   TPN macronutrient/rate changes: new start  TPN electrolyte changes: new start, standard electrolytes  TPN insulin changes: n/a     Pharmacy will continue to monitor enteral nutrition plan, electrolytes, renal function, and dietician recommendations and adjust parenteral nutrition as needed.    Thank you,  Carolann Santana [facility]

## 2024-08-12 NOTE — PROGRESS NOTES
Order received and attempted to see pt. Currently in the processing of transferring out of CCU. Will defer and continue to follow.

## 2024-08-12 NOTE — PROGRESS NOTES
Critical care interdisciplinary rounds today.  Following members present: Case Management, , Clinical Lead, Diabetes Team, Nursing, Nutrition, Pharmacy, and Physician. Family invited to participate.  Plan of care discussed.  See clinical pathway for plan of care and interventions and desired outcomes.    Plan to remove badillo today and transfer patient.

## 2024-08-13 ENCOUNTER — APPOINTMENT (OUTPATIENT)
Facility: HOSPITAL | Age: 79
DRG: 326 | End: 2024-08-13
Payer: MEDICARE

## 2024-08-13 LAB
ALBUMIN SERPL-MCNC: 2.5 G/DL (ref 3.5–5)
ALBUMIN/GLOB SERPL: 0.9 (ref 1.1–2.2)
ALP SERPL-CCNC: 54 U/L (ref 45–117)
ALT SERPL-CCNC: 23 U/L (ref 12–78)
ANION GAP SERPL CALC-SCNC: 4 MMOL/L (ref 5–15)
AST SERPL-CCNC: 22 U/L (ref 15–37)
BILIRUB DIRECT SERPL-MCNC: 0.1 MG/DL (ref 0–0.2)
BILIRUB SERPL-MCNC: 0.4 MG/DL (ref 0.2–1)
BUN SERPL-MCNC: 15 MG/DL (ref 6–20)
BUN/CREAT SERPL: 22 (ref 12–20)
CALCIUM SERPL-MCNC: 8 MG/DL (ref 8.5–10.1)
CHLORIDE SERPL-SCNC: 109 MMOL/L (ref 97–108)
CO2 SERPL-SCNC: 25 MMOL/L (ref 21–32)
CREAT SERPL-MCNC: 0.69 MG/DL (ref 0.7–1.3)
ERYTHROCYTE [DISTWIDTH] IN BLOOD BY AUTOMATED COUNT: 16.2 % (ref 11.5–14.5)
GLOBULIN SER CALC-MCNC: 2.8 G/DL (ref 2–4)
GLUCOSE BLD STRIP.AUTO-MCNC: 116 MG/DL (ref 65–117)
GLUCOSE BLD STRIP.AUTO-MCNC: 116 MG/DL (ref 65–117)
GLUCOSE BLD STRIP.AUTO-MCNC: 120 MG/DL (ref 65–117)
GLUCOSE BLD STRIP.AUTO-MCNC: 124 MG/DL (ref 65–117)
GLUCOSE BLD STRIP.AUTO-MCNC: 124 MG/DL (ref 65–117)
GLUCOSE BLD STRIP.AUTO-MCNC: 135 MG/DL (ref 65–117)
GLUCOSE SERPL-MCNC: 114 MG/DL (ref 65–100)
HCT VFR BLD AUTO: 31.3 % (ref 36.6–50.3)
HGB BLD-MCNC: 10.3 G/DL (ref 12.1–17)
MAGNESIUM SERPL-MCNC: 2.2 MG/DL (ref 1.6–2.4)
MCH RBC QN AUTO: 33.7 PG (ref 26–34)
MCHC RBC AUTO-ENTMCNC: 32.9 G/DL (ref 30–36.5)
MCV RBC AUTO: 102.3 FL (ref 80–99)
NRBC # BLD: 0 K/UL (ref 0–0.01)
NRBC BLD-RTO: 0 PER 100 WBC
PHOSPHATE SERPL-MCNC: 1.3 MG/DL (ref 2.6–4.7)
PLATELET # BLD AUTO: 180 K/UL (ref 150–400)
PMV BLD AUTO: 8.6 FL (ref 8.9–12.9)
POTASSIUM SERPL-SCNC: 3.5 MMOL/L (ref 3.5–5.1)
PROT SERPL-MCNC: 5.3 G/DL (ref 6.4–8.2)
RBC # BLD AUTO: 3.06 M/UL (ref 4.1–5.7)
SERVICE CMNT-IMP: ABNORMAL
SERVICE CMNT-IMP: NORMAL
SERVICE CMNT-IMP: NORMAL
SODIUM SERPL-SCNC: 138 MMOL/L (ref 136–145)
TRIGL SERPL-MCNC: 56 MG/DL
WBC # BLD AUTO: 11.3 K/UL (ref 4.1–11.1)

## 2024-08-13 PROCEDURE — 2700000000 HC OXYGEN THERAPY PER DAY

## 2024-08-13 PROCEDURE — 6360000002 HC RX W HCPCS: Performed by: NURSE PRACTITIONER

## 2024-08-13 PROCEDURE — 94640 AIRWAY INHALATION TREATMENT: CPT

## 2024-08-13 PROCEDURE — 6360000002 HC RX W HCPCS: Performed by: INTERNAL MEDICINE

## 2024-08-13 PROCEDURE — 83735 ASSAY OF MAGNESIUM: CPT

## 2024-08-13 PROCEDURE — 2580000003 HC RX 258: Performed by: INTERNAL MEDICINE

## 2024-08-13 PROCEDURE — 2580000003 HC RX 258: Performed by: NURSE PRACTITIONER

## 2024-08-13 PROCEDURE — 85027 COMPLETE CBC AUTOMATED: CPT

## 2024-08-13 PROCEDURE — 2580000003 HC RX 258: Performed by: SURGERY

## 2024-08-13 PROCEDURE — 97162 PT EVAL MOD COMPLEX 30 MIN: CPT

## 2024-08-13 PROCEDURE — 74018 RADEX ABDOMEN 1 VIEW: CPT

## 2024-08-13 PROCEDURE — 97530 THERAPEUTIC ACTIVITIES: CPT

## 2024-08-13 PROCEDURE — 71045 X-RAY EXAM CHEST 1 VIEW: CPT

## 2024-08-13 PROCEDURE — 2500000003 HC RX 250 WO HCPCS: Performed by: INTERNAL MEDICINE

## 2024-08-13 PROCEDURE — 36415 COLL VENOUS BLD VENIPUNCTURE: CPT

## 2024-08-13 PROCEDURE — 80048 BASIC METABOLIC PNL TOTAL CA: CPT

## 2024-08-13 PROCEDURE — 82962 GLUCOSE BLOOD TEST: CPT

## 2024-08-13 PROCEDURE — 84478 ASSAY OF TRIGLYCERIDES: CPT

## 2024-08-13 PROCEDURE — 2500000003 HC RX 250 WO HCPCS: Performed by: NURSE PRACTITIONER

## 2024-08-13 PROCEDURE — 80076 HEPATIC FUNCTION PANEL: CPT

## 2024-08-13 PROCEDURE — 97166 OT EVAL MOD COMPLEX 45 MIN: CPT | Performed by: OCCUPATIONAL THERAPIST

## 2024-08-13 PROCEDURE — 84100 ASSAY OF PHOSPHORUS: CPT

## 2024-08-13 PROCEDURE — 2060000000 HC ICU INTERMEDIATE R&B

## 2024-08-13 PROCEDURE — 97530 THERAPEUTIC ACTIVITIES: CPT | Performed by: OCCUPATIONAL THERAPIST

## 2024-08-13 PROCEDURE — 2500000003 HC RX 250 WO HCPCS: Performed by: SURGERY

## 2024-08-13 RX ADMIN — PHENOL 1 SPRAY: 1.4 SPRAY ORAL at 19:50

## 2024-08-13 RX ADMIN — METRONIDAZOLE 500 MG: 500 INJECTION, SOLUTION INTRAVENOUS at 09:25

## 2024-08-13 RX ADMIN — ENOXAPARIN SODIUM 30 MG: 100 INJECTION SUBCUTANEOUS at 09:17

## 2024-08-13 RX ADMIN — ARFORMOTEROL TARTRATE: 15 SOLUTION RESPIRATORY (INHALATION) at 07:27

## 2024-08-13 RX ADMIN — PANTOPRAZOLE SODIUM 40 MG: 40 INJECTION, POWDER, FOR SOLUTION INTRAVENOUS at 09:17

## 2024-08-13 RX ADMIN — METRONIDAZOLE 500 MG: 500 INJECTION, SOLUTION INTRAVENOUS at 15:56

## 2024-08-13 RX ADMIN — Medication: at 20:02

## 2024-08-13 RX ADMIN — SODIUM CHLORIDE, PRESERVATIVE FREE 10 ML: 5 INJECTION INTRAVENOUS at 09:19

## 2024-08-13 RX ADMIN — PHENOL 1 SPRAY: 1.4 SPRAY ORAL at 06:05

## 2024-08-13 RX ADMIN — HYDROMORPHONE HYDROCHLORIDE 0.25 MG: 1 INJECTION, SOLUTION INTRAMUSCULAR; INTRAVENOUS; SUBCUTANEOUS at 05:58

## 2024-08-13 RX ADMIN — Medication: at 09:18

## 2024-08-13 RX ADMIN — POTASSIUM PHOSPHATE, MONOBASIC POTASSIUM PHOSPHATE, DIBASIC: 224; 236 INJECTION, SOLUTION, CONCENTRATE INTRAVENOUS at 17:19

## 2024-08-13 RX ADMIN — FLUCONAZOLE 200 MG: 200 INJECTION, SOLUTION INTRAVENOUS at 22:01

## 2024-08-13 RX ADMIN — CEFEPIME 2000 MG: 2 INJECTION, POWDER, FOR SOLUTION INTRAVENOUS at 03:46

## 2024-08-13 RX ADMIN — ALBUTEROL SULFATE 2.5 MG: 2.5 SOLUTION RESPIRATORY (INHALATION) at 05:24

## 2024-08-13 RX ADMIN — HYDROMORPHONE HYDROCHLORIDE 0.25 MG: 1 INJECTION, SOLUTION INTRAMUSCULAR; INTRAVENOUS; SUBCUTANEOUS at 01:50

## 2024-08-13 RX ADMIN — ARFORMOTEROL TARTRATE: 15 SOLUTION RESPIRATORY (INHALATION) at 22:47

## 2024-08-13 RX ADMIN — VANCOMYCIN HYDROCHLORIDE 500 MG: 500 INJECTION, POWDER, LYOPHILIZED, FOR SOLUTION INTRAVENOUS at 00:18

## 2024-08-13 RX ADMIN — POTASSIUM PHOSPHATE, MONOBASIC AND POTASSIUM PHOSPHATE, DIBASIC 30 MMOL: 224; 236 INJECTION, SOLUTION, CONCENTRATE INTRAVENOUS at 10:22

## 2024-08-13 RX ADMIN — VANCOMYCIN HYDROCHLORIDE 500 MG: 500 INJECTION, POWDER, LYOPHILIZED, FOR SOLUTION INTRAVENOUS at 12:46

## 2024-08-13 RX ADMIN — PANTOPRAZOLE SODIUM 40 MG: 40 INJECTION, POWDER, FOR SOLUTION INTRAVENOUS at 20:09

## 2024-08-13 RX ADMIN — HYDROMORPHONE HYDROCHLORIDE 0.25 MG: 1 INJECTION, SOLUTION INTRAMUSCULAR; INTRAVENOUS; SUBCUTANEOUS at 19:51

## 2024-08-13 RX ADMIN — SODIUM CHLORIDE, PRESERVATIVE FREE 10 ML: 5 INJECTION INTRAVENOUS at 19:59

## 2024-08-13 ASSESSMENT — PAIN DESCRIPTION - ONSET
ONSET: ON-GOING
ONSET: AWAKENED FROM SLEEP

## 2024-08-13 ASSESSMENT — PAIN DESCRIPTION - PAIN TYPE
TYPE: SURGICAL PAIN
TYPE: SURGICAL PAIN

## 2024-08-13 ASSESSMENT — PAIN - FUNCTIONAL ASSESSMENT
PAIN_FUNCTIONAL_ASSESSMENT: ACTIVITIES ARE NOT PREVENTED

## 2024-08-13 ASSESSMENT — PAIN DESCRIPTION - DESCRIPTORS
DESCRIPTORS: SORE
DESCRIPTORS: CRAMPING
DESCRIPTORS: CRAMPING
DESCRIPTORS: SORE
DESCRIPTORS: CRAMPING

## 2024-08-13 ASSESSMENT — PAIN SCALES - GENERAL
PAINLEVEL_OUTOF10: 0
PAINLEVEL_OUTOF10: 6
PAINLEVEL_OUTOF10: 0
PAINLEVEL_OUTOF10: 6
PAINLEVEL_OUTOF10: 0
PAINLEVEL_OUTOF10: 6

## 2024-08-13 ASSESSMENT — PAIN DESCRIPTION - FREQUENCY
FREQUENCY: INTERMITTENT
FREQUENCY: INTERMITTENT

## 2024-08-13 ASSESSMENT — PAIN DESCRIPTION - DIRECTION
RADIATING_TOWARDS: RUQ
RADIATING_TOWARDS: RUQ

## 2024-08-13 ASSESSMENT — PAIN DESCRIPTION - LOCATION
LOCATION: ABDOMEN
LOCATION: THROAT
LOCATION: THROAT

## 2024-08-13 ASSESSMENT — PAIN DESCRIPTION - ORIENTATION
ORIENTATION: RIGHT

## 2024-08-13 NOTE — PLAN OF CARE
Problem: Pain  Goal: Verbalizes/displays adequate comfort level or baseline comfort level  Outcome: Progressing  Flowsheets (Taken 8/13/2024 1526)  Verbalizes/displays adequate comfort level or baseline comfort level:   Encourage patient to monitor pain and request assistance   Assess pain using appropriate pain scale   Administer analgesics based on type and severity of pain and evaluate response   Implement non-pharmacological measures as appropriate and evaluate response   Consider cultural and social influences on pain and pain management   Notify Licensed Independent Practitioner if interventions unsuccessful or patient reports new pain     Problem: Skin/Tissue Integrity  Goal: Absence of new skin breakdown  Description: 1.  Monitor for areas of redness and/or skin breakdown  2.  Assess vascular access sites hourly  3.  Every 4-6 hours minimum:  Change oxygen saturation probe site  4.  Every 4-6 hours:  If on nasal continuous positive airway pressure, respiratory therapy assess nares and determine need for appliance change or resting period.  Outcome: Progressing     Problem: Safety - Adult  Goal: Free from fall injury  Outcome: Progressing     Problem: Discharge Planning  Goal: Discharge to home or other facility with appropriate resources  Outcome: Progressing  Flowsheets (Taken 8/13/2024 1600)  Discharge to home or other facility with appropriate resources:   Identify barriers to discharge with patient and caregiver   Arrange for needed discharge resources and transportation as appropriate   Identify discharge learning needs (meds, wound care, etc)   Refer to discharge planning if patient needs post-hospital services based on physician order or complex needs related to functional status, cognitive ability or social support system     Problem: Respiratory - Adult  Goal: Achieves optimal ventilation and oxygenation  8/13/2024 1641 by Maryellen Valle, RN  Outcome: Progressing  Flowsheets (Taken 8/13/2024  Assistance: Independent,  ,  ,  ,  ,  , Homemaking Assistance: Needs assistance (Family friend helps with laundry, deep cleaning, shopping/grocery management), Ambulation Assistance: Independent (With Rw vs cane depending on energy level), Transfer Assistance: Independent, Active : Yes     HOME SUPPORT: Patient lived alone and has a caregiver 2-3x wk for a few hours. Daughter is supportive and works from home.    Occupational Therapy Goals:  Initiated 8/13/2024  1.  Patient will perform grooming with Modified [place] within 7 day(s).  2.  Patient will perform upper body dressing and lower body dressing with Modified [place] within 7 day(s).  3.  Patient will perform toileting with Modified [place] within 7 day(s).  4.  Patient will perform toilet transfers with Modified [place]  within 7 day(s).  5.  Patient will perform bathing with Modified [place] within 7 day(s).      8/13/2024 1458 by Rebeka Vang, OTR/L  Outcome: Progressing  8/13/2024 1414 by Rebeka Vang, OTR/L  Outcome: Not Progressing

## 2024-08-13 NOTE — CARE COORDINATION
Care Management Initial Assessment       RUR: 16% \"medium risk\"  Readmission? No  1st IM letter given? Yes, given by CM on 8/13/24  1st  letter given: N/A    Initial note - 1545 PM: Chart reviewed. CM met with pt at the bedside to introduce self and role. Verified contact information and demographics. Pt resides alone in a one level home with 2 JOAQUIN. Pt PCP is Dr. Bassett with the last visit being in December of 2023. Preferred pharmacy is [facility] on [address] or [employer] mail order. Pt has a hx of HH services with [facility]. Pt has a hx of an IPR stay at [facility]. Pt is independent with ADL's and has portable O2 through [facility] at home. Pt owns a cane and walker as well. Pt is an active . Pt has no ACP on file; pt is a LIMITED code. Pt daughter to transport upon time of d/c.    CM discussed PT/OT recommendation for IPR/SNF placement with pt. CM left an IPR and SNF list at the bedside. Pt to discuss placement choices with pt daughter. CM to follow up to receive choices. Full assessment below:     08/13/24 8599   Service Assessment   Patient Orientation Alert and Oriented;Place;Person;Situation;Self   Cognition Alert   History Provided By Patient   Primary Caregiver Self   Support Systems Children   Patient's Healthcare Decision Maker is: Legal Next of Kin   PCP Verified by CM Yes  (Dr. Bassett)   Last Visit to PCP Within last year   Prior Functional Level Independent in ADLs/IADLs   Current Functional Level Assistance with the following:;Mobility;Bathing;Dressing   Can patient return to prior living arrangement Yes   Ability to make needs known: Good   Family able to assist with home care needs: Yes   Would you like for me to discuss the discharge plan with any other family members/significant others, and if so, who? Yes  (Deja Bowden (daughter), [phone])   Financial Resources Medicare  ([employer] Medicare)   Community Resources None   Social/Functional History   Lives With Alone   Type of  Home House   Home Layout One level   Home Access Stairs to enter with rails   Entrance Stairs - Number of Steps 2 JOAQUIN   Bathroom Shower/Tub None   Bathroom Equipment None   Home Equipment Cane;Walker - Rolling   Receives Help From Family   ADL Assistance Needs assistance   Toileting Needs assistance   Homemaking Assistance Needs assistance   Ambulation Assistance Needs assistance   Transfer Assistance Needs assistance   Active  Yes   Mode of Transportation Car   Occupation Retired   Discharge Planning   Type of Residence House   Living Arrangements Alone   Current Services Prior To Admission None   Potential Assistance Needed Skilled Nursing Facility   DME Ordered? No   Potential Assistance Purchasing Medications No   Type of Home Care Services None   Patient expects to be discharged to: Other (comment)  (IPR vs. SNF)     Advance Care Planning     General Advance Care Planning (ACP) Conversation    Date of Conversation: 8/13/2024  Conducted with: Patient with Decision Making Capacity  Other persons present: None    Healthcare Decision Maker:   Primary Decision Maker: Deja Bowden - Child - [phone]     Today we documented Decision Maker(s) consistent with Legal Next of Kin hierarchy.  Content/Action Overview:  Has NO ACP documents-Information provided  Reviewed DNR/DNI and patient is a Limited Code.   Length of Voluntary ACP Conversation in minutes:  <16 minutes (Non-Billable)    AJCK WEINSTEIN   [phone]

## 2024-08-13 NOTE — PROGRESS NOTES
SURGERY PROGRESS NOTE      Admit Date: 2024    POD 2 Days Post-Op    Procedure: Procedure(s):  LAPAROSCOPIC REPAIR OF PERFORATED ANTRAL ULCER WITH GRAM PATCH      Subjective:     Patient denies pain and nausea.   NG came out overnight and hospitalist NP ordered replacement.  Surgery was not contacted.        Objective:     BP (!) 134/59   Pulse 81   Temp 98.3 °F (36.8 °C) (Oral)   Resp 17   Ht 1.6 m (5' 3\")   Wt 40.7 kg (89 lb 11.6 oz)   SpO2 99%   BMI 15.89 kg/m²      Temp (24hrs), Av.2 °F (36.8 °C), Min:98 °F (36.7 °C), Max:98.4 °F (36.9 °C)      No intake/output data recorded.   1901 -  0700  In: 5602.2 [I.V.:2964.4]  Out: 1885 [Urine:1275; Drains:260]    Physical Exam:    General:  alert, appears stated age, cooperative, and no distress   Abdomen: Soft, minimal tenderness.      MARILU 150 serosanguinous    Incision:   healing well, no drainage, no erythema, no hernia, no seroma, no swelling, no dehiscence, incision well approximated         CBC:   Lab Results   Component Value Date/Time    WBC 11.3 2024 03:44 AM    RBC 3.06 2024 03:44 AM    HGB 10.3 2024 03:44 AM    HCT 31.3 2024 03:44 AM    .3 2024 03:44 AM    MCH 33.7 2024 03:44 AM    MCHC 32.9 2024 03:44 AM    RDW 16.2 2024 03:44 AM     2024 03:44 AM    MPV 8.6 2024 03:44 AM     BMP:    Lab Results   Component Value Date/Time     2024 03:44 AM    K 3.5 2024 03:44 AM     2024 03:44 AM    CO2 25 2024 03:44 AM    BUN 15 2024 03:44 AM    CREATININE 0.69 2024 03:44 AM    CALCIUM 8.0 2024 03:44 AM    LABGLOM >90 2024 03:44 AM    LABGLOM >60 2024 01:40 AM    GLUCOSE 114 2024 03:44 AM       Assessment:     Principal Problem:    Pneumoperitoneum  Active Problems:    Bowel perforation (HCC)    Goals of care, counseling/discussion    DNR (do not resuscitate) discussion    Frailty    Severe protein-calorie

## 2024-08-13 NOTE — PROGRESS NOTES
Hospitalist Progress Note    NAME:   Anuel Bermudez   : 1945   MRN: [id number]     Date/Time: 2024 12:55 PM  Patient PCP: Bonifacio Bassett MD    Estimated discharge date: ?  HH/?SNF TBD  Barriers: Surgical clearance, NGT, TPN/NPO      Assessment / Plan:    Pneumoperitoneum d/t perforated peptic/antral ulcer s/p laparoscopic Adarsh patch repair by Gen Surgery  CT demonstrated pneumoperitoneum and thickening in the gastric antrum    Follow surgery recommendations- keep NPO with NGT  Continue antibiotics and antifungal empirically, continue to follow WBC-trending down to 11.3 today  Cont BID PPI  Continue TPN  IF NG tube dislodged, Surgery team recommending not to replace it, will rollow recommendations  IP GI consulted for PUD follow up (duration of treatment, need for H.pylori, follow up of pathology etc) once out of ICU-  recommended to avoid NSAID use in the future, check H. pylori stool antigen and treat if positive per protocol.No acute endoscopic intervention indicated from a GI perspective. Use PPI BID x 8 weeks and then qd afterwards.       Protein calorie malnutrition  Hypomagnesemia-resolved     Chronic hypoxic respiratory failure  On as needed home 1-2 L nasal cannula  Continue bronchodilators     Hx of NSCLC, squamous cell carcinoma (f/b VCI)  S/p laser to L endobronchial lung lesion, s/p chemo  Started on maintenance Keytruda 24             Medical Decision Making:   I personally reviewed labs: BMP, Mag, CBC  I personally reviewed imaging:  I personally reviewed EKG/Telemetry strip, consistent with NSR  Toxic drug monitoring: Vancomycin, monitor for CHIRAG, dosing with pharmacy assistance  Discussed case with: Case Management in IDRs        Code Status: Partial Code- intubation ok otherwise DNR per pt with palliative today  DVT Prophylaxis: SCDs  GI Prophylaxis:PPI    Subjective:     Chief Complaint / Reason for Physician Visit:  Patient seen and evaluated at bedside,

## 2024-08-13 NOTE — PROGRESS NOTES
Nursing contacted Nocturnist/cross cover provider via non-urgent messaging system [email or web address] and notified patient removed his ng tube that was to LIS, states ng tube replaced and kub per protocol done- asking for review of kub results. No other concerns reported. No acute distress reported. No other information provided by nurse. VSS. Informed nurse ok to resume ng tube use per prior orders on my initial review of the kub appears ng tube in the stomach- pending final rad review. Will defer further evaluation/management to the day shift primary attending care team. Patient denies any further complaints or concerns. Nursing to notify Hospitalist for further/continued concerns. Will remain available overnight for further concerns if nursing/patient needs. Please note, there are RRT systems in this hospital in place that if nursing has acute or critical patient condition change or concern, this is to help facilitate and notify that patient needs immediate bedside evaluation by a provider.     Non-billable note.

## 2024-08-13 NOTE — PROGRESS NOTES
1505: Bedside and Verbal shift change report given to Marline Valle RN (oncoming nurse) by Ayse Bishop RN  (offgoing nurse). Report included the following information Nurse Handoff Report, Index, ED Encounter Summary, Adult Overview, Surgery Report, Intake/Output, MAR, Recent Results, Cardiac Rhythm SR, Alarm Parameters, Quality Measures, and Neuro Assessment.     1600: Assessment completed. Pt repositioned in bed.     1800: Pt repositioned in bed.     1918: Bedside and Verbal shift change report given to Carolann Sanabria RN (oncoming nurse) by Marline Valle RN (offgoing nurse). Report included the following information Nurse Handoff Report, Index, ED Encounter Summary, Adult Overview, Surgery Report, Intake/Output, MAR, Recent Results, Cardiac Rhythm SR, Alarm Parameters, Quality Measures, and Neuro Assessment.

## 2024-08-13 NOTE — PROGRESS NOTES
Pt accidentally dislodged NGT during sleep. Placed new 18fr NGT in R nare at 60cm. Pt tolerated fairly well. Auscultated air bolus and aspirated red-tinged green GI contents. Placed order for stat XR to confirm placement.     0417: Xray at bedside.     0530: Nocturnist reviewed KUB and confirmed placement in stomach.     End of Shift Note    Bedside shift change report given to Ayse DRAKE RN (oncoming nurse) by Carolann Sanabria RN (offgoing nurse).  Report included the following information SBAR, Intake/Output, MAR, Recent Results, and Cardiac Rhythm NSR    Shift worked:  7p-7a     Shift summary and any significant changes:     See above. Dislodgement of NGT, replaced and patent to LIS.     Dilaudid given x2 for RUQ pain per PRN orders.    PRN neb tx given this Am by RT- pt has nonproductive cough. Unable to wean O2. IS at bedside, encouraging pulm hygiene.     PT/OT today     Concerns for physician to address:       Zone phone for oncoming shift:   1059       Activity:     Number times ambulated in hallways past shift: 0  Number of times OOB to chair past shift: 0    Cardiac:   Cardiac Monitoring: Yes           Access:  Current line(s): PIV and PICC     Genitourinary:   Urinary status: voiding, incontinent, and external catheter    Respiratory:      Chronic home O2 use?: YES  Incentive spirometer at bedside: YES       GI:     Current diet:  Diet NPO  PN-Adult 2-in-1 Peripheral Line (Custom)  Passing flatus: NO  Tolerating current diet: YES       Pain Management:   Patient states pain is manageable on current regimen: YES    Skin:     Interventions: float heels, increase time out of bed, PT/OT consult, limit briefs, internal/external urinary devices, and nutritional support    Patient Safety:  Fall Score:    Interventions: bed/chair alarm, gripper socks, pt to call before getting OOB, and stay with me (per policy)       Length of Stay:  Expected LOS: 7  Actual LOS: 2      Carolann Sanabria RN

## 2024-08-13 NOTE — PLAN OF CARE
Problem: Physical Therapy - Adult  Goal: By Discharge: Performs mobility at highest level of function for planned discharge setting.  See evaluation for individualized goals.  Description: FUNCTIONAL STATUS PRIOR TO ADMISSION: Patient was modified independent using a rolling walker and single point cane for functional mobility depending on energy level. Patient reports indep with ADLs. 2L NC available as needed.    HOME SUPPORT PRIOR TO ADMISSION: The patient lived alone with daughter and family friend to provide assistance. Daughter has arranged for family friend to check on patient a couple times/week, assists with grocery shopping, laundry, etc depending on need at time.     Physical Therapy Goals  Initiated 8/13/2024  1.  Patient will move from supine to sit and sit to supine, scoot up and down, and roll side to side in bed with modified independence within 7 day(s).    2.  Patient will perform sit to stand with modified independence within 7 day(s).  3.  Patient will transfer from bed to chair and chair to bed with modified independence using the least restrictive device within 7 day(s).  4.  Patient will ambulate with modified independence for 50 feet with the least restrictive device within 7 day(s).   5.  Patient will ascend/descend 4 stairs with handrail(s) with contact guard assist within 7 day(s).   Outcome: Progressing   PHYSICAL THERAPY EVALUATION    Patient: Anuel Bermudez (78 y.o. male)  Date: 8/13/2024  Primary Diagnosis: Pneumoperitoneum [K66.8]  Elevated lipase [R74.8]  Bowel perforation (HCC) [K63.1]  Procedure(s) (LRB):  LAPAROSCOPIC REPAIR OF PERFORATED ANTRAL ULCER WITH GRAM PATCH (N/A) 2 Days Post-Op   Precautions: Restrictions/Precautions: Fall Risk    ASSESSMENT :   DEFICITS/IMPAIRMENTS:   The patient is limited by decreased functional mobility, independence in ADLs, strength, activity tolerance, balance, increased pain levels following admission for pneumoperitoneum 2 days s/p ex lap.  of discharging home with home health or need of SNF/IPR.    1. Leticia Blackburn, Reji Hatfield, Melina Álvarez, Obinna Rea, Adi Blackburn.  Validity of the AM-PAC “6-Clicks” Inpatient Daily Activity and Basic Mobility Short Forms. Physical Therapy Mar 2014, 94 3) 379-391; DOI: 10.2522/ptj.14451605  2. Quang CERVANTES, Toma JOHN, Elsi JOHN, Zachary JOHN. Association of AM-PAC \"6-Clicks\" Basic Mobility and Daily Activity Scores With Discharge Destination. Phys Ther. 2021 4;101(4):fvry679. doi: 10.1093/ptj/gycr607. PMID: 13841389.  3. Leidy JOHN, Mark HUMPHREYS, Eva S, Jose K, Nohemi S. Activity Measure for Post-Acute Care \"6-Clicks\" Basic Mobility Scores Predict Discharge Destination After Acute Care Hospitalization in Select Patient Groups: A Retrospective, Observational Study. Arch Rehabil Res Clin Transl. 2022 16;4(3):109884. doi: 10.1016/j.arrct..039131. PMID: 51695054; PMCID: [id number].  4. Alexey HILLIARD, Danielle S, Dedra W, Yue P. AM-PAC Short Forms Manual 4.0. Revised 2020.                                                                                                                                                                                                                           Pain Ratin/10 at rest  Pain Intervention(s):   pain is at a level acceptable to the patient    Activity Tolerance:   Fair , Poor, requires frequent rest breaks, and observed shortness of breath on exertion    After treatment:   Patient left in no apparent distress sitting up in chair, Call bell within reach, and Updated patient's board on functional status and mobility recommendations    COMMUNICATION/EDUCATION:   The patient's plan of care was discussed with: occupational therapist and registered nurse    Patient Education  Education Given To: Patient  Education Provided: Role of Therapy;Plan of Care;Transfer Training;Fall Prevention Strategies  Education Method: Verbal  Barriers to

## 2024-08-13 NOTE — PROGRESS NOTES
Pharmacy TPN Management Note    TPN indication: perforated antral ulcer, NPO, malnutrition    TPN type: Central     Macronutrients:  Protein: 42.5 g  Dextrose: 100 g  Lipids: held; need to clarify egg/fish allergy to see if he can tolerate    Rate: 42 mL/hr    Labs:  Recent Labs     Units 24  0344 24  0306 24  2143   NA mmol/L 138 134* 132*   K mmol/L 3.5 4.0 4.3   CL mmol/L 109* 106 103   CO2 mmol/L 25 20* 21   MG mg/dL 2.2 1.5* 1.6   PHOS MG/DL 1.3* 2.7 3.1   BUN MG/DL 15 14 15     Recent Labs     Units 24  0344 24  1344   AST U/L 22 24   ALT U/L 23 25     Recent Labs     Units 24  0344   WBC K/uL 11.3*   HGB g/dL 10.3*   HCT % 31.3*   TRIG MG/DL 56       Electrolytes (dosed per LITER):  Na: 35 meq/L; K: 52 meq/L; Phos: 30 mmol/L; M meq/L; Ca 4.5 meq/L    Other additives in TPN: multivitamins  and trace elements    Impression/Plan:   TPN macronutrient/rate changes: None  TPN electrolyte changes: Increased potassium phosphate to 30 mmol/L and repleted 30 mmol of phosphorus outside of TPN.  TPN insulin changes: n/a     Pharmacy will continue to monitor enteral nutrition plan, electrolytes, renal function, and dietician recommendations and adjust parenteral nutrition as needed.    Thank you,  JETT LIND [facility]

## 2024-08-13 NOTE — PROGRESS NOTES
Pharmacy Antimicrobial Kinetic Dosing    Indication for Antimicrobials: intra-abd infection 2/2 perforated antral ulcer    Current Regimen of Each Antimicrobial:  Vancomycin - pharmacy to dose; started ; Day #2   Cefepime 2000 mg IV q12h; started , Day #3  Metronidzole 500 mg IV q8h, started , Day #3  Fluconazole 200 mg IV q24h; started ; Day # 3    Previous Antimicrobial Therapy:  N/A    Goal Level: Vancomycin -600    Date Dose & Interval Measured (mcg/mL) Predicted AUC   24 500 mg IV q12h 6.5 408                 Significant Cultures:   N/A    Labs:  Recent Labs     Units 24  0344 24  0306 24  2143 24  1344   CREATININE MG/DL 0.69* 0.75 0.75 0.84   BUN MG/DL 15 14 15 16   WBC K/uL 11.3* 11.8* 13.3* 8.8     Temp (24hrs), Av.2 °F (36.8 °C), Min:97.8 °F (36.6 °C), Max:98.4 °F (36.9 °C)    Conditions for Dosing Consideration: Malnutrition    Creatinine Clearance (mL/min): Estimated Creatinine Clearance: 50 mL/min (A) (based on SCr of 0.69 mg/dL (L)).     Impression/Plan:   Will update vancomycin regimen to 750 mg  IV q12h for a projected SS-  Consider a level before 0000 dose on 8/15  Daily CBC and BMP ordered through   Antimicrobial stop date pending     Pharmacy will follow daily and adjust medications as appropriate for renal function and/or serum levels.    Thank you,  JETT LIND [facility]

## 2024-08-13 NOTE — PLAN OF CARE
Problem: Occupational Therapy - Adult  Goal: By Discharge: Performs self-care activities at highest level of function for planned discharge setting.  See evaluation for individualized goals.  Description: FUNCTIONAL STATUS PRIOR TO ADMISSION:  performed ADLS on his own, drives to appointments  Receives Help From: Family, Friend(s), ADL Assistance: Independent,  ,  ,  ,  ,  , Homemaking Assistance: Needs assistance (Family friend helps with laundry, deep cleaning, shopping/grocery management), Ambulation Assistance: Independent (With Rw vs cane depending on energy level), Transfer Assistance: Independent, Active : Yes     HOME SUPPORT: Patient lived alone and has a caregiver 2-3x wk for a few hours. Daughter is supportive and works from home.    Occupational Therapy Goals:  Initiated 8/13/2024  1.  Patient will perform grooming with Modified [place] within 7 day(s).  2.  Patient will perform upper body dressing and lower body dressing with Modified [place] within 7 day(s).  3.  Patient will perform toileting with Modified [place] within 7 day(s).  4.  Patient will perform toilet transfers with Modified [place]  within 7 day(s).  5.  Patient will perform bathing with Modified [place] within 7 day(s).      8/13/2024 1458 by Rebeka Vang, OTR/L  Outcome: Progressing  OCCUPATIONAL THERAPY EVALUATION    Patient: Anuel Bermudez (78 y.o. male)  Date: 8/13/2024  Primary Diagnosis: Pneumoperitoneum [K66.8]  Elevated lipase [R74.8]  Bowel perforation (HCC) [K63.1]  Procedure(s) (LRB):  LAPAROSCOPIC REPAIR OF PERFORATED ANTRAL ULCER WITH GRAM PATCH (N/A) 2 Days Post-Op     Precautions: Fall Risk                  ASSESSMENT :  The patient is limited by decreased functional mobility, independence in ADLs, strength, endurance, balance, posture, increased pain levels.    Pt was supine upon arrival and cleared for session by nursing.  Moderate pain at incision sites with mobility.  O2 sat was  recommendations    COMMUNICATION/EDUCATION:   The patient's plan of care was discussed with: physical therapist, registered nurse, and patient    Patient Education  Education Given To: Patient  Education Provided: Role of Therapy;Plan of Care  Education Method: Verbal  Barriers to Learning: None  Education Outcome: Continued education needed;Verbalized understanding    Thank you for this referral.  Rebeka Vang OTR/L  Minutes: 22    Occupational Therapy Evaluation Charge Determination   History Examination Decision-Making   MEDIUM Complexity : Expanded review of history including physical, cognitive and psychocial history  MEDIUM Complexity: 3-5 Performance deficits relating to physical, cognitive, or psychosocial skills that result in activity limitations and/or participation restrictions MEDIUM Complexity: Patient may present with comorbidities that affect occupational performance. Minimal to moderate modifications of tasks or assist (eg. physical or verbal) with assist is necessary to enable pt to complete eval   Based on the above components, the patient evaluation is determined to be of the following complexity level: Medium

## 2024-08-14 LAB
ALBUMIN SERPL-MCNC: 2.3 G/DL (ref 3.5–5)
ALBUMIN/GLOB SERPL: 0.8 (ref 1.1–2.2)
ALP SERPL-CCNC: 53 U/L (ref 45–117)
ALT SERPL-CCNC: 21 U/L (ref 12–78)
ANION GAP SERPL CALC-SCNC: 6 MMOL/L (ref 5–15)
AST SERPL-CCNC: 17 U/L (ref 15–37)
BILIRUB DIRECT SERPL-MCNC: 0.1 MG/DL (ref 0–0.2)
BILIRUB SERPL-MCNC: 0.7 MG/DL (ref 0.2–1)
BUN SERPL-MCNC: 13 MG/DL (ref 6–20)
BUN/CREAT SERPL: 23 (ref 12–20)
CALCIUM SERPL-MCNC: 7.7 MG/DL (ref 8.5–10.1)
CHLORIDE SERPL-SCNC: 107 MMOL/L (ref 97–108)
CO2 SERPL-SCNC: 23 MMOL/L (ref 21–32)
CREAT SERPL-MCNC: 0.56 MG/DL (ref 0.7–1.3)
EKG ATRIAL RATE: 92 BPM
EKG DIAGNOSIS: NORMAL
EKG P AXIS: 104 DEGREES
EKG P-R INTERVAL: 134 MS
EKG Q-T INTERVAL: 374 MS
EKG QRS DURATION: 86 MS
EKG QTC CALCULATION (BAZETT): 462 MS
EKG R AXIS: 95 DEGREES
EKG T AXIS: 51 DEGREES
EKG VENTRICULAR RATE: 92 BPM
ERYTHROCYTE [DISTWIDTH] IN BLOOD BY AUTOMATED COUNT: 15.9 % (ref 11.5–14.5)
GLOBULIN SER CALC-MCNC: 3 G/DL (ref 2–4)
GLUCOSE BLD STRIP.AUTO-MCNC: 109 MG/DL (ref 65–117)
GLUCOSE BLD STRIP.AUTO-MCNC: 111 MG/DL (ref 65–117)
GLUCOSE BLD STRIP.AUTO-MCNC: 115 MG/DL (ref 65–117)
GLUCOSE BLD STRIP.AUTO-MCNC: 95 MG/DL (ref 65–117)
GLUCOSE SERPL-MCNC: 100 MG/DL (ref 65–100)
HCT VFR BLD AUTO: 30.5 % (ref 36.6–50.3)
HGB BLD-MCNC: 9.9 G/DL (ref 12.1–17)
MAGNESIUM SERPL-MCNC: 1.8 MG/DL (ref 1.6–2.4)
MCH RBC QN AUTO: 33.1 PG (ref 26–34)
MCHC RBC AUTO-ENTMCNC: 32.5 G/DL (ref 30–36.5)
MCV RBC AUTO: 102 FL (ref 80–99)
NRBC # BLD: 0 K/UL (ref 0–0.01)
NRBC BLD-RTO: 0 PER 100 WBC
PHOSPHATE SERPL-MCNC: 2.1 MG/DL (ref 2.6–4.7)
PLATELET # BLD AUTO: 168 K/UL (ref 150–400)
PMV BLD AUTO: 8.8 FL (ref 8.9–12.9)
POTASSIUM SERPL-SCNC: 3.4 MMOL/L (ref 3.5–5.1)
PROT SERPL-MCNC: 5.3 G/DL (ref 6.4–8.2)
RBC # BLD AUTO: 2.99 M/UL (ref 4.1–5.7)
SERVICE CMNT-IMP: NORMAL
SODIUM SERPL-SCNC: 136 MMOL/L (ref 136–145)
VANCOMYCIN SERPL-MCNC: 7.8 UG/ML
WBC # BLD AUTO: 8.6 K/UL (ref 4.1–11.1)

## 2024-08-14 PROCEDURE — 2580000003 HC RX 258: Performed by: SURGERY

## 2024-08-14 PROCEDURE — 80048 BASIC METABOLIC PNL TOTAL CA: CPT

## 2024-08-14 PROCEDURE — 85027 COMPLETE CBC AUTOMATED: CPT

## 2024-08-14 PROCEDURE — 2500000003 HC RX 250 WO HCPCS: Performed by: SURGERY

## 2024-08-14 PROCEDURE — 6360000002 HC RX W HCPCS: Performed by: INTERNAL MEDICINE

## 2024-08-14 PROCEDURE — 6360000002 HC RX W HCPCS: Performed by: NURSE PRACTITIONER

## 2024-08-14 PROCEDURE — 84100 ASSAY OF PHOSPHORUS: CPT

## 2024-08-14 PROCEDURE — 97530 THERAPEUTIC ACTIVITIES: CPT

## 2024-08-14 PROCEDURE — 2580000003 HC RX 258: Performed by: INTERNAL MEDICINE

## 2024-08-14 PROCEDURE — 36415 COLL VENOUS BLD VENIPUNCTURE: CPT

## 2024-08-14 PROCEDURE — 2580000003 HC RX 258: Performed by: NURSE PRACTITIONER

## 2024-08-14 PROCEDURE — 82962 GLUCOSE BLOOD TEST: CPT

## 2024-08-14 PROCEDURE — 6360000002 HC RX W HCPCS: Performed by: STUDENT IN AN ORGANIZED HEALTH CARE EDUCATION/TRAINING PROGRAM

## 2024-08-14 PROCEDURE — 80076 HEPATIC FUNCTION PANEL: CPT

## 2024-08-14 PROCEDURE — 2500000003 HC RX 250 WO HCPCS: Performed by: STUDENT IN AN ORGANIZED HEALTH CARE EDUCATION/TRAINING PROGRAM

## 2024-08-14 PROCEDURE — 80202 ASSAY OF VANCOMYCIN: CPT

## 2024-08-14 PROCEDURE — 2060000000 HC ICU INTERMEDIATE R&B

## 2024-08-14 PROCEDURE — 97116 GAIT TRAINING THERAPY: CPT

## 2024-08-14 PROCEDURE — 97535 SELF CARE MNGMENT TRAINING: CPT

## 2024-08-14 PROCEDURE — 2700000000 HC OXYGEN THERAPY PER DAY

## 2024-08-14 PROCEDURE — 2580000003 HC RX 258: Performed by: STUDENT IN AN ORGANIZED HEALTH CARE EDUCATION/TRAINING PROGRAM

## 2024-08-14 PROCEDURE — 94640 AIRWAY INHALATION TREATMENT: CPT

## 2024-08-14 PROCEDURE — 83735 ASSAY OF MAGNESIUM: CPT

## 2024-08-14 RX ADMIN — ARFORMOTEROL TARTRATE: 15 SOLUTION RESPIRATORY (INHALATION) at 08:17

## 2024-08-14 RX ADMIN — METRONIDAZOLE 500 MG: 500 INJECTION, SOLUTION INTRAVENOUS at 17:55

## 2024-08-14 RX ADMIN — ENOXAPARIN SODIUM 30 MG: 100 INJECTION SUBCUTANEOUS at 09:10

## 2024-08-14 RX ADMIN — VANCOMYCIN HYDROCHLORIDE 750 MG: 750 INJECTION, POWDER, LYOPHILIZED, FOR SOLUTION INTRAVENOUS at 12:45

## 2024-08-14 RX ADMIN — VANCOMYCIN HYDROCHLORIDE 750 MG: 750 INJECTION, POWDER, LYOPHILIZED, FOR SOLUTION INTRAVENOUS at 00:09

## 2024-08-14 RX ADMIN — PANTOPRAZOLE SODIUM 40 MG: 40 INJECTION, POWDER, FOR SOLUTION INTRAVENOUS at 21:33

## 2024-08-14 RX ADMIN — SODIUM CHLORIDE, PRESERVATIVE FREE 10 ML: 5 INJECTION INTRAVENOUS at 21:39

## 2024-08-14 RX ADMIN — FLUCONAZOLE 200 MG: 200 INJECTION, SOLUTION INTRAVENOUS at 21:39

## 2024-08-14 RX ADMIN — POTASSIUM PHOSPHATE, MONOBASIC AND POTASSIUM PHOSPHATE, DIBASIC 20 MMOL: 224; 236 INJECTION, SOLUTION, CONCENTRATE INTRAVENOUS at 09:08

## 2024-08-14 RX ADMIN — PANTOPRAZOLE SODIUM 40 MG: 40 INJECTION, POWDER, FOR SOLUTION INTRAVENOUS at 09:09

## 2024-08-14 RX ADMIN — SODIUM CHLORIDE: 9 INJECTION, SOLUTION INTRAVENOUS at 04:19

## 2024-08-14 RX ADMIN — CEFEPIME 2000 MG: 2 INJECTION, POWDER, FOR SOLUTION INTRAVENOUS at 04:21

## 2024-08-14 RX ADMIN — VANCOMYCIN HYDROCHLORIDE 1000 MG: 1 INJECTION, POWDER, LYOPHILIZED, FOR SOLUTION INTRAVENOUS at 23:47

## 2024-08-14 RX ADMIN — METRONIDAZOLE 500 MG: 500 INJECTION, SOLUTION INTRAVENOUS at 00:06

## 2024-08-14 RX ADMIN — CEFEPIME 2000 MG: 2 INJECTION, POWDER, FOR SOLUTION INTRAVENOUS at 17:59

## 2024-08-14 RX ADMIN — ARFORMOTEROL TARTRATE: 15 SOLUTION RESPIRATORY (INHALATION) at 23:08

## 2024-08-14 RX ADMIN — Medication: at 09:10

## 2024-08-14 RX ADMIN — SODIUM CHLORIDE, PRESERVATIVE FREE 10 ML: 5 INJECTION INTRAVENOUS at 09:10

## 2024-08-14 RX ADMIN — METRONIDAZOLE 500 MG: 500 INJECTION, SOLUTION INTRAVENOUS at 09:03

## 2024-08-14 RX ADMIN — METRONIDAZOLE 500 MG: 500 INJECTION, SOLUTION INTRAVENOUS at 23:33

## 2024-08-14 RX ADMIN — Medication: at 21:39

## 2024-08-14 RX ADMIN — SODIUM CHLORIDE: 9 INJECTION, SOLUTION INTRAVENOUS at 00:05

## 2024-08-14 RX ADMIN — POTASSIUM PHOSPHATE, MONOBASIC POTASSIUM PHOSPHATE, DIBASIC: 224; 236 INJECTION, SOLUTION, CONCENTRATE INTRAVENOUS at 18:12

## 2024-08-14 ASSESSMENT — PAIN SCALES - GENERAL
PAINLEVEL_OUTOF10: 0
PAINLEVEL_OUTOF10: 0

## 2024-08-14 NOTE — PROGRESS NOTES
End of Shift Note    Bedside shift change report given to Jamir DELA CRUZ (oncoming nurse) by Carolann Sanabria RN (offgoing nurse).  Report included the following information SBAR, Intake/Output, MAR, Recent Results, and Cardiac Rhythm NSR    Shift worked:  7p-7a     Shift summary and any significant changes:    2000: Dilaudid given for reported RUQ pain per PRN orders.    2105: Bed exit alarming, at bedside pt attempting to exit bed and has pulled out NGT. Disoriented to all but person but is calm and reorients somewhat to place. Pt had similar episode the prior night after receiving narcotic. Assisted pt back into bed. Reassurance provided. Pericare completed and new external cath applied. Leaving NGT out per surgery orders.     2330: Reassessment- pt unable to sleep. Oriented to person and place, mildly restless. Promoted sleep by playing music, darkening room, keeping door closed, and clustering care.    0600: CHG bath provided, external catheter changed. Pt reports getting some rest but sleep was poor quality. A&O x4 this AM.        Concerns for physician to address:  K 3.4 this AM     Zone phone for oncoming shift:   [phone]       Activity:     Number times ambulated in hallways past shift: 0  Number of times OOB to chair past shift: 0    Cardiac:   Cardiac Monitoring: Yes           Access:  Current line(s): PIV and PICC     Genitourinary:   Urinary status: voiding, incontinent, and external catheter    Respiratory:      Chronic home O2 use?: YES  Incentive spirometer at bedside: YES       GI:     Current diet:  Diet NPO  PN-Adult 2-in-1 Peripheral Line (Custom)  Passing flatus: NO  Tolerating current diet: YES       Pain Management:   Patient states pain is manageable on current regimen: YES    Skin:     Interventions: float heels, increase time out of bed, PT/OT consult, internal/external urinary devices, and nutritional support    Patient Safety:  Fall Score:    Interventions: bed/chair alarm, assistive device (walker,

## 2024-08-14 NOTE — PROGRESS NOTES
Pharmacy TPN Management Note    TPN indication: perforated antral ulcer, NPO, malnutrition    TPN type: Central     Macronutrients:  Protein: 60 g AA  Dextrose: 100 g  Lipids: held; need to clarify egg/fish allergy to see if he can tolerate    Rate: 42 mL/hr    Labs:  Recent Labs     Units 24  0306   NA mmol/L 136 138 134*   K mmol/L 3.4* 3.5 4.0   CL mmol/L 107 109* 106   CO2 mmol/L 23 25 20*   MG mg/dL 1.8 2.2 1.5*   PHOS MG/DL 2.1* 1.3* 2.7   BUN MG/DL 13 15 14     Recent Labs     Units 24  0344   AST U/L 17 22   ALT U/L 21 23     Recent Labs     Units 24  0344   WBC K/uL 8.6 11.3*   HGB g/dL 9.9* 10.3*   HCT % 30.5* 31.3*   TRIG MG/DL  --  56       Electrolytes (dosed per LITER):  Na: 35 meq/L; K: 52 meq/L; Phos: 30 mmol/L; M meq/L; Ca 4.5 meq/L    Other additives in TPN: multivitamins  and trace elements    Impression/Plan:   TPN macronutrient/rate changes: Increased protein to dietician recommended 60 g AA as electrolytes are more stabilized.   TPN electrolyte changes: Repleted 15 mmol of phosphorus outside of TPN   TPN insulin changes: n/a     Pharmacy will continue to monitor enteral nutrition plan, electrolytes, renal function, and dietician recommendations and adjust parenteral nutrition as needed.    Thank you,  Victorino Medina [facility]

## 2024-08-14 NOTE — PROGRESS NOTES
SURGERY PROGRESS NOTE      Admit Date: 2024    POD 3 Days Post-Op    Procedure: Procedure(s):  LAPAROSCOPIC REPAIR OF PERFORATED ANTRAL ULCER WITH GRAM PATCH      Subjective:     Patient denies abdominal pain and nausea.   Sitting up ready newspaper this morning.      Had confusion overnight and pulled NG out again.        Objective:     BP (!) 156/79   Pulse 77   Temp 98.3 °F (36.8 °C) (Oral)   Resp 18   Ht 1.6 m (5' 3\")   Wt 45.4 kg (100 lb 1.6 oz)   SpO2 96%   BMI 17.73 kg/m²      Temp (24hrs), Av.3 °F (36.8 °C), Min:97.8 °F (36.6 °C), Max:98.7 °F (37.1 °C)      701 - 1900  In: -   Out: 20 [Drains:20]  1901 -  0700  In: 4147 [I.V.:771.2]  Out: 2970 [Urine:1950; Drains:170]    Physical Exam:    General:  alert, appears stated age, cooperative, and no distress   Abdomen: soft, bowel sounds active, non-tender    MARILU - serosanguinous 90cc   Incision:   healing well, no drainage, no erythema, no hernia, no seroma, no swelling, no dehiscence, incision well approximated         CBC:   Lab Results   Component Value Date/Time    WBC 8.6 2024 04:12 AM    RBC 2.99 2024 04:12 AM    HGB 9.9 2024 04:12 AM    HCT 30.5 2024 04:12 AM    .0 2024 04:12 AM    MCH 33.1 2024 04:12 AM    MCHC 32.5 2024 04:12 AM    RDW 15.9 2024 04:12 AM     2024 04:12 AM    MPV 8.8 2024 04:12 AM     BMP:    Lab Results   Component Value Date/Time     2024 04:12 AM    K 3.4 2024 04:12 AM     2024 04:12 AM    CO2 23 2024 04:12 AM    BUN 13 2024 04:12 AM    CREATININE 0.56 2024 04:12 AM    CALCIUM 7.7 2024 04:12 AM    LABGLOM >90 2024 04:12 AM    LABGLOM >60 2024 01:40 AM    GLUCOSE 100 2024 04:12 AM       Assessment:     Principal Problem:    Pneumoperitoneum  Active Problems:    Bowel perforation (HCC)    Goals of care, counseling/discussion    DNR (do not resuscitate)

## 2024-08-14 NOTE — PROGRESS NOTES
Spiritual Health Assessment/Progress Note  [facility]    Initial Encounter, Palliative Care,  , Adjustment to illness,      Name: Anuel Bermudez MRN: [id number]    Age: 78 y.o.     Sex: male   Language: English   Judaism: Yarsanism   Pneumoperitoneum     Date: 8/14/2024            Total Time Calculated: 15 min              Spiritual Assessment began in [facility] 2 PROGRESSIVE CARE        Referral/Consult From: Rounding   Encounter Overview/Reason: Initial Encounter, Palliative Care  Service Provided For: Patient    Mariana, Belief, Meaning:   Patient identifies as spiritual, is connected with a mariana tradition or spiritual practice, and has beliefs or practices that help with coping during difficult times  Family/Friends No family/friends present      Importance and Influence:  Patient has spiritual/personal beliefs that influence decisions regarding their health  Family/Friends no family/friends present    Community:  Patient is connected with a spiritual community and feels well-supported. Support system includes: Children  Family/Friends Other: No family present    Assessment and Plan of Care:     Patient Interventions include: Facilitated expression of thoughts and feelings, Explored spiritual coping/struggle/distress and theological reflection, and Affirmed coping skills/support systems  Family/Friends Interventions include: Other: No family present.    Patient Plan of Care: Spiritual Care available upon further referral  Family/Friends Plan of Care: Other: Daughter not present.    Electronically signed by Chaplain Solomon on 8/14/2024 at 3:54 PM

## 2024-08-14 NOTE — PLAN OF CARE
Problem: Occupational Therapy - Adult  Goal: By Discharge: Performs self-care activities at highest level of function for planned discharge setting.  See evaluation for individualized goals.  Description: FUNCTIONAL STATUS PRIOR TO ADMISSION:  performed ADLS on his own, drives to appointments  Receives Help From: Family, Friend(s), ADL Assistance: Independent,  ,  ,  ,  ,  , Homemaking Assistance: Needs assistance (Family friend helps with laundry, deep cleaning, shopping/grocery management), Ambulation Assistance: Independent (With Rw vs cane depending on energy level), Transfer Assistance: Independent, Active : Yes     HOME SUPPORT: Patient lived alone and has a caregiver 2-3x wk for a few hours. Daughter is supportive and works from home.    Occupational Therapy Goals:  Initiated 8/13/2024  1.  Patient will perform grooming with Modified [place] within 7 day(s).  2.  Patient will perform upper body dressing and lower body dressing with Modified [place] within 7 day(s).  3.  Patient will perform toileting with Modified [place] within 7 day(s).  4.  Patient will perform toilet transfers with Modified [place]  within 7 day(s).  5.  Patient will perform bathing with Modified [place] within 7 day(s).      8/13/2024 1458 by Rebeka Vang, OTR/L  Outcome: Progressing  8/13/2024 1414 by Rebeka Vang, OTR/L  Outcome: Not Progressing

## 2024-08-14 NOTE — PLAN OF CARE
Problem: Physical Therapy - Adult  Goal: By Discharge: Performs mobility at highest level of function for planned discharge setting.  See evaluation for individualized goals.  Description: FUNCTIONAL STATUS PRIOR TO ADMISSION: Patient was modified independent using a rolling walker and single point cane for functional mobility depending on energy level. Patient reports indep with ADLs. 2L NC available as needed.    HOME SUPPORT PRIOR TO ADMISSION: The patient lived alone with daughter and family friend to provide assistance. Daughter has arranged for family friend to check on patient a couple times/week, assists with grocery shopping, laundry, etc depending on need at time.     Physical Therapy Goals  Initiated 8/13/2024  1.  Patient will move from supine to sit and sit to supine, scoot up and down, and roll side to side in bed with modified independence within 7 day(s).    2.  Patient will perform sit to stand with modified independence within 7 day(s).  3.  Patient will transfer from bed to chair and chair to bed with modified independence using the least restrictive device within 7 day(s).  4.  Patient will ambulate with modified independence for 50 feet with the least restrictive device within 7 day(s).   5.  Patient will ascend/descend 4 stairs with handrail(s) with contact guard assist within 7 day(s).   Outcome: Progressing   PHYSICAL THERAPY TREATMENT    Patient: Anuel Bermudez (78 y.o. male)  Date: 8/14/2024  Diagnosis: Pneumoperitoneum [K66.8]  Elevated lipase [R74.8]  Bowel perforation (HCC) [K63.1] Pneumoperitoneum  Procedure(s) (LRB):  LAPAROSCOPIC REPAIR OF PERFORATED ANTRAL ULCER WITH GRAM PATCH (N/A) 3 Days Post-Op  Precautions: Fall Risk                      ASSESSMENT:  Patient continues to benefit from skilled PT services and is slowly progressing towards goals. Patient received in bed and agreeable to participate, able to come to sit EOB with SBA and slow pace. Performed LE exercises with

## 2024-08-14 NOTE — PLAN OF CARE
Ratin/10   Pain Intervention(s):         Activity Tolerance:   requires rest breaks  Please refer to the flowsheet for vital signs taken during this treatment.    After treatment:   Patient left in no apparent distress sitting up in chair and Call bell within reach    COMMUNICATION/EDUCATION:   The patient's plan of care was discussed with: registered nurse    Patient Education  Education Given To: Patient  Education Provided: Transfer Training;ADL Adaptive Strategies;Home Exercise Program  Education Provided Comments: instruatce din simple UE/LE AROm exercises to complete while seated in chair.  Education Method: Demonstration;Verbal;Teach Back  Barriers to Learning: None  Education Outcome: Demonstrated understanding    Thank you for this referral.  Ashley Herr OT  Minutes: 22

## 2024-08-14 NOTE — PROGRESS NOTES
Hospitalist Progress Note    NAME:   Anuel Bermudez   : 1945   MRN: [id number]     Date/Time: 2024 6:24 PM  Patient PCP: Bonifacio Bassett MD    Estimated discharge date: ?  HH/?SNF TBD  Barriers: Surgical clearance, NGT, TPN/NPO      Assessment / Plan:    Pneumoperitoneum d/t perforated peptic/antral ulcer s/p laparoscopic Adarsh patch repair by Gen Surgery  CT demonstrated pneumoperitoneum and thickening in the gastric antrum    Follow surgery recommendations- keep NPO with NGT  Continue antibiotics and antifungal empirically, continue to follow WBC-trending down to 11.3 today  Cont BID PPI  Continue TPN  IF NG tube dislodged, Surgery team recommending not to replace it, will rollow recommendations  IP GI consulted for PUD follow up (duration of treatment, need for H.pylori, follow up of pathology etc) once out of ICU-  recommended to avoid NSAID use in the future, check H. pylori stool antigen and treat if positive per protocol.No acute endoscopic intervention indicated from a GI perspective. Use PPI BID x 8 weeks and then qd afterwards.  - planning for UGI on Friday     Protein calorie malnutrition  Hypomagnesemia-resolved     Chronic hypoxic respiratory failure  On as needed home 1-2 L nasal cannula  Continue bronchodilators     Hx of NSCLC, squamous cell carcinoma (f/b VCI)  S/p laser to L endobronchial lung lesion, s/p chemo  Started on maintenance Keytruda 24         Medical Decision Making:   I personally reviewed labs: BMP,  CBC  I personally reviewed imaging:  Toxic drug monitoring: bmp for hayde from Vancomycin toxicity  Discussed case with: Case Management in IDRs      Code Status: Partial Code- intubation ok otherwise DNR per pt with palliative today  DVT Prophylaxis: SCDs  GI Prophylaxis:PPI    Subjective:     Chief Complaint / Reason for Physician Visit:  Patient seen and evaluated at bedside, overnight events, reviewed. Feels ok today.      Objective:     VITALS:  191 180 168     Recent Labs     08/12/24  0306 08/13/24  0344 08/14/24  0412   * 138 136   K 4.0 3.5 3.4*    109* 107   CO2 20* 25 23   GLUCOSE 166* 114* 100   BUN 14 15 13   CREATININE 0.75 0.69* 0.56*   CALCIUM 7.7* 8.0* 7.7*   MG 1.5* 2.2 1.8   PHOS 2.7 1.3* 2.1*   BILITOT  --  0.4 0.7   AST  --  22 17   ALT  --  23 21       Signed: David L Mendel, MD

## 2024-08-15 LAB
ANION GAP SERPL CALC-SCNC: 7 MMOL/L (ref 5–15)
BUN SERPL-MCNC: 14 MG/DL (ref 6–20)
BUN/CREAT SERPL: 28 (ref 12–20)
CALCIUM SERPL-MCNC: 7.8 MG/DL (ref 8.5–10.1)
CHLORIDE SERPL-SCNC: 102 MMOL/L (ref 97–108)
CO2 SERPL-SCNC: 25 MMOL/L (ref 21–32)
CREAT SERPL-MCNC: 0.5 MG/DL (ref 0.7–1.3)
ERYTHROCYTE [DISTWIDTH] IN BLOOD BY AUTOMATED COUNT: 15.1 % (ref 11.5–14.5)
GLUCOSE BLD STRIP.AUTO-MCNC: 106 MG/DL (ref 65–117)
GLUCOSE BLD STRIP.AUTO-MCNC: 107 MG/DL (ref 65–117)
GLUCOSE BLD STRIP.AUTO-MCNC: 124 MG/DL (ref 65–117)
GLUCOSE SERPL-MCNC: 103 MG/DL (ref 65–100)
HCT VFR BLD AUTO: 31.7 % (ref 36.6–50.3)
HGB BLD-MCNC: 10.5 G/DL (ref 12.1–17)
MAGNESIUM SERPL-MCNC: 1.7 MG/DL (ref 1.6–2.4)
MCH RBC QN AUTO: 32.7 PG (ref 26–34)
MCHC RBC AUTO-ENTMCNC: 33.1 G/DL (ref 30–36.5)
MCV RBC AUTO: 98.8 FL (ref 80–99)
NRBC # BLD: 0 K/UL (ref 0–0.01)
NRBC BLD-RTO: 0 PER 100 WBC
PHOSPHATE SERPL-MCNC: 2.1 MG/DL (ref 2.6–4.7)
PLATELET # BLD AUTO: 154 K/UL (ref 150–400)
PMV BLD AUTO: 8.4 FL (ref 8.9–12.9)
POTASSIUM SERPL-SCNC: 3.6 MMOL/L (ref 3.5–5.1)
RBC # BLD AUTO: 3.21 M/UL (ref 4.1–5.7)
SERVICE CMNT-IMP: ABNORMAL
SERVICE CMNT-IMP: NORMAL
SERVICE CMNT-IMP: NORMAL
SODIUM SERPL-SCNC: 134 MMOL/L (ref 136–145)
WBC # BLD AUTO: 5.9 K/UL (ref 4.1–11.1)

## 2024-08-15 PROCEDURE — 80202 ASSAY OF VANCOMYCIN: CPT

## 2024-08-15 PROCEDURE — 6360000002 HC RX W HCPCS: Performed by: STUDENT IN AN ORGANIZED HEALTH CARE EDUCATION/TRAINING PROGRAM

## 2024-08-15 PROCEDURE — 82962 GLUCOSE BLOOD TEST: CPT

## 2024-08-15 PROCEDURE — 6360000002 HC RX W HCPCS: Performed by: INTERNAL MEDICINE

## 2024-08-15 PROCEDURE — 83735 ASSAY OF MAGNESIUM: CPT

## 2024-08-15 PROCEDURE — 85027 COMPLETE CBC AUTOMATED: CPT

## 2024-08-15 PROCEDURE — 94640 AIRWAY INHALATION TREATMENT: CPT

## 2024-08-15 PROCEDURE — 2500000003 HC RX 250 WO HCPCS: Performed by: STUDENT IN AN ORGANIZED HEALTH CARE EDUCATION/TRAINING PROGRAM

## 2024-08-15 PROCEDURE — 99024 POSTOP FOLLOW-UP VISIT: CPT | Performed by: NURSE PRACTITIONER

## 2024-08-15 PROCEDURE — 2580000003 HC RX 258: Performed by: NURSE PRACTITIONER

## 2024-08-15 PROCEDURE — 6370000000 HC RX 637 (ALT 250 FOR IP): Performed by: NURSE PRACTITIONER

## 2024-08-15 PROCEDURE — 6360000002 HC RX W HCPCS: Performed by: NURSE PRACTITIONER

## 2024-08-15 PROCEDURE — 2580000003 HC RX 258: Performed by: INTERNAL MEDICINE

## 2024-08-15 PROCEDURE — 36415 COLL VENOUS BLD VENIPUNCTURE: CPT

## 2024-08-15 PROCEDURE — 80048 BASIC METABOLIC PNL TOTAL CA: CPT

## 2024-08-15 PROCEDURE — 2500000003 HC RX 250 WO HCPCS: Performed by: SURGERY

## 2024-08-15 PROCEDURE — 1100000000 HC RM PRIVATE

## 2024-08-15 PROCEDURE — 2580000003 HC RX 258: Performed by: STUDENT IN AN ORGANIZED HEALTH CARE EDUCATION/TRAINING PROGRAM

## 2024-08-15 PROCEDURE — 84100 ASSAY OF PHOSPHORUS: CPT

## 2024-08-15 PROCEDURE — 97535 SELF CARE MNGMENT TRAINING: CPT

## 2024-08-15 PROCEDURE — 2580000003 HC RX 258: Performed by: SURGERY

## 2024-08-15 PROCEDURE — 97530 THERAPEUTIC ACTIVITIES: CPT

## 2024-08-15 RX ADMIN — FLUCONAZOLE 200 MG: 200 INJECTION, SOLUTION INTRAVENOUS at 22:56

## 2024-08-15 RX ADMIN — CEFEPIME 2000 MG: 2 INJECTION, POWDER, FOR SOLUTION INTRAVENOUS at 06:03

## 2024-08-15 RX ADMIN — PANTOPRAZOLE SODIUM 40 MG: 40 INJECTION, POWDER, FOR SOLUTION INTRAVENOUS at 20:59

## 2024-08-15 RX ADMIN — SODIUM CHLORIDE, PRESERVATIVE FREE 10 ML: 5 INJECTION INTRAVENOUS at 13:37

## 2024-08-15 RX ADMIN — ENOXAPARIN SODIUM 30 MG: 100 INJECTION SUBCUTANEOUS at 09:51

## 2024-08-15 RX ADMIN — POTASSIUM PHOSPHATE, MONOBASIC AND POTASSIUM PHOSPHATE, DIBASIC 30 MMOL: 224; 236 INJECTION, SOLUTION INTRAVENOUS at 07:02

## 2024-08-15 RX ADMIN — ARFORMOTEROL TARTRATE: 15 SOLUTION RESPIRATORY (INHALATION) at 08:59

## 2024-08-15 RX ADMIN — METRONIDAZOLE 500 MG: 500 INJECTION, SOLUTION INTRAVENOUS at 12:33

## 2024-08-15 RX ADMIN — POTASSIUM PHOSPHATE, MONOBASIC POTASSIUM PHOSPHATE, DIBASIC: 224; 236 INJECTION, SOLUTION, CONCENTRATE INTRAVENOUS at 18:58

## 2024-08-15 RX ADMIN — VANCOMYCIN HYDROCHLORIDE 1000 MG: 1 INJECTION, POWDER, LYOPHILIZED, FOR SOLUTION INTRAVENOUS at 13:36

## 2024-08-15 RX ADMIN — Medication: at 20:58

## 2024-08-15 RX ADMIN — SODIUM CHLORIDE, PRESERVATIVE FREE 10 ML: 5 INJECTION INTRAVENOUS at 21:00

## 2024-08-15 RX ADMIN — CEFEPIME 2000 MG: 2 INJECTION, POWDER, FOR SOLUTION INTRAVENOUS at 19:01

## 2024-08-15 RX ADMIN — PANTOPRAZOLE SODIUM 40 MG: 40 INJECTION, POWDER, FOR SOLUTION INTRAVENOUS at 09:52

## 2024-08-15 RX ADMIN — METRONIDAZOLE 500 MG: 500 INJECTION, SOLUTION INTRAVENOUS at 23:59

## 2024-08-15 RX ADMIN — Medication: at 09:30

## 2024-08-15 RX ADMIN — METRONIDAZOLE 500 MG: 500 INJECTION, SOLUTION INTRAVENOUS at 16:51

## 2024-08-15 ASSESSMENT — PAIN SCALES - GENERAL: PAINLEVEL_OUTOF10: 0

## 2024-08-15 NOTE — PROGRESS NOTES
Comprehensive Nutrition Assessment    Type and Reason for Visit:  Reassess    Nutrition Recommendations/Plan:   Continue current TPN today - 60g AA, 100g Dextrose (580 kcals)  Once lytes WNL, advance to 75g AA, 200g Dextrose (980 kcals)  If diet not advancing and lytes remain WNL, advance to 75g AA, 300g Dextrose (1320 kcals)     Malnutrition Assessment:  Malnutrition Status:  Severe malnutrition (08/12/24 1444)    Context:  Chronic Illness     Findings of the 6 clinical characteristics of malnutrition:  Energy Intake:  75% or less estimated energy requirements for 1 month or longer  Weight Loss:  Greater than 10% over 6 months     Body Fat Loss:  Severe body fat loss Fat Overlying Ribs, Triceps   Muscle Mass Loss:  Severe muscle mass loss Clavicles (pectoralis & deltoids), Thigh (quadriceps)  Fluid Accumulation:  No significant fluid accumulation     Strength:  Not Performed    Nutrition Assessment:    Chart reviewed; patient receiving TPN. Potassium and Mg normal today but phos remains low. Would continue TPN as ordered for today which is providing ~12.7 kcals/kg and 1.3 g protein/kg. Advancement recommendations provided above. Plans for UGI tomorrow. NGT is out. Diet advancement per surgery. Will continue to follow and provide further recommendations pending diet advancement.     Nutrition Related Findings:    Na 134, K+ 3.6, Phos 2.1, Mg 1.7, -[phone]   Humalog, protonix, Kphos   Wound Type: Surgical Incision       Current Nutrition Intake & Therapies:    Average Meal Intake: NPO     Diet NPO  PN-Adult 2-in-1 Peripheral Line (Custom)    Anthropometric Measures:  Height: 160 cm (5' 3\")  Ideal Body Weight (IBW): 124 lbs (56 kg)       Current Body Weight: 45.4 kg (100 lb 1.4 oz), 72.4 % IBW. Weight Source: Bed Scale  Current BMI (kg/m2): 17.7  Usual Body Weight: 47.6 kg (105 lb)  % Weight Change (Calculated): -14.5                    BMI Categories: Underweight (BMI less than 18.5)    Estimated Daily

## 2024-08-15 NOTE — CARE COORDINATION
Transition of Care Plan:    RUR: 16% \"medium risk\"  Prior Level of Functioning: Independent with ADL's   Disposition: IPR vs. SNF   If SNF or IPR: Date FOC offered: 8/13  Date FOC received: 8/15  Accepting facility: N/A  Date authorization started with reference number: N/A  Date authorization received and expires: N/A  Follow up appointments: Defer to facility  DME needed: Defer to facility   Transportation at discharge: Pt daughter to transport    IM/IMM Medicare/ letter given: 2nd IM needed prior to d/c   Is patient a [place] and connected with [facility]? No  Caregiver Contact: Deja Bowden (child), [phone]  Discharge Caregiver contacted prior to discharge? To be contacted   Care Conference needed? Not at this time   Barriers to discharge: Surgery clearance, TPN, UGI     0921 AM: Chart reviewed. CM following for d/c planning. CM met with pt to follow up on IPR/SNF choices. Pt noted that pt daughter was reviewing IPR and SNF list. CM contacted pt daughter, Deja, at [phone] to discuss IPR and SNF choices. Deja prefers for referrals to be sent to [facility] for IPR and [facility] for SNF back up. CM to send referrals.     MEHNAZ Phelan  Care Management  [facility]   [phone]

## 2024-08-15 NOTE — PROGRESS NOTES
Pharmacy TPN Management Note    TPN indication: perforated antral ulcer, NPO, malnutrition    TPN type: Central     Macronutrients:  Protein: 60 g AA  Dextrose: 100 g  Lipids: held; need to clarify egg/fish allergy to see if he can tolerate    Rate: 42 mL/hr    Labs:  Recent Labs     Units 08/15/24  0406 24  0344   NA mmol/L 134* 136 138   K mmol/L 3.6 3.4* 3.5   CL mmol/L 102 107 109*   CO2 mmol/L 25 23 25   MG mg/dL 1.7 1.8 2.2   PHOS MG/DL 2.1* 2.1* 1.3*   BUN MG/DL 14  15     Recent Labs     Units 24  0412 24  0344   AST U/L 17 22   ALT U/L 21 23     Recent Labs     Units 08/15/24  0406 24  0344   WBC K/uL 5.9   < > 11.3*   HGB g/dL 10.5*   < > 10.3*   HCT % 31.7*   < > 31.3*   TRIG MG/DL  --   --  56    < > = values in this interval not displayed.       Electrolytes (dosed per LITER):  Na: 35 meq/L; K: 52 meq/L; Phos: 30 mmol/L; M meq/L; Ca 4.5 meq/L    Other additives in TPN: multivitamins  and trace elements    Impression/Plan:   TPN macronutrient/rate changes: Continue at 60 g today, plan to advance tomorrow.  TPN electrolyte changes: Repleted 30 mmol of Kphos outside of TPN today.  TPN insulin changes: n/a     Pharmacy will continue to monitor enteral nutrition plan, electrolytes, renal function, and dietician recommendations and adjust parenteral nutrition as needed.    Thank you,  Phillip Mcbride [facility]

## 2024-08-15 NOTE — PROGRESS NOTES
Physical Therapy     Chart reviewed up to date. Attempted to see pt for PT session. Pt adamantly but politely refusing therapy services at this time. Pt stating he is too tired and awaiting transfer to surgical floor. Attempted to redirect on importance of mobility to maintain function but pt continued to decline. Will defer but continue to follow.    Annabel Field PT, DPT, NCS

## 2024-08-15 NOTE — PROGRESS NOTES
SURGERY PROGRESS NOTE      Admit Date: 2024    POD 4 Days Post-Op    Procedure: Procedure(s):  LAPAROSCOPIC REPAIR OF PERFORATED ANTRAL ULCER WITH GRAM PATCH      Subjective:     Patient without complaint. Denies pain and nausea. Has been getting out of bed with assist. Having bowel function.     Objective:     /79   Pulse 58   Temp 98.5 °F (36.9 °C) (Oral)   Resp 18   Ht 1.6 m (5' 3\")   Wt 45.4 kg (100 lb 1.6 oz)   SpO2 97%   BMI 17.73 kg/m²      Temp (24hrs), Av.4 °F (36.9 °C), Min:98.2 °F (36.8 °C), Max:98.6 °F (37 °C)      No intake/output data recorded.   1901 - 08/15 0700  In: 1690.5 [I.V.:85.5]  Out: 2570 [Urine:2250; Drains:170]    Physical Exam:    General:  alert, appears stated age, cooperative, and no distress   Abdomen: soft, bowel sounds active, non-tender    MARILU - serosanguinous    Incision:   healing well, no drainage, no erythema, no hernia, no seroma, no swelling, no dehiscence, incision well approximated         CBC:   Lab Results   Component Value Date/Time    WBC 5.9 08/15/2024 04:06 AM    RBC 3.21 08/15/2024 04:06 AM    HGB 10.5 08/15/2024 04:06 AM    HCT 31.7 08/15/2024 04:06 AM    MCV 98.8 08/15/2024 04:06 AM    MCH 32.7 08/15/2024 04:06 AM    MCHC 33.1 08/15/2024 04:06 AM    RDW 15.1 08/15/2024 04:06 AM     08/15/2024 04:06 AM    MPV 8.4 08/15/2024 04:06 AM     BMP:    Lab Results   Component Value Date/Time     08/15/2024 04:06 AM    K 3.6 08/15/2024 04:06 AM     08/15/2024 04:06 AM    CO2 25 08/15/2024 04:06 AM    BUN 14 08/15/2024 04:06 AM    CREATININE 0.50 08/15/2024 04:06 AM    CALCIUM 7.8 08/15/2024 04:06 AM    LABGLOM >90 08/15/2024 04:06 AM    LABGLOM >60 2024 01:40 AM    GLUCOSE 103 08/15/2024 04:06 AM       Assessment:     Principal Problem:    Pneumoperitoneum  Active Problems:    Bowel perforation (HCC)    Goals of care, counseling/discussion    DNR (do not resuscitate) discussion    Frailty    Severe protein-calorie

## 2024-08-15 NOTE — PLAN OF CARE
Problem: Occupational Therapy - Adult  Goal: By Discharge: Performs self-care activities at highest level of function for planned discharge setting.  See evaluation for individualized goals.  Description: FUNCTIONAL STATUS PRIOR TO ADMISSION:  performed ADLS on his own, drives to appointments  Receives Help From: Family, Friend(s), ADL Assistance: Independent,  ,  ,  ,  ,  , Homemaking Assistance: Needs assistance (Family friend helps with laundry, deep cleaning, shopping/grocery management), Ambulation Assistance: Independent (With Rw vs cane depending on energy level), Transfer Assistance: Independent, Active : Yes     HOME SUPPORT: Patient lived alone and has a caregiver 2-3x wk for a few hours. Daughter is supportive and works from home.    Occupational Therapy Goals:  Initiated 8/13/2024  1.  Patient will perform grooming with Modified [place] within 7 day(s).  2.  Patient will perform upper body dressing and lower body dressing with Modified [place] within 7 day(s).  3.  Patient will perform toileting with Modified [place] within 7 day(s).  4.  Patient will perform toilet transfers with Modified [place]  within 7 day(s).  5.  Patient will perform bathing with Modified [place] within 7 day(s).      Outcome: Progressing   OCCUPATIONAL THERAPY TREATMENT  Patient: Anuel Bermudez (78 y.o. male)  Date: 8/15/2024  Primary Diagnosis: Pneumoperitoneum [K66.8]  Elevated lipase [R74.8]  Bowel perforation (HCC) [K63.1]  Procedure(s) (LRB):  LAPAROSCOPIC REPAIR OF PERFORATED ANTRAL ULCER WITH GRAM PATCH (N/A) 4 Days Post-Op   Precautions: Fall Risk                Chart, occupational therapy assessment, plan of care, and goals were reviewed.    ASSESSMENT  Patient continues to benefit from skilled OT services and is slowly progressing towards goals. He is AxOx4 with ability to follow multistep commands without difficulty. Overall able to complete bed mobility at supervision and transfers  assistance           Balance:     Balance  Sitting: Intact  Standing: Impaired  Standing - Static: Constant support;Good  Standing - Dynamic: Constant support;Fair;Good      ADL Intervention:    Feeding: NPO         Grooming: Setup   Grooming Skilled Clinical Factors: seated in chair to complete light grooming         UE Dressing: Moderate assistance  UE Dressing Skilled Clinical Factors: needed more assist d/t multiple lines/leand in each arm, functional strength and ROM to complete with less assistance without lines present          Skin Care: Chlorhexidine wipes    Pt educated on safe transfer techniques, with specific emphasis on proper hand placement to push up from seated surface rather than attempt to pull self up, fully positioning self in-front of desired seated location, feeling chair on back of legs and reaching back with 1-2 UE to slowly lower self to seated position.              Pain Rating:  Pt without c/o acute pain   Pain Intervention(s):         Activity Tolerance:   Fair , requires rest breaks, observed shortness of breath on exertion, and SpO2 stable on room air  Please refer to the flowsheet for vital signs taken during this treatment.    After treatment:   Patient left in no apparent distress sitting up in chair, Call bell within reach, and Bed/ chair alarm activated    COMMUNICATION/EDUCATION:   The patient's plan of care was discussed with: registered nurse    Patient Education  Education Given To: Patient  Education Provided: Transfer Training;ADL Adaptive Strategies;Energy Conservation;Mobility Training;Fall Prevention Strategies;Plan of Care  Education Method: Demonstration;Verbal  Barriers to Learning: None  Education Outcome: Verbalized understanding;Continued education needed    Thank you for this referral.  Inocencia Reyes OT  Minutes: 24

## 2024-08-15 NOTE — PROGRESS NOTES
1900: Bedside shift change report given to Ayana DELA CRUZ (oncoming nurse) by Capri DELA CRUZ (offgoing nurse). Report included the following information Nurse Handoff Report, Intake/Output, MAR, Recent Results, and Cardiac Rhythm NSR .     End of Shift Note    Bedside shift change report given to Carie DELA CRUZ (oncoming nurse) by Ayana Ramachandran RN (offgoing nurse).  Report included the following information SBAR, Kardex, ED Summary, Intake/Output, MAR, Recent Results, and Cardiac Rhythm NSR    Shift worked:  2743-3103     Shift summary and any significant changes:     Patient oriented/disoriented overnight, but re-directable. No complaints of pain.      Concerns for physician to address:  Advance diet?     Zone phone for oncoming shift:          Activity:     Number times ambulated in hallways past shift: 0  Number of times OOB to chair past shift: 0    Cardiac:   Cardiac Monitoring: Yes           Access:  Current line(s): PIV and PICC     Genitourinary:   Urinary status: external catheter    Respiratory:      Chronic home O2 use?: NO  Incentive spirometer at bedside: YES       GI:     Current diet:  Diet NPO  PN-Adult 2-in-1 Peripheral Line (Custom)  Passing flatus: YES  Tolerating current diet: NO       Pain Management:   Patient states pain is manageable on current regimen: YES    Skin:     Interventions: specialty bed, float heels, increase time out of bed, and PT/OT consult    Patient Safety:  Fall Score:    Interventions: bed/chair alarm, assistive device (walker, cane. etc), gripper socks, pt to call before getting OOB, and stay with me (per policy)       Length of Stay:  Expected LOS: 7  Actual LOS: 4      Ayana Ramachandran RN

## 2024-08-15 NOTE — PROGRESS NOTES
Pharmacy Antimicrobial Kinetic Dosing    Indication for Antimicrobials: intra-abd infection 2/2 perforated antral ulcer    Current Regimen of Each Antimicrobial:  Vancomycin - pharmacy to dose; started ; Day #2   Cefepime 2000 mg IV q12h; started , Day #3  Metronidzole 500 mg IV q8h, started , Day #3  Fluconazole 200 mg IV q24h; started ; Day # 3    Previous Antimicrobial Therapy:  N/A    Goal Level: Vancomycin -600    Date Dose & Interval Measured (mcg/mL) Predicted AUC   24 500 mg IV q12h 6.5 408   24 750 mg IV q12h 7.8 395           Significant Cultures:   N/A    Labs:  Recent Labs     Units 24  0412 24  0344 24  0306   CREATININE MG/DL 0.56* 0.69* 0.75   BUN MG/DL 13 15 14   WBC K/uL 8.6 11.3* 11.8*     Temp (24hrs), Av.4 °F (36.9 °C), Min:98.3 °F (36.8 °C), Max:98.6 °F (37 °C)    Conditions for Dosing Consideration: Malnutrition    Creatinine Clearance (mL/min): Estimated Creatinine Clearance: 69 mL/min (A) (based on SCr of 0.56 mg/dL (L)).     Impression/Plan:   Will update vancomycin regimen to 1000 mg  IV q12h for a projected SS-  Continue Cefepime, metronidazole, fluconazole  Antimicrobial stop date pending     Pharmacy will follow daily and adjust medications as appropriate for renal function and/or serum levels.    Thank you,  Belinda Guerra [facility]

## 2024-08-16 ENCOUNTER — APPOINTMENT (OUTPATIENT)
Facility: HOSPITAL | Age: 79
DRG: 326 | End: 2024-08-16
Payer: MEDICARE

## 2024-08-16 ENCOUNTER — HOSPITAL ENCOUNTER (INPATIENT)
Facility: HOSPITAL | Age: 79
Discharge: HOME | DRG: 326 | End: 2024-08-19
Payer: MEDICARE

## 2024-08-16 PROBLEM — Z71.89 ADVANCE CARE PLANNING: Status: ACTIVE | Noted: 2024-08-16

## 2024-08-16 LAB
ALBUMIN SERPL-MCNC: 2.5 G/DL (ref 3.5–5)
ALBUMIN/GLOB SERPL: 0.9 (ref 1.1–2.2)
ALP SERPL-CCNC: 59 U/L (ref 45–117)
ALT SERPL-CCNC: 20 U/L (ref 12–78)
ANION GAP SERPL CALC-SCNC: 5 MMOL/L (ref 5–15)
AST SERPL-CCNC: 20 U/L (ref 15–37)
BILIRUB DIRECT SERPL-MCNC: 0.2 MG/DL (ref 0–0.2)
BILIRUB SERPL-MCNC: 0.5 MG/DL (ref 0.2–1)
BUN SERPL-MCNC: 15 MG/DL (ref 6–20)
BUN/CREAT SERPL: 29 (ref 12–20)
CALCIUM SERPL-MCNC: 8 MG/DL (ref 8.5–10.1)
CHLORIDE SERPL-SCNC: 102 MMOL/L (ref 97–108)
CO2 SERPL-SCNC: 25 MMOL/L (ref 21–32)
CREAT SERPL-MCNC: 0.52 MG/DL (ref 0.7–1.3)
ERYTHROCYTE [DISTWIDTH] IN BLOOD BY AUTOMATED COUNT: 14.8 % (ref 11.5–14.5)
GLOBULIN SER CALC-MCNC: 2.9 G/DL (ref 2–4)
GLUCOSE BLD STRIP.AUTO-MCNC: 100 MG/DL (ref 65–117)
GLUCOSE BLD STRIP.AUTO-MCNC: 101 MG/DL (ref 65–117)
GLUCOSE BLD STRIP.AUTO-MCNC: 107 MG/DL (ref 65–117)
GLUCOSE BLD STRIP.AUTO-MCNC: 109 MG/DL (ref 65–117)
GLUCOSE BLD STRIP.AUTO-MCNC: 116 MG/DL (ref 65–117)
GLUCOSE SERPL-MCNC: 98 MG/DL (ref 65–100)
HCT VFR BLD AUTO: 32.2 % (ref 36.6–50.3)
HGB BLD-MCNC: 10.9 G/DL (ref 12.1–17)
MAGNESIUM SERPL-MCNC: 1.8 MG/DL (ref 1.6–2.4)
MCH RBC QN AUTO: 32.5 PG (ref 26–34)
MCHC RBC AUTO-ENTMCNC: 33.9 G/DL (ref 30–36.5)
MCV RBC AUTO: 96.1 FL (ref 80–99)
NRBC # BLD: 0 K/UL (ref 0–0.01)
NRBC BLD-RTO: 0 PER 100 WBC
PHOSPHATE SERPL-MCNC: 2 MG/DL (ref 2.6–4.7)
PLATELET # BLD AUTO: 168 K/UL (ref 150–400)
PMV BLD AUTO: 9.2 FL (ref 8.9–12.9)
POTASSIUM SERPL-SCNC: 3.8 MMOL/L (ref 3.5–5.1)
PROT SERPL-MCNC: 5.4 G/DL (ref 6.4–8.2)
RBC # BLD AUTO: 3.35 M/UL (ref 4.1–5.7)
SERVICE CMNT-IMP: NORMAL
SODIUM SERPL-SCNC: 132 MMOL/L (ref 136–145)
VANCOMYCIN SERPL-MCNC: 12 UG/ML
WBC # BLD AUTO: 5.4 K/UL (ref 4.1–11.1)

## 2024-08-16 PROCEDURE — 6360000002 HC RX W HCPCS: Performed by: INTERNAL MEDICINE

## 2024-08-16 PROCEDURE — 85027 COMPLETE CBC AUTOMATED: CPT

## 2024-08-16 PROCEDURE — 74240 X-RAY XM UPR GI TRC 1CNTRST: CPT

## 2024-08-16 PROCEDURE — 83735 ASSAY OF MAGNESIUM: CPT

## 2024-08-16 PROCEDURE — 74150 CT ABDOMEN W/O CONTRAST: CPT

## 2024-08-16 PROCEDURE — 6370000000 HC RX 637 (ALT 250 FOR IP): Performed by: NURSE PRACTITIONER

## 2024-08-16 PROCEDURE — 99497 ADVNCD CARE PLAN 30 MIN: CPT | Performed by: NURSE PRACTITIONER

## 2024-08-16 PROCEDURE — 84100 ASSAY OF PHOSPHORUS: CPT

## 2024-08-16 PROCEDURE — 2580000003 HC RX 258: Performed by: NURSE PRACTITIONER

## 2024-08-16 PROCEDURE — 6360000004 HC RX CONTRAST MEDICATION: Performed by: RADIOLOGY

## 2024-08-16 PROCEDURE — 36415 COLL VENOUS BLD VENIPUNCTURE: CPT

## 2024-08-16 PROCEDURE — 94761 N-INVAS EAR/PLS OXIMETRY MLT: CPT

## 2024-08-16 PROCEDURE — 2580000003 HC RX 258: Performed by: STUDENT IN AN ORGANIZED HEALTH CARE EDUCATION/TRAINING PROGRAM

## 2024-08-16 PROCEDURE — 6360000002 HC RX W HCPCS: Performed by: STUDENT IN AN ORGANIZED HEALTH CARE EDUCATION/TRAINING PROGRAM

## 2024-08-16 PROCEDURE — 82962 GLUCOSE BLOOD TEST: CPT

## 2024-08-16 PROCEDURE — 97116 GAIT TRAINING THERAPY: CPT | Performed by: PHYSICAL THERAPIST

## 2024-08-16 PROCEDURE — 2500000003 HC RX 250 WO HCPCS: Performed by: STUDENT IN AN ORGANIZED HEALTH CARE EDUCATION/TRAINING PROGRAM

## 2024-08-16 PROCEDURE — 80048 BASIC METABOLIC PNL TOTAL CA: CPT

## 2024-08-16 PROCEDURE — 94640 AIRWAY INHALATION TREATMENT: CPT

## 2024-08-16 PROCEDURE — 2580000003 HC RX 258: Performed by: INTERNAL MEDICINE

## 2024-08-16 PROCEDURE — 6360000002 HC RX W HCPCS: Performed by: NURSE PRACTITIONER

## 2024-08-16 PROCEDURE — 1100000000 HC RM PRIVATE

## 2024-08-16 PROCEDURE — 80076 HEPATIC FUNCTION PANEL: CPT

## 2024-08-16 RX ORDER — POLYETHYLENE GLYCOL 3350 17 G/17G
17 POWDER, FOR SOLUTION ORAL DAILY
Status: DISCONTINUED | OUTPATIENT
Start: 2024-08-16 | End: 2024-08-16

## 2024-08-16 RX ORDER — DONEPEZIL HYDROCHLORIDE 5 MG/1
5 TABLET, FILM COATED ORAL NIGHTLY
Status: DISCONTINUED | OUTPATIENT
Start: 2024-08-16 | End: 2024-08-27 | Stop reason: HOSPADM

## 2024-08-16 RX ORDER — FAMOTIDINE 20 MG/1
20 TABLET, FILM COATED ORAL 2 TIMES DAILY
Status: DISCONTINUED | OUTPATIENT
Start: 2024-08-16 | End: 2024-08-22

## 2024-08-16 RX ORDER — MONTELUKAST SODIUM 10 MG/1
10 TABLET ORAL NIGHTLY
Status: DISCONTINUED | OUTPATIENT
Start: 2024-08-16 | End: 2024-08-27 | Stop reason: HOSPADM

## 2024-08-16 RX ORDER — MIRTAZAPINE 15 MG/1
30 TABLET, FILM COATED ORAL NIGHTLY
Status: DISCONTINUED | OUTPATIENT
Start: 2024-08-16 | End: 2024-08-27 | Stop reason: HOSPADM

## 2024-08-16 RX ADMIN — VANCOMYCIN HYDROCHLORIDE 1000 MG: 1 INJECTION, POWDER, LYOPHILIZED, FOR SOLUTION INTRAVENOUS at 23:40

## 2024-08-16 RX ADMIN — SODIUM CHLORIDE: 9 INJECTION, SOLUTION INTRAVENOUS at 06:41

## 2024-08-16 RX ADMIN — METRONIDAZOLE 500 MG: 500 INJECTION, SOLUTION INTRAVENOUS at 12:29

## 2024-08-16 RX ADMIN — VANCOMYCIN HYDROCHLORIDE 1000 MG: 1 INJECTION, POWDER, LYOPHILIZED, FOR SOLUTION INTRAVENOUS at 02:07

## 2024-08-16 RX ADMIN — CALCIUM GLUCONATE: 98 INJECTION, SOLUTION INTRAVENOUS at 18:11

## 2024-08-16 RX ADMIN — DONEPEZIL HYDROCHLORIDE 5 MG: 5 TABLET, FILM COATED ORAL at 22:27

## 2024-08-16 RX ADMIN — SODIUM CHLORIDE: 9 INJECTION, SOLUTION INTRAVENOUS at 20:08

## 2024-08-16 RX ADMIN — FAMOTIDINE 20 MG: 20 TABLET, FILM COATED ORAL at 15:35

## 2024-08-16 RX ADMIN — PANTOPRAZOLE SODIUM 40 MG: 40 INJECTION, POWDER, FOR SOLUTION INTRAVENOUS at 22:27

## 2024-08-16 RX ADMIN — PANTOPRAZOLE SODIUM 40 MG: 40 INJECTION, POWDER, FOR SOLUTION INTRAVENOUS at 11:20

## 2024-08-16 RX ADMIN — VANCOMYCIN HYDROCHLORIDE 1000 MG: 1 INJECTION, POWDER, LYOPHILIZED, FOR SOLUTION INTRAVENOUS at 12:30

## 2024-08-16 RX ADMIN — Medication: at 11:20

## 2024-08-16 RX ADMIN — FLUCONAZOLE 200 MG: 200 INJECTION, SOLUTION INTRAVENOUS at 22:23

## 2024-08-16 RX ADMIN — SODIUM CHLORIDE, PRESERVATIVE FREE 10 ML: 5 INJECTION INTRAVENOUS at 20:17

## 2024-08-16 RX ADMIN — POLYETHYLENE GLYCOL 3350 17 G: 17 POWDER, FOR SOLUTION ORAL at 11:19

## 2024-08-16 RX ADMIN — MIRTAZAPINE 30 MG: 15 TABLET, FILM COATED ORAL at 22:27

## 2024-08-16 RX ADMIN — ENOXAPARIN SODIUM 30 MG: 100 INJECTION SUBCUTANEOUS at 11:20

## 2024-08-16 RX ADMIN — METRONIDAZOLE 500 MG: 500 INJECTION, SOLUTION INTRAVENOUS at 20:14

## 2024-08-16 RX ADMIN — FAMOTIDINE 20 MG: 20 TABLET, FILM COATED ORAL at 22:28

## 2024-08-16 RX ADMIN — MONTELUKAST 10 MG: 10 TABLET, FILM COATED ORAL at 22:28

## 2024-08-16 RX ADMIN — Medication: at 22:27

## 2024-08-16 RX ADMIN — CEFEPIME 2000 MG: 2 INJECTION, POWDER, FOR SOLUTION INTRAVENOUS at 20:09

## 2024-08-16 RX ADMIN — ARFORMOTEROL TARTRATE: 15 SOLUTION RESPIRATORY (INHALATION) at 22:09

## 2024-08-16 RX ADMIN — CEFEPIME 2000 MG: 2 INJECTION, POWDER, FOR SOLUTION INTRAVENOUS at 06:43

## 2024-08-16 RX ADMIN — IOHEXOL 100 ML: 240 INJECTION, SOLUTION INTRATHECAL; INTRAVASCULAR; INTRAVENOUS; ORAL at 10:08

## 2024-08-16 RX ADMIN — SODIUM PHOSPHATE, MONOBASIC, MONOHYDRATE AND SODIUM PHOSPHATE, DIBASIC, ANHYDROUS 30 MMOL: 276; 142 INJECTION, SOLUTION INTRAVENOUS at 12:33

## 2024-08-16 NOTE — PROGRESS NOTES
Hospitalist Progress Note    NAME:   Anuel Bermudez   : 1945   MRN: [id number]     Date/Time: 2024 5:24 PM  Patient PCP: Bonifacio Bassett MD    Estimated discharge date: ?  HH/?SNF TBD  Barriers: Surgical clearance, TPN, diet advancement      Assessment / Plan:    Pneumoperitoneum d/t perforated peptic/antral ulcer s/p laparoscopic Adarsh patch repair by Gen Surgery  CT demonstrated pneumoperitoneum and thickening in the gastric antrum    Follow surgery recommendations-   Continue antibiotics and antifungal empirically, continue to follow WBC-trending down to 11.3   Cont BID PPI  Continue TPN  IF NG tube dislodged, Surgery team recommending not to replace it, will rollow recommendations  IP GI consulted for PUD follow up (duration of treatment, need for H.pylori, follow up of pathology etc) once out of ICU-  recommended to avoid NSAID use in the future, check H. pylori stool antigen and treat if positive per protocol.No acute endoscopic intervention indicated from a GI perspective. Use PPI BID x 8 weeks and then qd afterwards.  - abdominal, diet, and TPN management per Surgery     Protein calorie malnutrition  Hypomagnesemia-resolved     Chronic hypoxic respiratory failure  On as needed home 1-2 L nasal cannula  Continue bronchodilators     Hx of NSCLC, squamous cell carcinoma (f/b VCI)  S/p laser to L endobronchial lung lesion, s/p chemo  Started on maintenance Keytruda 24         Medical Decision Making:   I personally reviewed labs: BMP,  CBC  I personally reviewed imaging:  Toxic drug monitoring: bmp for hayde from Vancomycin toxicity  Discussed case with: RN CM      Code Status: Partial Code- intubation ok otherwise DNR per pt with palliative today  DVT Prophylaxis: SCDs  GI Prophylaxis:PPI    Subjective:     Chief Complaint / Reason for Physician Visit:  Patient seen and evaluated at bedside, overnight events, reviewed. Feels fine.      Objective:     VITALS:   Last 24hrs VS

## 2024-08-16 NOTE — PROGRESS NOTES
End of Shift Note    Bedside shift change report given to JOSE DE JESUS Hsu (oncoming nurse) by María Carranza RN (offgoing nurse).  Report included the following information SBAR    Shift worked:  nights     Shift summary and any significant changes:      Patient is post-repair of a perforated ulcer and is strictly NPO to allow for bowel rest. Laguna is in place and draining adequately with a total urine output of 1200 ml per shift. PICC line in the left brachial vein is being utilized for TPN.  A suction drain is present in the RLQ. Skin assessment revealed a blanchable spot on he sacrum; Venalax was applied as a protective measure. Patient requires assistance by one with a walker.    Concerns for physician to address:  none     Zone phone for oncoming shift:   [phone]           María Carranza RN

## 2024-08-16 NOTE — PROGRESS NOTES
End of Shift Note    Bedside shift change report given to JOSE DE JESUS Daniel (oncoming nurse) by Nadira Chavez LPN (offgoing nurse).  Report included the following information SBAR    Shift worked:  7a-7p     Shift summary and any significant changes:     Pt arrived on unit, VSS. Pt denies any complaints of pain at this time. Pt resting in bed. Uneventful evening.      Concerns for physician to address:  none     Zone phone for oncoming shift:   [phone]           Nadira Chavez LPN

## 2024-08-16 NOTE — PROGRESS NOTES
Hospitalist Progress Note    NAME:   Anuel Bermudez   : 1945   MRN: [id number]     Date/Time: 8/15/2024 8:29 PM  Patient PCP: Bonifacio Bassett MD    Estimated discharge date: ?  HH/?SNF TBD  Barriers: Surgical clearance, TPN/NPO      Assessment / Plan:    Pneumoperitoneum d/t perforated peptic/antral ulcer s/p laparoscopic Adarsh patch repair by Gen Surgery  CT demonstrated pneumoperitoneum and thickening in the gastric antrum    Follow surgery recommendations- keep NPO with NGT  Continue antibiotics and antifungal empirically, continue to follow WBC-trending down to 11.3 today  Cont BID PPI  Continue TPN  IF NG tube dislodged, Surgery team recommending not to replace it, will rollow recommendations  IP GI consulted for PUD follow up (duration of treatment, need for H.pylori, follow up of pathology etc) once out of ICU-  recommended to avoid NSAID use in the future, check H. pylori stool antigen and treat if positive per protocol.No acute endoscopic intervention indicated from a GI perspective. Use PPI BID x 8 weeks and then qd afterwards.  - planning for UGI tomorrow     Protein calorie malnutrition  Hypomagnesemia-resolved     Chronic hypoxic respiratory failure  On as needed home 1-2 L nasal cannula  Continue bronchodilators     Hx of NSCLC, squamous cell carcinoma (f/b VCI)  S/p laser to L endobronchial lung lesion, s/p chemo  Started on maintenance Keytruda 24         Medical Decision Making:   I personally reviewed labs: BMP,  CBC  I personally reviewed imaging:  Toxic drug monitoring: bmp for hayde from Vancomycin toxicity  Discussed case with: Case Management in IDRs      Code Status: Partial Code- intubation ok otherwise DNR per pt with palliative today  DVT Prophylaxis: SCDs  GI Prophylaxis:PPI    Subjective:     Chief Complaint / Reason for Physician Visit:  Patient seen and evaluated at bedside, overnight events, reviewed. No complaints today.      Objective:     VITALS:    BUN 15 13 14   CREATININE 0.69* 0.56* 0.50*   CALCIUM 8.0* 7.7* 7.8*   MG 2.2 1.8 1.7   PHOS 1.3* 2.1* 2.1*   BILITOT 0.4 0.7  --    AST 22 17  --    ALT 23 21  --        Signed: David L Mendel, MD

## 2024-08-16 NOTE — PROGRESS NOTES
End of Shift Note    Bedside shift change report given to ***, RN (oncoming nurse) by John Cross RN (offgoing nurse).  Report included the following information SBAR, Kardex, Procedure Summary, Intake/Output, MAR, and Recent Results    Shift worked:  7p-930a     Shift summary and any significant changes:     ***     Concerns for physician to address:  ***     Zone phone for oncoming shift:   ***           Length of Stay:  Expected LOS: 8  Actual LOS: 5      John Cross RN

## 2024-08-16 NOTE — PROGRESS NOTES
Pharmacy TPN Management Note    TPN indication: perforated antral ulcer, NPO, malnutrition    TPN type: Central     Macronutrients:  Protein: 60 g AA  Dextrose: 100 g  Lipids: held; need to clarify egg/fish allergy to see if he can tolerate    Rate: 42 mL/hr    Labs:  Recent Labs     Units 24  0438 08/15/24  0406 24  0412   NA mmol/L 132* 134* 136   K mmol/L 3.8 3.6 3.4*   CL mmol/L 102 102 107   CO2 mmol/L 25 25 23   MG mg/dL 1.8 1.7 1.8   PHOS MG/DL 2.0* 2.1* 2.1*   BUN MG/DL 15 14 13     Recent Labs     Units 24  0438 24  0412   AST U/L 20 17   ALT U/L 20 21     Recent Labs     Units 24  0438   WBC K/uL 5.4   HGB g/dL 10.9*   HCT % 32.2*       Electrolytes (dosed per LITER):  Na: 35 meq/L; K: 52 meq/L; Phos: 30 mmol/L; M meq/L; Ca 4.5 meq/L    Other additives in TPN: multivitamins  and trace elements    Impression/Plan:   TPN macronutrient/rate changes: Continue at 60 g today, lytes not WNL.  TPN electrolyte changes: increase Na slightly. Replete w/[place]os outside of TPN   TPN insulin changes: n/a     Pharmacy will continue to monitor enteral nutrition plan, electrolytes, renal function, and dietician recommendations and adjust parenteral nutrition as needed.    Thank you,  Doris Cruz [facility]

## 2024-08-16 NOTE — PLAN OF CARE
Problem: Pain  Goal: Verbalizes/displays adequate comfort level or baseline comfort level  8/16/2024 0538 by María Carranza RN  Outcome: Progressing  8/15/2024 1749 by Nadira Chavez LPN  Outcome: Progressing  Flowsheets (Taken 8/15/2024 1637)  Verbalizes/displays adequate comfort level or baseline comfort level:   Encourage patient to monitor pain and request assistance   Assess pain using appropriate pain scale   Administer analgesics based on type and severity of pain and evaluate response     Problem: Skin/Tissue Integrity  Goal: Absence of new skin breakdown  Description: 1.  Monitor for areas of redness and/or skin breakdown  2.  Assess vascular access sites hourly  3.  Every 4-6 hours minimum:  Change oxygen saturation probe site  4.  Every 4-6 hours:  If on nasal continuous positive airway pressure, respiratory therapy assess nares and determine need for appliance change or resting period.  8/16/2024 0538 by María Carranza RN  Outcome: Progressing  8/15/2024 1749 by Nadira Chavez LPN  Outcome: Progressing     Problem: Safety - Adult  Goal: Free from fall injury  8/16/2024 0538 by María Carranza RN  Outcome: Progressing  8/15/2024 1749 by Nadira Chavez LPN  Outcome: Progressing     Problem: Discharge Planning  Goal: Discharge to home or other facility with appropriate resources  Outcome: Progressing  Flowsheets (Taken 8/15/2024 1637 by Nadira Chavez LPN)  Discharge to home or other facility with appropriate resources:   Identify barriers to discharge with patient and caregiver   Identify discharge learning needs (meds, wound care, etc)   Arrange for needed discharge resources and transportation as appropriate     Problem: Respiratory - Adult  Goal: Achieves optimal ventilation and oxygenation  Outcome: Progressing  Flowsheets (Taken 8/15/2024 1637 by Nadira Chavez LPN)  Achieves optimal ventilation and oxygenation: Assess for changes in respiratory status     Problem: Nutrition

## 2024-08-16 NOTE — PROGRESS NOTES
Pharmacy Antimicrobial Kinetic Dosing    Indication for Antimicrobials: intra-abd infection 2/2 perforated antral ulcer    Current Regimen of Each Antimicrobial:  Vancomycin - pharmacy to dose; started ; Day # 5  Cefepime 2000 mg IV q12h; started , Day # 6  Metronidzole 500 mg IV q8h, started , Day # 6  Fluconazole 200 mg IV q24h; started ; Day #  6    Previous Antimicrobial Therapy:  N/A    Goal Level: Vancomycin -600    Date Dose & Interval Measured (mcg/mL) Predicted AUC   24 500 mg IV q12h 6.5 408   24 750 mg IV q12h 7.8 395   8/15  1000mg q 12  12 570     Significant Cultures:   N/A    Labs:  Recent Labs     Units 24  0438 08/15/24  0406 24  0412   CREATININE MG/DL 0.52* 0.50* 0.56*   BUN MG/DL 15 14 13   WBC K/uL 5.4 5.9 8.6     Temp (24hrs), Av.9 °F (36.6 °C), Min:97.6 °F (36.4 °C), Max:98.2 °F (36.8 °C)    Conditions for Dosing Consideration: Malnutrition    Creatinine Clearance (mL/min): Estimated Creatinine Clearance: 74 mL/min (A) (based on SCr of 0.52 mg/dL (L)).     Impression/Plan:   Vancomycin level of 12 is therapeutic, continue 1000mg q 12 for   Continue Cefepime, metronidazole, fluconazole  Antimicrobial stop date pending     Pharmacy will follow daily and adjust medications as appropriate for renal function and/or serum levels.    Thank you,  Doris Cruz, [facility]

## 2024-08-16 NOTE — ACP (ADVANCE CARE PLANNING)
Advance Care Planning      Palliative Medicine Provider (MD/NP)  Advance Care Planning (ACP) Conversation      Date of Conversation: 08/16/24  The patient and/or authorized decision maker consented to a voluntary Advance Care Planning conversation.   Individuals present for the conversation:   Patient with decision making capacity    Legal Healthcare Agent(s):    Primary Decision Maker: Deja Bowden - Child - [phone]    ACP documents available in EMR prior to discussion:  -None    Primary Palliative Diagnosis(es):  Goals of care  DNR Discussion  Advance Care Planning  Frailty  Malnutrition    Conversation Summary:  Met with Mr Bermudez to complete a POST as we discussed last week  He had made himself a DNR, but OK with short term intubation for potentially reversible respiratory failure with the ICU team, and he wants documentation to reflect this for when he leaves the hospital  Potential d/c to rehab in the coming days, so we completed this document together today. Left him with the original and placed a copy on his chart    Resuscitation Status:    Code Status: Limited    Outcomes / Completed Documentation:  An explanation of advance directives and their importance was provided and the following forms completed:    -POLST    If new document completed, original was provided to patient and/or family member.    Copy was placed for scanning into the [facility] EMR.      I spent 20 minutes providing separately identifiable ACP services with the patient and/or surrogate decision maker in a voluntary, in-person conversation discussing the patient's wishes and goals as detailed in the above note.       ENRIQUETA Simms - NP

## 2024-08-16 NOTE — PROGRESS NOTES
Admit Date: 2024    POD 5 Days Post-Op    Procedure:  Procedure(s):  LAPAROSCOPIC REPAIR OF PERFORATED ANTRAL ULCER WITH GRAM PATCH    Subjective:     Patient has no new complaints.  UGI complete with no evidence of contrast extravasation.  Just had incontinent large loose stool.    Objective:     Blood pressure (!) 145/85, pulse 80, temperature 98.2 °F (36.8 °C), temperature source Oral, resp. rate 18, height 1.6 m (5' 3\"), weight 45.4 kg (100 lb 1.6 oz), SpO2 98%.    Temp (24hrs), Av °F (36.7 °C), Min:97.7 °F (36.5 °C), Max:98.2 °F (36.8 °C)      Physical Exam:  GENERAL: alert, appears stated age, and cooperative, LUNG: clear to auscultation bilaterally, HEART: regular rate and rhythm, ABDOMEN: soft, ND, wounds c/d/i, EXTREMITIES:  extremities normal, atraumatic, no cyanosis or edema    Labs:   Recent Results (from the past 24 hour(s))   Vancomycin Level, Random    Collection Time: 08/15/24 11:37 PM   Result Value Ref Range    Vancomycin Rm 12.0 UG/ML   Magnesium    Collection Time: 24  4:38 AM   Result Value Ref Range    Magnesium 1.8 1.6 - 2.4 mg/dL   Phosphorus    Collection Time: 24  4:38 AM   Result Value Ref Range    Phosphorus 2.0 (L) 2.6 - 4.7 MG/DL   Basic Metabolic Panel    Collection Time: 24  4:38 AM   Result Value Ref Range    Sodium 132 (L) 136 - 145 mmol/L    Potassium 3.8 3.5 - 5.1 mmol/L    Chloride 102 97 - 108 mmol/L    CO2 25 21 - 32 mmol/L    Anion Gap 5 5 - 15 mmol/L    Glucose 98 65 - 100 mg/dL    BUN 15 6 - 20 MG/DL    Creatinine 0.52 (L) 0.70 - 1.30 MG/DL    BUN/Creatinine Ratio 29 (H) 12 - 20      Est, Glom Filt Rate >90 >60 ml/min/1.73m2    Calcium 8.0 (L) 8.5 - 10.1 MG/DL   CBC    Collection Time: 24  4:38 AM   Result Value Ref Range    WBC 5.4 4.1 - 11.1 K/uL    RBC 3.35 (L) 4.10 - 5.70 M/uL    Hemoglobin 10.9 (L) 12.1 - 17.0 g/dL    Hematocrit 32.2 (L) 36.6 - 50.3 %    MCV 96.1 80.0 - 99.0 FL    MCH 32.5 26.0 - 34.0 PG    MCHC 33.9 30.0 - 36.5 g/dL     RDW 14.8 (H) 11.5 - 14.5 %    Platelets 168 150 - 400 K/uL    MPV 9.2 8.9 - 12.9 FL    Nucleated RBCs 0.0 0  WBC    nRBC 0.00 0.00 - 0.01 K/uL   Hepatic Function Panel    Collection Time: 08/16/24  4:38 AM   Result Value Ref Range    Total Protein 5.4 (L) 6.4 - 8.2 g/dL    Albumin 2.5 (L) 3.5 - 5.0 g/dL    Globulin 2.9 2.0 - 4.0 g/dL    Albumin/Globulin Ratio 0.9 (L) 1.1 - 2.2      Total Bilirubin 0.5 0.2 - 1.0 MG/DL    Bilirubin, Direct 0.2 0.0 - 0.2 MG/DL    Alk Phosphatase 59 45 - 117 U/L    AST 20 15 - 37 U/L    ALT 20 12 - 78 U/L   POCT Glucose    Collection Time: 08/16/24  5:58 AM   Result Value Ref Range    POC Glucose 107 65 - 117 mg/dL    Performed by: Dillon Daniel RN    POCT Glucose    Collection Time: 08/16/24 12:08 PM   Result Value Ref Range    POC Glucose 109 65 - 117 mg/dL    Performed by: Manuelito Shore PCT        Data Review images and reports reviewed    Assessment:     Principal Problem:    Pneumoperitoneum  Active Problems:    Bowel perforation (HCC)    Goals of care, counseling/discussion    DNR (do not resuscitate) discussion    Frailty    Severe protein-calorie malnutrition (HCC)    Pain at surgical site  Resolved Problems:    * No resolved hospital problems. *      Plan/Recommendations/Medical Decision Making:     Continue present treatment  Clear liquid diet, advance as taras  Likely ready for d/c home over the weekend      Shahriar Walker MD  [facility]

## 2024-08-16 NOTE — CARE COORDINATION
Transition of Care Plan:     RUR: 15% \"medium risk\"  Prior Level of Functioning: Independent with ADL's   Disposition: IPR vs. SNF   If SNF or IPR: Date FOC offered: 8/13  Date FOC received: 8/15  Accepting facility: N/A  Date authorization started with reference number: N/A  Date authorization received and expires: N/A  Follow up appointments: Defer to facility  DME needed: Defer to facility   Transportation at discharge: Pt daughter to transport    IM/IMM Medicare/ letter given: 2nd IM needed prior to d/c   Is patient a  and connected with VA? No  Caregiver Contact: Deja Bowden (child), [phone]  Discharge Caregiver contacted prior to discharge? To be contacted   Care Conference needed? Not at this time   Barriers to discharge: Surgery clearance, TPN, placement, and insurance auth    Initial Note: Chart Reviewed: Pt transferred to Surg Tele. CM spoke with pt regarding rehab. Pt is agreeable. [facility] and [facility] are reviewing pts referral. [facility] unable to take TPN, Once he is weaned & tolerating diet, they will contunie to review.  CM has sent SNF referral from previous CM note. Referrals pending. Pt will need insurance auth once accepted. CM will continue to follow.     MISAEL Baldwin,  [phone]

## 2024-08-16 NOTE — PLAN OF CARE
Problem: Physical Therapy - Adult  Goal: By Discharge: Performs mobility at highest level of function for planned discharge setting.  See evaluation for individualized goals.  Description: FUNCTIONAL STATUS PRIOR TO ADMISSION: Patient was modified independent using a rolling walker and single point cane for functional mobility depending on energy level. Patient reports indep with ADLs. 2L NC available as needed.    HOME SUPPORT PRIOR TO ADMISSION: The patient lived alone with daughter and family friend to provide assistance. Daughter has arranged for family friend to check on patient a couple times/week, assists with grocery shopping, laundry, etc depending on need at time.     Physical Therapy Goals  Initiated 8/13/2024  1.  Patient will move from supine to sit and sit to supine, scoot up and down, and roll side to side in bed with modified independence within 7 day(s).    2.  Patient will perform sit to stand with modified independence within 7 day(s).  3.  Patient will transfer from bed to chair and chair to bed with modified independence using the least restrictive device within 7 day(s).  4.  Patient will ambulate with modified independence for 50 feet with the least restrictive device within 7 day(s).   5.  Patient will ascend/descend 4 stairs with handrail(s) with contact guard assist within 7 day(s).   Outcome: Progressing   PHYSICAL THERAPY TREATMENT    Patient: Anuel Bermudez (78 y.o. male)  Date: 8/16/2024  Diagnosis: Pneumoperitoneum [K66.8]  Elevated lipase [R74.8]  Bowel perforation (HCC) [K63.1] Pneumoperitoneum  Procedure(s) (LRB):  LAPAROSCOPIC REPAIR OF PERFORATED ANTRAL ULCER WITH GRAM PATCH (N/A) 5 Days Post-Op  Precautions: Fall Risk                      ASSESSMENT:  Patient continues to benefit from skilled PT services and is progressing towards goals. Patient demonstrating improvements in overall mobility and endurance. He is able to complete bed mobility with SBA and requires CGA for

## 2024-08-17 LAB
GLUCOSE BLD STRIP.AUTO-MCNC: 114 MG/DL (ref 65–117)
GLUCOSE BLD STRIP.AUTO-MCNC: 135 MG/DL (ref 65–117)
GLUCOSE BLD STRIP.AUTO-MCNC: 93 MG/DL (ref 65–117)
SERVICE CMNT-IMP: ABNORMAL
SERVICE CMNT-IMP: NORMAL
SERVICE CMNT-IMP: NORMAL

## 2024-08-17 PROCEDURE — 6360000002 HC RX W HCPCS: Performed by: INTERNAL MEDICINE

## 2024-08-17 PROCEDURE — 94640 AIRWAY INHALATION TREATMENT: CPT

## 2024-08-17 PROCEDURE — 6360000002 HC RX W HCPCS: Performed by: STUDENT IN AN ORGANIZED HEALTH CARE EDUCATION/TRAINING PROGRAM

## 2024-08-17 PROCEDURE — 6370000000 HC RX 637 (ALT 250 FOR IP): Performed by: NURSE PRACTITIONER

## 2024-08-17 PROCEDURE — 1100000000 HC RM PRIVATE

## 2024-08-17 PROCEDURE — 6360000002 HC RX W HCPCS: Performed by: NURSE PRACTITIONER

## 2024-08-17 PROCEDURE — 82962 GLUCOSE BLOOD TEST: CPT

## 2024-08-17 PROCEDURE — 2500000003 HC RX 250 WO HCPCS: Performed by: NURSE PRACTITIONER

## 2024-08-17 PROCEDURE — 2580000003 HC RX 258: Performed by: INTERNAL MEDICINE

## 2024-08-17 PROCEDURE — 94761 N-INVAS EAR/PLS OXIMETRY MLT: CPT

## 2024-08-17 PROCEDURE — 2580000003 HC RX 258: Performed by: NURSE PRACTITIONER

## 2024-08-17 PROCEDURE — 2500000003 HC RX 250 WO HCPCS: Performed by: STUDENT IN AN ORGANIZED HEALTH CARE EDUCATION/TRAINING PROGRAM

## 2024-08-17 PROCEDURE — 2580000003 HC RX 258: Performed by: STUDENT IN AN ORGANIZED HEALTH CARE EDUCATION/TRAINING PROGRAM

## 2024-08-17 RX ADMIN — MIRTAZAPINE 30 MG: 15 TABLET, FILM COATED ORAL at 22:11

## 2024-08-17 RX ADMIN — VANCOMYCIN HYDROCHLORIDE 1000 MG: 1 INJECTION, POWDER, LYOPHILIZED, FOR SOLUTION INTRAVENOUS at 12:14

## 2024-08-17 RX ADMIN — METRONIDAZOLE 500 MG: 500 INJECTION, SOLUTION INTRAVENOUS at 12:19

## 2024-08-17 RX ADMIN — ENOXAPARIN SODIUM 30 MG: 100 INJECTION SUBCUTANEOUS at 08:42

## 2024-08-17 RX ADMIN — FAMOTIDINE 20 MG: 20 TABLET, FILM COATED ORAL at 22:11

## 2024-08-17 RX ADMIN — METRONIDAZOLE 500 MG: 500 INJECTION, SOLUTION INTRAVENOUS at 05:07

## 2024-08-17 RX ADMIN — ACETAMINOPHEN 650 MG: 325 TABLET ORAL at 03:42

## 2024-08-17 RX ADMIN — Medication: at 13:17

## 2024-08-17 RX ADMIN — CEFEPIME 2000 MG: 2 INJECTION, POWDER, FOR SOLUTION INTRAVENOUS at 05:46

## 2024-08-17 RX ADMIN — PANTOPRAZOLE SODIUM 40 MG: 40 INJECTION, POWDER, FOR SOLUTION INTRAVENOUS at 22:11

## 2024-08-17 RX ADMIN — CEFEPIME 2000 MG: 2 INJECTION, POWDER, FOR SOLUTION INTRAVENOUS at 17:49

## 2024-08-17 RX ADMIN — FAMOTIDINE 20 MG: 20 TABLET, FILM COATED ORAL at 08:42

## 2024-08-17 RX ADMIN — METRONIDAZOLE 500 MG: 500 INJECTION, SOLUTION INTRAVENOUS at 21:08

## 2024-08-17 RX ADMIN — ARFORMOTEROL TARTRATE: 15 SOLUTION RESPIRATORY (INHALATION) at 19:59

## 2024-08-17 RX ADMIN — SODIUM CHLORIDE, PRESERVATIVE FREE 10 ML: 5 INJECTION INTRAVENOUS at 22:12

## 2024-08-17 RX ADMIN — PANTOPRAZOLE SODIUM 40 MG: 40 INJECTION, POWDER, FOR SOLUTION INTRAVENOUS at 08:42

## 2024-08-17 RX ADMIN — FLUCONAZOLE 200 MG: 200 INJECTION, SOLUTION INTRAVENOUS at 22:10

## 2024-08-17 RX ADMIN — DONEPEZIL HYDROCHLORIDE 5 MG: 5 TABLET, FILM COATED ORAL at 22:11

## 2024-08-17 RX ADMIN — HYDROMORPHONE HYDROCHLORIDE 0.25 MG: 1 INJECTION, SOLUTION INTRAMUSCULAR; INTRAVENOUS; SUBCUTANEOUS at 13:23

## 2024-08-17 RX ADMIN — Medication: at 22:11

## 2024-08-17 RX ADMIN — MONTELUKAST 10 MG: 10 TABLET, FILM COATED ORAL at 22:11

## 2024-08-17 RX ADMIN — HYDROMORPHONE HYDROCHLORIDE 0.25 MG: 1 INJECTION, SOLUTION INTRAMUSCULAR; INTRAVENOUS; SUBCUTANEOUS at 03:35

## 2024-08-17 RX ADMIN — SODIUM CHLORIDE: 9 INJECTION, SOLUTION INTRAVENOUS at 05:06

## 2024-08-17 RX ADMIN — ARFORMOTEROL TARTRATE: 15 SOLUTION RESPIRATORY (INHALATION) at 08:43

## 2024-08-17 RX ADMIN — POTASSIUM PHOSPHATE, MONOBASIC POTASSIUM PHOSPHATE, DIBASIC: 224; 236 INJECTION, SOLUTION, CONCENTRATE INTRAVENOUS at 17:33

## 2024-08-17 ASSESSMENT — PAIN DESCRIPTION - DESCRIPTORS
DESCRIPTORS: ACHING

## 2024-08-17 ASSESSMENT — PAIN SCALES - GENERAL
PAINLEVEL_OUTOF10: 9
PAINLEVEL_OUTOF10: 0
PAINLEVEL_OUTOF10: 8
PAINLEVEL_OUTOF10: 5

## 2024-08-17 ASSESSMENT — PAIN DESCRIPTION - LOCATION
LOCATION: ABDOMEN
LOCATION: ABDOMEN
LOCATION: GENERALIZED

## 2024-08-17 ASSESSMENT — PAIN DESCRIPTION - ONSET
ONSET: GRADUAL
ONSET: GRADUAL

## 2024-08-17 ASSESSMENT — PAIN - FUNCTIONAL ASSESSMENT
PAIN_FUNCTIONAL_ASSESSMENT: PREVENTS OR INTERFERES SOME ACTIVE ACTIVITIES AND ADLS

## 2024-08-17 ASSESSMENT — PAIN DESCRIPTION - ORIENTATION
ORIENTATION: OTHER (COMMENT)
ORIENTATION: ANTERIOR
ORIENTATION: ANTERIOR

## 2024-08-17 ASSESSMENT — PAIN DESCRIPTION - FREQUENCY
FREQUENCY: INTERMITTENT
FREQUENCY: INTERMITTENT

## 2024-08-17 ASSESSMENT — PAIN DESCRIPTION - PAIN TYPE
TYPE: ACUTE PAIN
TYPE: ACUTE PAIN;SURGICAL PAIN

## 2024-08-17 NOTE — PROGRESS NOTES
Hospitalist Progress Note    NAME:   Anuel Bermudez   : 1945   MRN: [id number]     Date/Time: 2024 2:00 PM  Patient PCP: Bonifacio Bassett MD    Estimated discharge date: ?  HH/?SNF TBD  Barriers: Surgical clearance, TPN, diet advancement      Assessment / Plan:    Pneumoperitoneum d/t perforated peptic/antral ulcer s/p laparoscopic Adarsh patch repair by Gen Surgery  CT demonstrated pneumoperitoneum and thickening in the gastric antrum    Follow surgery recommendations-   Continue antibiotics and antifungal empirically, continue to follow WBC-trending down to 11.3   Cont BID PPI  Continue TPN  IF NG tube dislodged, Surgery team recommending not to replace it, will rollow recommendations  IP GI consulted for PUD follow up (duration of treatment, need for H.pylori, follow up of pathology etc) once out of ICU-  recommended to avoid NSAID use in the future, check H. pylori stool antigen and treat if positive per protocol.No acute endoscopic intervention indicated from a GI perspective. Use PPI BID x 8 weeks and then qd afterwards.  - abdominal, diet, and TPN management per Surgery     Protein calorie malnutrition  Hypomagnesemia-resolved     Chronic hypoxic respiratory failure  On as needed home 1-2 L nasal cannula  Continue bronchodilators     Hx of NSCLC, squamous cell carcinoma (f/b VCI)  S/p laser to L endobronchial lung lesion, s/p chemo  Started on maintenance Keytruda 24         Medical Decision Making:   I personally reviewed labs: BMP,  CBC  I personally reviewed imaging:  Toxic drug monitoring: bmp for hayde from Vancomycin toxicity  Discussed case with: RN CM      Code Status: Partial Code- intubation ok otherwise DNR per pt with palliative today  DVT Prophylaxis: SCDs  GI Prophylaxis:PPI    Subjective:     Chief Complaint / Reason for Physician Visit:  Patient seen and evaluated at bedside, overnight events, reviewed. No complaints today.      Objective:     VITALS:   Last

## 2024-08-17 NOTE — PROGRESS NOTES
Pharmacy Antimicrobial Kinetic Dosing    Indication for Antimicrobials: intra-abd infection 2/2 perforated antral ulcer    Current Regimen of Each Antimicrobial:  Vancomycin - pharmacy to dose; started ; Day # 6  Cefepime 2000 mg IV q12h; started , Day # 7  Metronidzole 500 mg IV q8h, started , Day # 7  Fluconazole 200 mg IV q24h; started 811; Day #  7    Previous Antimicrobial Therapy:  N/A    Goal Level: Vancomycin -600    Date Dose & Interval Measured (mcg/mL) Predicted AUC   24 500 mg IV q12h 6.5 408   24 750 mg IV q12h 7.8 395   8/15  1000mg q 12  12 570     Significant Cultures:   N/A    Labs:  Recent Labs     Units 24  0438 08/15/24  0406   CREATININE MG/DL 0.52* 0.50*   BUN MG/DL 15 14   WBC K/uL 5.4 5.9     Temp (24hrs), Av.8 °F (36.6 °C), Min:97.3 °F (36.3 °C), Max:98.2 °F (36.8 °C)    Conditions for Dosing Consideration: Malnutrition    Creatinine Clearance (mL/min): Estimated Creatinine Clearance: 74 mL/min (A) (based on SCr of 0.52 mg/dL (L)).     Impression/Plan:   Vancomycin level of 12 is therapeutic, continue 1000mg q 12 for   Continue Cefepime, metronidazole, fluconazole  Antimicrobial stop date pending     Pharmacy will follow daily and adjust medications as appropriate for renal function and/or serum levels.    Thank you,  Zane Zhou RPH

## 2024-08-17 NOTE — PROGRESS NOTES
.End of Shift Note    Bedside shift change report given to JOSE DE JESUS Reed (oncoming nurse) by LYNDA RIVERA LPN (offgoing nurse).  Report included the following information SBAR, Kardex, OR Summary, Procedure Summary, Intake/Output, MAR, Recent Results, and Cardiac Rhythm SR    Shift worked:  7-7     Shift summary and any significant changes:     Patient has ambulated to day into hallway, patient reports weakness but improvement with ambulation today. Surgical site intact, MARILU  draining serous type drainage. [place] in place due to incontinency. Continues on antibiotic therapy and TPN. Advanced to low fiber diet. No BM's.      Concerns for physician to address:  Monitor labs, toleration of diet     Zone phone for oncoming shift:   [phone]       Activity:     Number times ambulated in hallways past shift: 1  Number of times OOB to chair past shift: 1    Cardiac:   Cardiac Monitoring: Yes           Access:  Current line(s): PIV and PICC     Genitourinary:   Urinary status: voiding and external catheter    Respiratory:      Chronic home O2 use?: NO  Incentive spirometer at bedside: YES       GI:     Current diet:  PN-Adult 2-in-1 Peripheral Line (Custom)  TPN-Adult 2-in-1 Peripheral Line (Custom)  ADULT DIET; Regular; Low Fiber  Passing flatus: YES  Tolerating current diet: YES       Pain Management:   Patient states pain is manageable on current regimen: YES    Skin:     Interventions: float heels, increase time out of bed, internal/external urinary devices, and nutritional support    Patient Safety:  Fall Score:    Interventions: assistive device (walker, cane. etc), gripper socks, pt to call before getting OOB, and stay with me (per policy)       Length of Stay:  Expected LOS: 8  Actual LOS: 6      LYNDA RIVERA LPN

## 2024-08-17 NOTE — PROGRESS NOTES
planning  Resolved Problems:    * No resolved hospital problems. *      Plan/Recommendations/Medical Decision Making:     Continue present treatment  Advance diet today to low fiber adult diet  Okay for d/c home from our standpoint once tolerating diet.      Shahriar Walker MD  [facility]

## 2024-08-17 NOTE — PROGRESS NOTES
End of Shift Note    Bedside shift change report given to ROBIN Phipps (oncoming nurse) by Sada Zhou RN (offgoing nurse).  Report included the following information SBAR, Kardex, ED Summary, OR Summary, Procedure Summary, Intake/Output, MAR, Recent Results, and Cardiac Rhythm NSR    Shift worked:  [id number]     Shift summary and any significant changes:     No AM labs ordered, pt c/o severe pain, managed w/ 1x dose of 650 mg tylenol and 0.25 mg dilaudid. Pt up to chair at 6 am. Pt voiding well, output in chart, MARILU drain output serous - 40 mL over shift, pt emptied 1x onto bed - unmeasured. Pt slightly hypertensive, but consistently fidgeting/moving arm through reading. Pt had some confusion over shift, but able to articulate needs well. Pt eager to go home.      Concerns for physician to address:       Zone phone for oncoming shift:   [phone]         Sada Zhou, RN

## 2024-08-17 NOTE — PROGRESS NOTES
Pharmacy TPN Management Note    TPN indication: perforated antral ulcer, NPO, malnutrition    TPN type: Central     Macronutrients:  Protein: 60 g AA  Dextrose: 100 g  Lipids: held; need to clarify egg/fish allergy to see if he can tolerate    Rate: 42 mL/hr    Labs:  Recent Labs     Units 24  0438 08/15/24  0406   NA mmol/L 132* 134*   K mmol/L 3.8 3.6   CL mmol/L 102 102   CO2 mmol/L 25 25   MG mg/dL 1.8 1.7   PHOS MG/DL 2.0* 2.1*   BUN MG/DL 15 14     Recent Labs     Units 24  0438   AST U/L 20   ALT U/L 20     Recent Labs     Units 24  0438   WBC K/uL 5.4   HGB g/dL 10.9*   HCT % 32.2*       Electrolytes (dosed per LITER):  Na: 45 meq/L; K: 59 meq/L; Phos: 35 mmol/L; M meq/L; Ca 4.5 meq/L    Other additives in TPN: multivitamins  and trace elements    Impression/Plan:   TPN macronutrient/rate changes: Continue at 60 g today, lytes not WNL.  TPN electrolyte changes: increase Na slightly. Increase Kphos slightly   TPN insulin changes: n/a     Pharmacy will continue to monitor enteral nutrition plan, electrolytes, renal function, and dietician recommendations and adjust parenteral nutrition as needed.    Thank you,  Zane Zhou RPH

## 2024-08-18 LAB
ANION GAP SERPL CALC-SCNC: 3 MMOL/L (ref 5–15)
BUN SERPL-MCNC: 19 MG/DL (ref 6–20)
BUN/CREAT SERPL: 37 (ref 12–20)
CALCIUM SERPL-MCNC: 7.6 MG/DL (ref 8.5–10.1)
CHLORIDE SERPL-SCNC: 108 MMOL/L (ref 97–108)
CO2 SERPL-SCNC: 26 MMOL/L (ref 21–32)
CREAT SERPL-MCNC: 0.52 MG/DL (ref 0.7–1.3)
GLUCOSE BLD STRIP.AUTO-MCNC: 112 MG/DL (ref 65–117)
GLUCOSE BLD STRIP.AUTO-MCNC: 137 MG/DL (ref 65–117)
GLUCOSE BLD STRIP.AUTO-MCNC: 174 MG/DL (ref 65–117)
GLUCOSE BLD STRIP.AUTO-MCNC: 91 MG/DL (ref 65–117)
GLUCOSE SERPL-MCNC: 112 MG/DL (ref 65–100)
MAGNESIUM SERPL-MCNC: 1.8 MG/DL (ref 1.6–2.4)
PHOSPHATE SERPL-MCNC: 2.2 MG/DL (ref 2.6–4.7)
POTASSIUM SERPL-SCNC: 3.5 MMOL/L (ref 3.5–5.1)
SERVICE CMNT-IMP: ABNORMAL
SERVICE CMNT-IMP: ABNORMAL
SERVICE CMNT-IMP: NORMAL
SERVICE CMNT-IMP: NORMAL
SODIUM SERPL-SCNC: 137 MMOL/L (ref 136–145)

## 2024-08-18 PROCEDURE — 6370000000 HC RX 637 (ALT 250 FOR IP): Performed by: NURSE PRACTITIONER

## 2024-08-18 PROCEDURE — 1100000000 HC RM PRIVATE

## 2024-08-18 PROCEDURE — 83735 ASSAY OF MAGNESIUM: CPT

## 2024-08-18 PROCEDURE — 6360000002 HC RX W HCPCS: Performed by: STUDENT IN AN ORGANIZED HEALTH CARE EDUCATION/TRAINING PROGRAM

## 2024-08-18 PROCEDURE — 6360000002 HC RX W HCPCS: Performed by: NURSE PRACTITIONER

## 2024-08-18 PROCEDURE — 6360000002 HC RX W HCPCS: Performed by: INTERNAL MEDICINE

## 2024-08-18 PROCEDURE — 82962 GLUCOSE BLOOD TEST: CPT

## 2024-08-18 PROCEDURE — 84100 ASSAY OF PHOSPHORUS: CPT

## 2024-08-18 PROCEDURE — 36415 COLL VENOUS BLD VENIPUNCTURE: CPT

## 2024-08-18 PROCEDURE — 94640 AIRWAY INHALATION TREATMENT: CPT

## 2024-08-18 PROCEDURE — 94761 N-INVAS EAR/PLS OXIMETRY MLT: CPT

## 2024-08-18 PROCEDURE — 80048 BASIC METABOLIC PNL TOTAL CA: CPT

## 2024-08-18 PROCEDURE — 2580000003 HC RX 258: Performed by: NURSE PRACTITIONER

## 2024-08-18 PROCEDURE — 2580000003 HC RX 258: Performed by: STUDENT IN AN ORGANIZED HEALTH CARE EDUCATION/TRAINING PROGRAM

## 2024-08-18 PROCEDURE — 2580000003 HC RX 258: Performed by: INTERNAL MEDICINE

## 2024-08-18 RX ORDER — METRONIDAZOLE 500 MG/100ML
500 INJECTION, SOLUTION INTRAVENOUS EVERY 8 HOURS
Status: CANCELLED | OUTPATIENT
Start: 2024-08-18 | End: 2024-08-18

## 2024-08-18 RX ORDER — FLUCONAZOLE 2 MG/ML
200 INJECTION, SOLUTION INTRAVENOUS EVERY 24 HOURS
Status: CANCELLED | OUTPATIENT
Start: 2024-08-18 | End: 2024-08-18

## 2024-08-18 RX ADMIN — PANTOPRAZOLE SODIUM 40 MG: 40 INJECTION, POWDER, FOR SOLUTION INTRAVENOUS at 20:28

## 2024-08-18 RX ADMIN — ARFORMOTEROL TARTRATE: 15 SOLUTION RESPIRATORY (INHALATION) at 20:09

## 2024-08-18 RX ADMIN — ARFORMOTEROL TARTRATE: 15 SOLUTION RESPIRATORY (INHALATION) at 08:33

## 2024-08-18 RX ADMIN — Medication: at 10:21

## 2024-08-18 RX ADMIN — Medication: at 20:29

## 2024-08-18 RX ADMIN — CEFEPIME 2000 MG: 2 INJECTION, POWDER, FOR SOLUTION INTRAVENOUS at 18:24

## 2024-08-18 RX ADMIN — ENOXAPARIN SODIUM 30 MG: 100 INJECTION SUBCUTANEOUS at 10:16

## 2024-08-18 RX ADMIN — MIRTAZAPINE 30 MG: 15 TABLET, FILM COATED ORAL at 20:28

## 2024-08-18 RX ADMIN — FAMOTIDINE 20 MG: 20 TABLET, FILM COATED ORAL at 10:16

## 2024-08-18 RX ADMIN — METRONIDAZOLE 500 MG: 500 INJECTION, SOLUTION INTRAVENOUS at 04:23

## 2024-08-18 RX ADMIN — METRONIDAZOLE 500 MG: 500 INJECTION, SOLUTION INTRAVENOUS at 13:01

## 2024-08-18 RX ADMIN — DONEPEZIL HYDROCHLORIDE 5 MG: 5 TABLET, FILM COATED ORAL at 20:28

## 2024-08-18 RX ADMIN — VANCOMYCIN HYDROCHLORIDE 1000 MG: 1 INJECTION, POWDER, LYOPHILIZED, FOR SOLUTION INTRAVENOUS at 00:14

## 2024-08-18 RX ADMIN — PANTOPRAZOLE SODIUM 40 MG: 40 INJECTION, POWDER, FOR SOLUTION INTRAVENOUS at 10:16

## 2024-08-18 RX ADMIN — CEFEPIME 2000 MG: 2 INJECTION, POWDER, FOR SOLUTION INTRAVENOUS at 06:21

## 2024-08-18 RX ADMIN — MONTELUKAST 10 MG: 10 TABLET, FILM COATED ORAL at 20:28

## 2024-08-18 RX ADMIN — FAMOTIDINE 20 MG: 20 TABLET, FILM COATED ORAL at 20:28

## 2024-08-18 RX ADMIN — VANCOMYCIN HYDROCHLORIDE 1000 MG: 1 INJECTION, POWDER, LYOPHILIZED, FOR SOLUTION INTRAVENOUS at 12:58

## 2024-08-18 RX ADMIN — FLUCONAZOLE 200 MG: 200 INJECTION, SOLUTION INTRAVENOUS at 22:03

## 2024-08-18 RX ADMIN — SODIUM CHLORIDE, PRESERVATIVE FREE 10 ML: 5 INJECTION INTRAVENOUS at 20:29

## 2024-08-18 RX ADMIN — SODIUM CHLORIDE, PRESERVATIVE FREE 10 ML: 5 INJECTION INTRAVENOUS at 10:18

## 2024-08-18 RX ADMIN — METRONIDAZOLE 500 MG: 500 INJECTION, SOLUTION INTRAVENOUS at 20:33

## 2024-08-18 NOTE — PROGRESS NOTES
Hospitalist Progress Note    NAME:   Anuel Bermudez   : 1945   MRN: [id number]     Date/Time: 2024 1:49 PM  Patient PCP: Bonifacio Bassett MD    Estimated discharge date: ?  HH/?SNF TBD  Barriers: placement, needs auth      Assessment / Plan:    Pneumoperitoneum d/t perforated peptic/antral ulcer s/p laparoscopic Adarsh patch repair by Gen Surgery  CT demonstrated pneumoperitoneum and thickening in the gastric antrum    Follow surgery recommendations-   Continue antibiotics and antifungal empirically, continue to follow WBC-trending down to 11.3   Cont BID PPI  Continue TPN  IF NG tube dislodged, Surgery team recommending not to replace it, will rollow recommendations  IP GI consulted for PUD follow up (duration of treatment, need for H.pylori, follow up of pathology etc) once out of ICU-  recommended to avoid NSAID use in the future, check H. pylori stool antigen and treat if positive per protocol.No acute endoscopic intervention indicated from a GI perspective. Use PPI BID x 8 weeks and then qd afterwards.  - abdominal, diet, and TPN management per Surgery  - DC TPN per Surg, have signed off. Stopped dilaudid  - needs additional week of PO abx     Protein calorie malnutrition  Hypomagnesemia-resolved     Chronic hypoxic respiratory failure  On as needed home 1-2 L nasal cannula  Continue bronchodilators     Hx of NSCLC, squamous cell carcinoma (f/b VCI)  S/p laser to L endobronchial lung lesion, s/p chemo  Started on maintenance Keytruda 24         Medical Decision Making:   I personally reviewed labs: BMP,  CBC  I personally reviewed imaging:  Toxic drug monitoring: bmp for hayde from Vancomycin toxicity  Discussed case with: RN, Surgery      Code Status: Partial Code- intubation ok otherwise DNR per pt with palliative today  DVT Prophylaxis: SCDs  GI Prophylaxis:PPI    Subjective:     Chief Complaint / Reason for Physician Visit:  Patient seen and evaluated at bedside, overnight  0.52* 0.52*   CALCIUM 8.0* 7.6*   MG 1.8 1.8   PHOS 2.0* 2.2*   BILITOT 0.5  --    AST 20  --    ALT 20  --        Signed: David L Mendel, MD

## 2024-08-18 NOTE — PROGRESS NOTES
Admit Date: 2024    POD 7 Days Post-Op    Procedure:  Procedure(s):  LAPAROSCOPIC REPAIR OF PERFORATED ANTRAL ULCER WITH GRAM PATCH    Subjective:     Patient has no new complaints.  UGI complete with no evidence of contrast extravasation.  Diet advanced to regular low fiber.    Objective:     Blood pressure (!) 169/80, pulse 66, temperature 97.9 °F (36.6 °C), temperature source Oral, resp. rate 15, height 1.6 m (5' 3\"), weight 45.4 kg (100 lb 1.6 oz), SpO2 99%.    Temp (24hrs), Av °F (36.7 °C), Min:97.7 °F (36.5 °C), Max:98.2 °F (36.8 °C)      Physical Exam:  GENERAL: alert, appears stated age, and cooperative, LUNG: clear to auscultation bilaterally, HEART: regular rate and rhythm, ABDOMEN: soft, ND, wounds c/d/i, EXTREMITIES:  extremities normal, atraumatic, no cyanosis or edema    Labs:   Recent Results (from the past 24 hour(s))   POCT Glucose    Collection Time: 24  1:06 PM   Result Value Ref Range    POC Glucose 135 (H) 65 - 117 mg/dL    Performed by: Corey Ferguson (CON)    POCT Glucose    Collection Time: 24  6:09 PM   Result Value Ref Range    POC Glucose 93 65 - 117 mg/dL    Performed by: Corey Ferguson (TERE)    POCT Glucose    Collection Time: 24 12:27 AM   Result Value Ref Range    POC Glucose 112 65 - 117 mg/dL    Performed by: Abelardo Tomlin RN    Basic Metabolic Panel    Collection Time: 24 12:33 AM   Result Value Ref Range    Sodium 137 136 - 145 mmol/L    Potassium 3.5 3.5 - 5.1 mmol/L    Chloride 108 97 - 108 mmol/L    CO2 26 21 - 32 mmol/L    Anion Gap 3 (L) 5 - 15 mmol/L    Glucose 112 (H) 65 - 100 mg/dL    BUN 19 6 - 20 MG/DL    Creatinine 0.52 (L) 0.70 - 1.30 MG/DL    BUN/Creatinine Ratio 37 (H) 12 - 20      Est, Glom Filt Rate >90 >60 ml/min/1.73m2    Calcium 7.6 (L) 8.5 - 10.1 MG/DL   Magnesium    Collection Time: 24 12:33 AM   Result Value Ref Range    Magnesium 1.8 1.6 - 2.4 mg/dL   Phosphorus    Collection Time:  08/18/24 12:33 AM   Result Value Ref Range    Phosphorus 2.2 (L) 2.6 - 4.7 MG/DL   POCT Glucose    Collection Time: 08/18/24  6:28 AM   Result Value Ref Range    POC Glucose 91 65 - 117 mg/dL    Performed by: Abelardo Tomlin RN        Data Review images and reports reviewed    Assessment:     Principal Problem:    Pneumoperitoneum  Active Problems:    Bowel perforation (HCC)    Goals of care, counseling/discussion    DNR (do not resuscitate) discussion    Frailty    Severe protein-calorie malnutrition (HCC)    Pain at surgical site    Advance care planning  Resolved Problems:    * No resolved hospital problems. *      Plan/Recommendations/Medical Decision Making:     D/c TPN  Remove MARILU  Okay for d/c home from our standpoint.  Should d/c on bid protonix and needs f/u with GI for EGD in 6 weeks, f/u with Dr Hall in office in 3 weeks      Shahriar Walker MD  [facility]

## 2024-08-18 NOTE — PROGRESS NOTES
End of Shift Note    Bedside shift change report given to JOSE DE JESUS Agee (oncoming nurse) by Sada Zhou RN (offgoing nurse).  Report included the following information SBAR, Kardex, ED Summary, OR Summary, Procedure Summary, Intake/Output, MAR, Recent Results, and Cardiac Rhythm NSR    Shift worked:  [id number]     Shift summary and any significant changes:     VSS, no significant events, pt appeared to sleep well over shift.    Pt had a smear BM this AM.    Voiding well, outputs in chart.     No c/o pain. AM labs drawn, IV abx hung, TPN running.     Concerns for physician to address:       Zone phone for oncoming shift:   [phone]         Sada Zhou RN

## 2024-08-18 NOTE — PROGRESS NOTES
Pharmacy Antimicrobial Kinetic Dosing    Indication for Antimicrobials: intra-abd infection 2/2 perforated antral ulcer    Current Regimen of Each Antimicrobial:  Vancomycin - pharmacy to dose; started ; Day # 7  Cefepime 2000 mg IV q12h; started , Day # 8  Metronidzole 500 mg IV q8h, started 11, Day # 8  Fluconazole 200 mg IV q24h; started 811; Day #  8    Previous Antimicrobial Therapy:  N/A    Goal Level: Vancomycin -600    Date Dose & Interval Measured (mcg/mL) Predicted AUC   24 500 mg IV q12h 6.5 408   24 750 mg IV q12h 7.8 395   8/15  1000mg q 12  12 570     Significant Cultures:   N/A    Labs:  Recent Labs     Units 24  0033 24  0438   CREATININE MG/DL 0.52* 0.52*   BUN MG/DL 19 15   WBC K/uL  --  5.4     Temp (24hrs), Av °F (36.7 °C), Min:97.7 °F (36.5 °C), Max:98.2 °F (36.8 °C)    Conditions for Dosing Consideration: Malnutrition    Creatinine Clearance (mL/min): Estimated Creatinine Clearance: 74 mL/min (A) (based on SCr of 0.52 mg/dL (L)).     Impression/Plan:   Vancomycin level of 12 is therapeutic, continue 1000mg q 12 for   Continue Cefepime, metronidazole, fluconazole  Antimicrobial stop date pending     Pharmacy will follow daily and adjust medications as appropriate for renal function and/or serum levels.    Thank you,  Zane Zhou RPH

## 2024-08-19 LAB
ALBUMIN SERPL-MCNC: 2.2 G/DL (ref 3.5–5)
ALBUMIN/GLOB SERPL: 0.8 (ref 1.1–2.2)
ALP SERPL-CCNC: 55 U/L (ref 45–117)
ALT SERPL-CCNC: 21 U/L (ref 12–78)
ANION GAP SERPL CALC-SCNC: 5 MMOL/L (ref 5–15)
AST SERPL-CCNC: 17 U/L (ref 15–37)
BILIRUB DIRECT SERPL-MCNC: 0.1 MG/DL (ref 0–0.2)
BILIRUB SERPL-MCNC: 0.6 MG/DL (ref 0.2–1)
BUN SERPL-MCNC: 16 MG/DL (ref 6–20)
BUN/CREAT SERPL: 27 (ref 12–20)
CALCIUM SERPL-MCNC: 7.9 MG/DL (ref 8.5–10.1)
CHLORIDE SERPL-SCNC: 108 MMOL/L (ref 97–108)
CO2 SERPL-SCNC: 25 MMOL/L (ref 21–32)
CREAT SERPL-MCNC: 0.59 MG/DL (ref 0.7–1.3)
GLOBULIN SER CALC-MCNC: 2.8 G/DL (ref 2–4)
GLUCOSE BLD STRIP.AUTO-MCNC: 103 MG/DL (ref 65–117)
GLUCOSE BLD STRIP.AUTO-MCNC: 174 MG/DL (ref 65–117)
GLUCOSE BLD STRIP.AUTO-MCNC: 93 MG/DL (ref 65–117)
GLUCOSE BLD STRIP.AUTO-MCNC: 94 MG/DL (ref 65–117)
GLUCOSE BLD STRIP.AUTO-MCNC: 98 MG/DL (ref 65–117)
GLUCOSE SERPL-MCNC: 94 MG/DL (ref 65–100)
MAGNESIUM SERPL-MCNC: 1.5 MG/DL (ref 1.6–2.4)
PHOSPHATE SERPL-MCNC: 1.7 MG/DL (ref 2.6–4.7)
POTASSIUM SERPL-SCNC: 3.3 MMOL/L (ref 3.5–5.1)
PROT SERPL-MCNC: 5 G/DL (ref 6.4–8.2)
SERVICE CMNT-IMP: ABNORMAL
SERVICE CMNT-IMP: NORMAL
SODIUM SERPL-SCNC: 138 MMOL/L (ref 136–145)

## 2024-08-19 PROCEDURE — 80076 HEPATIC FUNCTION PANEL: CPT

## 2024-08-19 PROCEDURE — 1100000000 HC RM PRIVATE

## 2024-08-19 PROCEDURE — 6360000002 HC RX W HCPCS: Performed by: INTERNAL MEDICINE

## 2024-08-19 PROCEDURE — 6360000002 HC RX W HCPCS: Performed by: NURSE PRACTITIONER

## 2024-08-19 PROCEDURE — 6370000000 HC RX 637 (ALT 250 FOR IP): Performed by: INTERNAL MEDICINE

## 2024-08-19 PROCEDURE — 97116 GAIT TRAINING THERAPY: CPT | Performed by: PHYSICAL THERAPIST

## 2024-08-19 PROCEDURE — 2580000003 HC RX 258: Performed by: INTERNAL MEDICINE

## 2024-08-19 PROCEDURE — 36415 COLL VENOUS BLD VENIPUNCTURE: CPT

## 2024-08-19 PROCEDURE — 97530 THERAPEUTIC ACTIVITIES: CPT | Performed by: PHYSICAL THERAPIST

## 2024-08-19 PROCEDURE — 84100 ASSAY OF PHOSPHORUS: CPT

## 2024-08-19 PROCEDURE — 6360000002 HC RX W HCPCS: Performed by: STUDENT IN AN ORGANIZED HEALTH CARE EDUCATION/TRAINING PROGRAM

## 2024-08-19 PROCEDURE — 80048 BASIC METABOLIC PNL TOTAL CA: CPT

## 2024-08-19 PROCEDURE — 2580000003 HC RX 258: Performed by: STUDENT IN AN ORGANIZED HEALTH CARE EDUCATION/TRAINING PROGRAM

## 2024-08-19 PROCEDURE — 97535 SELF CARE MNGMENT TRAINING: CPT | Performed by: OCCUPATIONAL THERAPIST

## 2024-08-19 PROCEDURE — 6370000000 HC RX 637 (ALT 250 FOR IP): Performed by: NURSE PRACTITIONER

## 2024-08-19 PROCEDURE — 94640 AIRWAY INHALATION TREATMENT: CPT

## 2024-08-19 PROCEDURE — 82962 GLUCOSE BLOOD TEST: CPT

## 2024-08-19 PROCEDURE — 2580000003 HC RX 258: Performed by: NURSE PRACTITIONER

## 2024-08-19 PROCEDURE — 83735 ASSAY OF MAGNESIUM: CPT

## 2024-08-19 PROCEDURE — 94761 N-INVAS EAR/PLS OXIMETRY MLT: CPT

## 2024-08-19 RX ORDER — GAUZE BANDAGE 2" X 2"
100 BANDAGE TOPICAL DAILY
Status: DISCONTINUED | OUTPATIENT
Start: 2024-08-19 | End: 2024-08-27 | Stop reason: HOSPADM

## 2024-08-19 RX ORDER — CEFDINIR 300 MG/1
300 CAPSULE ORAL EVERY 12 HOURS SCHEDULED
Status: DISCONTINUED | OUTPATIENT
Start: 2024-08-19 | End: 2024-08-22

## 2024-08-19 RX ORDER — METRONIDAZOLE 250 MG/1
500 TABLET ORAL EVERY 8 HOURS SCHEDULED
Status: DISCONTINUED | OUTPATIENT
Start: 2024-08-19 | End: 2024-08-22

## 2024-08-19 RX ORDER — MAGNESIUM SULFATE IN WATER 40 MG/ML
2000 INJECTION, SOLUTION INTRAVENOUS ONCE
Status: COMPLETED | OUTPATIENT
Start: 2024-08-19 | End: 2024-08-19

## 2024-08-19 RX ORDER — POTASSIUM CHLORIDE 750 MG/1
40 TABLET, EXTENDED RELEASE ORAL ONCE
Status: COMPLETED | OUTPATIENT
Start: 2024-08-19 | End: 2024-08-19

## 2024-08-19 RX ORDER — FOLIC ACID 1 MG/1
1 TABLET ORAL DAILY
Status: DISCONTINUED | OUTPATIENT
Start: 2024-08-19 | End: 2024-08-27 | Stop reason: HOSPADM

## 2024-08-19 RX ADMIN — POTASSIUM & SODIUM PHOSPHATES POWDER PACK 280-160-250 MG 500 MG: 280-160-250 PACK at 10:24

## 2024-08-19 RX ADMIN — FOLIC ACID 1 MG: 1 TABLET ORAL at 16:29

## 2024-08-19 RX ADMIN — METRONIDAZOLE 500 MG: 500 INJECTION, SOLUTION INTRAVENOUS at 04:28

## 2024-08-19 RX ADMIN — METRONIDAZOLE 500 MG: 250 TABLET ORAL at 13:19

## 2024-08-19 RX ADMIN — Medication: at 21:57

## 2024-08-19 RX ADMIN — MIRTAZAPINE 30 MG: 15 TABLET, FILM COATED ORAL at 21:56

## 2024-08-19 RX ADMIN — VANCOMYCIN HYDROCHLORIDE 1000 MG: 1 INJECTION, POWDER, LYOPHILIZED, FOR SOLUTION INTRAVENOUS at 00:37

## 2024-08-19 RX ADMIN — ARFORMOTEROL TARTRATE: 15 SOLUTION RESPIRATORY (INHALATION) at 20:36

## 2024-08-19 RX ADMIN — SODIUM CHLORIDE, PRESERVATIVE FREE 10 ML: 5 INJECTION INTRAVENOUS at 10:14

## 2024-08-19 RX ADMIN — PANTOPRAZOLE SODIUM 40 MG: 40 INJECTION, POWDER, FOR SOLUTION INTRAVENOUS at 10:13

## 2024-08-19 RX ADMIN — MAGNESIUM SULFATE HEPTAHYDRATE 2000 MG: 40 INJECTION, SOLUTION INTRAVENOUS at 10:37

## 2024-08-19 RX ADMIN — POTASSIUM CHLORIDE 40 MEQ: 750 TABLET, FILM COATED, EXTENDED RELEASE ORAL at 10:23

## 2024-08-19 RX ADMIN — CEFDINIR 300 MG: 300 CAPSULE ORAL at 13:19

## 2024-08-19 RX ADMIN — DONEPEZIL HYDROCHLORIDE 5 MG: 5 TABLET, FILM COATED ORAL at 21:57

## 2024-08-19 RX ADMIN — CEFEPIME 2000 MG: 2 INJECTION, POWDER, FOR SOLUTION INTRAVENOUS at 05:48

## 2024-08-19 RX ADMIN — ARFORMOTEROL TARTRATE: 15 SOLUTION RESPIRATORY (INHALATION) at 08:53

## 2024-08-19 RX ADMIN — PANTOPRAZOLE SODIUM 40 MG: 40 INJECTION, POWDER, FOR SOLUTION INTRAVENOUS at 22:02

## 2024-08-19 RX ADMIN — FAMOTIDINE 20 MG: 20 TABLET, FILM COATED ORAL at 21:56

## 2024-08-19 RX ADMIN — METRONIDAZOLE 500 MG: 250 TABLET ORAL at 21:56

## 2024-08-19 RX ADMIN — FAMOTIDINE 20 MG: 20 TABLET, FILM COATED ORAL at 10:13

## 2024-08-19 RX ADMIN — ENOXAPARIN SODIUM 30 MG: 100 INJECTION SUBCUTANEOUS at 10:13

## 2024-08-19 RX ADMIN — MONTELUKAST 10 MG: 10 TABLET, FILM COATED ORAL at 21:56

## 2024-08-19 RX ADMIN — Medication: at 11:26

## 2024-08-19 RX ADMIN — CEFDINIR 300 MG: 300 CAPSULE ORAL at 21:56

## 2024-08-19 RX ADMIN — Medication 100 MG: at 16:29

## 2024-08-19 RX ADMIN — SODIUM CHLORIDE, PRESERVATIVE FREE 10 ML: 5 INJECTION INTRAVENOUS at 21:59

## 2024-08-19 ASSESSMENT — PAIN SCALES - GENERAL
PAINLEVEL_OUTOF10: 0

## 2024-08-19 NOTE — PROGRESS NOTES
Admit Date: 2024    POD 8 Days Post-Op    Procedure:  Procedure(s):  LAPAROSCOPIC REPAIR OF PERFORATED ANTRAL ULCER WITH GRAM PATCH    Subjective:     Patient has no new complaints.  UGI complete with no evidence of contrast extravasation.  Diet advanced to regular low fiber.    Objective:     Blood pressure (!) 147/96, pulse 76, temperature 97.5 °F (36.4 °C), resp. rate 20, height 1.6 m (5' 3\"), weight 45.4 kg (100 lb 1.6 oz), SpO2 100%.    Temp (24hrs), Av.8 °F (36.6 °C), Min:97.5 °F (36.4 °C), Max:98.1 °F (36.7 °C)      Physical Exam:  GENERAL: alert, appears stated age, and cooperative, LUNG: normal WOB, HEART: regular rate and rhythm, ABDOMEN: soft, ND, wounds c/d/i, EXTREMITIES:  extremities normal, atraumatic, no cyanosis or edema    Labs:   Recent Results (from the past 24 hour(s))   POCT Glucose    Collection Time: 24 12:04 PM   Result Value Ref Range    POC Glucose 174 (H) 65 - 117 mg/dL    Performed by: Janice Smith PCT    POCT Glucose    Collection Time: 24  5:18 PM   Result Value Ref Range    POC Glucose 137 (H) 65 - 117 mg/dL    Performed by: Janice Smith PCT    POCT Glucose    Collection Time: 24 12:41 AM   Result Value Ref Range    POC Glucose 93 65 - 117 mg/dL    Performed by: PAT Garcia RN    Basic Metabolic Panel    Collection Time: 24  4:20 AM   Result Value Ref Range    Sodium 138 136 - 145 mmol/L    Potassium 3.3 (L) 3.5 - 5.1 mmol/L    Chloride 108 97 - 108 mmol/L    CO2 25 21 - 32 mmol/L    Anion Gap 5 5 - 15 mmol/L    Glucose 94 65 - 100 mg/dL    BUN 16 6 - 20 MG/DL    Creatinine 0.59 (L) 0.70 - 1.30 MG/DL    BUN/Creatinine Ratio 27 (H) 12 - 20      Est, Glom Filt Rate >90 >60 ml/min/1.73m2    Calcium 7.9 (L) 8.5 - 10.1 MG/DL   Magnesium    Collection Time: 24  4:20 AM   Result Value Ref Range    Magnesium 1.5 (L) 1.6 - 2.4 mg/dL   Phosphorus    Collection Time: 24  4:20 AM   Result Value Ref Range    Phosphorus 1.7 (L) 2.6 - 4.7

## 2024-08-19 NOTE — PROGRESS NOTES
End of Shift Note    Bedside shift change report given to  (oncoming nurse) by Ann Orozco RN (offgoing nurse).  Report included the following information SBAR    Shift worked:  7a7p       Shift summary and any significant changes:     Mr. Bermudez is alert and oriented. He is on room air and ambulates out of bed with 1 assist with a walker. He has no complains of pain. He ambulated out in the hallway twice and has been up in the chair a few times.      Concerns for physician to address:  N/A     [facility] phone for oncoming shift:   [phone]       Activity:     Number times ambulated in hallways past shift: 2  Number of times OOB to chair past shift: 3    Cardiac:   Cardiac Monitoring: Yes           Access:  Current line(s): PIV     Genitourinary:   Urinary status: voiding    Respiratory:      Chronic home O2 use?: NO  Incentive spirometer at bedside: YES       GI:     Current diet:  ADULT DIET; Regular; Low Fiber  Passing flatus: YES  Tolerating current diet: YES       Pain Management:   Patient states pain is manageable on current regimen: YES    Skin:     Interventions: PT/OT consult    Patient Safety:  Fall Score:    Interventions: bed/chair alarm, gripper socks, pt to call before getting OOB, and stay with me (per policy)       Length of Stay:  Expected LOS: 9  Actual LOS: 8      Ann Orozco RN

## 2024-08-19 NOTE — DISCHARGE INSTRUCTIONS
Open Bowel Surgery: What to Expect at Home  Your Recovery     You are likely to have pain that comes and goes for the next few days after bowel surgery. You may have bowel cramps, and your cut (incision) may hurt. You may also feel like you have the flu. You may feel tired and nauseated. This is common. You should feel better after a week and will probably be back to normal in 2 to 3 weeks.  This care sheet gives you a general idea about how long it will take for you to recover. But each person recovers at a different pace. Follow the steps below to get better as quickly as possible.  How can you care for yourself at home?  Activity    Rest when you feel tired. Getting enough sleep will help you recover.     Try to walk each day. Start by walking a little more than you did the day before. Bit by bit, increase the amount you walk. Walking boosts blood flow and helps prevent pneumonia and constipation.     Avoid strenuous activities, such as biking, jogging, weight lifting, or aerobic exercise, until your doctor says it is okay.     Avoid lifting anything that would make you strain. This may include heavy grocery bags and milk containers, a heavy briefcase or backpack, cat litter or dog food bags, a vacuum , or a child.     Ask your doctor when you can drive again.     You will probably need to take 3 to 4 weeks off from work. It depends on the type of work you do and how you feel. You may need to take off 4 to 6 weeks if you lift heavy objects in your job.     You may shower 24 to 48 hours after surgery, if your doctor says it is okay. Pat the cut (incision) dry. Do not take a bath for the first 2 weeks, or until your doctor tells you it is okay.     Ask your doctor when it is okay for you to have sex.   Diet    You may not have much appetite after the surgery. But try to eat healthy foods. Your doctor will tell you about any foods you should not eat.     Eat a low-fiber diet for several weeks after

## 2024-08-19 NOTE — PROGRESS NOTES
End of Shift Note    Bedside shift change report given to JOSE DE JESUS Juarez (oncoming nurse) by Radha Alvarenga RN (offgoing nurse).  Report included the following information SBAR    Shift worked:  [id number]     Shift summary and any significant changes:    Pt had a large loose BM this shift  Denies pain  VSS, voiding well via purewick  Labs extracted  Uneventful shift   Concerns for physician to address:      Zone phone for oncoming shift:               Radha Alvarenga RN

## 2024-08-19 NOTE — PLAN OF CARE
Problem: Physical Therapy - Adult  Goal: By Discharge: Performs mobility at highest level of function for planned discharge setting.  See evaluation for individualized goals.  Description: FUNCTIONAL STATUS PRIOR TO ADMISSION: Patient was modified independent using a rolling walker and single point cane for functional mobility depending on energy level. Patient reports indep with ADLs. 2L NC available as needed.    HOME SUPPORT PRIOR TO ADMISSION: The patient lived alone with daughter and family friend to provide assistance. Daughter has arranged for family friend to check on patient a couple times/week, assists with grocery shopping, laundry, etc depending on need at time.     Physical Therapy Goals  Initiated 8/13/2024  1.  Patient will move from supine to sit and sit to supine, scoot up and down, and roll side to side in bed with modified independence within 7 day(s).    2.  Patient will perform sit to stand with modified independence within 7 day(s).  3.  Patient will transfer from bed to chair and chair to bed with modified independence using the least restrictive device within 7 day(s).  4.  Patient will ambulate with modified independence for 50 feet with the least restrictive device within 7 day(s).   5.  Patient will ascend/descend 4 stairs with handrail(s) with contact guard assist within 7 day(s).   Outcome: Progressing   PHYSICAL THERAPY TREATMENT    Patient: Anuel Bermudez (78 y.o. male)  Date: 8/19/2024  Diagnosis: Pneumoperitoneum [K66.8]  Elevated lipase [R74.8]  Bowel perforation (HCC) [K63.1] Pneumoperitoneum  Procedure(s) (LRB):  LAPAROSCOPIC REPAIR OF PERFORATED ANTRAL ULCER WITH GRAM PATCH (N/A) 8 Days Post-Op  Precautions: Fall Risk                      ASSESSMENT:  Patient continues to benefit from skilled PT services and is progressing towards goals. Patient seen PT today and continues to demonstrate steady improvements in overall mobility, balance and endurance. Patient able to complete  99% post ambulation  After treatment:   Patient left in no apparent distress sitting up in chair, Call bell within reach, Bed/ chair alarm activated, and Caregiver / family present      COMMUNICATION/EDUCATION:   The patient's plan of care was discussed with: occupational therapist, registered nurse, and            Eleni Echeverria, PT  Minutes: 30

## 2024-08-19 NOTE — PLAN OF CARE
Problem: Occupational Therapy - Adult  Goal: By Discharge: Performs self-care activities at highest level of function for planned discharge setting.  See evaluation for individualized goals.  Description: FUNCTIONAL STATUS PRIOR TO ADMISSION:  performed ADLS on his own, drives to appointments  Receives Help From: Family, Friend(s), ADL Assistance: Independent,  ,  ,  ,  ,  , Homemaking Assistance: Needs assistance (Family friend helps with laundry, deep cleaning, shopping/grocery management), Ambulation Assistance: Independent (With Rw vs cane depending on energy level), Transfer Assistance: Independent, Active : Yes     HOME SUPPORT: Patient lived alone and has a caregiver 2-3x wk for a few hours. Daughter is supportive and works from home.    Occupational Therapy Goals:  Initiated 8/13/2024  1.  Patient will perform grooming with Modified [place] within 7 day(s).  2.  Patient will perform upper body dressing and lower body dressing with Modified [place] within 7 day(s).  3.  Patient will perform toileting with Modified [place] within 7 day(s).  4.  Patient will perform toilet transfers with Modified [place]  within 7 day(s).  5.  Patient will perform bathing with Modified [place] within 7 day(s).      Outcome: Progressing    OCCUPATIONAL THERAPY TREATMENT  Patient: Anuel Bermudez (78 y.o. male)  Date: 8/19/2024  Primary Diagnosis: Pneumoperitoneum [K66.8]  Elevated lipase [R74.8]  Bowel perforation (HCC) [K63.1]  Procedure(s) (LRB):  LAPAROSCOPIC REPAIR OF PERFORATED ANTRAL ULCER WITH GRAM PATCH (N/A) 8 Days Post-Op   Precautions: Fall Risk                Chart, occupational therapy assessment, plan of care, and goals were reviewed.    ASSESSMENT  Patient continues to benefit from skilled OT services and is progressing towards goals. Pt was ambulating prior to initiation of OT treatment session and doing well.  He required rest break, prior to ambulating into bathroom for toilet

## 2024-08-19 NOTE — PROGRESS NOTES
Comprehensive Nutrition Assessment    Type and Reason for Visit:  Reassess    Nutrition Recommendations/Plan:   Continue current diet as tolerated  Pt with refeeding risk, low lytes - rec replete K, mag, phos aggressively, consider +MVI, thiamine, folic acid  Monitor and record PO intakes and Bms in I/Os     Malnutrition Assessment:  Malnutrition Status:  Severe malnutrition (08/12/24 1444)    Context:  Chronic Illness     Findings of the 6 clinical characteristics of malnutrition:  Energy Intake:  75% or less estimated energy requirements for 1 month or longer  Weight Loss:  Greater than 10% over 6 months     Body Fat Loss:  Severe body fat loss Fat Overlying Ribs, Triceps   Muscle Mass Loss:  Severe muscle mass loss Clavicles (pectoralis & deltoids), Thigh (quadriceps)  Fluid Accumulation:  No significant fluid accumulation     Strength:  Not Performed    Nutrition Assessment:    Follow up. 8/17 diet advanced to regular/low fiber, TPN weaned. Good intakes, >50% of dinner last night. Plan to continue diet as tolerated. Noted electrolyte repletions for low K, mag, phos today - consider +MVI, thiamine, folic acid and agressive lyte repletion with pt at risk for refeeding. Labs: Na 138, K 3.3, BUN 16, Creat 0.59, Gluc 174, Phos 1.7, Mag 1.5. Meds: famotidine, insulin lispro, magnesium sulfate, mirtazapine, pantoprazole, potassium and sodium phosphates, KCl    Nutrition Related Findings:    No n/v, d/c, or problems chewing/swallowing. Trace b/l LE edema. BM 8/19. Wound Type: Surgical Incision       Current Nutrition Intake & Therapies:    Average Meal Intake: 51-75%  Average Supplements Intake: None Ordered  ADULT DIET; Regular; Low Fiber    Anthropometric Measures:  Height: 160 cm (5' 2.99\")  Ideal Body Weight (IBW): 124 lbs (56 kg)    Current Body Weight: 45.4 kg (100 lb 1.4 oz), 80.7 % IBW. Weight Source: Bed Scale  Current BMI (kg/m2): 17.7  Usual Body Weight: 47.6 kg (105 lb)  % Weight Change (Calculated):

## 2024-08-19 NOTE — PROGRESS NOTES
Hospitalist Progress Note    NAME:   Anuel Bermudez   : 1945   MRN: [id number]     Date/Time: 2024 1:41 PM  Patient PCP: Bonifacio Bassett MD    Estimated discharge date:   Barriers: placement, needs auth, improvement of electrolytes      Assessment / Plan:    Pneumoperitoneum d/t perforated peptic/antral ulcer s/p laparoscopic Adarsh patch repair by Gen Surgery  CT demonstrated pneumoperitoneum and thickening in the gastric antrum    -Status post laparoscopic repair of perforated antral ulcer.  UGI completed with no evidence of contrast.  -General surgery recommended soft diet for 1 more week, Protonix twice daily and outpatient follow-up with GI for EGD in 6 weeks  -Will stop empiric IV antibiotics and changed to p.o. antibiotics including cefdinir and Flagyl per surgery recommendations.  Per surgery, patient will need antibiotics p.o. until .  -PT OT recommended IPR placement.  Case management consulted.  -TPN has been weaned off    Protein calorie malnutrition  -Encourage oral intake      Hypomagnesemia  Hypokalemia  Hypophosphatemia  -Echo electrolytes replaced.  Recheck in a.m. tomorrow.  Will add thiamine and folic acid.       Chronic hypoxic respiratory failure  On as needed home 1-2 L nasal cannula  Continue bronchodilators     Hx of NSCLC, squamous cell carcinoma (f/b VCI)  S/p laser to L endobronchial lung lesion, s/p chemo  Started on maintenance Keytruda 24         Medical Decision Making:   I personally reviewed labs: BMP,  CBC  I personally reviewed imaging:  Toxic drug monitoring:   Discussed case with:       Code Status: Partial Code- intubation ok otherwise DNR per pt with palliative today  DVT Prophylaxis: Lovenox  GI Prophylaxis:PPI    Subjective:     Chief Complaint / Reason for Physician Visit:  Abdominal pain is improved.  No nausea vomiting.  No fever.  Overnight events noted      Objective:     VITALS:   Last 24hrs VS reviewed since prior progress note.  Most recent are:  Patient Vitals for the past 24 hrs:   BP Temp Temp src Pulse Resp SpO2 Height   08/19/24 1253 -- -- -- -- -- -- 1.6 m (5' 2.99\")   08/19/24 1225 -- -- -- 100 -- 100 % --   08/19/24 1143 138/73 97.9 °F (36.6 °C) Oral 89 16 100 % --   08/19/24 0853 -- -- -- 76 20 100 % --   08/19/24 0426 (!) 147/96 97.5 °F (36.4 °C) -- 74 16 99 % --   08/18/24 2037 -- -- -- 82 20 96 % --   08/18/24 2009 -- -- -- 87 20 97 % --   08/18/24 1941 (!) 151/113 97.9 °F (36.6 °C) Oral 80 18 100 % --   08/18/24 1718 131/64 98.1 °F (36.7 °C) Oral 81 16 95 % --         Intake/Output Summary (Last 24 hours) at 8/19/2024 1341  Last data filed at 8/19/2024 0532  Gross per 24 hour   Intake 150 ml   Output 950 ml   Net -800 ml        I had a face to face encounter and independently examined this patient on 8/19/2024, as outlined below:  PHYSICAL EXAM:  General: Alert, cooperative  EENT:  EOMI. Anicteric sclerae.  Resp:  CTA bilaterally, no wheezing or rales.  No accessory muscle use  CV:  Regular  rate,  No edema  GI:  Soft, Non distended, Non tender.   Neurologic:  Alert and oriented X 3, normal speech,   Skin:  No rashes.  No jaundice    Reviewed most current lab test results and cultures  YES  Reviewed most current radiology test results   YES  Review and summation of old records today    NO  Reviewed patient's current orders and MAR    YES  PMH/SH reviewed - no change compared to H&P    Procedures: see electronic medical records for all procedures/Xrays and details which were not copied into this note but were reviewed prior to creation of Plan.      LABS:  I reviewed today's most current labs and imaging studies.  Pertinent labs include:  No results for input(s): \"WBC\", \"HGB\", \"HCT\", \"PLT\" in the last 72 hours.    Recent Labs     08/18/24  0033 08/19/24  0420    138   K 3.5 3.3*    108   CO2 26 25   GLUCOSE 112* 94   BUN 19 16   CREATININE 0.52* 0.59*   CALCIUM 7.6* 7.9*   MG 1.8 1.5*   PHOS 2.2* 1.7*   BILITOT  --

## 2024-08-20 LAB
ANION GAP SERPL CALC-SCNC: 5 MMOL/L (ref 5–15)
BUN SERPL-MCNC: 16 MG/DL (ref 6–20)
BUN/CREAT SERPL: 25 (ref 12–20)
CALCIUM SERPL-MCNC: 8.1 MG/DL (ref 8.5–10.1)
CHLORIDE SERPL-SCNC: 105 MMOL/L (ref 97–108)
CO2 SERPL-SCNC: 25 MMOL/L (ref 21–32)
CREAT SERPL-MCNC: 0.64 MG/DL (ref 0.7–1.3)
GLUCOSE BLD STRIP.AUTO-MCNC: 114 MG/DL (ref 65–117)
GLUCOSE BLD STRIP.AUTO-MCNC: 117 MG/DL (ref 65–117)
GLUCOSE BLD STRIP.AUTO-MCNC: 89 MG/DL (ref 65–117)
GLUCOSE BLD STRIP.AUTO-MCNC: 93 MG/DL (ref 65–117)
GLUCOSE SERPL-MCNC: 95 MG/DL (ref 65–100)
MAGNESIUM SERPL-MCNC: 1.8 MG/DL (ref 1.6–2.4)
PHOSPHATE SERPL-MCNC: 2.1 MG/DL (ref 2.6–4.7)
POTASSIUM SERPL-SCNC: 3.8 MMOL/L (ref 3.5–5.1)
SERVICE CMNT-IMP: NORMAL
SODIUM SERPL-SCNC: 135 MMOL/L (ref 136–145)

## 2024-08-20 PROCEDURE — 6370000000 HC RX 637 (ALT 250 FOR IP): Performed by: INTERNAL MEDICINE

## 2024-08-20 PROCEDURE — 84100 ASSAY OF PHOSPHORUS: CPT

## 2024-08-20 PROCEDURE — 1100000000 HC RM PRIVATE

## 2024-08-20 PROCEDURE — 2580000003 HC RX 258: Performed by: INTERNAL MEDICINE

## 2024-08-20 PROCEDURE — 83735 ASSAY OF MAGNESIUM: CPT

## 2024-08-20 PROCEDURE — 97535 SELF CARE MNGMENT TRAINING: CPT

## 2024-08-20 PROCEDURE — 36415 COLL VENOUS BLD VENIPUNCTURE: CPT

## 2024-08-20 PROCEDURE — 97116 GAIT TRAINING THERAPY: CPT

## 2024-08-20 PROCEDURE — 2580000003 HC RX 258: Performed by: NURSE PRACTITIONER

## 2024-08-20 PROCEDURE — 94761 N-INVAS EAR/PLS OXIMETRY MLT: CPT

## 2024-08-20 PROCEDURE — 80048 BASIC METABOLIC PNL TOTAL CA: CPT

## 2024-08-20 PROCEDURE — 6360000002 HC RX W HCPCS: Performed by: NURSE PRACTITIONER

## 2024-08-20 PROCEDURE — 6370000000 HC RX 637 (ALT 250 FOR IP): Performed by: NURSE PRACTITIONER

## 2024-08-20 PROCEDURE — 94640 AIRWAY INHALATION TREATMENT: CPT

## 2024-08-20 PROCEDURE — 6360000002 HC RX W HCPCS: Performed by: INTERNAL MEDICINE

## 2024-08-20 PROCEDURE — 82962 GLUCOSE BLOOD TEST: CPT

## 2024-08-20 RX ADMIN — MIRTAZAPINE 30 MG: 15 TABLET, FILM COATED ORAL at 21:00

## 2024-08-20 RX ADMIN — METRONIDAZOLE 500 MG: 250 TABLET ORAL at 13:40

## 2024-08-20 RX ADMIN — SODIUM CHLORIDE, PRESERVATIVE FREE 10 ML: 5 INJECTION INTRAVENOUS at 08:31

## 2024-08-20 RX ADMIN — POTASSIUM & SODIUM PHOSPHATES POWDER PACK 280-160-250 MG 500 MG: 280-160-250 PACK at 15:19

## 2024-08-20 RX ADMIN — METRONIDAZOLE 500 MG: 250 TABLET ORAL at 05:59

## 2024-08-20 RX ADMIN — ENOXAPARIN SODIUM 30 MG: 100 INJECTION SUBCUTANEOUS at 08:36

## 2024-08-20 RX ADMIN — FAMOTIDINE 20 MG: 20 TABLET, FILM COATED ORAL at 20:59

## 2024-08-20 RX ADMIN — MONTELUKAST 10 MG: 10 TABLET, FILM COATED ORAL at 21:00

## 2024-08-20 RX ADMIN — FAMOTIDINE 20 MG: 20 TABLET, FILM COATED ORAL at 08:37

## 2024-08-20 RX ADMIN — CEFDINIR 300 MG: 300 CAPSULE ORAL at 20:58

## 2024-08-20 RX ADMIN — Medication: at 08:40

## 2024-08-20 RX ADMIN — FOLIC ACID 1 MG: 1 TABLET ORAL at 08:37

## 2024-08-20 RX ADMIN — PANTOPRAZOLE SODIUM 40 MG: 40 INJECTION, POWDER, FOR SOLUTION INTRAVENOUS at 08:31

## 2024-08-20 RX ADMIN — PANTOPRAZOLE SODIUM 40 MG: 40 INJECTION, POWDER, FOR SOLUTION INTRAVENOUS at 21:02

## 2024-08-20 RX ADMIN — Medication 100 MG: at 08:37

## 2024-08-20 RX ADMIN — Medication: at 21:06

## 2024-08-20 RX ADMIN — SODIUM CHLORIDE, PRESERVATIVE FREE 10 ML: 5 INJECTION INTRAVENOUS at 21:02

## 2024-08-20 RX ADMIN — ARFORMOTEROL TARTRATE: 15 SOLUTION RESPIRATORY (INHALATION) at 08:18

## 2024-08-20 RX ADMIN — CEFDINIR 300 MG: 300 CAPSULE ORAL at 08:37

## 2024-08-20 RX ADMIN — POTASSIUM & SODIUM PHOSPHATES POWDER PACK 280-160-250 MG 500 MG: 280-160-250 PACK at 09:44

## 2024-08-20 RX ADMIN — METRONIDAZOLE 500 MG: 250 TABLET ORAL at 22:28

## 2024-08-20 RX ADMIN — DONEPEZIL HYDROCHLORIDE 5 MG: 5 TABLET, FILM COATED ORAL at 21:01

## 2024-08-20 NOTE — PROGRESS NOTES
End of Shift Note    Bedside shift change report given to JOSE DE JESUS Rucker (oncoming nurse) by Mckayla Pimentel LPN (offgoing nurse).  Report included the following information SBAR, Intake/Output, MAR, and Recent Results    Shift worked:  7a-7p     Shift summary and any significant changes:     Central line removed per order. Up in chair x 3 today for meals. Given scheduled antibiotics and electrolyte replacement per order. No complaints of pain. Worked with PT/OT and ambulated in halls. Tolerating diet well. Waiting on placement.     Concerns for physician to address:  none     Zone phone for oncoming shift:   [phone]       Mckayla Pimentel LPN

## 2024-08-20 NOTE — PLAN OF CARE
Problem: Occupational Therapy - Adult  Goal: By Discharge: Performs self-care activities at highest level of function for planned discharge setting.  See evaluation for individualized goals.  Description: FUNCTIONAL STATUS PRIOR TO ADMISSION:  performed ADLS on his own, drives to appointments  Receives Help From: Family, Friend(s), ADL Assistance: Independent,  ,  ,  ,  ,  , Homemaking Assistance: Needs assistance (Family friend helps with laundry, deep cleaning, shopping/grocery management), Ambulation Assistance: Independent (With Rw vs cane depending on energy level), Transfer Assistance: Independent, Active : Yes     HOME SUPPORT: Patient lived alone and has a caregiver 2-3x wk for a few hours. Daughter is supportive and works from home.    Occupational Therapy Goals:  All goals reviewed on 8/20/24 and all remain appropriate at this time.  Initiated 8/13/2024  1.  Patient will perform grooming with Modified [place] within 7 day(s).  2.  Patient will perform upper body dressing and lower body dressing with Modified [place] within 7 day(s).  3.  Patient will perform toileting with Modified [place] within 7 day(s).  4.  Patient will perform toilet transfers with Modified [place]  within 7 day(s).  5.  Patient will perform bathing with Modified [place] within 7 day(s).      Outcome: Progressing    OCCUPATIONAL THERAPY TREATMENT: WEEKLY REASSESSMENT    Patient: Anuel Bermudez (78 y.o. male)  Date: 8/20/2024  Primary Diagnosis: Pneumoperitoneum [K66.8]  Elevated lipase [R74.8]  Bowel perforation (HCC) [K63.1]  Procedure(s) (LRB):  LAPAROSCOPIC REPAIR OF PERFORATED ANTRAL ULCER WITH GRAM PATCH (N/A) 9 Days Post-Op   Precautions: Fall Risk                Chart, occupational therapy assessment, plan of care, and goals were reviewed.    ASSESSMENT  Patient continues to benefit from skilled OT services and is slowly progressing towards goals. This patient is currently D to s/u for simple

## 2024-08-20 NOTE — PROGRESS NOTES
Hospitalist Progress Note    NAME:   Anuel Bermudez   : 1945   MRN: [id number]     Date/Time: 2024 2:01 PM  Patient PCP: Bonifacio Bassett MD    Estimated discharge date:   Barriers: placement, needs auth, improvement of electrolytes      Assessment / Plan:    Pneumoperitoneum d/t perforated peptic/antral ulcer s/p laparoscopic Adarsh patch repair by Gen Surgery  CT demonstrated pneumoperitoneum and thickening in the gastric antrum    -Status post laparoscopic repair of perforated antral ulcer.  UGI completed with no evidence of contrast.  -General surgery recommended soft diet for 1 more week, Protonix twice daily and outpatient follow-up with GI for EGD in 6 weeks  -S/p IV antibiotics and currently on cefdinir and Flagyl.  Continue p.o. antibiotics until .  -TPN has been weaned off.  Patient is able to tolerate soft diet  -Case management notified about placement.  Peer to peer done and left message to Aleah Odonnell and waiting on callback.  -Patient is medically clear for discharge and waiting on placement.    Protein calorie malnutrition  -Encourage oral intake      Hypomagnesemia  Hypokalemia  Hypophosphatemia  -Phosphorus is low and replaced today.       Chronic hypoxic respiratory failure  On as needed home 1-2 L nasal cannula  Continue bronchodilators     Hx of NSCLC, squamous cell carcinoma (f/b VCI)  S/p laser to L endobronchial lung lesion, s/p chemo  Started on maintenance Keytruda 24         Medical Decision Making:   I personally reviewed labs: BMP,  CBC  I personally reviewed imaging:  Toxic drug monitoring:   Discussed case with:       Code Status: Partial Code- intubation ok otherwise DNR per pt with palliative today  DVT Prophylaxis: Lovenox  GI Prophylaxis:PPI    Subjective:     Chief Complaint / Reason for Physician Visit:  Does not report any abdominal pain, nausea vomiting  Overnight events noted      Objective:     VITALS:   Last 24hrs VS reviewed since  Alert and oriented to person, place and time

## 2024-08-20 NOTE — PLAN OF CARE
[x]  2 []  3  []  4     Raw Score: 17/24                            Cutoff score ?171,2,3 had higher odds of discharging home with home health or need of SNF/IPR.    1. Leticia Blackburn, Flores Smart, Reji Aguirre, Melina Álvarez, Obinna Rea, Adi Blackburn.  Validity of the AM-PAC “6-Clicks” Inpatient Daily Activity and Basic Mobility Short Forms. Physical Therapy Mar 2014, 94 (1) 379-391; DOI: 10.2522/ptj.[id number]  2. Quang CERVANTES, Toma JOHN, Elsi JOHN, Zachary JOHN. Association of AM-PAC \"6-Clicks\" Basic Mobility and Daily Activity Scores With Discharge Destination. Phys Ther. 2021 Apr 4;101(4):bukg656. doi: 10.1093/ptj/tpvm122. PMID: 44884515.  3. Leidy JOHN, Mark D, Eva S, Jose K, Nohemi S. Activity Measure for Post-Acute Care \"6-Clicks\" Basic Mobility Scores Predict Discharge Destination After Acute Care Hospitalization in Select Patient Groups: A Retrospective, Observational Study. Arch Rehabil Res Clin Transl. 2022 Jul 16;4(3):738433. doi: 10.1016/j.arrct.2022.973493. PMID: 77551000; PMCID: [id number].  4. Alexey HILLIARD, Danielle S, Dedra W, Yue P. AM-PAC Short Forms Manual 4.0. Revised 2/2020.                                                                                                                                                                                                                              Pain Rating:  No c/o pain  Activity Tolerance:   Good, observed shortness of breath on exertion, and SpO2 stable on room air    After treatment:   Patient left in no apparent distress sitting up in chair, Call bell within reach, Bed/ chair alarm activated, and legs elevated    COMMUNICATION/EDUCATION:   The patient's plan of care was discussed with: registered nurse    Patient Education  Education Given To: Patient  Education Provided: Role of Therapy;Plan of Care;Fall Prevention Strategies  Education Method: Verbal  Barriers to Learning: None  Education Outcome: Verbalized  understanding;Continued education needed    Thank you for this referral.  Ashley Kent, PT  Minutes: 17

## 2024-08-20 NOTE — CARE COORDINATION
Transition of Care Plan:     RUR: 15% \"medium risk\"  Prior Level of Functioning: Independent with ADL's   Disposition: IPR vs. SNF   If SNF or IPR: Date FOC offered: 8/13  Date FOC received: 8/15  Accepting facility: N/A  Date authorization started with reference number: N/A  Date authorization received and expires: N/A  Follow up appointments: Defer to facility  DME needed: Defer to facility   Transportation at discharge: Pt daughter to transport    IM/IMM Medicare/ letter given: 2nd IM needed prior to d/c   Is patient a [place] and connected with [facility]? No  Caregiver Contact: Deja Bowden (child), [phone]  Discharge Caregiver contacted prior to discharge? To be contacted   Care Conference needed? Not at this time   Barriers to discharge: Medical Clearance and insurance auth        Updated Note 12:20pm: P2P requested by pts insurance. CM has geovany JENNINGS.     P2P Information:   P2P Requested for Anuel Bermudez Must be done by 1430 today Call [phone] ext [phone] ( Aleah Duran). Leave name , Cell and best time to call.     Initial Note: Chart Reviewed: Pt pending medical clearance and insurance auth for d/c. Pt has been accepted to [facility] for IPR intervention. Insurance auth currently pending. CM spoke with pts daughter to discuss a back up plan if insurance auth denies IPR. [facility] and [facility] are pts daughters top SNF choices. She stated she would continue to review SNF list for more choices. CM will continue to follow.     MISAEL Baldwin,CAMRYN  [phone]

## 2024-08-21 LAB
ALBUMIN SERPL-MCNC: 2.3 G/DL (ref 3.5–5)
ALBUMIN/GLOB SERPL: 0.8 (ref 1.1–2.2)
ALP SERPL-CCNC: 64 U/L (ref 45–117)
ALT SERPL-CCNC: 23 U/L (ref 12–78)
AST SERPL-CCNC: 14 U/L (ref 15–37)
BILIRUB DIRECT SERPL-MCNC: 0.1 MG/DL (ref 0–0.2)
BILIRUB SERPL-MCNC: 0.3 MG/DL (ref 0.2–1)
GLOBULIN SER CALC-MCNC: 2.9 G/DL (ref 2–4)
GLUCOSE BLD STRIP.AUTO-MCNC: 103 MG/DL (ref 65–117)
GLUCOSE BLD STRIP.AUTO-MCNC: 116 MG/DL (ref 65–117)
GLUCOSE BLD STRIP.AUTO-MCNC: 116 MG/DL (ref 65–117)
GLUCOSE BLD STRIP.AUTO-MCNC: 86 MG/DL (ref 65–117)
GLUCOSE BLD STRIP.AUTO-MCNC: 92 MG/DL (ref 65–117)
PROT SERPL-MCNC: 5.2 G/DL (ref 6.4–8.2)
SERVICE CMNT-IMP: NORMAL

## 2024-08-21 PROCEDURE — 97535 SELF CARE MNGMENT TRAINING: CPT

## 2024-08-21 PROCEDURE — 97116 GAIT TRAINING THERAPY: CPT | Performed by: PHYSICAL THERAPIST

## 2024-08-21 PROCEDURE — 2580000003 HC RX 258: Performed by: INTERNAL MEDICINE

## 2024-08-21 PROCEDURE — 82962 GLUCOSE BLOOD TEST: CPT

## 2024-08-21 PROCEDURE — 6370000000 HC RX 637 (ALT 250 FOR IP): Performed by: INTERNAL MEDICINE

## 2024-08-21 PROCEDURE — 2580000003 HC RX 258: Performed by: NURSE PRACTITIONER

## 2024-08-21 PROCEDURE — 97530 THERAPEUTIC ACTIVITIES: CPT | Performed by: PHYSICAL THERAPIST

## 2024-08-21 PROCEDURE — 1100000000 HC RM PRIVATE

## 2024-08-21 PROCEDURE — 36415 COLL VENOUS BLD VENIPUNCTURE: CPT

## 2024-08-21 PROCEDURE — 6360000002 HC RX W HCPCS: Performed by: INTERNAL MEDICINE

## 2024-08-21 PROCEDURE — 80076 HEPATIC FUNCTION PANEL: CPT

## 2024-08-21 PROCEDURE — 6370000000 HC RX 637 (ALT 250 FOR IP): Performed by: NURSE PRACTITIONER

## 2024-08-21 PROCEDURE — 6360000002 HC RX W HCPCS: Performed by: NURSE PRACTITIONER

## 2024-08-21 RX ADMIN — Medication 100 MG: at 07:58

## 2024-08-21 RX ADMIN — SODIUM CHLORIDE, PRESERVATIVE FREE 10 ML: 5 INJECTION INTRAVENOUS at 20:56

## 2024-08-21 RX ADMIN — FAMOTIDINE 20 MG: 20 TABLET, FILM COATED ORAL at 20:56

## 2024-08-21 RX ADMIN — Medication: at 20:57

## 2024-08-21 RX ADMIN — METRONIDAZOLE 500 MG: 250 TABLET ORAL at 21:55

## 2024-08-21 RX ADMIN — FOLIC ACID 1 MG: 1 TABLET ORAL at 07:58

## 2024-08-21 RX ADMIN — SODIUM CHLORIDE, PRESERVATIVE FREE 10 ML: 5 INJECTION INTRAVENOUS at 07:59

## 2024-08-21 RX ADMIN — METRONIDAZOLE 500 MG: 250 TABLET ORAL at 15:07

## 2024-08-21 RX ADMIN — ENOXAPARIN SODIUM 30 MG: 100 INJECTION SUBCUTANEOUS at 07:58

## 2024-08-21 RX ADMIN — MONTELUKAST 10 MG: 10 TABLET, FILM COATED ORAL at 20:56

## 2024-08-21 RX ADMIN — PANTOPRAZOLE SODIUM 40 MG: 40 INJECTION, POWDER, FOR SOLUTION INTRAVENOUS at 20:57

## 2024-08-21 RX ADMIN — METRONIDAZOLE 500 MG: 250 TABLET ORAL at 06:10

## 2024-08-21 RX ADMIN — FAMOTIDINE 20 MG: 20 TABLET, FILM COATED ORAL at 07:58

## 2024-08-21 RX ADMIN — PANTOPRAZOLE SODIUM 40 MG: 40 INJECTION, POWDER, FOR SOLUTION INTRAVENOUS at 07:58

## 2024-08-21 RX ADMIN — DONEPEZIL HYDROCHLORIDE 5 MG: 5 TABLET, FILM COATED ORAL at 20:56

## 2024-08-21 RX ADMIN — CEFDINIR 300 MG: 300 CAPSULE ORAL at 20:59

## 2024-08-21 RX ADMIN — MIRTAZAPINE 30 MG: 15 TABLET, FILM COATED ORAL at 20:55

## 2024-08-21 RX ADMIN — Medication: at 07:59

## 2024-08-21 RX ADMIN — CEFDINIR 300 MG: 300 CAPSULE ORAL at 07:58

## 2024-08-21 ASSESSMENT — PAIN SCALES - GENERAL: PAINLEVEL_OUTOF10: 1

## 2024-08-21 NOTE — PROGRESS NOTES
End of Shift Note    Bedside shift change report given to Jennifer DELA CRUZ (oncoming nurse) by Aurora Gardner RN (offgoing nurse).  Report included the following information SBAR    Shift worked:  [id number]     Shift summary and any significant changes:     No significant or acute changes during shift. Patient remains A&O x4. Ambulated with PT in jensen. Blood sugar WNL throughout the shift. No c/o pain.      Concerns for physician to address:  Medical clearance and placement after discharge     Zone phone for oncoming shift:          Activity:     Number times ambulated in hallways past shift: 1  Number of times OOB to chair past shift: 1    Cardiac:   Cardiac Monitoring: No           Access:  Current line(s): PIV     Genitourinary:   Urinary status: voiding and external catheter    Respiratory:      Chronic home O2 use?: N/A  Incentive spirometer at bedside: N/A       GI:     Current diet:  ADULT DIET; Regular; Low Fiber  Passing flatus: YES  Tolerating current diet: YES       Pain Management:   Patient states pain is manageable on current regimen: YES    Skin:     Interventions: turn team, float heels, increase time out of bed, PT/OT consult, and internal/external urinary devices    Patient Safety:  Fall Score:    Interventions: bed/chair alarm, assistive device (walker, cane. etc), gripper socks, pt to call before getting OOB, stay with me (per policy), and gait belt       Length of Stay:  Expected LOS: 11  Actual LOS: 10      Aurora Gardner RN

## 2024-08-21 NOTE — PROGRESS NOTES
End of Shift Note    Bedside shift change report given to JOSE DE JESUS Simon (oncoming nurse) by Chaim Billings RN (offgoing nurse).  Report included the following information SBAR and MAR    Shift worked: 7PM - 7AM     Shift summary and any significant changes:    Uneventful night. No complains. Due meds given as ordered. Labs done. Awaiting for placement.     Concerns for physician to address:       Zone phone for oncoming shift:         Activity:     Number times ambulated in hallways past shift: 0  Number of times OOB to chair past shift: 0    Cardiac:   Cardiac Monitoring: No           Access:  Current line(s): PIV     Genitourinary:   Urinary status: external catheter    Respiratory:      Chronic home O2 use?: NO  Incentive spirometer at bedside:        GI:     Current diet:  ADULT DIET; Regular; Low Fiber  Passing flatus:   Tolerating current diet: YES       Pain Management:   Patient states pain is manageable on current regimen: YES    Skin:     Interventions:     Patient Safety:  Fall Score:    Interventions: assistive device (walker, cane. etc), gripper socks, and stay with me (per policy)       Length of Stay:  Expected LOS: 10  Actual LOS: 10      Chaim Billings RN

## 2024-08-21 NOTE — PLAN OF CARE
Problem: Physical Therapy - Adult  Goal: By Discharge: Performs mobility at highest level of function for planned discharge setting.  See evaluation for individualized goals.  Description: FUNCTIONAL STATUS PRIOR TO ADMISSION: Patient was modified independent using a rolling walker and single point cane for functional mobility depending on energy level. Patient reports indep with ADLs. 2L NC available as needed.    HOME SUPPORT PRIOR TO ADMISSION: The patient lived alone with daughter and family friend to provide assistance. Daughter has arranged for family friend to check on patient a couple times/week, assists with grocery shopping, laundry, etc depending on need at time.     Physical Therapy Goals  Revised 8/20/2024  1.  Patient will move from supine to sit and sit to supine in bed with modified independence within 7 day(s).    2.  Patient will perform sit to stand with supervision/set-up within 7 day(s).  3.  Patient will transfer from bed to chair and chair to bed with supervision/set-up using the least restrictive device within 7 day(s).  4.  Patient will ambulate with supervision/set-up for 100 feet with the least restrictive device within 7 day(s).      Physical Therapy Goals  Initiated 8/13/2024  1.  Patient will move from supine to sit and sit to supine, scoot up and down, and roll side to side in bed with modified independence within 7 day(s).    2.  Patient will perform sit to stand with modified independence within 7 day(s).  3.  Patient will transfer from bed to chair and chair to bed with modified independence using the least restrictive device within 7 day(s).  4.  Patient will ambulate with modified independence for 50 feet with the least restrictive device within 7 day(s).   5.  Patient will ascend/descend 4 stairs with handrail(s) with contact guard assist within 7 day(s).   Outcome: Progressing   PHYSICAL THERAPY TREATMENT    Patient: Anuel Bermudez (78 y.o. male)  Date: 8/21/2024  Diagnosis:  support   Ambulation/Gait Training:     Gait  Gait Training: Yes  Overall Level of Assistance: Contact-guard assistance  Distance (ft): 80 Feet (x3 with standing rests)  Assistive Device: Walker, rolling  Interventions: Safety awareness training;Verbal cues  Base of Support: Widened  Speed/Carolyn: Pace decreased (< 100 feet/min)  Step Length: Left shortened;Right shortened  Gait Abnormalities: Decreased step clearance (flexed posture)       Activity Tolerance:   Fair  and requires rest breaks    After treatment:   Patient left in no apparent distress sitting up in chair and Call bell within reach      COMMUNICATION/EDUCATION:   The patient's plan of care was discussed with: registered nurse           Eleni Echeverria, PT  Minutes: -34

## 2024-08-21 NOTE — PLAN OF CARE
functional mobility, and decreased activity tolerance and strength. Pt received supine in bed, agreeable to participate. He transferred supine>sit with supervision and demo'd good sitting balance EOB. Using RW pt ambulated to bathroom with CGA and stood at sink for approx 5 minutes to brush his teeth and wash hands. Noted pt with increasingly flexed posture as he fatigued with standing task, and often requiring 1 UE support. He returned to sitting EOB and performed UB and LB AROM ex with min cueing for technique. Returned to bed at end of session with needs met and VSS. Patient continues to benefit from skilled intervention to address the above impairments. Continue to recommend IPR at discharge.         PLAN :  Patient continues to benefit from skilled intervention to address the above impairments.  Continue treatment per established plan of care to address goals.    Recommendation for discharge: (in order for the patient to meet his/her long term goals): Therapy 3 hours/day 5-7 days/week    Other factors to consider for discharge: lives alone and concern for safely navigating or managing the home environment    IF patient discharges home will need the following DME: continuing to assess with progress       SUBJECTIVE:   Patient stated “I've been doing some exercises.”    OBJECTIVE DATA SUMMARY:   Cognitive/Behavioral Status:   Alert       Functional Mobility and Transfers for ADLs:  Bed Mobility:  Bed Mobility Training  Supine to Sit: Supervision;Additional time  Sit to Supine: Additional time;Contact-guard assistance     Transfers:   Transfer Training  Sit to Stand: Additional time;Contact-guard assistance  Stand to Sit: Contact-guard assistance           Balance:     Balance  Sitting: Intact  Standing: Impaired  Standing - Static: Constant support;Good  Standing - Dynamic: Constant support;Fair      ADL Intervention:         Grooming: Contact guard assistance;Minimal assistance   Grooming Skilled Clinical

## 2024-08-21 NOTE — PROGRESS NOTES
Hospitalist Progress Note    NAME:   Anuel Bermudez   : 1945   MRN: [id number]     Date/Time: 2024 2:41 PM  Patient PCP: Bonifacio Bassett MD    Estimated discharge date:   Barriers: placement, needs auth      Assessment / Plan:    Pneumoperitoneum d/t perforated peptic/antral ulcer s/p laparoscopic Adarsh patch repair by Gen Surgery  CT demonstrated pneumoperitoneum and thickening in the gastric antrum    -Status post laparoscopic repair of perforated antral ulcer.  UGI completed with no evidence of contrast.  -General surgery recommended soft diet for 1 more week, Protonix twice daily and outpatient follow-up with GI for EGD in 6 weeks  -S/p IV antibiotics and currently on cefdinir and Flagyl.  Continue p.o. antibiotics until .  -TPN has been weaned off.  Patient is able to tolerate soft diet  -Case management notified about placement. Dr. Duckworth performed Peer to peer and left message to Aleah Odonnell on  and waiting on callback.  -Patient is medically clear for discharge and waiting on placement.    Protein calorie malnutrition  -Encourage oral intake      Hypomagnesemia  Hypokalemia  Hypophosphatemia  -Phosphorus is low and replaced today.       Chronic hypoxic respiratory failure  On as needed home 1-2 L nasal cannula  Continue bronchodilators     Hx of NSCLC, squamous cell carcinoma (f/b VCI)  S/p laser to L endobronchial lung lesion, s/p chemo  Started on maintenance Keytruda 24         Medical Decision Making:   I personally reviewed labs: BMP,  CBC  I personally reviewed imaging:  Toxic drug monitoring:   Discussed case with:       Code Status: Partial Code- intubation ok otherwise DNR per pt with palliative today  DVT Prophylaxis: Lovenox  GI Prophylaxis:PPI    Subjective:     Chief Complaint / Reason for Physician Visit:  Does not report any abdominal pain, nausea vomiting  Overnight events noted      Objective:     VITALS:   Last 24hrs VS reviewed since prior

## 2024-08-21 NOTE — PLAN OF CARE
Predictive Model Details          19 (Normal)  Factor Value    Calculated 8/21/2024 17:30 84% Age 78 years old    Deterioration Index Model 9% Sodium abnormal (135 mmol/L)     4% Systolic 119     1% Potassium 3.8 mmol/L     1% Pulse 80     1% Temperature 97.5 °F (36.4 °C)     0% Pulse oximetry 96 %     0% Respiratory rate 16        Problem: Pain  Goal: Verbalizes/displays adequate comfort level or baseline comfort level  Outcome: Progressing     Problem: Skin/Tissue Integrity  Goal: Absence of new skin breakdown  Description: 1.  Monitor for areas of redness and/or skin breakdown  2.  Assess vascular access sites hourly  3.  Every 4-6 hours minimum:  Change oxygen saturation probe site  4.  Every 4-6 hours:  If on nasal continuous positive airway pressure, respiratory therapy assess nares and determine need for appliance change or resting period.  Outcome: Progressing     Problem: Safety - Adult  Goal: Free from fall injury  Outcome: Progressing  Flowsheets (Taken 8/21/2024 7040)  Free From Fall Injury: Instruct family/caregiver on patient safety     Problem: Discharge Planning  Goal: Discharge to home or other facility with appropriate resources  Outcome: Progressing  Flowsheets (Taken 8/21/2024 5466)  Discharge to home or other facility with appropriate resources:   Identify barriers to discharge with patient and caregiver   Arrange for needed discharge resources and transportation as appropriate   Identify discharge learning needs (meds, wound care, etc)   Arrange for interpreters to assist at discharge as needed   Refer to discharge planning if patient needs post-hospital services based on physician order or complex needs related to functional status, cognitive ability or social support system     Problem: Respiratory - Adult  Goal: Achieves optimal ventilation and oxygenation  Outcome: Progressing     Problem: Nutrition Deficit:  Goal: Optimize nutritional status  Outcome: Progressing     Problem: Physical

## 2024-08-22 LAB
GLUCOSE BLD STRIP.AUTO-MCNC: 116 MG/DL (ref 65–117)
GLUCOSE BLD STRIP.AUTO-MCNC: 142 MG/DL (ref 65–117)
GLUCOSE BLD STRIP.AUTO-MCNC: 80 MG/DL (ref 65–117)
SERVICE CMNT-IMP: ABNORMAL
SERVICE CMNT-IMP: NORMAL
SERVICE CMNT-IMP: NORMAL

## 2024-08-22 PROCEDURE — 6360000002 HC RX W HCPCS: Performed by: INTERNAL MEDICINE

## 2024-08-22 PROCEDURE — 6370000000 HC RX 637 (ALT 250 FOR IP): Performed by: INTERNAL MEDICINE

## 2024-08-22 PROCEDURE — 2580000003 HC RX 258: Performed by: INTERNAL MEDICINE

## 2024-08-22 PROCEDURE — 97530 THERAPEUTIC ACTIVITIES: CPT

## 2024-08-22 PROCEDURE — 6370000000 HC RX 637 (ALT 250 FOR IP): Performed by: NURSE PRACTITIONER

## 2024-08-22 PROCEDURE — 2580000003 HC RX 258: Performed by: NURSE PRACTITIONER

## 2024-08-22 PROCEDURE — 97535 SELF CARE MNGMENT TRAINING: CPT

## 2024-08-22 PROCEDURE — 82962 GLUCOSE BLOOD TEST: CPT

## 2024-08-22 PROCEDURE — 94640 AIRWAY INHALATION TREATMENT: CPT

## 2024-08-22 PROCEDURE — 6370000000 HC RX 637 (ALT 250 FOR IP): Performed by: STUDENT IN AN ORGANIZED HEALTH CARE EDUCATION/TRAINING PROGRAM

## 2024-08-22 PROCEDURE — 1100000000 HC RM PRIVATE

## 2024-08-22 PROCEDURE — 94761 N-INVAS EAR/PLS OXIMETRY MLT: CPT

## 2024-08-22 PROCEDURE — 6360000002 HC RX W HCPCS: Performed by: NURSE PRACTITIONER

## 2024-08-22 RX ORDER — PANTOPRAZOLE SODIUM 40 MG/1
40 TABLET, DELAYED RELEASE ORAL
Status: DISCONTINUED | OUTPATIENT
Start: 2024-08-22 | End: 2024-08-27 | Stop reason: HOSPADM

## 2024-08-22 RX ADMIN — Medication: at 08:55

## 2024-08-22 RX ADMIN — Medication 100 MG: at 08:49

## 2024-08-22 RX ADMIN — METRONIDAZOLE 500 MG: 250 TABLET ORAL at 05:59

## 2024-08-22 RX ADMIN — ARFORMOTEROL TARTRATE: 15 SOLUTION RESPIRATORY (INHALATION) at 10:01

## 2024-08-22 RX ADMIN — PANTOPRAZOLE SODIUM 40 MG: 40 INJECTION, POWDER, FOR SOLUTION INTRAVENOUS at 08:50

## 2024-08-22 RX ADMIN — PANTOPRAZOLE SODIUM 40 MG: 40 TABLET, DELAYED RELEASE ORAL at 16:44

## 2024-08-22 RX ADMIN — CEFDINIR 300 MG: 300 CAPSULE ORAL at 08:49

## 2024-08-22 RX ADMIN — FAMOTIDINE 20 MG: 20 TABLET, FILM COATED ORAL at 08:48

## 2024-08-22 RX ADMIN — FOLIC ACID 1 MG: 1 TABLET ORAL at 08:48

## 2024-08-22 RX ADMIN — SODIUM CHLORIDE, PRESERVATIVE FREE 10 ML: 5 INJECTION INTRAVENOUS at 08:50

## 2024-08-22 RX ADMIN — ENOXAPARIN SODIUM 30 MG: 100 INJECTION SUBCUTANEOUS at 08:53

## 2024-08-22 NOTE — CARE COORDINATION
note:    CM received a call back from Deja Bowden daughter - informed her of update per unit CM notes from today at 3:20 pm - daughter is aware she needs to call [employer] for follow-up - she will follow-up with [employer] first thing tomorrow morning.  Asked her to call again if she needs further information from CM staff. NELL MOSLEY, MSW  [phone]

## 2024-08-22 NOTE — PLAN OF CARE
Problem: Occupational Therapy - Adult  Goal: By Discharge: Performs self-care activities at highest level of function for planned discharge setting.  See evaluation for individualized goals.  Description: FUNCTIONAL STATUS PRIOR TO ADMISSION:  performed ADLS on his own, drives to appointments  Receives Help From: Family, Friend(s), ADL Assistance: Independent,  ,  ,  ,  ,  , Homemaking Assistance: Needs assistance (Family friend helps with laundry, deep cleaning, shopping/grocery management), Ambulation Assistance: Independent (With Rw vs cane depending on energy level), Transfer Assistance: Independent, Active : Yes     HOME SUPPORT: Patient lived alone and has a caregiver 2-3x wk for a few hours. Daughter is supportive and works from home.    Occupational Therapy Goals:  All goals reviewed on 8/20/24 and all remain appropriate at this time.  Initiated 8/13/2024  1.  Patient will perform grooming with Modified Cooper within 7 day(s).  2.  Patient will perform upper body dressing and lower body dressing with Modified Cooper within 7 day(s).  3.  Patient will perform toileting with Modified Cooper within 7 day(s).  4.  Patient will perform toilet transfers with Modified Cooper  within 7 day(s).  5.  Patient will perform bathing with Modified Cooper within 7 day(s).      Outcome: Progressing   OCCUPATIONAL THERAPY TREATMENT  Patient: Anuel Bermudez (78 y.o. male)  Date: 8/22/2024  Primary Diagnosis: Pneumoperitoneum [K66.8]  Elevated lipase [R74.8]  Bowel perforation (HCC) [K63.1]  Procedure(s) (LRB):  LAPAROSCOPIC REPAIR OF PERFORATED ANTRAL ULCER WITH GRAM PATCH (N/A) 11 Days Post-Op   Precautions: Fall Risk                Chart, occupational therapy assessment, plan of care, and goals were reviewed.    ASSESSMENT  Patient continues to benefit from skilled OT services and is progressing towards goals. Patient remains limited by generalized deconditioning, kyphosis, decreased

## 2024-08-22 NOTE — PROGRESS NOTES
Hospitalist Progress Note    NAME:   Anuel Bermudez   : 1945   MRN: [id number]     Date/Time: 2024 2:21 PM  Patient PCP: Bonifacio Bassett MD    Estimated discharge date:   Barriers: placement, needs auth      Assessment / Plan:    Pneumoperitoneum d/t perforated peptic/antral ulcer s/p laparoscopic Adarsh patch repair by Gen Surgery  CT demonstrated pneumoperitoneum and thickening in the gastric antrum    -Status post laparoscopic repair of perforated antral ulcer.  UGI completed with no evidence of contrast.  -General surgery recommended soft diet for 1 more week, Protonix twice daily and outpatient follow-up with GI for EGD in 6 weeks  -S/p IV antibiotics and currently on cefdinir and Flagyl.  Continue p.o. antibiotics until .  -TPN has been weaned off.  Patient is able to tolerate soft diet  -Case management notified about placement. Dr. Duckworth performed Peer to peer and left message to Aleah Odonnell on  and waiting on callback.  -Patient is medically clear for discharge and waiting on placement.    Protein calorie malnutrition  -Encourage oral intake      Hypomagnesemia  Hypokalemia  Hypophosphatemia  -Phosphorus is low and replaced today.       Chronic hypoxic respiratory failure  On as needed home 1-2 L nasal cannula  Continue bronchodilators     Hx of NSCLC, squamous cell carcinoma (f/b VCI)  S/p laser to L endobronchial lung lesion, s/p chemo  Started on maintenance Keytruda 24         Medical Decision Making:   I personally reviewed labs: BMP,  CBC  I personally reviewed imaging:  Toxic drug monitoring:   Discussed case with:       Code Status: Partial Code- intubation ok otherwise DNR per pt with palliative today  DVT Prophylaxis: Lovenox  GI Prophylaxis:PPI    Subjective:     Patient comfortably sitting in the chair.  No acute events overnight.  No new complaints at this time.  Patient denied by insurance for IPR placement.  Awaiting SNF placement.    Objective:

## 2024-08-22 NOTE — PROGRESS NOTES
End of Shift Note    Bedside shift change report given to ROBIN Haq Amani, Nsg student, JOSE DE JESUS Velasquez (oncoming nurse) by Radha Alvarenga RN (offgoing nurse).  Report included the following information SBAR    Shift worked:  [id number]     Shift summary and any significant changes:    Pt denies pain  2 episodes of BM this shift  Voiding to the bathroom  Heels elevated  Uneventful shift   Concerns for physician to address:      Zone phone for oncoming shift:             Radha Alvarenga, RN

## 2024-08-22 NOTE — CARE COORDINATION
Transition of Care Plan:     RUR: 13% \"medium risk\"  Prior Level of Functioning: Independent with ADL's   Disposition:  SNF - pending pt appeal  If SNF or IPR: Date FOC offered: 8/13  Date FOC received: 8/15  Accepting facility: N/A  Date authorization started with reference number: N/A  Date authorization received and expires: N/A  Follow up appointments: Defer to facility  DME needed: Defer to facility   Transportation at discharge: Pt daughter to transport    IM/IMM Medicare/ letter given: 2nd IM needed prior to d/c   Is patient a [place] and connected with VA? No  Caregiver Contact: Deja Bowden (child), [phone]  Discharge Caregiver contacted prior to discharge? To be contacted   Care Conference needed? Not at this time   Barriers to discharge:  insurance auth    Per MD P2P has been denied.  CM will get SNF choices from pt & continue to follow.  Patient requested CM talk with his daughter about SNF choices.  CM left daughter a vm requesting a return call.    11:23  Patients daughter stated she is going to start a pt appeal today & will f/u with CM later this afternoon.      3:20  Daughter contacted [facility] to start appeal.  [employer] provided daughter an Auth#[id number] & requested MD send updated notes.  CM phoned [employer] for fax number [phone].   CM faxed clinicals from the time pt was admitted to today.  CM will notify daughter she needs to follow up on the pt appeal.     Carol Cardenas  Ext [phone]

## 2024-08-23 LAB
ALBUMIN SERPL-MCNC: 2.6 G/DL (ref 3.5–5)
ALBUMIN/GLOB SERPL: 1 (ref 1.1–2.2)
ALP SERPL-CCNC: 67 U/L (ref 45–117)
ALT SERPL-CCNC: 27 U/L (ref 12–78)
AST SERPL-CCNC: 13 U/L (ref 15–37)
BILIRUB DIRECT SERPL-MCNC: 0.1 MG/DL (ref 0–0.2)
BILIRUB SERPL-MCNC: 0.4 MG/DL (ref 0.2–1)
GLOBULIN SER CALC-MCNC: 2.7 G/DL (ref 2–4)
GLUCOSE BLD STRIP.AUTO-MCNC: 112 MG/DL (ref 65–117)
GLUCOSE BLD STRIP.AUTO-MCNC: 126 MG/DL (ref 65–117)
GLUCOSE BLD STRIP.AUTO-MCNC: 80 MG/DL (ref 65–117)
GLUCOSE BLD STRIP.AUTO-MCNC: 96 MG/DL (ref 65–117)
PROT SERPL-MCNC: 5.3 G/DL (ref 6.4–8.2)
SERVICE CMNT-IMP: ABNORMAL
SERVICE CMNT-IMP: NORMAL

## 2024-08-23 PROCEDURE — 1100000000 HC RM PRIVATE

## 2024-08-23 PROCEDURE — 97116 GAIT TRAINING THERAPY: CPT

## 2024-08-23 PROCEDURE — 2580000003 HC RX 258: Performed by: NURSE PRACTITIONER

## 2024-08-23 PROCEDURE — 6370000000 HC RX 637 (ALT 250 FOR IP): Performed by: NURSE PRACTITIONER

## 2024-08-23 PROCEDURE — 36415 COLL VENOUS BLD VENIPUNCTURE: CPT

## 2024-08-23 PROCEDURE — 6360000002 HC RX W HCPCS: Performed by: NURSE PRACTITIONER

## 2024-08-23 PROCEDURE — 6370000000 HC RX 637 (ALT 250 FOR IP): Performed by: INTERNAL MEDICINE

## 2024-08-23 PROCEDURE — 80076 HEPATIC FUNCTION PANEL: CPT

## 2024-08-23 PROCEDURE — 94640 AIRWAY INHALATION TREATMENT: CPT

## 2024-08-23 PROCEDURE — 6370000000 HC RX 637 (ALT 250 FOR IP): Performed by: STUDENT IN AN ORGANIZED HEALTH CARE EDUCATION/TRAINING PROGRAM

## 2024-08-23 PROCEDURE — 94761 N-INVAS EAR/PLS OXIMETRY MLT: CPT

## 2024-08-23 PROCEDURE — 97530 THERAPEUTIC ACTIVITIES: CPT

## 2024-08-23 PROCEDURE — 82962 GLUCOSE BLOOD TEST: CPT

## 2024-08-23 PROCEDURE — 6360000002 HC RX W HCPCS: Performed by: INTERNAL MEDICINE

## 2024-08-23 RX ADMIN — MONTELUKAST 10 MG: 10 TABLET, FILM COATED ORAL at 00:27

## 2024-08-23 RX ADMIN — Medication: at 20:48

## 2024-08-23 RX ADMIN — Medication 100 MG: at 08:20

## 2024-08-23 RX ADMIN — PANTOPRAZOLE SODIUM 40 MG: 40 TABLET, DELAYED RELEASE ORAL at 08:20

## 2024-08-23 RX ADMIN — ARFORMOTEROL TARTRATE: 15 SOLUTION RESPIRATORY (INHALATION) at 09:16

## 2024-08-23 RX ADMIN — PANTOPRAZOLE SODIUM 40 MG: 40 TABLET, DELAYED RELEASE ORAL at 17:49

## 2024-08-23 RX ADMIN — FOLIC ACID 1 MG: 1 TABLET ORAL at 08:20

## 2024-08-23 RX ADMIN — DONEPEZIL HYDROCHLORIDE 5 MG: 5 TABLET, FILM COATED ORAL at 20:48

## 2024-08-23 RX ADMIN — Medication: at 08:25

## 2024-08-23 RX ADMIN — MIRTAZAPINE 30 MG: 15 TABLET, FILM COATED ORAL at 20:48

## 2024-08-23 RX ADMIN — MONTELUKAST 10 MG: 10 TABLET, FILM COATED ORAL at 20:48

## 2024-08-23 RX ADMIN — DONEPEZIL HYDROCHLORIDE 5 MG: 5 TABLET, FILM COATED ORAL at 00:27

## 2024-08-23 RX ADMIN — SODIUM CHLORIDE, PRESERVATIVE FREE 10 ML: 5 INJECTION INTRAVENOUS at 00:27

## 2024-08-23 RX ADMIN — SODIUM CHLORIDE, PRESERVATIVE FREE 10 ML: 5 INJECTION INTRAVENOUS at 20:48

## 2024-08-23 RX ADMIN — ENOXAPARIN SODIUM 30 MG: 100 INJECTION SUBCUTANEOUS at 08:20

## 2024-08-23 RX ADMIN — Medication: at 00:29

## 2024-08-23 RX ADMIN — SODIUM CHLORIDE, PRESERVATIVE FREE 10 ML: 5 INJECTION INTRAVENOUS at 08:21

## 2024-08-23 RX ADMIN — MIRTAZAPINE 30 MG: 15 TABLET, FILM COATED ORAL at 00:27

## 2024-08-23 NOTE — CARE COORDINATION
Transition of Care Plan:     RUR: 14% \"medium risk\"  Prior Level of Functioning: Independent with ADL's   Disposition: SNF   If SNF or IPR: Date FOC offered: 8/13  Date FOC received: 8/15  Accepting facility: [facility]  Date authorization started with reference number: Ref#[id number]  Date authorization received and expires:   Follow up appointments: Defer to facility  DME needed: Defer to facility   Transportation at discharge: BLS transport    IM/IMM Medicare/ letter given: 2nd IM given 8/23  Is patient a  and connected with VA? No  Caregiver Contact: Deja Bowden (child), [phone]  Discharge Caregiver contacted prior to discharge? To be contacted   Care Conference needed? Not at this time   Barriers to discharge: Insurance auth    Initial Note: Chart Reviewed: Pt pending insurance auth for d/c. CM spoke with pts daughter and she stated she has started family appeal. Per CM leadership we must wait until the family appeal is either approved or denied before discharging pt. [facility] has accepted pt and CM has initiated auth. Pts daughter has been notified and she stated [employer] explained that auth for SNF being initiated should not affect family appeal for IPR.  CM will continue to follow.     MISAEL Baldwin,CAMRYN  [phone]

## 2024-08-23 NOTE — PLAN OF CARE
Problem: Pain  Goal: Verbalizes/displays adequate comfort level or baseline comfort level  Outcome: Progressing     Problem: Skin/Tissue Integrity  Goal: Absence of new skin breakdown  Description: 1.  Monitor for areas of redness and/or skin breakdown  2.  Assess vascular access sites hourly  3.  Every 4-6 hours minimum:  Change oxygen saturation probe site  4.  Every 4-6 hours:  If on nasal continuous positive airway pressure, respiratory therapy assess nares and determine need for appliance change or resting period.  Outcome: Progressing     Problem: Safety - Adult  Goal: Free from fall injury  Outcome: Progressing     Problem: Respiratory - Adult  Goal: Achieves optimal ventilation and oxygenation  Outcome: Progressing     Problem: Nutrition Deficit:  Goal: Optimize nutritional status  Outcome: Progressing

## 2024-08-23 NOTE — PROGRESS NOTES
Comprehensive Nutrition Assessment    Type and Reason for Visit:  Reassess    Nutrition Recommendations/Plan:   Continue current diet  Monitor and record PO intakes and Bms in I/os     Malnutrition Assessment:  Malnutrition Status:  Severe malnutrition (08/12/24 1444)    Context:  Chronic Illness     Findings of the 6 clinical characteristics of malnutrition:  Energy Intake:  75% or less estimated energy requirements for 1 month or longer  Weight Loss:  Greater than 10% over 6 months     Body Fat Loss:  Severe body fat loss Fat Overlying Ribs, Triceps   Muscle Mass Loss:  Severe muscle mass loss Clavicles (pectoralis & deltoids), Thigh (quadriceps)  Fluid Accumulation:  No significant fluid accumulation     Strength:  Not Performed    Nutrition Assessment:    Continues to tolerate diet with good appetite/intakes. Discharge pending auth.    Nutrition Related Findings:    Labs: Gluc 112 Meds: folic acid, insulin lispro, mirtazapine, pantoprazole, thiamine Wound Type: Surgical Incision       Current Nutrition Intake & Therapies:    Average Meal Intake: 51-75%  Average Supplements Intake: None Ordered  ADULT DIET; Regular; Low Fiber    Anthropometric Measures:  Height: 160 cm (5' 2.99\")  Ideal Body Weight (IBW): 124 lbs (56 kg)    Current Body Weight: 48.9 kg (107 lb 12.9 oz), 86.9 % IBW. Weight Source: Standing Scale  Current BMI (kg/m2): 19.1  Usual Body Weight: 47.6 kg (105 lb)  % Weight Change (Calculated): -14.5  BMI Categories: Underweight (BMI less than 22) age over 65    Estimated Daily Nutrient Needs:  Energy Requirements Based On: Kcal/kg  Weight Used for Energy Requirements: Current  Energy (kcal/day): 1589 kcals (35 kcals/kg bw)  Weight Used for Protein Requirements: Current  Protein (g/day): 68-82g (1.5-1.8 g/kg bw)  Method Used for Fluid Requirements: 1 ml/kcal  Fluid (ml/day): 1600 mL    Nutrition Diagnosis:   Severe malnutrition related to catabolic illness, inadequate protein-energy intake, altered

## 2024-08-23 NOTE — PROGRESS NOTES
End of Shift Note    Bedside shift change report given to JOSE DE JESUS Leo (oncoming nurse) by Mckayla Pimentel LPN (offgoing nurse).  Report included the following information SBAR, Intake/Output, MAR, and Recent Results    Shift worked:  7a-7p     Shift summary and any significant changes:     Pt up in chair for most of shift. No complaints of pain. Tolerating diet.      Concerns for physician to address:  none     Zone phone for oncoming shift:   [phone]         Mckayla Pimentel LPN

## 2024-08-23 NOTE — PROGRESS NOTES
End of Shift Note    Bedside shift change report given to Luisito (oncoming nurse) by Rad Kim RN (offgoing nurse).  Report included the following information SBAR, Kardex, and MAR    Shift worked:  7a-7p     Shift summary and any significant changes:     Vital signs stable. Incision sites clean, dry, and intact. No complaints of pain during shift. Uneventful.     Concerns for physician to address:       Zone phone for oncoming shift:              Rad Kim RN

## 2024-08-23 NOTE — PROGRESS NOTES
Chart reviewed for PCP hospital follow up. Patient's current CASSIE is 6/1/24. Patient is currently recommended for SNF. Pending patient discharge.

## 2024-08-23 NOTE — PLAN OF CARE
Problem: Pain  Goal: Verbalizes/displays adequate comfort level or baseline comfort level  8/23/2024 1608 by Rad Kim RN  Outcome: Progressing  8/23/2024 0344 by Felipa Mclean RN  Outcome: Progressing     Problem: Skin/Tissue Integrity  Goal: Absence of new skin breakdown  Description: 1.  Monitor for areas of redness and/or skin breakdown  2.  Assess vascular access sites hourly  3.  Every 4-6 hours minimum:  Change oxygen saturation probe site  4.  Every 4-6 hours:  If on nasal continuous positive airway pressure, respiratory therapy assess nares and determine need for appliance change or resting period.  8/23/2024 1608 by Rad Kim RN  Outcome: Progressing  8/23/2024 0344 by Felipa Mclean RN  Outcome: Progressing     Problem: Safety - Adult  Goal: Free from fall injury  8/23/2024 1608 by Rad Kim RN  Outcome: Progressing  8/23/2024 0344 by Felipa Mclean RN  Outcome: Progressing     Problem: Discharge Planning  Goal: Discharge to home or other facility with appropriate resources  Outcome: Progressing     Problem: Respiratory - Adult  Goal: Achieves optimal ventilation and oxygenation  8/23/2024 1608 by Rad Kim RN  Outcome: Progressing  8/23/2024 0923 by Doris Man RCP  Outcome: Progressing  8/23/2024 0344 by Felipa Mclean RN  Outcome: Progressing     Problem: Nutrition Deficit:  Goal: Optimize nutritional status  8/23/2024 1608 by Rad Kim RN  Outcome: Progressing  8/23/2024 0344 by Felipa Mclean RN  Outcome: Progressing

## 2024-08-23 NOTE — PROGRESS NOTES
Hospitalist Progress Note    NAME:   Anuel Bermudez   : 1945   MRN: [id number]     Date/Time: 2024 1:53 PM  Patient PCP: Bonifacio Bassett MD    Estimated discharge date:   Barriers: placement, needs auth      Assessment / Plan:    Pneumoperitoneum d/t perforated peptic/antral ulcer s/p laparoscopic Adarsh patch repair by Gen Surgery  CT demonstrated pneumoperitoneum and thickening in the gastric antrum    -Status post laparoscopic repair of perforated antral ulcer.  UGI completed with no evidence of contrast.  -General surgery recommended soft diet for 1 more week, Protonix twice daily and outpatient follow-up with GI for EGD in 6 weeks  -S/p IV antibiotics and currently on cefdinir and Flagyl.  Continue p.o. antibiotics until .  -TPN has been weaned off.  Patient is able to tolerate soft diet  -Case management notified about placement. Dr. Duckworth performed Peer to peer and left message to Aleah Odonnell on  and waiting on callback.  -Patient is medically clear for discharge and waiting on placement.    Protein calorie malnutrition  -Encourage oral intake      Hypomagnesemia  Hypokalemia  Hypophosphatemia  -Phosphorus is low and replaced today.       Chronic hypoxic respiratory failure  On as needed home 1-2 L nasal cannula  Continue bronchodilators     Hx of NSCLC, squamous cell carcinoma (f/b VCI)  S/p laser to L endobronchial lung lesion, s/p chemo  Started on maintenance Keytruda 24         Medical Decision Making:   I personally reviewed labs: BMP,  CBC  I personally reviewed imaging:  Toxic drug monitoring:   Discussed case with:       Code Status: Partial Code- intubation ok otherwise DNR per pt with palliative today  DVT Prophylaxis: Lovenox  GI Prophylaxis:PPI    Subjective:     Patient comfortably sitting in the chair.  No acute events overnight.  No new complaints at this time.  Patient denied by insurance for IPR placement.  Awaiting SNF placement.    Objective:

## 2024-08-23 NOTE — PLAN OF CARE
Training  Transfer Training: Yes  Interventions: Safety awareness training;Verbal cues  Sit to Stand: Stand-by assistance  Stand to Sit: Stand-by assistance  Bed to Chair: Contact-guard assistance;Adaptive equipment;Additional time  Balance:  Balance  Sitting: Intact  Standing: Impaired  Standing - Static: Good  Standing - Dynamic: Fair;Constant support   Ambulation/Gait Training:     Gait  Gait Training: Yes  Overall Level of Assistance: Contact-guard assistance  Distance (ft): 75 Feet (x2; multiple standing rest breaks)  Assistive Device: Walker, rolling;Gait belt  Interventions: Safety awareness training;Verbal cues  Base of Support: Widened  Speed/Carolyn: Pace decreased (< 100 feet/min)  Step Length: Left shortened;Right shortened  Gait Abnormalities: Decreased step clearance (forward flexed posture)    Pain Ratin/10     Activity Tolerance:   Fair , requires frequent rest breaks, and observed shortness of breath on exertion    After treatment:   Patient left in no apparent distress sitting up in chair, Call bell within reach, and Bed/ chair alarm activated      COMMUNICATION/EDUCATION:   The patient's plan of care was discussed with: registered nurse    Patient Education  Education Given To: Patient  Education Provided: Plan of Care;Fall Prevention Strategies;Precautions;Transfer Training  Education Method: Verbal  Barriers to Learning: None  Education Outcome: Verbalized understanding      Estelle Lemus PT  Minutes: 14

## 2024-08-24 LAB
GLUCOSE BLD STRIP.AUTO-MCNC: 113 MG/DL (ref 65–117)
GLUCOSE BLD STRIP.AUTO-MCNC: 115 MG/DL (ref 65–117)
GLUCOSE BLD STRIP.AUTO-MCNC: 119 MG/DL (ref 65–117)
GLUCOSE BLD STRIP.AUTO-MCNC: 94 MG/DL (ref 65–117)
GLUCOSE BLD STRIP.AUTO-MCNC: 99 MG/DL (ref 65–117)
SERVICE CMNT-IMP: ABNORMAL
SERVICE CMNT-IMP: NORMAL

## 2024-08-24 PROCEDURE — 94640 AIRWAY INHALATION TREATMENT: CPT

## 2024-08-24 PROCEDURE — 6370000000 HC RX 637 (ALT 250 FOR IP): Performed by: NURSE PRACTITIONER

## 2024-08-24 PROCEDURE — 94761 N-INVAS EAR/PLS OXIMETRY MLT: CPT

## 2024-08-24 PROCEDURE — 6370000000 HC RX 637 (ALT 250 FOR IP): Performed by: STUDENT IN AN ORGANIZED HEALTH CARE EDUCATION/TRAINING PROGRAM

## 2024-08-24 PROCEDURE — 1100000000 HC RM PRIVATE

## 2024-08-24 PROCEDURE — 6370000000 HC RX 637 (ALT 250 FOR IP): Performed by: INTERNAL MEDICINE

## 2024-08-24 PROCEDURE — 6360000002 HC RX W HCPCS: Performed by: NURSE PRACTITIONER

## 2024-08-24 PROCEDURE — 2580000003 HC RX 258: Performed by: NURSE PRACTITIONER

## 2024-08-24 PROCEDURE — 82962 GLUCOSE BLOOD TEST: CPT

## 2024-08-24 PROCEDURE — 6360000002 HC RX W HCPCS: Performed by: INTERNAL MEDICINE

## 2024-08-24 RX ADMIN — Medication 100 MG: at 08:47

## 2024-08-24 RX ADMIN — DONEPEZIL HYDROCHLORIDE 5 MG: 5 TABLET, FILM COATED ORAL at 21:15

## 2024-08-24 RX ADMIN — ENOXAPARIN SODIUM 30 MG: 100 INJECTION SUBCUTANEOUS at 08:47

## 2024-08-24 RX ADMIN — Medication: at 08:47

## 2024-08-24 RX ADMIN — Medication: at 21:17

## 2024-08-24 RX ADMIN — PANTOPRAZOLE SODIUM 40 MG: 40 TABLET, DELAYED RELEASE ORAL at 05:48

## 2024-08-24 RX ADMIN — PANTOPRAZOLE SODIUM 40 MG: 40 TABLET, DELAYED RELEASE ORAL at 17:17

## 2024-08-24 RX ADMIN — MIRTAZAPINE 30 MG: 15 TABLET, FILM COATED ORAL at 21:14

## 2024-08-24 RX ADMIN — FOLIC ACID 1 MG: 1 TABLET ORAL at 08:47

## 2024-08-24 RX ADMIN — SODIUM CHLORIDE, PRESERVATIVE FREE 10 ML: 5 INJECTION INTRAVENOUS at 08:47

## 2024-08-24 RX ADMIN — SODIUM CHLORIDE, PRESERVATIVE FREE 10 ML: 5 INJECTION INTRAVENOUS at 21:19

## 2024-08-24 RX ADMIN — ARFORMOTEROL TARTRATE: 15 SOLUTION RESPIRATORY (INHALATION) at 09:14

## 2024-08-24 RX ADMIN — MONTELUKAST 10 MG: 10 TABLET, FILM COATED ORAL at 21:16

## 2024-08-24 NOTE — PROGRESS NOTES
End of Shift Note    Bedside shift change report given to Kesha (oncoming nurse) by Rad Kim RN (offgoing nurse).  Report included the following information SBAR, Kardex, and Procedure Summary    Shift worked:  7a-7p     Shift summary and any significant changes:     Vital signs stable. No complaints of pain. Incisions clean dry and intact. Up in chair x2 today. BM x1. Uneventful.     Concerns for physician to address:       Zone phone for oncoming shift:              Rad Kim RN

## 2024-08-24 NOTE — PLAN OF CARE
Problem: Pain  Goal: Verbalizes/displays adequate comfort level or baseline comfort level  8/24/2024 1009 by Rad Kim RN  Outcome: Progressing  8/24/2024 0651 by Luisito Ashraf RN  Outcome: Progressing     Problem: Skin/Tissue Integrity  Goal: Absence of new skin breakdown  Description: 1.  Monitor for areas of redness and/or skin breakdown  2.  Assess vascular access sites hourly  3.  Every 4-6 hours minimum:  Change oxygen saturation probe site  4.  Every 4-6 hours:  If on nasal continuous positive airway pressure, respiratory therapy assess nares and determine need for appliance change or resting period.  8/24/2024 1009 by Rad Kim RN  Outcome: Progressing  8/24/2024 0651 by Luisito Ashraf RN  Outcome: Progressing     Problem: Safety - Adult  Goal: Free from fall injury  8/24/2024 1009 by Rad Kim RN  Outcome: Progressing  8/24/2024 0651 by Luisito Ashraf RN  Outcome: Progressing     Problem: Discharge Planning  Goal: Discharge to home or other facility with appropriate resources  8/24/2024 1009 by Rad Kim RN  Outcome: Progressing  8/24/2024 0651 by Luisito Ashraf RN  Outcome: Progressing     Problem: Respiratory - Adult  Goal: Achieves optimal ventilation and oxygenation  8/24/2024 1009 by Rad Kim RN  Outcome: Progressing  8/24/2024 0918 by Bruna Solis RCP  Outcome: Progressing  8/24/2024 0651 by Luisito Ashraf RN  Outcome: Progressing     Problem: Nutrition Deficit:  Goal: Optimize nutritional status  8/24/2024 1009 by Rad Kim RN  Outcome: Progressing  8/24/2024 0651 by Luisito Ashraf RN  Outcome: Progressing

## 2024-08-24 NOTE — PLAN OF CARE
Problem: Skin/Tissue Integrity  Goal: Absence of new skin breakdown  Description: 1.  Monitor for areas of redness and/or skin breakdown  2.  Assess vascular access sites hourly  3.  Every 4-6 hours minimum:  Change oxygen saturation probe site  4.  Every 4-6 hours:  If on nasal continuous positive airway pressure, respiratory therapy assess nares and determine need for appliance change or resting period.  Outcome: Progressing     Problem: Respiratory - Adult  Goal: Achieves optimal ventilation and oxygenation  Outcome: Progressing     Problem: Nutrition Deficit:  Goal: Optimize nutritional status  Outcome: Progressing

## 2024-08-25 LAB
GLUCOSE BLD STRIP.AUTO-MCNC: 116 MG/DL (ref 65–117)
GLUCOSE BLD STRIP.AUTO-MCNC: 126 MG/DL (ref 65–117)
GLUCOSE BLD STRIP.AUTO-MCNC: 144 MG/DL (ref 65–117)
GLUCOSE BLD STRIP.AUTO-MCNC: 83 MG/DL (ref 65–117)
SERVICE CMNT-IMP: ABNORMAL
SERVICE CMNT-IMP: ABNORMAL
SERVICE CMNT-IMP: NORMAL
SERVICE CMNT-IMP: NORMAL

## 2024-08-25 PROCEDURE — 94761 N-INVAS EAR/PLS OXIMETRY MLT: CPT

## 2024-08-25 PROCEDURE — 6360000002 HC RX W HCPCS: Performed by: NURSE PRACTITIONER

## 2024-08-25 PROCEDURE — 6370000000 HC RX 637 (ALT 250 FOR IP): Performed by: STUDENT IN AN ORGANIZED HEALTH CARE EDUCATION/TRAINING PROGRAM

## 2024-08-25 PROCEDURE — 1100000000 HC RM PRIVATE

## 2024-08-25 PROCEDURE — 82962 GLUCOSE BLOOD TEST: CPT

## 2024-08-25 PROCEDURE — 6370000000 HC RX 637 (ALT 250 FOR IP): Performed by: INTERNAL MEDICINE

## 2024-08-25 PROCEDURE — 6360000002 HC RX W HCPCS: Performed by: INTERNAL MEDICINE

## 2024-08-25 PROCEDURE — 6370000000 HC RX 637 (ALT 250 FOR IP): Performed by: NURSE PRACTITIONER

## 2024-08-25 PROCEDURE — 2580000003 HC RX 258: Performed by: NURSE PRACTITIONER

## 2024-08-25 PROCEDURE — 94640 AIRWAY INHALATION TREATMENT: CPT

## 2024-08-25 RX ORDER — ENOXAPARIN SODIUM 100 MG/ML
40 INJECTION SUBCUTANEOUS DAILY
Status: DISCONTINUED | OUTPATIENT
Start: 2024-08-26 | End: 2024-08-27 | Stop reason: HOSPADM

## 2024-08-25 RX ADMIN — Medication: at 22:00

## 2024-08-25 RX ADMIN — MONTELUKAST 10 MG: 10 TABLET, FILM COATED ORAL at 21:49

## 2024-08-25 RX ADMIN — SODIUM CHLORIDE, PRESERVATIVE FREE 10 ML: 5 INJECTION INTRAVENOUS at 21:48

## 2024-08-25 RX ADMIN — DONEPEZIL HYDROCHLORIDE 5 MG: 5 TABLET, FILM COATED ORAL at 21:49

## 2024-08-25 RX ADMIN — Medication: at 09:16

## 2024-08-25 RX ADMIN — MIRTAZAPINE 30 MG: 15 TABLET, FILM COATED ORAL at 21:48

## 2024-08-25 RX ADMIN — FOLIC ACID 1 MG: 1 TABLET ORAL at 09:16

## 2024-08-25 RX ADMIN — Medication 100 MG: at 09:16

## 2024-08-25 RX ADMIN — PANTOPRAZOLE SODIUM 40 MG: 40 TABLET, DELAYED RELEASE ORAL at 16:38

## 2024-08-25 RX ADMIN — PANTOPRAZOLE SODIUM 40 MG: 40 TABLET, DELAYED RELEASE ORAL at 06:25

## 2024-08-25 RX ADMIN — SODIUM CHLORIDE, PRESERVATIVE FREE 10 ML: 5 INJECTION INTRAVENOUS at 09:17

## 2024-08-25 RX ADMIN — ENOXAPARIN SODIUM 30 MG: 100 INJECTION SUBCUTANEOUS at 09:16

## 2024-08-25 RX ADMIN — ARFORMOTEROL TARTRATE: 15 SOLUTION RESPIRATORY (INHALATION) at 09:11

## 2024-08-25 ASSESSMENT — PAIN SCALES - GENERAL
PAINLEVEL_OUTOF10: 0
PAINLEVEL_OUTOF10: 0

## 2024-08-25 NOTE — PROGRESS NOTES
End of Shift Note    Bedside shift change report given to Nadira KELLY (oncoming nurse) by Mazin Avina LPN (offgoing nurse).  Report included the following information SBAR, Intake/Output, MAR, and Recent Results    Shift worked:  1900-730     Shift summary and any significant changes:     Uneventful night. No complaints of pain. Pt. Has a knot on  on left side of forehead from a fall but pt states did not occur during hospital stay.     Concerns for physician to address:  none     Zone phone for oncoming shift:   [phone]         Mazin Avina LPN

## 2024-08-25 NOTE — PROGRESS NOTES
End of Shift Note    Bedside shift change report given to Nadira KELLY (oncoming nurse) by Mazin Avina LPN (offgoing nurse).  Report included the following information SBAR, Intake/Output, MAR, and Recent Results    Shift worked:  1900-730     Shift summary and any significant changes:     Uneventful night. No complaints of pain.     Concerns for physician to address:  none     Zone phone for oncoming shift:     [phone]         Mazin Avina LPN

## 2024-08-25 NOTE — PROGRESS NOTES
End of Shift Note    Bedside shift change report given to JOSE DE JESUS Rodriges (oncoming nurse) by Nadira Chavez LPN (offgoing nurse).  Report included the following information SBAR    Shift worked:  7a-7p     Shift summary and any significant changes:     Pt ambulated in room and in halls today, tolerated well. Pt denies any complaints of pain at this time. Pt tolerating meals. Uneventful day.      Concerns for physician to address:  none     Zone phone for oncoming shift:   [phone]           Nadira Chavez LPN

## 2024-08-25 NOTE — CARE COORDINATION
CM reviewed chart. Per prior CM note:    Pt pending insurance auth for d/c. CM spoke with pts daughter and she stated she has started family appeal. Per CM leadership we must wait until the family appeal is either approved or denied before discharging pt. [facility] has accepted pt and CM has initiated auth. Pts daughter has been notified and she stated [employer] explained that auth for SNF being initiated should not affect family appeal for IPR.      CM will continue to follow.    Taylor Gayle, RN  Care Manager  [phone]

## 2024-08-25 NOTE — PROGRESS NOTES
VITALS:   Last 24hrs VS reviewed since prior progress note. Most recent are:  Patient Vitals for the past 24 hrs:   BP Temp Temp src Pulse Resp SpO2 Weight   08/25/24 0913 -- -- -- -- 16 -- --   08/25/24 0741 (!) 142/95 97.3 °F (36.3 °C) Oral 76 16 97 % --   08/25/24 0600 -- -- -- -- -- -- 53.1 kg (117 lb)   08/25/24 0359 121/71 98.1 °F (36.7 °C) Oral 66 17 97 % --   08/24/24 1950 -- -- -- 81 -- -- --   08/24/24 1945 131/87 98.1 °F (36.7 °C) Oral 81 17 97 % --   08/24/24 1456 (!) 108/59 98.1 °F (36.7 °C) Oral 77 18 99 % --       No intake or output data in the 24 hours ending 08/25/24 1114       I had a face to face encounter and independently examined this patient on 8/25/2024, as outlined below:  PHYSICAL EXAM:  General: Alert, cooperative  EENT:  EOMI. Anicteric sclerae.  Resp:  CTA bilaterally, no wheezing or rales.  No accessory muscle use  CV:  Regular  rate,  No edema  GI:  Soft, Non distended, Non tender.   Neurologic:  Alert and oriented X 3, normal speech,   Skin:  No rashes.  No jaundice    Reviewed most current lab test results and cultures  YES  Reviewed most current radiology test results   YES  Review and summation of old records today    NO  Reviewed patient's current orders and MAR    YES  PMH/SH reviewed - no change compared to H&P    Procedures: see electronic medical records for all procedures/Xrays and details which were not copied into this note but were reviewed prior to creation of Plan.      LABS:  I reviewed today's most current labs and imaging studies.  Pertinent labs include:  No results for input(s): \"WBC\", \"HGB\", \"HCT\", \"PLT\" in the last 72 hours.    Recent Labs     08/23/24  0745   BILITOT 0.4   AST 13*   ALT 27       Signed: Randolph Foster MD

## 2024-08-26 LAB
ALBUMIN SERPL-MCNC: 2.5 G/DL (ref 3.5–5)
ALBUMIN/GLOB SERPL: 0.9 (ref 1.1–2.2)
ALP SERPL-CCNC: 64 U/L (ref 45–117)
ALT SERPL-CCNC: 22 U/L (ref 12–78)
AST SERPL-CCNC: 15 U/L (ref 15–37)
BILIRUB DIRECT SERPL-MCNC: 0.1 MG/DL (ref 0–0.2)
BILIRUB SERPL-MCNC: 0.3 MG/DL (ref 0.2–1)
GLOBULIN SER CALC-MCNC: 2.7 G/DL (ref 2–4)
GLUCOSE BLD STRIP.AUTO-MCNC: 109 MG/DL (ref 65–117)
GLUCOSE BLD STRIP.AUTO-MCNC: 146 MG/DL (ref 65–117)
GLUCOSE BLD STRIP.AUTO-MCNC: 88 MG/DL (ref 65–117)
GLUCOSE BLD STRIP.AUTO-MCNC: 93 MG/DL (ref 65–117)
PROT SERPL-MCNC: 5.2 G/DL (ref 6.4–8.2)
SERVICE CMNT-IMP: ABNORMAL
SERVICE CMNT-IMP: NORMAL

## 2024-08-26 PROCEDURE — 1100000000 HC RM PRIVATE

## 2024-08-26 PROCEDURE — 80076 HEPATIC FUNCTION PANEL: CPT

## 2024-08-26 PROCEDURE — 97530 THERAPEUTIC ACTIVITIES: CPT

## 2024-08-26 PROCEDURE — 6360000002 HC RX W HCPCS: Performed by: STUDENT IN AN ORGANIZED HEALTH CARE EDUCATION/TRAINING PROGRAM

## 2024-08-26 PROCEDURE — 2580000003 HC RX 258: Performed by: NURSE PRACTITIONER

## 2024-08-26 PROCEDURE — 6360000002 HC RX W HCPCS: Performed by: INTERNAL MEDICINE

## 2024-08-26 PROCEDURE — 6370000000 HC RX 637 (ALT 250 FOR IP): Performed by: STUDENT IN AN ORGANIZED HEALTH CARE EDUCATION/TRAINING PROGRAM

## 2024-08-26 PROCEDURE — 94640 AIRWAY INHALATION TREATMENT: CPT

## 2024-08-26 PROCEDURE — 97116 GAIT TRAINING THERAPY: CPT | Performed by: PHYSICAL THERAPIST

## 2024-08-26 PROCEDURE — 6370000000 HC RX 637 (ALT 250 FOR IP): Performed by: INTERNAL MEDICINE

## 2024-08-26 PROCEDURE — 97530 THERAPEUTIC ACTIVITIES: CPT | Performed by: PHYSICAL THERAPIST

## 2024-08-26 PROCEDURE — 36415 COLL VENOUS BLD VENIPUNCTURE: CPT

## 2024-08-26 PROCEDURE — 82962 GLUCOSE BLOOD TEST: CPT

## 2024-08-26 PROCEDURE — 6370000000 HC RX 637 (ALT 250 FOR IP): Performed by: NURSE PRACTITIONER

## 2024-08-26 PROCEDURE — 94761 N-INVAS EAR/PLS OXIMETRY MLT: CPT

## 2024-08-26 PROCEDURE — 97535 SELF CARE MNGMENT TRAINING: CPT

## 2024-08-26 RX ADMIN — DONEPEZIL HYDROCHLORIDE 5 MG: 5 TABLET, FILM COATED ORAL at 22:52

## 2024-08-26 RX ADMIN — MONTELUKAST 10 MG: 10 TABLET, FILM COATED ORAL at 22:51

## 2024-08-26 RX ADMIN — ARFORMOTEROL TARTRATE: 15 SOLUTION RESPIRATORY (INHALATION) at 09:22

## 2024-08-26 RX ADMIN — Medication 100 MG: at 10:43

## 2024-08-26 RX ADMIN — Medication: at 22:54

## 2024-08-26 RX ADMIN — PANTOPRAZOLE SODIUM 40 MG: 40 TABLET, DELAYED RELEASE ORAL at 05:20

## 2024-08-26 RX ADMIN — MIRTAZAPINE 30 MG: 15 TABLET, FILM COATED ORAL at 22:51

## 2024-08-26 RX ADMIN — ENOXAPARIN SODIUM 40 MG: 100 INJECTION SUBCUTANEOUS at 10:43

## 2024-08-26 RX ADMIN — Medication: at 10:43

## 2024-08-26 RX ADMIN — FOLIC ACID 1 MG: 1 TABLET ORAL at 10:43

## 2024-08-26 RX ADMIN — PANTOPRAZOLE SODIUM 40 MG: 40 TABLET, DELAYED RELEASE ORAL at 18:04

## 2024-08-26 RX ADMIN — SODIUM CHLORIDE, PRESERVATIVE FREE 10 ML: 5 INJECTION INTRAVENOUS at 10:43

## 2024-08-26 ASSESSMENT — PAIN SCALES - GENERAL
PAINLEVEL_OUTOF10: 0
PAINLEVEL_OUTOF10: 0

## 2024-08-26 NOTE — PLAN OF CARE
Problem: Occupational Therapy - Adult  Goal: By Discharge: Performs self-care activities at highest level of function for planned discharge setting.  See evaluation for individualized goals.  Description: FUNCTIONAL STATUS PRIOR TO ADMISSION:  performed ADLS on his own, drives to appointments  Receives Help From: Family, Friend(s), ADL Assistance: Independent,  ,  ,  ,  ,  , Homemaking Assistance: Needs assistance (Family friend helps with laundry, deep cleaning, shopping/grocery management), Ambulation Assistance: Independent (With Rw vs cane depending on energy level), Transfer Assistance: Independent, Active : Yes     HOME SUPPORT: Patient lived alone and has a caregiver 2-3x wk for a few hours. Daughter is supportive and works from home.    Occupational Therapy Goals:  All goals reviewed on 8/20/24 and all remain appropriate at this time.  Initiated 8/13/2024  1.  Patient will perform grooming with Modified [place] within 7 day(s).  2.  Patient will perform upper body dressing and lower body dressing with Modified [place] within 7 day(s).  3.  Patient will perform toileting with Modified [place] within 7 day(s).  4.  Patient will perform toilet transfers with Modified [place]  within 7 day(s).  5.  Patient will perform bathing with Modified [place] within 7 day(s).      Outcome: Progressing   OCCUPATIONAL THERAPY TREATMENT  Patient: Anuel Bermudez (78 y.o. male)  Date: 8/26/2024  Primary Diagnosis: Pneumoperitoneum [K66.8]  Elevated lipase [R74.8]  Bowel perforation (HCC) [K63.1]  Procedure(s) (LRB):  LAPAROSCOPIC REPAIR OF PERFORATED ANTRAL ULCER WITH GRAM PATCH (N/A) 15 Days Post-Op   Precautions: Fall Risk                Chart, occupational therapy assessment, plan of care, and goals were reviewed.    ASSESSMENT  Patient continues to benefit from skilled OT services and is progressing towards goals. Patient participates in hallway navigation today, to include obstacle

## 2024-08-26 NOTE — PLAN OF CARE
Problem: Physical Therapy - Adult  Goal: By Discharge: Performs mobility at highest level of function for planned discharge setting.  See evaluation for individualized goals.  Description: FUNCTIONAL STATUS PRIOR TO ADMISSION: Patient was modified independent using a rolling walker and single point cane for functional mobility depending on energy level. Patient reports indep with ADLs. 2L NC available as needed.    HOME SUPPORT PRIOR TO ADMISSION: The patient lived alone with daughter and family friend to provide assistance. Daughter has arranged for family friend to check on patient a couple times/week, assists with grocery shopping, laundry, etc depending on need at time.     Physical Therapy Goals  Revised 8/20/2024  1.  Patient will move from supine to sit and sit to supine in bed with modified independence within 7 day(s).    2.  Patient will perform sit to stand with supervision/set-up within 7 day(s).  3.  Patient will transfer from bed to chair and chair to bed with supervision/set-up using the least restrictive device within 7 day(s).  4.  Patient will ambulate with supervision/set-up for 100 feet with the least restrictive device within 7 day(s).      Physical Therapy Goals  Initiated 8/13/2024  1.  Patient will move from supine to sit and sit to supine, scoot up and down, and roll side to side in bed with modified independence within 7 day(s).    2.  Patient will perform sit to stand with modified independence within 7 day(s).  3.  Patient will transfer from bed to chair and chair to bed with modified independence using the least restrictive device within 7 day(s).  4.  Patient will ambulate with modified independence for 50 feet with the least restrictive device within 7 day(s).   5.  Patient will ascend/descend 4 stairs with handrail(s) with contact guard assist within 7 day(s).   Outcome: Progressing   PHYSICAL THERAPY TREATMENT    Patient: Anuel Bermudez (78 y.o. male)  Date: 8/26/2024  Diagnosis:  Pneumoperitoneum [K66.8]  Elevated lipase [R74.8]  Bowel perforation (HCC) [K63.1] Pneumoperitoneum  Procedure(s) (LRB):  LAPAROSCOPIC REPAIR OF PERFORATED ANTRAL ULCER WITH GRAM PATCH (N/A) 15 Days Post-Op  Precautions: Fall Risk                      ASSESSMENT:  Patient continues to benefit from skilled PT services and is progressing towards goals. Patient reports ambulating in halls over weekend and demonstrating improvements in overall mobility and endurance. Patient able to complete transfers with supervision and required only SBA to ambulate 300 feet in jensen using RW. Patient able to ascend/descend 12 steps using R railing with SBA also.  Due to overall improvements in overall functional mobility and endurance, now recommending HHPT following discharge. Discussed benefits of having increased supervision initially once home and patient indicates understanding.         PLAN:  Patient continues to benefit from skilled intervention to address the above impairments.  Continue treatment per established plan of care.      Recommendation for discharge: (in order for the patient to meet his/her long term goals):   Intermittent physical therapy up to 2-3x/week in previous living setting    Other factors to consider for discharge: lives alone and daughter, who is very supportive, lives 2 hours away    IF patient discharges home will need the following DME: patient owns DME required for discharge       SUBJECTIVE:   Patient stated, \"I really want to go home.\"    OBJECTIVE DATA SUMMARY:     Functional Mobility Training:  Bed Mobility:  Bed Mobility Training  Bed Mobility Training: No  Transfers:  Transfer Training  Transfer Training: Yes  Sit to Stand: Supervision  Stand to Sit: Supervision  Balance:  Balance  Sitting: Intact  Standing: Impaired  Standing - Static: Good;Constant support  Standing - Dynamic: Good;Constant support   Ambulation/Gait Training:     Gait  Gait Training: Yes  Overall Level of Assistance: Stand-by

## 2024-08-26 NOTE — CARE COORDINATION
Transition of Care Plan:     RUR: 13% \"medium risk\"  Prior Level of Functioning: Independent with ADL's   Disposition: SNF   If SNF or IPR: Date FOC offered: 8/13  Date FOC received: 8/15  Accepting facility: [facility]  Date authorization started with reference number: Ref#[id number]  Date authorization received and expires:   Follow up appointments: Defer to facility  DME needed: Defer to facility   Transportation at discharge: BLS transport    IM/IMM Medicare/ letter given: 2nd IM given 8/23  Is patient a  and connected with VA? No  Caregiver Contact: Deja Bowden (child), [phone]  Discharge Caregiver contacted prior to discharge? To be contacted   Care Conference needed? Not at this time   Barriers to discharge: Insurance auth          2:00PM Updated Note: CAMRYN spoke with pt and pts daughter regarding discharge planning. Per PT/OT pt was able to walk 300ft, walk in the halls, and walk stairs. CAMRYN explained to both pt and pts daughter that [facility] may not accept due to pts current level of functioning. Pt stated he wants to go home and feels he would continue to improve in his home environment. Pts daughter would like pt to discharge tomorrow so she can re arranged pts home to be safer for him.  notified MD and MD was agreeable to discharge for tomorrow.   has arranged [facility] transport for 11:00am 8/27.        10:00am Updated Note: CAMRYN received paperwork from [employer] on Saturday 8/24/24.  has faxed paperwork to pts daughter to complete and pts daughter stated she will fax back to .       Initial Note: Chart Reviewed: Pt pending insurance auth for d/c. CAMRYN received a call from [employer] and requested more information on why pt can not return home. CAMRYN advocated for pt and stated pt lives alone and although pts daughter is very involved with pts care she lives two hours away CM stated it would be an unsafe discharge. [email or web address] has stated insurance auth is now under medical review.    CAMRYN

## 2024-08-26 NOTE — PROGRESS NOTES
Hospitalist Progress Note        Demographics    Patient Name  Anuel Bermudez   Date of Birth 1945   Medical Record Number  [id number]      Age  78 y.o.   PCP Bonifacio Bassett MD   Admit date:  8/11/2024  1:21 PM     Room Number  3109/01  @ [facility]           Admission Diagnoses:  Pneumoperitoneum   Admission Summary:  \" 77 y/o male PMHx of chronic dysphagia, COPD, chronic respiratory failure on chronic oxygen, NSCLC, squamous cell carcinoma of the lung dx (3/2024) s/p chemo now on maintenance Keytruda (7/17/24)  presented to the ED with abdominal pain found to have pneumoperitoneum on CT was taken urgently to the OR s/p ex lap for perforated peptic/antral ulcer s/p Adarsh patch repair.  Transferred to the ICU postoperatively for close monitoring.  \"     Assessment and plan:     Pneumoperitoneum d/t perforated peptic/antral ulcer s/p laparoscopic Adarsh patch repair by Gen Surgery  CT demonstrated pneumoperitoneum and thickening in the gastric antrum     -Status post laparoscopic repair of perforated antral ulcer.  UGI completed with no evidence of contrast.  -General surgery recommended soft diet for 1 more week, Protonix twice daily and outpatient follow-up with GI for EGD in 6 weeks  -S/p IV antibiotics and currently on cefdinir and Flagyl.  Continue p.o. antibiotics until 8/26.  -TPN has been weaned off.  Patient is able to tolerate soft diet  -Case management notified about placement. Dr. Duckworth performed Peer to peer and left message to Aleah Odonnell on 8/20 and waiting on callback.  -Patient is medically clear for discharge and waiting on placement.     Protein calorie malnutrition  -Encourage oral intake        Hypomagnesemia  Hypokalemia  Hypophosphatemia  -Phosphorus is low and replaced today.        Chronic hypoxic respiratory failure  On as needed home 1-2 L nasal cannula  Continue bronchodilators     Hx of NSCLC, squamous cell carcinoma (f/b VCI)  S/p laser to L

## 2024-08-27 VITALS
TEMPERATURE: 98 F | SYSTOLIC BLOOD PRESSURE: 125 MMHG | HEIGHT: 63 IN | BODY MASS INDEX: 20.73 KG/M2 | WEIGHT: 117 LBS | RESPIRATION RATE: 16 BRPM | HEART RATE: 73 BPM | DIASTOLIC BLOOD PRESSURE: 77 MMHG | OXYGEN SATURATION: 97 %

## 2024-08-27 LAB
GLUCOSE BLD STRIP.AUTO-MCNC: 102 MG/DL (ref 65–117)
GLUCOSE BLD STRIP.AUTO-MCNC: 90 MG/DL (ref 65–117)
SERVICE CMNT-IMP: NORMAL
SERVICE CMNT-IMP: NORMAL

## 2024-08-27 PROCEDURE — 6370000000 HC RX 637 (ALT 250 FOR IP): Performed by: INTERNAL MEDICINE

## 2024-08-27 PROCEDURE — 2580000003 HC RX 258: Performed by: NURSE PRACTITIONER

## 2024-08-27 PROCEDURE — 6370000000 HC RX 637 (ALT 250 FOR IP): Performed by: STUDENT IN AN ORGANIZED HEALTH CARE EDUCATION/TRAINING PROGRAM

## 2024-08-27 PROCEDURE — 6360000002 HC RX W HCPCS: Performed by: STUDENT IN AN ORGANIZED HEALTH CARE EDUCATION/TRAINING PROGRAM

## 2024-08-27 PROCEDURE — 82962 GLUCOSE BLOOD TEST: CPT

## 2024-08-27 PROCEDURE — 6360000002 HC RX W HCPCS: Performed by: INTERNAL MEDICINE

## 2024-08-27 PROCEDURE — 94640 AIRWAY INHALATION TREATMENT: CPT

## 2024-08-27 PROCEDURE — 97535 SELF CARE MNGMENT TRAINING: CPT

## 2024-08-27 RX ORDER — PANTOPRAZOLE SODIUM 40 MG/1
40 TABLET, DELAYED RELEASE ORAL
Qty: 60 TABLET | Refills: 0 | Status: SHIPPED
Start: 2024-08-27 | End: 2024-08-27

## 2024-08-27 RX ORDER — THIAMINE MONONITRATE (VIT B1) 100 MG
100 TABLET ORAL DAILY
Qty: 30 TABLET | Refills: 0 | Status: SHIPPED | OUTPATIENT
Start: 2024-08-28

## 2024-08-27 RX ORDER — FOLIC ACID 1 MG/1
1 TABLET ORAL DAILY
Qty: 30 TABLET | Refills: 0 | Status: SHIPPED | OUTPATIENT
Start: 2024-08-28

## 2024-08-27 RX ORDER — THIAMINE MONONITRATE (VIT B1) 100 MG
100 TABLET ORAL DAILY
Qty: 30 TABLET | Refills: 0 | Status: SHIPPED
Start: 2024-08-28 | End: 2024-08-27

## 2024-08-27 RX ORDER — FOLIC ACID 1 MG/1
1 TABLET ORAL DAILY
Qty: 30 TABLET | Refills: 0 | Status: SHIPPED
Start: 2024-08-28 | End: 2024-08-27

## 2024-08-27 RX ORDER — PANTOPRAZOLE SODIUM 40 MG/1
40 TABLET, DELAYED RELEASE ORAL
Qty: 60 TABLET | Refills: 0 | Status: SHIPPED | OUTPATIENT
Start: 2024-08-27

## 2024-08-27 RX ADMIN — SODIUM CHLORIDE, PRESERVATIVE FREE 5 ML: 5 INJECTION INTRAVENOUS at 00:04

## 2024-08-27 RX ADMIN — FOLIC ACID 1 MG: 1 TABLET ORAL at 09:15

## 2024-08-27 RX ADMIN — ENOXAPARIN SODIUM 40 MG: 100 INJECTION SUBCUTANEOUS at 09:15

## 2024-08-27 RX ADMIN — SODIUM CHLORIDE, PRESERVATIVE FREE 10 ML: 5 INJECTION INTRAVENOUS at 09:17

## 2024-08-27 RX ADMIN — ARFORMOTEROL TARTRATE: 15 SOLUTION RESPIRATORY (INHALATION) at 07:56

## 2024-08-27 RX ADMIN — Medication: at 09:17

## 2024-08-27 RX ADMIN — Medication 100 MG: at 09:15

## 2024-08-27 RX ADMIN — PANTOPRAZOLE SODIUM 40 MG: 40 TABLET, DELAYED RELEASE ORAL at 06:51

## 2024-08-27 ASSESSMENT — PAIN SCALES - GENERAL: PAINLEVEL_OUTOF10: 0

## 2024-08-27 NOTE — PROGRESS NOTES
OCCUPATIONAL THERAPY TREATMENT  Patient: Anuel Bermudez (78 y.o. male)  Date: 8/27/2024  Primary Diagnosis: Pneumoperitoneum [K66.8]  Elevated lipase [R74.8]  Bowel perforation (HCC) [K63.1]  Procedure(s) (LRB):  LAPAROSCOPIC REPAIR OF PERFORATED ANTRAL ULCER WITH GRAM PATCH (N/A) 16 Days Post-Op   Precautions: Fall Risk                Chart, occupational therapy assessment, plan of care, and goals were reviewed.    ASSESSMENT  Patient continues to benefit from skilled OT services and is progressing towards goals. Patient participates in light household management tasks, to include item retrieval from various planes. Patient is observed to put RW to side as needed for ease of material access, however stays proximal to stable surface and uses light handhold support for steadying. He maintains balance with SBA while reaching outside base of support.  Reinforced fall prevention, RW management, and energy conservation with patient verbalizing good understanding.            PLAN :  Patient continues to benefit from skilled intervention to address the above impairments.  Continue treatment per established plan of care to address goals.    Recommendation for discharge: (in order for the patient to meet his/her long term goals):   Intermittent occupational therapy up to 2-3x/week in previous living setting with additional support needed for IADLs    Other factors to consider for discharge: lives alone and concern for safely navigating or managing the home environment    IF patient discharges home will need the following DME:  walker tray       SUBJECTIVE:   Patient stated “I think I'm ready.”    OBJECTIVE DATA SUMMARY:   Cognitive/Behavioral Status:  Orientation  Overall Orientation Status: Within Functional Limits  Orientation Level: Oriented X4  Cognition  Overall Cognitive Status: WFL  Problem Solving: Assistance required to generate solutions    Functional Mobility and Transfers for ADLs:  Bed Mobility:  Bed Mobility

## 2024-08-27 NOTE — PROGRESS NOTES
put the discharge      Hospitalist Progress Note        Demographics    Patient Name  Anuel Bermudez   Date of Birth 1945   Medical Record Number  [id number]      Age  78 y.o.   PCP Bonifacio Bassett MD   Admit date:  8/11/2024  1:21 PM     Room Number  3109/01  @ [facility]        I am writing      quick summary to   Admission Diagnoses:  Pneumoperitoneum   Admission Summary: patient's and   \" 77 y/o male PMHx of chronic dysphagia, COPD, chronic respiratory failure on chronic oxygen, NSCLC, squamous cell carcinoma of the lung dx (3/2024) s/p chemo now on maintenance Keytruda (7/17/24)  presented to the ED with abdominal pain found to have pneumoperitoneum on CT was taken urgently to the OR s/p ex lap for perforated peptic/antral ulcer s/p Adarsh patch repair.  Transferred to the ICU postoperatively for close monitoring.  \"     Assessment and plan:     Pneumoperitoneum d/t perforated peptic/antral ulcer s/p laparoscopic Adarsh patch repair by Gen Surgery  CT demonstrated pneumoperitoneum and thickening in the gastric antrum     -Status post laparoscopic repair of perforated antral ulcer.  UGI completed with no evidence of contrast.  -General surgery recommended soft diet for 1 more week, Protonix twice daily and outpatient follow-up with GI for EGD in 6 weeks  -S/p IV antibiotics and currently on cefdinir and Flagyl.  Continue p.o. antibiotics until 8/26.  -TPN has been weaned off.  Patient is able to tolerate soft diet  -Case management notified about placement. Dr. Duckworth performed Peer to peer and left message to Aleah Odonnell on 8/20 and waiting on callback.  -Patient is medically clear for discharge and waiting on placement.  8/27: Patient will be discharged to nursing home today.    Protein calorie malnutrition  -Encourage oral intake        Hypomagnesemia  Hypokalemia  Hypophosphatemia  -Phosphorus is low and replaced today.        Chronic hypoxic respiratory failure  On as  montelukast  10 mg Oral Nightly    insulin lispro  0-4 Units SubCUTAneous 4 times per day    balsum peru-castor oil   Topical BID    sodium chloride flush  5-40 mL IntraVENous 2 times per day     Continuous Infusions:   sodium chloride Stopped (08/17/24 0617)     PRN Meds:.phenol, sodium chloride flush, sodium chloride, ondansetron **OR** ondansetron, acetaminophen **OR** acetaminophen, albuterol       Relevant other information:   Results       ** No results found for the last 336 hours. **            No results for input(s): \"WBC\", \"HGB\", \"HCT\", \"PLT\" in the last 72 hours.  Recent Labs     08/26/24  0444   ALT 22      No results found for: \"TSH\"        Yan Guerra MD    Disclaimer: Sections of this note are dictated using utilizing voice recognition software, which may have resulted in some phonetic based errors in grammar and contents. Even though attempts were made to correct all the mistakes, some may have been missed, and remained in the body of the document. If questions arise, please contact our department.

## 2024-08-27 NOTE — CARE COORDINATION
Transition of Care Plan:     RUR: 11% \"medium risk\"  Prior Level of Functioning: Independent with ADL's   Disposition: Home with [facility]  Follow up appointments: PCP follow up  DME needed: N/A  Transportation at discharge: [facility] @11:00[facility]   IM/IMM Medicare/ letter given: 2nd IM given 8/26  Is patient a [place] and connected with VA? No  Caregiver Contact: Deja Bowden (child), [phone]  Discharge Caregiver contacted prior to discharge? Yes  Care Conference needed? Not at this time   Barriers to discharge: N/A    CM acknowledged d/c. Chart reviewed, IDR completed. Pt will d/c home with follow up apts and [facility] for d/c.? SOC 24-48 hours after discharge. [facility] will transport at 11:00am for d/c. ?Pt, pts daughter, and nurse has been notified. 2 IM letter given 8/26. CM will remain accessible if any needs arise prior to discharge.         08/27/24 0858   Services At/After Discharge   Transition of Care Consult (CM Consult) Home Health   Services At/After Discharge Home Health   Mode of Transport at Discharge [facility]   Hospital Transport Time of Discharge 1100   Confirm Follow Up Transport Family   Condition of Participation: Discharge Planning   The Plan for Transition of Care is related to the following treatment goals: Pt will discharge home with follow up appts and  services   The Patient and/or Patient Representative was provided with a Choice of Provider? Patient   The Patient and/Or Patient Representative agree with the Discharge Plan? Yes   Freedom of Choice list was provided with basic dialogue that supports the patient's individualized plan of care/goals, treatment preferences, and shares the quality data associated with the providers?  Yes       MISAEL Baldwin,CM  [phone]

## 2024-08-27 NOTE — DISCHARGE SUMMARY
DISCHARGE NOTE FROM SURGICAL-TELEMETRY NURSE    Patient determined to be stable for discharge by attending provider. I have reviewed the discharge instructions and follow-up appointments with the Patient. They verbalized understanding and all questions were answered to their satisfaction. No complaints or further questions were expressed.      Medications sent to pharmacy Appropriate educational materials and medication side effect teaching were provided.      PIV were removed prior to discharge.     Patient did not discharge with any line, badillo, or drain.    All personal items collected during admission were returned to the patient prior to discharge.    Post-op patient: Yes - Patient given post-op discharge kit and instructed on use.      Ann Orozco RN

## 2024-08-27 NOTE — DISCHARGE SUMMARY
Hospitalist Discharge Summary    Patient: Anuel Bermudez               Sex: male          DOA: 8/11/2024         YOB: 1945      Age:  78 y.o.        LOS:  LOS: 16 days                Admit Date: 8/11/2024    Discharge Date: 8/27/2024    Admission Diagnoses: Pneumoperitoneum [K66.8]  Elevated lipase [R74.8]  Bowel perforation (HCC) [K63.1]    Discharge Diagnoses:      Patient Active Problem List   Diagnosis    Collapse of left lung    Pneumoperitoneum    Bowel perforation (HCC)    Goals of care, counseling/discussion    DNR (do not resuscitate) discussion    Frailty    Severe protein-calorie malnutrition (HCC)    Pain at surgical site    Advance care planning     1.  Pneumoperitoneum due to perforated peptic ulcer status post laparoscopic Adarsh patch repair  2.  Severe coronary malnutrition  3.  Acute debility, slowly improving  4.  Electrolyte disturbances, stable  5.  History of non-small cell carcinoma and chronic hypoxic respiratory failure      Discharge Condition:  Improved    Hospital Course: This is a 79 y/o male PMHx of chronic dysphagia, COPD, chronic respiratory failure on chronic oxygen, NSCLC, squamous cell carcinoma of the lung dx (3/2024) s/p chemo now on maintenance Keytruda (7/17/24) presented to the ED with abdominal pain found to have pneumoperitoneum on CT was taken urgently to the OR s/p ex lap for perforated peptic/antral ulcer s/p Adarsh patch repair. Transferred to the ICU postoperatively for close monitoring postoperatively, patient had upper GI series that was normal and patient was given diet with improvement in symptoms.  Surgery recommended continuing Protonix twice daily and outpatient follow-up.  Patient also received antibiotics until August 26, 2024.  Initially was on TPN that was stopped.  Patient's electrolytes were managed appropriately.  He was continued on home dose of bronchodilators for chronic hypoxic respiratory failure management.  He was also started on  regular diet    Wound Care: as directed    Follow-up:   Follow with PCP in a week  Follow-up with surgeon in 2 weeks    Total discharge time is greater than 35 minutes.    Disclaimer: Sections of this note are dictated using utilizing voice recognition software, which may have resulted in some phonetic based errors in grammar and contents. Even though attempts were made to correct all the mistakes, some may have been missed, and remained in the body of the document. If questions arise, please contact our department.    Yan Guerra MD  August 27, 2024  11:09 AM

## 2024-08-27 NOTE — PLAN OF CARE
Problem: Pain  Goal: Verbalizes/displays adequate comfort level or baseline comfort level  8/27/2024 1007 by Ann Orozco RN  Outcome: Progressing  8/27/2024 0254 by Flores Rodgers RN  Outcome: Progressing     Problem: Skin/Tissue Integrity  Goal: Absence of new skin breakdown  Description: 1.  Monitor for areas of redness and/or skin breakdown  2.  Assess vascular access sites hourly  3.  Every 4-6 hours minimum:  Change oxygen saturation probe site  4.  Every 4-6 hours:  If on nasal continuous positive airway pressure, respiratory therapy assess nares and determine need for appliance change or resting period.  8/27/2024 1007 by Ann Orozco RN  Outcome: Progressing  8/27/2024 0254 by Flores Rodgers RN  Outcome: Progressing     Problem: Safety - Adult  Goal: Free from fall injury  8/27/2024 1007 by Ann Orozco RN  Outcome: Progressing  8/27/2024 0825 by Jenni Joseph, RT  Outcome: Progressing  8/27/2024 0254 by Flores Rodgers RN  Outcome: Progressing     Problem: Discharge Planning  Goal: Discharge to home or other facility with appropriate resources  Outcome: Progressing     Problem: Respiratory - Adult  Goal: Achieves optimal ventilation and oxygenation  Outcome: Progressing     Problem: Nutrition Deficit:  Goal: Optimize nutritional status  Outcome: Progressing

## 2024-08-27 NOTE — PROGRESS NOTES
PCP hospital follow-up transitional care appointment has been scheduled with IVANNA Tamayo on 8/30/24 1200. [facility] placed [facility] information AVS for patient resource. Pending patient discharge.  Maddie Handy, Care Management Assistant

## 2024-09-11 ENCOUNTER — OFFICE VISIT (OUTPATIENT)
Age: 79
End: 2024-09-11

## 2024-09-11 VITALS
SYSTOLIC BLOOD PRESSURE: 117 MMHG | DIASTOLIC BLOOD PRESSURE: 74 MMHG | BODY MASS INDEX: 17.36 KG/M2 | HEIGHT: 63 IN | TEMPERATURE: 98 F | WEIGHT: 98 LBS | OXYGEN SATURATION: 96 % | HEART RATE: 100 BPM

## 2024-09-11 DIAGNOSIS — A04.8 H. PYLORI INFECTION: Primary | ICD-10-CM

## 2024-09-11 DIAGNOSIS — Z09 POSTOP CHECK: Primary | ICD-10-CM

## 2024-09-11 PROCEDURE — 99024 POSTOP FOLLOW-UP VISIT: CPT | Performed by: STUDENT IN AN ORGANIZED HEALTH CARE EDUCATION/TRAINING PROGRAM

## 2024-09-11 ASSESSMENT — PATIENT HEALTH QUESTIONNAIRE - PHQ9
SUM OF ALL RESPONSES TO PHQ QUESTIONS 1-9: 0
2. FEELING DOWN, DEPRESSED OR HOPELESS: NOT AT ALL
SUM OF ALL RESPONSES TO PHQ QUESTIONS 1-9: 0
SUM OF ALL RESPONSES TO PHQ QUESTIONS 1-9: 0
SUM OF ALL RESPONSES TO PHQ9 QUESTIONS 1 & 2: 0
1. LITTLE INTEREST OR PLEASURE IN DOING THINGS: NOT AT ALL
SUM OF ALL RESPONSES TO PHQ QUESTIONS 1-9: 0

## 2024-11-05 ENCOUNTER — HOSPITAL ENCOUNTER (OUTPATIENT)
Facility: HOSPITAL | Age: 79
Discharge: HOME | End: 2024-11-08
Attending: INTERNAL MEDICINE
Payer: MEDICARE

## 2024-11-05 VITALS — WEIGHT: 97 LBS | BODY MASS INDEX: 17.19 KG/M2

## 2024-11-05 DIAGNOSIS — C34.02 MALIGNANT NEOPLASM OF LEFT MAIN BRONCHUS (HCC): ICD-10-CM

## 2024-11-05 LAB
GLUCOSE BLD STRIP.AUTO-MCNC: 94 MG/DL (ref 65–117)
SERVICE CMNT-IMP: NORMAL

## 2024-11-05 PROCEDURE — 82962 GLUCOSE BLOOD TEST: CPT

## 2024-11-05 PROCEDURE — 3430000000 HC RX DIAGNOSTIC RADIOPHARMACEUTICAL: Performed by: INTERNAL MEDICINE

## 2024-11-05 PROCEDURE — 78815 PET IMAGE W/CT SKULL-THIGH: CPT

## 2024-11-05 PROCEDURE — A9609 HC RX DIAGNOSTIC RADIOPHARMACEUTICAL: HCPCS | Performed by: INTERNAL MEDICINE

## 2024-11-05 RX ORDER — FLUDEOXYGLUCOSE F-18 500 MCI/ML
10 INJECTION INTRAVENOUS
Status: COMPLETED | OUTPATIENT
Start: 2024-11-05 | End: 2024-11-05

## 2024-11-05 RX ADMIN — FLUDEOXYGLUCOSE F-18 10 MILLICURIE: 500 INJECTION INTRAVENOUS at 09:59

## 2024-11-26 ENCOUNTER — HOSPITAL ENCOUNTER (OUTPATIENT)
Facility: HOSPITAL | Age: 79
Discharge: HOME | End: 2024-11-29
Attending: INTERNAL MEDICINE
Payer: MEDICARE

## 2024-11-26 VITALS
WEIGHT: 95 LBS | BODY MASS INDEX: 15.83 KG/M2 | OXYGEN SATURATION: 96 % | DIASTOLIC BLOOD PRESSURE: 62 MMHG | HEIGHT: 65 IN | TEMPERATURE: 98.6 F | HEART RATE: 77 BPM | SYSTOLIC BLOOD PRESSURE: 122 MMHG

## 2024-11-26 DIAGNOSIS — J91.8 PLEURAL EFFUSION ASSOCIATED WITH HEPATIC DISORDER: ICD-10-CM

## 2024-11-26 DIAGNOSIS — K76.9 PLEURAL EFFUSION ASSOCIATED WITH HEPATIC DISORDER: ICD-10-CM

## 2024-11-26 DIAGNOSIS — C34.02 MALIGNANT NEOPLASM OF LEFT MAIN BRONCHUS (HCC): ICD-10-CM

## 2024-11-26 LAB
COMMENT:: NORMAL
SPECIMEN HOLD: NORMAL

## 2024-11-26 PROCEDURE — 32555 ASPIRATE PLEURA W/ IMAGING: CPT

## 2024-11-26 PROCEDURE — 6360000002 HC RX W HCPCS

## 2024-11-26 PROCEDURE — 71045 X-RAY EXAM CHEST 1 VIEW: CPT

## 2024-11-26 PROCEDURE — 36590 REMOVAL TUNNELED CV CATH: CPT

## 2024-11-26 PROCEDURE — 6360000002 HC RX W HCPCS: Performed by: RADIOLOGY

## 2024-11-26 RX ORDER — LIDOCAINE HYDROCHLORIDE 10 MG/ML
10 INJECTION, SOLUTION EPIDURAL; INFILTRATION; INTRACAUDAL; PERINEURAL ONCE
Status: COMPLETED | OUTPATIENT
Start: 2024-11-26 | End: 2024-11-26

## 2024-11-26 RX ORDER — LIDOCAINE HYDROCHLORIDE AND EPINEPHRINE 10; 10 MG/ML; UG/ML
20 INJECTION, SOLUTION INFILTRATION; PERINEURAL ONCE
Status: COMPLETED | OUTPATIENT
Start: 2024-11-26 | End: 2024-11-26

## 2024-11-26 RX ORDER — HEPARIN SODIUM 200 [USP'U]/100ML
200 INJECTION, SOLUTION INTRAVENOUS ONCE
Status: DISCONTINUED | OUTPATIENT
Start: 2024-11-26 | End: 2024-11-30 | Stop reason: HOSPADM

## 2024-11-26 RX ADMIN — LIDOCAINE HYDROCHLORIDE 10 ML: 10 INJECTION, SOLUTION EPIDURAL; INFILTRATION; INTRACAUDAL; PERINEURAL at 10:36

## 2024-11-26 RX ADMIN — LIDOCAINE HYDROCHLORIDE AND EPINEPHRINE 10 ML: 10; 10 INJECTION, SOLUTION INFILTRATION; PERINEURAL at 11:46

## 2024-11-26 ASSESSMENT — PAIN DESCRIPTION - ORIENTATION: ORIENTATION: LEFT

## 2024-11-26 ASSESSMENT — PAIN DESCRIPTION - PAIN TYPE: TYPE: ACUTE PAIN

## 2024-11-26 ASSESSMENT — PAIN DESCRIPTION - LOCATION: LOCATION: BACK;CHEST

## 2024-11-26 ASSESSMENT — PAIN SCALES - GENERAL: PAINLEVEL_OUTOF10: 2

## 2024-11-26 ASSESSMENT — PAIN DESCRIPTION - DESCRIPTORS: DESCRIPTORS: DISCOMFORT

## 2024-11-26 NOTE — DISCHARGE INSTRUCTIONS
or submerge in water for 3 days.     Apply a warm compress to affected area for comfort as needed.     Follow-up visit information:   Follow up with Interventional Radiology is not routinely necessary.     Occasionally, a situation will require prompt attention and to call your Primary Care Physician:    Swelling    Excessive Redness    Continued/ Excessive Drainage    Bright Red Continuous Bleeding    Increased Prolonged Pain    Fever of 100.4° or greater    Go to your nearest Emergency Room:    Bleeding or drainage from the needle site that is saturating the Band-Aid.     You have shaking chills or a temperature over 102°F    New, sudden difficulty breathing    New pain when taking a deep breath    A cough that produces blood       If you have any questions or concerns, please call [phone] and ask for the nurse on-call.    [facility]  Department of Interventional Radiology  [address] [id number] [phone]    PORT-A-CATH REMOVAL DISCHARGE EDUCATION      Radiologist:   Geetha GONCALVES      Date:   11/26/2024       Information:   The port removal process is similar to insertion, as it is performed under local anesthetic and sedation.     What should I expect after the port placement?    The area around the removed port may be swollen and tender following insertion.     You may have some mild pain or muscle soreness. This is normal. You can take acetaminophen (Tylenol®) or other non-aspirin pain medicine if you need to.    Once the wound is healed no dressing will be required.     Bathing & Wound Care:     Your incision has been sealed with skin glue which will come off once the site is healed.   You may shower after the port-a-cath has been removed. However, keep the incision site out of direct contact with water stream.   No baths, hot tubs, rivers or lakes submersions of the removal site for 2 weeks to decrease the risk of infection.    Apply a warm

## 2024-11-26 NOTE — PROGRESS NOTES
0955 - pt brought back to pre procedure area via wheel chair. Pt has weak gait using a walker. Pt is axox4, NAD at this time, denies pain.        1010 - pt consented by Geetha GONCALVES, PA stated a concern for the pts port as it is protruding through the skin with open laceration on it. Pt states he hasn't used the port for over 2 months and I does bother him from time to time.      1015 - RN called [facility], left message with MD Montaño regarding port removal.     1030 - Procedure complete,     Name of Procedure: Left thoracentesis      Start: 1022  End: 1034     Vital Signs: VSS       Fluids Removed: 900 ml of yellow thoracentesis fluid removed from left side.       Samples sent to lab: yes, hold specimen cup      Patient is A&Ox4, on RA, and is in NAD at this time.     _________________________________      1100 - IVANNA Lazaro verified with MD Montaño to remove the port today. Pt has consented to the removal, RN called family (Kanchan Narayan) and provided updates of the new procedure.    1120 - pt consented to remove port by IVANNA Lazaro.     1125 - pt and bedside area being prepped for procedure.     1130 - timeout    1131 - chest port  procedure starting    Name of Procedure: right chest port removal    Procedure completed with injectable lidocaine only.      Start: 1131   End: 1154     Vital Signs: vss       Any complications related to procedure: none identified at this time     Patient is A&Ox4, on RA, and is in NAD at this time.     1231 -Pt discharged to family via wheel chair.

## 2024-11-30 ENCOUNTER — APPOINTMENT (OUTPATIENT)
Facility: HOSPITAL | Age: 79
DRG: 388 | End: 2024-11-30
Payer: MEDICARE

## 2024-11-30 ENCOUNTER — HOSPITAL ENCOUNTER (INPATIENT)
Facility: HOSPITAL | Age: 79
LOS: 4 days | Discharge: NURSING, REHAB OR LONG-TERM CARE | DRG: 388 | End: 2024-12-05
Attending: STUDENT IN AN ORGANIZED HEALTH CARE EDUCATION/TRAINING PROGRAM | Admitting: STUDENT IN AN ORGANIZED HEALTH CARE EDUCATION/TRAINING PROGRAM
Payer: MEDICARE

## 2024-11-30 DIAGNOSIS — E83.42 HYPOMAGNESEMIA: ICD-10-CM

## 2024-11-30 DIAGNOSIS — K56.41 FECAL IMPACTION (HCC): Primary | ICD-10-CM

## 2024-11-30 DIAGNOSIS — E87.6 HYPOKALEMIA: ICD-10-CM

## 2024-11-30 DIAGNOSIS — R33.9 URINARY RETENTION: ICD-10-CM

## 2024-11-30 LAB
ALBUMIN SERPL-MCNC: 2.1 G/DL (ref 3.5–5)
ALBUMIN/GLOB SERPL: 0.6 (ref 1.1–2.2)
ALP SERPL-CCNC: 81 U/L (ref 45–117)
ALT SERPL-CCNC: 24 U/L (ref 12–78)
ANION GAP SERPL CALC-SCNC: 7 MMOL/L (ref 2–12)
APPEARANCE UR: CLEAR
AST SERPL-CCNC: 25 U/L (ref 15–37)
BACTERIA URNS QL MICRO: NEGATIVE /HPF
BASOPHILS # BLD: 0.1 K/UL (ref 0–0.1)
BASOPHILS NFR BLD: 1 % (ref 0–1)
BILIRUB SERPL-MCNC: 0.2 MG/DL (ref 0.2–1)
BILIRUB UR QL: NEGATIVE
BUN SERPL-MCNC: 39 MG/DL (ref 6–20)
BUN/CREAT SERPL: 39 (ref 12–20)
CALCIUM SERPL-MCNC: 8.1 MG/DL (ref 8.5–10.1)
CHLORIDE SERPL-SCNC: 106 MMOL/L (ref 97–108)
CO2 SERPL-SCNC: 26 MMOL/L (ref 21–32)
COLOR UR: NORMAL
CREAT SERPL-MCNC: 0.99 MG/DL (ref 0.7–1.3)
DIFFERENTIAL METHOD BLD: ABNORMAL
EOSINOPHIL # BLD: 0 K/UL (ref 0–0.4)
EOSINOPHIL NFR BLD: 0 % (ref 0–7)
EPITH CASTS URNS QL MICRO: NORMAL /LPF
ERYTHROCYTE [DISTWIDTH] IN BLOOD BY AUTOMATED COUNT: 14.9 % (ref 11.5–14.5)
GLOBULIN SER CALC-MCNC: 3.7 G/DL (ref 2–4)
GLUCOSE SERPL-MCNC: 117 MG/DL (ref 65–100)
GLUCOSE UR STRIP.AUTO-MCNC: NEGATIVE MG/DL
HCT VFR BLD AUTO: 34.9 % (ref 36.6–50.3)
HGB BLD-MCNC: 11.8 G/DL (ref 12.1–17)
HGB UR QL STRIP: NEGATIVE
HYALINE CASTS URNS QL MICRO: NORMAL /LPF (ref 0–2)
IMM GRANULOCYTES # BLD AUTO: 0 K/UL (ref 0–0.04)
IMM GRANULOCYTES NFR BLD AUTO: 0 % (ref 0–0.5)
KETONES UR QL STRIP.AUTO: NEGATIVE MG/DL
LEUKOCYTE ESTERASE UR QL STRIP.AUTO: NEGATIVE
LIPASE SERPL-CCNC: 27 U/L (ref 13–75)
LYMPHOCYTES # BLD: 1 K/UL (ref 0.8–3.5)
LYMPHOCYTES NFR BLD: 10 % (ref 12–49)
MAGNESIUM SERPL-MCNC: 1.4 MG/DL (ref 1.6–2.4)
MCH RBC QN AUTO: 29.9 PG (ref 26–34)
MCHC RBC AUTO-ENTMCNC: 33.8 G/DL (ref 30–36.5)
MCV RBC AUTO: 88.4 FL (ref 80–99)
MONOCYTES # BLD: 0.6 K/UL (ref 0–1)
MONOCYTES NFR BLD: 6 % (ref 5–13)
NEUTS SEG # BLD: 8.2 K/UL (ref 1.8–8)
NEUTS SEG NFR BLD: 83 % (ref 32–75)
NITRITE UR QL STRIP.AUTO: NEGATIVE
NRBC # BLD: 0 K/UL (ref 0–0.01)
NRBC BLD-RTO: 0 PER 100 WBC
PH UR STRIP: 7.5 (ref 5–8)
PLATELET # BLD AUTO: 296 K/UL (ref 150–400)
PMV BLD AUTO: 8.6 FL (ref 8.9–12.9)
POTASSIUM SERPL-SCNC: 2.2 MMOL/L (ref 3.5–5.1)
PROT SERPL-MCNC: 5.8 G/DL (ref 6.4–8.2)
PROT UR STRIP-MCNC: NEGATIVE MG/DL
RBC # BLD AUTO: 3.95 M/UL (ref 4.1–5.7)
RBC #/AREA URNS HPF: NORMAL /HPF (ref 0–5)
SODIUM SERPL-SCNC: 139 MMOL/L (ref 136–145)
SP GR UR REFRACTOMETRY: 1.01 (ref 1–1.03)
URINE CULTURE IF INDICATED: NORMAL
UROBILINOGEN UR QL STRIP.AUTO: 0.2 EU/DL (ref 0.2–1)
WBC # BLD AUTO: 9.8 K/UL (ref 4.1–11.1)
WBC URNS QL MICRO: NORMAL /HPF (ref 0–4)

## 2024-11-30 PROCEDURE — 83690 ASSAY OF LIPASE: CPT

## 2024-11-30 PROCEDURE — 96375 TX/PRO/DX INJ NEW DRUG ADDON: CPT

## 2024-11-30 PROCEDURE — 81001 URINALYSIS AUTO W/SCOPE: CPT

## 2024-11-30 PROCEDURE — 6360000002 HC RX W HCPCS

## 2024-11-30 PROCEDURE — 93005 ELECTROCARDIOGRAM TRACING: CPT | Performed by: INTERNAL MEDICINE

## 2024-11-30 PROCEDURE — 74177 CT ABD & PELVIS W/CONTRAST: CPT

## 2024-11-30 PROCEDURE — 2500000003 HC RX 250 WO HCPCS

## 2024-11-30 PROCEDURE — 96368 THER/DIAG CONCURRENT INF: CPT

## 2024-11-30 PROCEDURE — 85025 COMPLETE CBC W/AUTO DIFF WBC: CPT

## 2024-11-30 PROCEDURE — 2580000003 HC RX 258

## 2024-11-30 PROCEDURE — 6360000004 HC RX CONTRAST MEDICATION: Performed by: RADIOLOGY

## 2024-11-30 PROCEDURE — 96365 THER/PROPH/DIAG IV INF INIT: CPT

## 2024-11-30 PROCEDURE — 99285 EMERGENCY DEPT VISIT HI MDM: CPT

## 2024-11-30 PROCEDURE — 80053 COMPREHEN METABOLIC PANEL: CPT

## 2024-11-30 PROCEDURE — 83735 ASSAY OF MAGNESIUM: CPT

## 2024-11-30 PROCEDURE — 96366 THER/PROPH/DIAG IV INF ADDON: CPT

## 2024-11-30 PROCEDURE — 6370000000 HC RX 637 (ALT 250 FOR IP)

## 2024-11-30 RX ORDER — POTASSIUM CHLORIDE 1500 MG/1
40 TABLET, EXTENDED RELEASE ORAL ONCE
Status: COMPLETED | OUTPATIENT
Start: 2024-11-30 | End: 2024-11-30

## 2024-11-30 RX ORDER — MAGNESIUM SULFATE 1 G/100ML
1000 INJECTION INTRAVENOUS ONCE
Status: COMPLETED | OUTPATIENT
Start: 2024-11-30 | End: 2024-11-30

## 2024-11-30 RX ORDER — ONDANSETRON 2 MG/ML
4 INJECTION INTRAMUSCULAR; INTRAVENOUS EVERY 4 HOURS PRN
Status: DISCONTINUED | OUTPATIENT
Start: 2024-11-30 | End: 2024-12-01

## 2024-11-30 RX ORDER — ACETAMINOPHEN 325 MG/1
650 TABLET ORAL EVERY 4 HOURS PRN
Status: DISCONTINUED | OUTPATIENT
Start: 2024-11-30 | End: 2024-12-01

## 2024-11-30 RX ORDER — IOPAMIDOL 755 MG/ML
100 INJECTION, SOLUTION INTRAVASCULAR
Status: COMPLETED | OUTPATIENT
Start: 2024-11-30 | End: 2024-11-30

## 2024-11-30 RX ORDER — POTASSIUM CHLORIDE 7.45 MG/ML
10 INJECTION INTRAVENOUS
Status: DISPENSED | OUTPATIENT
Start: 2024-11-30 | End: 2024-11-30

## 2024-11-30 RX ADMIN — POTASSIUM CHLORIDE 10 MEQ: 7.46 INJECTION, SOLUTION INTRAVENOUS at 22:09

## 2024-11-30 RX ADMIN — POTASSIUM CHLORIDE 40 MEQ: 1500 TABLET, EXTENDED RELEASE ORAL at 19:30

## 2024-11-30 RX ADMIN — IOPAMIDOL 100 ML: 755 INJECTION, SOLUTION INTRAVENOUS at 18:59

## 2024-11-30 RX ADMIN — FAMOTIDINE 20 MG: 10 INJECTION, SOLUTION INTRAVENOUS at 21:21

## 2024-11-30 RX ADMIN — POTASSIUM CHLORIDE 10 MEQ: 7.46 INJECTION, SOLUTION INTRAVENOUS at 19:29

## 2024-11-30 RX ADMIN — MAGNESIUM SULFATE 1000 MG: 1 INJECTION INTRAVENOUS at 19:26

## 2024-11-30 RX ADMIN — LIDOCAINE HYDROCHLORIDE 40 ML: 20 SOLUTION ORAL at 21:21

## 2024-11-30 RX ADMIN — POTASSIUM CHLORIDE 10 MEQ: 7.46 INJECTION, SOLUTION INTRAVENOUS at 20:40

## 2024-11-30 ASSESSMENT — PAIN SCALES - GENERAL: PAINLEVEL_OUTOF10: 0

## 2024-12-01 ENCOUNTER — APPOINTMENT (OUTPATIENT)
Facility: HOSPITAL | Age: 79
DRG: 388 | End: 2024-12-01
Payer: MEDICARE

## 2024-12-01 PROBLEM — R33.9 URINARY RETENTION: Status: ACTIVE | Noted: 2024-12-01

## 2024-12-01 LAB
ALBUMIN SERPL-MCNC: 2 G/DL (ref 3.5–5)
ALBUMIN SERPL-MCNC: 2 G/DL (ref 3.5–5)
ALBUMIN/GLOB SERPL: 0.6 (ref 1.1–2.2)
ALBUMIN/GLOB SERPL: 0.6 (ref 1.1–2.2)
ALP SERPL-CCNC: 65 U/L (ref 45–117)
ALP SERPL-CCNC: 66 U/L (ref 45–117)
ALT SERPL-CCNC: 19 U/L (ref 12–78)
ALT SERPL-CCNC: 20 U/L (ref 12–78)
ANION GAP SERPL CALC-SCNC: 6 MMOL/L (ref 2–12)
ANION GAP SERPL CALC-SCNC: 6 MMOL/L (ref 2–12)
AST SERPL-CCNC: 21 U/L (ref 15–37)
AST SERPL-CCNC: 22 U/L (ref 15–37)
BASOPHILS # BLD: 0.1 K/UL (ref 0–0.1)
BASOPHILS NFR BLD: 1 % (ref 0–1)
BILIRUB SERPL-MCNC: 0.2 MG/DL (ref 0.2–1)
BILIRUB SERPL-MCNC: 0.4 MG/DL (ref 0.2–1)
BUN SERPL-MCNC: 23 MG/DL (ref 6–20)
BUN SERPL-MCNC: 29 MG/DL (ref 6–20)
BUN/CREAT SERPL: 28 (ref 12–20)
BUN/CREAT SERPL: 35 (ref 12–20)
CALCIUM SERPL-MCNC: 7.4 MG/DL (ref 8.5–10.1)
CALCIUM SERPL-MCNC: 7.7 MG/DL (ref 8.5–10.1)
CHLORIDE SERPL-SCNC: 107 MMOL/L (ref 97–108)
CHLORIDE SERPL-SCNC: 108 MMOL/L (ref 97–108)
CO2 SERPL-SCNC: 23 MMOL/L (ref 21–32)
CO2 SERPL-SCNC: 25 MMOL/L (ref 21–32)
CREAT SERPL-MCNC: 0.83 MG/DL (ref 0.7–1.3)
CREAT SERPL-MCNC: 0.84 MG/DL (ref 0.7–1.3)
DIFFERENTIAL METHOD BLD: ABNORMAL
EKG ATRIAL RATE: 94 BPM
EKG DIAGNOSIS: NORMAL
EKG P AXIS: 57 DEGREES
EKG P-R INTERVAL: 126 MS
EKG Q-T INTERVAL: 364 MS
EKG QRS DURATION: 106 MS
EKG QTC CALCULATION (BAZETT): 455 MS
EKG R AXIS: 79 DEGREES
EKG T AXIS: 106 DEGREES
EKG VENTRICULAR RATE: 94 BPM
EOSINOPHIL # BLD: 0 K/UL (ref 0–0.4)
EOSINOPHIL NFR BLD: 1 % (ref 0–7)
ERYTHROCYTE [DISTWIDTH] IN BLOOD BY AUTOMATED COUNT: 15.1 % (ref 11.5–14.5)
GLOBULIN SER CALC-MCNC: 3.2 G/DL (ref 2–4)
GLOBULIN SER CALC-MCNC: 3.2 G/DL (ref 2–4)
GLUCOSE SERPL-MCNC: 80 MG/DL (ref 65–100)
GLUCOSE SERPL-MCNC: 83 MG/DL (ref 65–100)
HCT VFR BLD AUTO: 33.3 % (ref 36.6–50.3)
HGB BLD-MCNC: 11.4 G/DL (ref 12.1–17)
IMM GRANULOCYTES # BLD AUTO: 0.1 K/UL (ref 0–0.04)
IMM GRANULOCYTES NFR BLD AUTO: 1 % (ref 0–0.5)
LYMPHOCYTES # BLD: 1.3 K/UL (ref 0.8–3.5)
LYMPHOCYTES NFR BLD: 16 % (ref 12–49)
MAGNESIUM SERPL-MCNC: 1.7 MG/DL (ref 1.6–2.4)
MAGNESIUM SERPL-MCNC: 1.9 MG/DL (ref 1.6–2.4)
MCH RBC QN AUTO: 29.8 PG (ref 26–34)
MCHC RBC AUTO-ENTMCNC: 34.2 G/DL (ref 30–36.5)
MCV RBC AUTO: 86.9 FL (ref 80–99)
MONOCYTES # BLD: 0.8 K/UL (ref 0–1)
MONOCYTES NFR BLD: 10 % (ref 5–13)
NEUTS SEG # BLD: 5.8 K/UL (ref 1.8–8)
NEUTS SEG NFR BLD: 71 % (ref 32–75)
NRBC # BLD: 0 K/UL (ref 0–0.01)
NRBC BLD-RTO: 0 PER 100 WBC
PLATELET # BLD AUTO: 290 K/UL (ref 150–400)
PMV BLD AUTO: 9.3 FL (ref 8.9–12.9)
POTASSIUM SERPL-SCNC: 2.4 MMOL/L (ref 3.5–5.1)
POTASSIUM SERPL-SCNC: 3.1 MMOL/L (ref 3.5–5.1)
PROT SERPL-MCNC: 5.2 G/DL (ref 6.4–8.2)
PROT SERPL-MCNC: 5.2 G/DL (ref 6.4–8.2)
RBC # BLD AUTO: 3.83 M/UL (ref 4.1–5.7)
SODIUM SERPL-SCNC: 136 MMOL/L (ref 136–145)
SODIUM SERPL-SCNC: 139 MMOL/L (ref 136–145)
WBC # BLD AUTO: 8.1 K/UL (ref 4.1–11.1)

## 2024-12-01 PROCEDURE — 96366 THER/PROPH/DIAG IV INF ADDON: CPT

## 2024-12-01 PROCEDURE — 6370000000 HC RX 637 (ALT 250 FOR IP): Performed by: INTERNAL MEDICINE

## 2024-12-01 PROCEDURE — 1100000003 HC PRIVATE W/ TELEMETRY

## 2024-12-01 PROCEDURE — 6370000000 HC RX 637 (ALT 250 FOR IP)

## 2024-12-01 PROCEDURE — 2580000003 HC RX 258: Performed by: INTERNAL MEDICINE

## 2024-12-01 PROCEDURE — 6360000002 HC RX W HCPCS: Performed by: STUDENT IN AN ORGANIZED HEALTH CARE EDUCATION/TRAINING PROGRAM

## 2024-12-01 PROCEDURE — 6360000002 HC RX W HCPCS: Performed by: INTERNAL MEDICINE

## 2024-12-01 PROCEDURE — 6360000002 HC RX W HCPCS

## 2024-12-01 PROCEDURE — 94761 N-INVAS EAR/PLS OXIMETRY MLT: CPT

## 2024-12-01 PROCEDURE — 83735 ASSAY OF MAGNESIUM: CPT

## 2024-12-01 PROCEDURE — 94640 AIRWAY INHALATION TREATMENT: CPT

## 2024-12-01 PROCEDURE — 6370000000 HC RX 637 (ALT 250 FOR IP): Performed by: STUDENT IN AN ORGANIZED HEALTH CARE EDUCATION/TRAINING PROGRAM

## 2024-12-01 PROCEDURE — 85025 COMPLETE CBC W/AUTO DIFF WBC: CPT

## 2024-12-01 PROCEDURE — 94664 DEMO&/EVAL PT USE INHALER: CPT

## 2024-12-01 PROCEDURE — 36415 COLL VENOUS BLD VENIPUNCTURE: CPT

## 2024-12-01 PROCEDURE — 80053 COMPREHEN METABOLIC PANEL: CPT

## 2024-12-01 PROCEDURE — 71045 X-RAY EXAM CHEST 1 VIEW: CPT

## 2024-12-01 PROCEDURE — 74018 RADEX ABDOMEN 1 VIEW: CPT

## 2024-12-01 RX ORDER — SODIUM CHLORIDE 0.9 % (FLUSH) 0.9 %
5-40 SYRINGE (ML) INJECTION PRN
Status: DISCONTINUED | OUTPATIENT
Start: 2024-12-01 | End: 2024-12-05 | Stop reason: HOSPADM

## 2024-12-01 RX ORDER — ONDANSETRON 2 MG/ML
4 INJECTION INTRAMUSCULAR; INTRAVENOUS EVERY 6 HOURS PRN
Status: DISCONTINUED | OUTPATIENT
Start: 2024-12-01 | End: 2024-12-05 | Stop reason: HOSPADM

## 2024-12-01 RX ORDER — POTASSIUM CHLORIDE 7.45 MG/ML
10 INJECTION INTRAVENOUS
Status: COMPLETED | OUTPATIENT
Start: 2024-12-01 | End: 2024-12-01

## 2024-12-01 RX ORDER — ACETAMINOPHEN 650 MG/1
650 SUPPOSITORY RECTAL EVERY 6 HOURS PRN
Status: DISCONTINUED | OUTPATIENT
Start: 2024-12-01 | End: 2024-12-05 | Stop reason: HOSPADM

## 2024-12-01 RX ORDER — ASPIRIN 81 MG/1
81 TABLET ORAL DAILY
Status: DISCONTINUED | OUTPATIENT
Start: 2024-12-01 | End: 2024-12-05 | Stop reason: HOSPADM

## 2024-12-01 RX ORDER — LACTULOSE 10 G/15ML
20 SOLUTION ORAL 2 TIMES DAILY
Status: DISCONTINUED | OUTPATIENT
Start: 2024-12-01 | End: 2024-12-03

## 2024-12-01 RX ORDER — LANOLIN ALCOHOL/MO/W.PET/CERES
400 CREAM (GRAM) TOPICAL 2 TIMES DAILY
Status: DISCONTINUED | OUTPATIENT
Start: 2024-12-01 | End: 2024-12-05 | Stop reason: HOSPADM

## 2024-12-01 RX ORDER — POLYETHYLENE GLYCOL 3350 17 G/17G
17 POWDER, FOR SOLUTION ORAL DAILY
Status: DISCONTINUED | OUTPATIENT
Start: 2024-12-01 | End: 2024-12-05 | Stop reason: HOSPADM

## 2024-12-01 RX ORDER — ALBUTEROL SULFATE 90 UG/1
1 INHALANT RESPIRATORY (INHALATION) EVERY 6 HOURS PRN
Status: DISCONTINUED | OUTPATIENT
Start: 2024-12-01 | End: 2024-12-01

## 2024-12-01 RX ORDER — ARFORMOTEROL TARTRATE 15 UG/2ML
15 SOLUTION RESPIRATORY (INHALATION)
Status: DISCONTINUED | OUTPATIENT
Start: 2024-12-01 | End: 2024-12-05 | Stop reason: HOSPADM

## 2024-12-01 RX ORDER — POTASSIUM CHLORIDE 1.5 G/1.58G
40 POWDER, FOR SOLUTION ORAL ONCE
Status: COMPLETED | OUTPATIENT
Start: 2024-12-01 | End: 2024-12-01

## 2024-12-01 RX ORDER — DONEPEZIL HYDROCHLORIDE 5 MG/1
5 TABLET, FILM COATED ORAL NIGHTLY
Status: DISCONTINUED | OUTPATIENT
Start: 2024-12-01 | End: 2024-12-05 | Stop reason: HOSPADM

## 2024-12-01 RX ORDER — LACTULOSE 10 G/15ML
20 SOLUTION ORAL ONCE
Status: COMPLETED | OUTPATIENT
Start: 2024-12-01 | End: 2024-12-01

## 2024-12-01 RX ORDER — MONTELUKAST SODIUM 10 MG/1
10 TABLET ORAL NIGHTLY
Status: DISCONTINUED | OUTPATIENT
Start: 2024-12-01 | End: 2024-12-05 | Stop reason: HOSPADM

## 2024-12-01 RX ORDER — POTASSIUM CHLORIDE 7.45 MG/ML
10 INJECTION INTRAVENOUS ONCE
Status: COMPLETED | OUTPATIENT
Start: 2024-12-01 | End: 2024-12-01

## 2024-12-01 RX ORDER — ARFORMOTEROL TARTRATE 15 UG/2ML
15 SOLUTION RESPIRATORY (INHALATION)
Status: DISCONTINUED | OUTPATIENT
Start: 2024-12-01 | End: 2024-12-01

## 2024-12-01 RX ORDER — PANTOPRAZOLE SODIUM 40 MG/1
40 TABLET, DELAYED RELEASE ORAL
Status: DISCONTINUED | OUTPATIENT
Start: 2024-12-01 | End: 2024-12-01

## 2024-12-01 RX ORDER — SODIUM CHLORIDE 9 MG/ML
INJECTION, SOLUTION INTRAVENOUS CONTINUOUS
Status: DISCONTINUED | OUTPATIENT
Start: 2024-12-01 | End: 2024-12-01

## 2024-12-01 RX ORDER — BUDESONIDE 0.5 MG/2ML
1 INHALANT ORAL
Status: DISCONTINUED | OUTPATIENT
Start: 2024-12-01 | End: 2024-12-05 | Stop reason: HOSPADM

## 2024-12-01 RX ORDER — BUDESONIDE 0.5 MG/2ML
1 INHALANT ORAL
Status: DISCONTINUED | OUTPATIENT
Start: 2024-12-01 | End: 2024-12-01

## 2024-12-01 RX ORDER — SODIUM CHLORIDE 9 MG/ML
INJECTION, SOLUTION INTRAVENOUS PRN
Status: DISCONTINUED | OUTPATIENT
Start: 2024-12-01 | End: 2024-12-05 | Stop reason: HOSPADM

## 2024-12-01 RX ORDER — ALBUTEROL SULFATE 0.83 MG/ML
2.5 SOLUTION RESPIRATORY (INHALATION) EVERY 6 HOURS PRN
Status: DISCONTINUED | OUTPATIENT
Start: 2024-12-01 | End: 2024-12-05 | Stop reason: HOSPADM

## 2024-12-01 RX ORDER — MIRTAZAPINE 15 MG/1
30 TABLET, FILM COATED ORAL NIGHTLY
Status: DISCONTINUED | OUTPATIENT
Start: 2024-12-01 | End: 2024-12-05 | Stop reason: HOSPADM

## 2024-12-01 RX ORDER — FOLIC ACID 1 MG/1
1 TABLET ORAL DAILY
Status: DISCONTINUED | OUTPATIENT
Start: 2024-12-01 | End: 2024-12-05 | Stop reason: HOSPADM

## 2024-12-01 RX ORDER — ACETAMINOPHEN 325 MG/1
650 TABLET ORAL EVERY 6 HOURS PRN
Status: DISCONTINUED | OUTPATIENT
Start: 2024-12-01 | End: 2024-12-05 | Stop reason: HOSPADM

## 2024-12-01 RX ORDER — ENOXAPARIN SODIUM 100 MG/ML
30 INJECTION SUBCUTANEOUS DAILY
Status: DISCONTINUED | OUTPATIENT
Start: 2024-12-01 | End: 2024-12-05 | Stop reason: HOSPADM

## 2024-12-01 RX ORDER — SODIUM CHLORIDE 0.9 % (FLUSH) 0.9 %
5-40 SYRINGE (ML) INJECTION EVERY 12 HOURS SCHEDULED
Status: DISCONTINUED | OUTPATIENT
Start: 2024-12-01 | End: 2024-12-05 | Stop reason: HOSPADM

## 2024-12-01 RX ORDER — GAUZE BANDAGE 2" X 2"
100 BANDAGE TOPICAL DAILY
Status: DISCONTINUED | OUTPATIENT
Start: 2024-12-01 | End: 2024-12-05 | Stop reason: HOSPADM

## 2024-12-01 RX ORDER — POTASSIUM CHLORIDE 1500 MG/1
40 TABLET, EXTENDED RELEASE ORAL ONCE
Status: COMPLETED | OUTPATIENT
Start: 2024-12-01 | End: 2024-12-01

## 2024-12-01 RX ADMIN — BUDESONIDE 1000 MCG: 0.5 INHALANT RESPIRATORY (INHALATION) at 21:29

## 2024-12-01 RX ADMIN — BUDESONIDE 1000 MCG: 0.5 INHALANT RESPIRATORY (INHALATION) at 07:54

## 2024-12-01 RX ADMIN — ONDANSETRON 4 MG: 2 INJECTION INTRAMUSCULAR; INTRAVENOUS at 15:39

## 2024-12-01 RX ADMIN — ARFORMOTEROL TARTRATE 15 MCG: 15 SOLUTION RESPIRATORY (INHALATION) at 07:54

## 2024-12-01 RX ADMIN — FOLIC ACID 1 MG: 1 TABLET ORAL at 08:34

## 2024-12-01 RX ADMIN — Medication 400 MG: at 21:44

## 2024-12-01 RX ADMIN — IPRATROPIUM BROMIDE 0.5 MG: 0.5 SOLUTION RESPIRATORY (INHALATION) at 03:45

## 2024-12-01 RX ADMIN — MONTELUKAST 10 MG: 10 TABLET, FILM COATED ORAL at 21:44

## 2024-12-01 RX ADMIN — ENOXAPARIN SODIUM 30 MG: 100 INJECTION SUBCUTANEOUS at 08:34

## 2024-12-01 RX ADMIN — POLYETHYLENE GLYCOL 3350 17 G: 17 POWDER, FOR SOLUTION ORAL at 08:34

## 2024-12-01 RX ADMIN — POTASSIUM CHLORIDE 10 MEQ: 7.46 INJECTION, SOLUTION INTRAVENOUS at 04:41

## 2024-12-01 RX ADMIN — ACETAMINOPHEN 325MG 650 MG: 325 TABLET ORAL at 08:34

## 2024-12-01 RX ADMIN — Medication 100 MG: at 08:34

## 2024-12-01 RX ADMIN — ASPIRIN 81 MG: 81 TABLET, COATED ORAL at 08:34

## 2024-12-01 RX ADMIN — IPRATROPIUM BROMIDE 0.5 MG: 0.5 SOLUTION RESPIRATORY (INHALATION) at 07:54

## 2024-12-01 RX ADMIN — LACTULOSE 20 G: 10 SOLUTION ORAL at 09:43

## 2024-12-01 RX ADMIN — POTASSIUM CHLORIDE 10 MEQ: 7.46 INJECTION, SOLUTION INTRAVENOUS at 06:22

## 2024-12-01 RX ADMIN — LACTULOSE 20 G: 10 SOLUTION ORAL at 00:13

## 2024-12-01 RX ADMIN — SODIUM CHLORIDE 40 MG: 9 INJECTION INTRAMUSCULAR; INTRAVENOUS; SUBCUTANEOUS at 18:05

## 2024-12-01 RX ADMIN — IPRATROPIUM BROMIDE 0.5 MG: 0.5 SOLUTION RESPIRATORY (INHALATION) at 21:19

## 2024-12-01 RX ADMIN — POTASSIUM CHLORIDE 10 MEQ: 7.46 INJECTION, SOLUTION INTRAVENOUS at 01:00

## 2024-12-01 RX ADMIN — DONEPEZIL HYDROCHLORIDE 5 MG: 5 TABLET, FILM COATED ORAL at 21:44

## 2024-12-01 RX ADMIN — POTASSIUM CHLORIDE 10 MEQ: 7.46 INJECTION, SOLUTION INTRAVENOUS at 07:26

## 2024-12-01 RX ADMIN — Medication 400 MG: at 13:47

## 2024-12-01 RX ADMIN — SODIUM CHLORIDE: 9 INJECTION, SOLUTION INTRAVENOUS at 16:36

## 2024-12-01 RX ADMIN — IPRATROPIUM BROMIDE 0.5 MG: 0.5 SOLUTION RESPIRATORY (INHALATION) at 15:08

## 2024-12-01 RX ADMIN — POTASSIUM CHLORIDE 40 MEQ: 1500 TABLET, EXTENDED RELEASE ORAL at 04:38

## 2024-12-01 RX ADMIN — MIRTAZAPINE 30 MG: 15 TABLET, FILM COATED ORAL at 21:43

## 2024-12-01 RX ADMIN — PANTOPRAZOLE SODIUM 40 MG: 40 TABLET, DELAYED RELEASE ORAL at 06:24

## 2024-12-01 RX ADMIN — POTASSIUM CHLORIDE 40 MEQ: 1.5 FOR SOLUTION ORAL at 13:47

## 2024-12-01 RX ADMIN — SODIUM CHLORIDE: 9 INJECTION, SOLUTION INTRAVENOUS at 09:43

## 2024-12-01 RX ADMIN — ARFORMOTEROL TARTRATE 15 MCG: 15 SOLUTION RESPIRATORY (INHALATION) at 21:29

## 2024-12-01 ASSESSMENT — PAIN - FUNCTIONAL ASSESSMENT
PAIN_FUNCTIONAL_ASSESSMENT: PREVENTS OR INTERFERES SOME ACTIVE ACTIVITIES AND ADLS
PAIN_FUNCTIONAL_ASSESSMENT: ACTIVITIES ARE NOT PREVENTED
PAIN_FUNCTIONAL_ASSESSMENT: PREVENTS OR INTERFERES WITH MANY ACTIVE NOT PASSIVE ACTIVITIES
PAIN_FUNCTIONAL_ASSESSMENT: ACTIVITIES ARE NOT PREVENTED
PAIN_FUNCTIONAL_ASSESSMENT: PREVENTS OR INTERFERES SOME ACTIVE ACTIVITIES AND ADLS

## 2024-12-01 ASSESSMENT — PAIN DESCRIPTION - ORIENTATION
ORIENTATION: LOWER

## 2024-12-01 ASSESSMENT — PAIN SCALES - GENERAL
PAINLEVEL_OUTOF10: 4
PAINLEVEL_OUTOF10: 9
PAINLEVEL_OUTOF10: 6
PAINLEVEL_OUTOF10: 6
PAINLEVEL_OUTOF10: 7

## 2024-12-01 ASSESSMENT — PAIN DESCRIPTION - PAIN TYPE
TYPE: ACUTE PAIN

## 2024-12-01 ASSESSMENT — PAIN DESCRIPTION - LOCATION
LOCATION: ABDOMEN

## 2024-12-01 ASSESSMENT — PAIN DESCRIPTION - ONSET
ONSET: GRADUAL

## 2024-12-01 ASSESSMENT — PAIN DESCRIPTION - DESCRIPTORS
DESCRIPTORS: ACHING;CRAMPING;OTHER (COMMENT)
DESCRIPTORS: ACHING;CRAMPING;SORE
DESCRIPTORS: ACHING;CRAMPING
DESCRIPTORS: CRAMPING;SORE
DESCRIPTORS: ACHING;CRAMPING

## 2024-12-01 ASSESSMENT — LIFESTYLE VARIABLES
HOW MANY STANDARD DRINKS CONTAINING ALCOHOL DO YOU HAVE ON A TYPICAL DAY: PATIENT DOES NOT DRINK
HOW OFTEN DO YOU HAVE A DRINK CONTAINING ALCOHOL: NEVER

## 2024-12-01 ASSESSMENT — PAIN DESCRIPTION - FREQUENCY
FREQUENCY: INTERMITTENT

## 2024-12-01 NOTE — ED PROVIDER NOTES
[facility] EMERGENCY DEPT  EMERGENCY DEPARTMENT ENCOUNTER       Pt Name: Anuel Bermudez  MRN: [id number]  Birthdate 1945  Date of Evaluation: 11/30/2024  Provider: ENRIQUETA Ocasio NP   PCP: Bonifacio Bassett MD  Note Started: 8:57 PM 11/30/24     CHIEF COMPLAINT       Chief Complaint   Patient presents with    Constipation     Pt reports he believes he is impacted and has been unable to have a BM in over a week        HISTORY OF PRESENT ILLNESS: 1 or more elements      History From: Patient and Patient's Sister  HPI Limitations None     Anuel Bermudez is a 78 y.o. male with PMHx of current lung cancer who presents to the ED today for concerns regarding constipation and lower abdominal pain.  Patient states he thinks his last bowel movement was approximately a week ago.  Patient has also been having difficulties voiding today.  Denies fevers, chills, headache, sore throat, cough, chest pain, nausea, vomiting.       See [facility] Documentation for further HPI and PMHx     Nursing Notes were all reviewed and agreed with or any disagreements were addressed in the HPI.     REVIEW OF SYSTEMS      Review of Systems     Positives and Pertinent negatives as per HPI.    PAST HISTORY     Past Medical History:  Past Medical History:   Diagnosis Date    Cancer (HCC)     Lung       Past Surgical History:  Past Surgical History:   Procedure Laterality Date    BRONCHOSCOPY N/A 3/20/2024    BRONCHOSCOPY performed by Ramez Fry MD at [facility] ENDOSCOPY    IR PORT PLACEMENT EQUAL OR GREATER THAN 5 YEARS  4/18/2024    IR PORT PLACEMENT EQUAL OR GREATER THAN 5 YEARS 4/18/2024 Anastasia Chavez APRN - NP [facility] RAD ANGIO IR    LAPAROSCOPY N/A 8/11/2024    LAPAROSCOPIC REPAIR OF PERFORATED ANTRAL ULCER WITH GRAM PATCH performed by Rafael Hall MD at [facility] MAIN OR       Family History:  No family history on file.    Social History:  Social History     Tobacco Use    Smoking status: Former     Types: Cigars     Quit date: 2023

## 2024-12-01 NOTE — H&P
Hospitalist Admission Note    NAME:Anuel Bermudez   : 1945   MRN: [id number]     Date/Time: 2024 4:19 AM    Patient PCP: Bonifacio Bassett MD    *Please be aware this note is formulated with assistance from Dragon voice-recognition dictation software. Please excuse any errors that may be present*    ______________________________________________________________________  Given the patient's current clinical presentation, I have a high level of concern for decompensation if discharged from the emergency department.  Complex decision making was performed, which includes reviewing the patient's available past medical records, laboratory results, and x-ray films.       My assessment of this patient's clinical condition and my plan of care is as follows.    Problem List:  Patient Active Problem List   Diagnosis    Collapse of left lung    Pneumoperitoneum    Bowel perforation (HCC)    Goals of care, counseling/discussion    DNR (do not resuscitate) discussion    Frailty    Severe protein-calorie malnutrition (HCC)    Pain at surgical site    Advance care planning    Urinary retention         Assessment / Plan:    Fecal impaction with subsequent urinary retention   Bladder distension with bilateral hydroureter s/p badillo catheter   Hypokalemia   Hypomagnesemia   - 1 week lower abdominal pain with no bowel movement  - CT showing rectal fecal impaction with no SBO, bladder distension with associated b/l hydroureter   - S/p badillo catheter placement in the ED   - Cr 0.99 on admission  - Enema given in the ED with small output; will repeat enema this AM  - Add miralax scheduled   - Consider Urology c/s; will need follow up for badillo catheter   - Potassium, magnesium replaced in the ED; repeat BMP now     Hx of lung cancer s/p laser to L endobronchial lung lesion and chemotherapy  Hx of chronic respiratory failure on oxygen  Hx of COPD  -Not in exacerbation; continue home inhalers     Medical Decision

## 2024-12-01 NOTE — ED NOTES
Report received from JOSE DE JESUS Nichole. Reviewed reason for patient arrival, vitals, labs, medications, orders, procedures, results, pending orders/results and current plan for disposition. Questions were asked and answered prior to departure.

## 2024-12-02 LAB
ALBUMIN SERPL-MCNC: 2 G/DL (ref 3.5–5)
ALBUMIN/GLOB SERPL: 0.6 (ref 1.1–2.2)
ALP SERPL-CCNC: 71 U/L (ref 45–117)
ALT SERPL-CCNC: 21 U/L (ref 12–78)
ANION GAP SERPL CALC-SCNC: 6 MMOL/L (ref 2–12)
AST SERPL-CCNC: 22 U/L (ref 15–37)
BASOPHILS # BLD: 0.1 K/UL (ref 0–0.1)
BASOPHILS NFR BLD: 1 % (ref 0–1)
BILIRUB SERPL-MCNC: 0.3 MG/DL (ref 0.2–1)
BUN SERPL-MCNC: 21 MG/DL (ref 6–20)
BUN/CREAT SERPL: 26 (ref 12–20)
CALCIUM SERPL-MCNC: 7.6 MG/DL (ref 8.5–10.1)
CHLORIDE SERPL-SCNC: 107 MMOL/L (ref 97–108)
CO2 SERPL-SCNC: 24 MMOL/L (ref 21–32)
CREAT SERPL-MCNC: 0.81 MG/DL (ref 0.7–1.3)
DIFFERENTIAL METHOD BLD: ABNORMAL
EOSINOPHIL # BLD: 0 K/UL (ref 0–0.4)
EOSINOPHIL NFR BLD: 0 % (ref 0–7)
ERYTHROCYTE [DISTWIDTH] IN BLOOD BY AUTOMATED COUNT: 15.1 % (ref 11.5–14.5)
GLOBULIN SER CALC-MCNC: 3.2 G/DL (ref 2–4)
GLUCOSE SERPL-MCNC: 100 MG/DL (ref 65–100)
HCT VFR BLD AUTO: 36.7 % (ref 36.6–50.3)
HGB BLD-MCNC: 12.4 G/DL (ref 12.1–17)
IMM GRANULOCYTES # BLD AUTO: 0 K/UL (ref 0–0.04)
IMM GRANULOCYTES NFR BLD AUTO: 1 % (ref 0–0.5)
LYMPHOCYTES # BLD: 1.2 K/UL (ref 0.8–3.5)
LYMPHOCYTES NFR BLD: 15 % (ref 12–49)
MAGNESIUM SERPL-MCNC: 2 MG/DL (ref 1.6–2.4)
MCH RBC QN AUTO: 30.1 PG (ref 26–34)
MCHC RBC AUTO-ENTMCNC: 33.8 G/DL (ref 30–36.5)
MCV RBC AUTO: 89.1 FL (ref 80–99)
MONOCYTES # BLD: 0.7 K/UL (ref 0–1)
MONOCYTES NFR BLD: 9 % (ref 5–13)
NEUTS SEG # BLD: 5.9 K/UL (ref 1.8–8)
NEUTS SEG NFR BLD: 74 % (ref 32–75)
NRBC # BLD: 0 K/UL (ref 0–0.01)
NRBC BLD-RTO: 0 PER 100 WBC
PLATELET # BLD AUTO: 281 K/UL (ref 150–400)
PMV BLD AUTO: 8.8 FL (ref 8.9–12.9)
POTASSIUM SERPL-SCNC: 2.7 MMOL/L (ref 3.5–5.1)
PROT SERPL-MCNC: 5.2 G/DL (ref 6.4–8.2)
RBC # BLD AUTO: 4.12 M/UL (ref 4.1–5.7)
SODIUM SERPL-SCNC: 137 MMOL/L (ref 136–145)
WBC # BLD AUTO: 8 K/UL (ref 4.1–11.1)

## 2024-12-02 PROCEDURE — 2580000003 HC RX 258: Performed by: STUDENT IN AN ORGANIZED HEALTH CARE EDUCATION/TRAINING PROGRAM

## 2024-12-02 PROCEDURE — 6360000002 HC RX W HCPCS: Performed by: STUDENT IN AN ORGANIZED HEALTH CARE EDUCATION/TRAINING PROGRAM

## 2024-12-02 PROCEDURE — 94640 AIRWAY INHALATION TREATMENT: CPT

## 2024-12-02 PROCEDURE — 6370000000 HC RX 637 (ALT 250 FOR IP): Performed by: STUDENT IN AN ORGANIZED HEALTH CARE EDUCATION/TRAINING PROGRAM

## 2024-12-02 PROCEDURE — 6370000000 HC RX 637 (ALT 250 FOR IP): Performed by: INTERNAL MEDICINE

## 2024-12-02 PROCEDURE — 85025 COMPLETE CBC W/AUTO DIFF WBC: CPT

## 2024-12-02 PROCEDURE — 6360000002 HC RX W HCPCS: Performed by: INTERNAL MEDICINE

## 2024-12-02 PROCEDURE — 83735 ASSAY OF MAGNESIUM: CPT

## 2024-12-02 PROCEDURE — 80053 COMPREHEN METABOLIC PANEL: CPT

## 2024-12-02 PROCEDURE — 97535 SELF CARE MNGMENT TRAINING: CPT

## 2024-12-02 PROCEDURE — 1100000003 HC PRIVATE W/ TELEMETRY

## 2024-12-02 PROCEDURE — 97165 OT EVAL LOW COMPLEX 30 MIN: CPT

## 2024-12-02 PROCEDURE — 2580000003 HC RX 258: Performed by: INTERNAL MEDICINE

## 2024-12-02 PROCEDURE — 94761 N-INVAS EAR/PLS OXIMETRY MLT: CPT

## 2024-12-02 PROCEDURE — 97162 PT EVAL MOD COMPLEX 30 MIN: CPT

## 2024-12-02 PROCEDURE — 36415 COLL VENOUS BLD VENIPUNCTURE: CPT

## 2024-12-02 PROCEDURE — 97530 THERAPEUTIC ACTIVITIES: CPT

## 2024-12-02 RX ORDER — POTASSIUM CHLORIDE 7.45 MG/ML
10 INJECTION INTRAVENOUS
Status: COMPLETED | OUTPATIENT
Start: 2024-12-02 | End: 2024-12-02

## 2024-12-02 RX ORDER — POTASSIUM CHLORIDE 1.5 G/1.58G
40 POWDER, FOR SOLUTION ORAL ONCE
Status: COMPLETED | OUTPATIENT
Start: 2024-12-02 | End: 2024-12-02

## 2024-12-02 RX ORDER — TAMSULOSIN HYDROCHLORIDE 0.4 MG/1
0.4 CAPSULE ORAL DAILY
Status: DISCONTINUED | OUTPATIENT
Start: 2024-12-02 | End: 2024-12-05 | Stop reason: HOSPADM

## 2024-12-02 RX ORDER — POTASSIUM CHLORIDE 7.45 MG/ML
10 INJECTION INTRAVENOUS ONCE
Status: DISCONTINUED | OUTPATIENT
Start: 2024-12-02 | End: 2024-12-02

## 2024-12-02 RX ADMIN — SODIUM CHLORIDE 40 MG: 9 INJECTION INTRAMUSCULAR; INTRAVENOUS; SUBCUTANEOUS at 06:40

## 2024-12-02 RX ADMIN — POTASSIUM CHLORIDE 10 MEQ: 7.46 INJECTION, SOLUTION INTRAVENOUS at 08:20

## 2024-12-02 RX ADMIN — BUDESONIDE 1000 MCG: 0.5 INHALANT RESPIRATORY (INHALATION) at 13:48

## 2024-12-02 RX ADMIN — TAMSULOSIN HYDROCHLORIDE 0.4 MG: 0.4 CAPSULE ORAL at 12:33

## 2024-12-02 RX ADMIN — Medication 400 MG: at 08:20

## 2024-12-02 RX ADMIN — ASPIRIN 81 MG: 81 TABLET, COATED ORAL at 08:20

## 2024-12-02 RX ADMIN — MIRTAZAPINE 30 MG: 15 TABLET, FILM COATED ORAL at 20:20

## 2024-12-02 RX ADMIN — ENOXAPARIN SODIUM 30 MG: 100 INJECTION SUBCUTANEOUS at 08:20

## 2024-12-02 RX ADMIN — POTASSIUM CHLORIDE 10 MEQ: 7.46 INJECTION, SOLUTION INTRAVENOUS at 06:54

## 2024-12-02 RX ADMIN — POLYETHYLENE GLYCOL 3350 17 G: 17 POWDER, FOR SOLUTION ORAL at 08:20

## 2024-12-02 RX ADMIN — Medication 100 MG: at 08:20

## 2024-12-02 RX ADMIN — IPRATROPIUM BROMIDE 0.5 MG: 0.5 SOLUTION RESPIRATORY (INHALATION) at 13:43

## 2024-12-02 RX ADMIN — ARFORMOTEROL TARTRATE 15 MCG: 15 SOLUTION RESPIRATORY (INHALATION) at 13:48

## 2024-12-02 RX ADMIN — BUDESONIDE 1000 MCG: 0.5 INHALANT RESPIRATORY (INHALATION) at 20:55

## 2024-12-02 RX ADMIN — IPRATROPIUM BROMIDE 0.5 MG: 0.5 SOLUTION RESPIRATORY (INHALATION) at 20:45

## 2024-12-02 RX ADMIN — POTASSIUM CHLORIDE 10 MEQ: 7.46 INJECTION, SOLUTION INTRAVENOUS at 10:26

## 2024-12-02 RX ADMIN — FOLIC ACID 1 MG: 1 TABLET ORAL at 08:20

## 2024-12-02 RX ADMIN — DONEPEZIL HYDROCHLORIDE 5 MG: 5 TABLET, FILM COATED ORAL at 20:20

## 2024-12-02 RX ADMIN — ACETAMINOPHEN 325MG 650 MG: 325 TABLET ORAL at 20:19

## 2024-12-02 RX ADMIN — Medication 400 MG: at 20:20

## 2024-12-02 RX ADMIN — POTASSIUM CHLORIDE 10 MEQ: 7.46 INJECTION, SOLUTION INTRAVENOUS at 09:29

## 2024-12-02 RX ADMIN — SODIUM CHLORIDE 40 MG: 9 INJECTION INTRAMUSCULAR; INTRAVENOUS; SUBCUTANEOUS at 17:16

## 2024-12-02 RX ADMIN — SODIUM CHLORIDE, PRESERVATIVE FREE 10 ML: 5 INJECTION INTRAVENOUS at 20:21

## 2024-12-02 RX ADMIN — SODIUM CHLORIDE, PRESERVATIVE FREE 10 ML: 5 INJECTION INTRAVENOUS at 08:20

## 2024-12-02 RX ADMIN — ARFORMOTEROL TARTRATE 15 MCG: 15 SOLUTION RESPIRATORY (INHALATION) at 20:55

## 2024-12-02 RX ADMIN — POTASSIUM CHLORIDE 40 MEQ: 1.5 FOR SOLUTION ORAL at 09:30

## 2024-12-02 RX ADMIN — MONTELUKAST 10 MG: 10 TABLET, FILM COATED ORAL at 20:20

## 2024-12-02 ASSESSMENT — PAIN SCALES - GENERAL
PAINLEVEL_OUTOF10: 4
PAINLEVEL_OUTOF10: 0
PAINLEVEL_OUTOF10: 0
PAINLEVEL_OUTOF10: 4
PAINLEVEL_OUTOF10: 0

## 2024-12-02 ASSESSMENT — PAIN DESCRIPTION - DESCRIPTORS: DESCRIPTORS: ACHING

## 2024-12-02 ASSESSMENT — PAIN - FUNCTIONAL ASSESSMENT: PAIN_FUNCTIONAL_ASSESSMENT: ACTIVITIES ARE NOT PREVENTED

## 2024-12-02 ASSESSMENT — PAIN DESCRIPTION - ORIENTATION: ORIENTATION: RIGHT;LEFT

## 2024-12-02 ASSESSMENT — PAIN DESCRIPTION - LOCATION: LOCATION: LEG

## 2024-12-02 NOTE — CARE COORDINATION
Care Management Initial Assessment       RUR: 16%  Readmission? No  1st IM letter given? Yes   1st  letter given: No    CM completed assessment with pts daughter.  Pt is known to reside alone in their one story home.  Pt is known to be active with PCP and use [facility].  Pt is independent with ADLs, with limited assistance.  Pt use DME at home: walker, cane and Home O2-as needed at night.  Pt has had HHC and SNF in the past.    Pts daughter agreeable to placement at the time of d/c.  Pts daughter [facility].  CM placed referrals to both snf facilities.    Pt will require auth prior to admission.  Once accepted CM will initiate insurance auth.        MEHNAZ Garcia CM  [phone]       12/02/24 1441   Service Assessment   Patient Orientation Alert and Oriented   Cognition Alert   History Provided By Patient   Primary Caregiver Self   Support Systems Family Members;Children   PCP Verified by CM Yes   Last Visit to PCP Within last 3 months   Prior Functional Level Independent in ADLs/IADLs  (limited assistance)   Current Functional Level Independent in ADLs/IADLs  (limited assistance)   Can patient return to prior living arrangement Yes   Ability to make needs known: Fair   Family able to assist with home care needs: Yes   Would you like for me to discuss the discharge plan with any other family members/significant others, and if so, who? Yes   Financial Resources Medicare   Social/Functional History   Lives With Alone   Type of Home House   Active  No   Mode of Transportation Family   Discharge Planning   Patient expects to be discharged to: Skilled nursing facility

## 2024-12-03 LAB
ALBUMIN SERPL-MCNC: 2 G/DL (ref 3.5–5)
ALBUMIN/GLOB SERPL: 0.6 (ref 1.1–2.2)
ALP SERPL-CCNC: 79 U/L (ref 45–117)
ALT SERPL-CCNC: 26 U/L (ref 12–78)
ANION GAP SERPL CALC-SCNC: 6 MMOL/L (ref 2–12)
AST SERPL-CCNC: 25 U/L (ref 15–37)
BASOPHILS # BLD: 0.1 K/UL (ref 0–0.1)
BASOPHILS NFR BLD: 1 % (ref 0–1)
BILIRUB SERPL-MCNC: 0.3 MG/DL (ref 0.2–1)
BUN SERPL-MCNC: 20 MG/DL (ref 6–20)
BUN/CREAT SERPL: 27 (ref 12–20)
CALCIUM SERPL-MCNC: 7.3 MG/DL (ref 8.5–10.1)
CHLORIDE SERPL-SCNC: 105 MMOL/L (ref 97–108)
CO2 SERPL-SCNC: 23 MMOL/L (ref 21–32)
CREAT SERPL-MCNC: 0.74 MG/DL (ref 0.7–1.3)
DIFFERENTIAL METHOD BLD: ABNORMAL
EOSINOPHIL # BLD: 0.1 K/UL (ref 0–0.4)
EOSINOPHIL NFR BLD: 1 % (ref 0–7)
ERYTHROCYTE [DISTWIDTH] IN BLOOD BY AUTOMATED COUNT: 15.1 % (ref 11.5–14.5)
GLOBULIN SER CALC-MCNC: 3.1 G/DL (ref 2–4)
GLUCOSE SERPL-MCNC: 101 MG/DL (ref 65–100)
HCT VFR BLD AUTO: 32.5 % (ref 36.6–50.3)
HGB BLD-MCNC: 11.2 G/DL (ref 12.1–17)
IMM GRANULOCYTES # BLD AUTO: 0.1 K/UL (ref 0–0.04)
IMM GRANULOCYTES NFR BLD AUTO: 1 % (ref 0–0.5)
LYMPHOCYTES # BLD: 1.1 K/UL (ref 0.8–3.5)
LYMPHOCYTES NFR BLD: 12 % (ref 12–49)
MAGNESIUM SERPL-MCNC: 1.9 MG/DL (ref 1.6–2.4)
MCH RBC QN AUTO: 29.7 PG (ref 26–34)
MCHC RBC AUTO-ENTMCNC: 34.5 G/DL (ref 30–36.5)
MCV RBC AUTO: 86.2 FL (ref 80–99)
MONOCYTES # BLD: 0.7 K/UL (ref 0–1)
MONOCYTES NFR BLD: 9 % (ref 5–13)
NEUTS SEG # BLD: 6.7 K/UL (ref 1.8–8)
NEUTS SEG NFR BLD: 76 % (ref 32–75)
NRBC # BLD: 0 K/UL (ref 0–0.01)
NRBC BLD-RTO: 0 PER 100 WBC
PLATELET # BLD AUTO: 269 K/UL (ref 150–400)
PMV BLD AUTO: 8.7 FL (ref 8.9–12.9)
POTASSIUM SERPL-SCNC: 3.2 MMOL/L (ref 3.5–5.1)
PROT SERPL-MCNC: 5.1 G/DL (ref 6.4–8.2)
RBC # BLD AUTO: 3.77 M/UL (ref 4.1–5.7)
SODIUM SERPL-SCNC: 134 MMOL/L (ref 136–145)
WBC # BLD AUTO: 8.6 K/UL (ref 4.1–11.1)

## 2024-12-03 PROCEDURE — 97530 THERAPEUTIC ACTIVITIES: CPT

## 2024-12-03 PROCEDURE — 6370000000 HC RX 637 (ALT 250 FOR IP): Performed by: STUDENT IN AN ORGANIZED HEALTH CARE EDUCATION/TRAINING PROGRAM

## 2024-12-03 PROCEDURE — 97116 GAIT TRAINING THERAPY: CPT

## 2024-12-03 PROCEDURE — 51702 INSERT TEMP BLADDER CATH: CPT

## 2024-12-03 PROCEDURE — 80053 COMPREHEN METABOLIC PANEL: CPT

## 2024-12-03 PROCEDURE — 97535 SELF CARE MNGMENT TRAINING: CPT

## 2024-12-03 PROCEDURE — 94761 N-INVAS EAR/PLS OXIMETRY MLT: CPT

## 2024-12-03 PROCEDURE — 1100000003 HC PRIVATE W/ TELEMETRY

## 2024-12-03 PROCEDURE — 85025 COMPLETE CBC W/AUTO DIFF WBC: CPT

## 2024-12-03 PROCEDURE — 36415 COLL VENOUS BLD VENIPUNCTURE: CPT

## 2024-12-03 PROCEDURE — 6360000002 HC RX W HCPCS: Performed by: STUDENT IN AN ORGANIZED HEALTH CARE EDUCATION/TRAINING PROGRAM

## 2024-12-03 PROCEDURE — 83735 ASSAY OF MAGNESIUM: CPT

## 2024-12-03 PROCEDURE — 6360000002 HC RX W HCPCS: Performed by: INTERNAL MEDICINE

## 2024-12-03 PROCEDURE — 2580000003 HC RX 258: Performed by: INTERNAL MEDICINE

## 2024-12-03 PROCEDURE — 2580000003 HC RX 258: Performed by: STUDENT IN AN ORGANIZED HEALTH CARE EDUCATION/TRAINING PROGRAM

## 2024-12-03 PROCEDURE — 6370000000 HC RX 637 (ALT 250 FOR IP): Performed by: INTERNAL MEDICINE

## 2024-12-03 PROCEDURE — 94640 AIRWAY INHALATION TREATMENT: CPT

## 2024-12-03 RX ORDER — SENNA AND DOCUSATE SODIUM 50; 8.6 MG/1; MG/1
2 TABLET, FILM COATED ORAL DAILY
Status: DISCONTINUED | OUTPATIENT
Start: 2024-12-03 | End: 2024-12-05 | Stop reason: HOSPADM

## 2024-12-03 RX ORDER — PANTOPRAZOLE SODIUM 40 MG/1
40 TABLET, DELAYED RELEASE ORAL
Status: DISCONTINUED | OUTPATIENT
Start: 2024-12-03 | End: 2024-12-05 | Stop reason: HOSPADM

## 2024-12-03 RX ADMIN — MIRTAZAPINE 30 MG: 15 TABLET, FILM COATED ORAL at 20:27

## 2024-12-03 RX ADMIN — ASPIRIN 81 MG: 81 TABLET, COATED ORAL at 08:13

## 2024-12-03 RX ADMIN — MONTELUKAST 10 MG: 10 TABLET, FILM COATED ORAL at 20:27

## 2024-12-03 RX ADMIN — ARFORMOTEROL TARTRATE 15 MCG: 15 SOLUTION RESPIRATORY (INHALATION) at 20:41

## 2024-12-03 RX ADMIN — ACETAMINOPHEN 325MG 650 MG: 325 TABLET ORAL at 04:01

## 2024-12-03 RX ADMIN — PANTOPRAZOLE SODIUM 40 MG: 40 TABLET, DELAYED RELEASE ORAL at 16:03

## 2024-12-03 RX ADMIN — POLYETHYLENE GLYCOL 3350 17 G: 17 POWDER, FOR SOLUTION ORAL at 08:15

## 2024-12-03 RX ADMIN — Medication 400 MG: at 08:13

## 2024-12-03 RX ADMIN — TAMSULOSIN HYDROCHLORIDE 0.4 MG: 0.4 CAPSULE ORAL at 08:13

## 2024-12-03 RX ADMIN — FOLIC ACID 1 MG: 1 TABLET ORAL at 08:13

## 2024-12-03 RX ADMIN — BUDESONIDE 1000 MCG: 0.5 INHALANT RESPIRATORY (INHALATION) at 20:43

## 2024-12-03 RX ADMIN — ARFORMOTEROL TARTRATE 15 MCG: 15 SOLUTION RESPIRATORY (INHALATION) at 08:49

## 2024-12-03 RX ADMIN — IPRATROPIUM BROMIDE 0.5 MG: 0.5 SOLUTION RESPIRATORY (INHALATION) at 15:00

## 2024-12-03 RX ADMIN — Medication 100 MG: at 08:13

## 2024-12-03 RX ADMIN — DONEPEZIL HYDROCHLORIDE 5 MG: 5 TABLET, FILM COATED ORAL at 20:27

## 2024-12-03 RX ADMIN — SENNOSIDES AND DOCUSATE SODIUM 2 TABLET: 50; 8.6 TABLET ORAL at 12:53

## 2024-12-03 RX ADMIN — ENOXAPARIN SODIUM 30 MG: 100 INJECTION SUBCUTANEOUS at 08:14

## 2024-12-03 RX ADMIN — IPRATROPIUM BROMIDE 0.5 MG: 0.5 SOLUTION RESPIRATORY (INHALATION) at 08:44

## 2024-12-03 RX ADMIN — Medication 400 MG: at 20:27

## 2024-12-03 RX ADMIN — BUDESONIDE 1000 MCG: 0.5 INHALANT RESPIRATORY (INHALATION) at 08:49

## 2024-12-03 RX ADMIN — IPRATROPIUM BROMIDE 0.5 MG: 0.5 SOLUTION RESPIRATORY (INHALATION) at 20:41

## 2024-12-03 RX ADMIN — SODIUM CHLORIDE, PRESERVATIVE FREE 10 ML: 5 INJECTION INTRAVENOUS at 20:27

## 2024-12-03 RX ADMIN — SODIUM CHLORIDE, PRESERVATIVE FREE 10 ML: 5 INJECTION INTRAVENOUS at 08:15

## 2024-12-03 RX ADMIN — SODIUM CHLORIDE 40 MG: 9 INJECTION INTRAMUSCULAR; INTRAVENOUS; SUBCUTANEOUS at 04:02

## 2024-12-03 ASSESSMENT — PAIN SCALES - GENERAL
PAINLEVEL_OUTOF10: 4
PAINLEVEL_OUTOF10: 6
PAINLEVEL_OUTOF10: 6

## 2024-12-03 ASSESSMENT — PAIN DESCRIPTION - LOCATION
LOCATION: ABDOMEN;PELVIS
LOCATION: LEG

## 2024-12-03 ASSESSMENT — PAIN DESCRIPTION - DESCRIPTORS
DESCRIPTORS: ACHING
DESCRIPTORS: ACHING

## 2024-12-03 ASSESSMENT — PAIN DESCRIPTION - ORIENTATION
ORIENTATION: RIGHT;LEFT
ORIENTATION: ANTERIOR

## 2024-12-03 ASSESSMENT — PAIN - FUNCTIONAL ASSESSMENT
PAIN_FUNCTIONAL_ASSESSMENT: ACTIVITIES ARE NOT PREVENTED
PAIN_FUNCTIONAL_ASSESSMENT: ACTIVITIES ARE NOT PREVENTED

## 2024-12-03 ASSESSMENT — PAIN DESCRIPTION - PAIN TYPE
TYPE: ACUTE PAIN
TYPE: ACUTE PAIN

## 2024-12-03 NOTE — CARE COORDINATION
Transition of Care Plan:    RUR: 17%  Prior Level of Functioning: need assistance with ADLs  Disposition: SNF Placement-[facility]   CASSIE: 24hrs-pending auth   If SNF or IPR: [facility] Date FOC offered: 12/2/24  Date FOC received: 12/2  Accepting facility: [facility] authorization started with reference number: 12/3-pending auth approval  Date authorization received and expires:   Follow up appointments: Follow up with PCP and/or Specialist   DME needed: will use at the facility   Transportation at discharge: BLS transport   IM/IMM Medicare/ letter given: 2nd IM Medicare Letter to be given   Is patient a [facility] [place] and connected with [facility]? N/A   If yes, was  transfer form completed and VA notified? N/A  Caregiver Contact: Deja Bowden  (child_ [phone]  Discharge Caregiver contacted prior to discharge? Family to be contacted   Care Conference needed? N/A  Barriers to discharge: Medical Clearance       UPDATE: 2:49PM    CM spoke to rep at [facility], and was informed that pt auth was still pending.      CM will continue to follow    MEHNAZ Garcia CM  [phone]        INITIAL NOTE: CM aware that d/c order is in place.  CM made aware that pt was accepted to [facility].  CM submitted for insurance auth to pts insurance provider: [employer], via fax; [phone].    CM currently waiting to hear from insurance provider.    MEHNAZ Garcia CM  [phone]

## 2024-12-04 LAB
ANION GAP SERPL CALC-SCNC: 6 MMOL/L (ref 2–12)
BASOPHILS # BLD: 0.1 K/UL (ref 0–0.1)
BASOPHILS NFR BLD: 1 % (ref 0–1)
BUN SERPL-MCNC: 21 MG/DL (ref 6–20)
BUN/CREAT SERPL: 28 (ref 12–20)
CALCIUM SERPL-MCNC: 7.1 MG/DL (ref 8.5–10.1)
CHLORIDE SERPL-SCNC: 106 MMOL/L (ref 97–108)
CO2 SERPL-SCNC: 23 MMOL/L (ref 21–32)
CREAT SERPL-MCNC: 0.74 MG/DL (ref 0.7–1.3)
DIFFERENTIAL METHOD BLD: ABNORMAL
EOSINOPHIL # BLD: 0.1 K/UL (ref 0–0.4)
EOSINOPHIL NFR BLD: 1 % (ref 0–7)
ERYTHROCYTE [DISTWIDTH] IN BLOOD BY AUTOMATED COUNT: 15.5 % (ref 11.5–14.5)
GLUCOSE SERPL-MCNC: 134 MG/DL (ref 65–100)
HCT VFR BLD AUTO: 33.2 % (ref 36.6–50.3)
HGB BLD-MCNC: 11.6 G/DL (ref 12.1–17)
IMM GRANULOCYTES # BLD AUTO: 0 K/UL (ref 0–0.04)
IMM GRANULOCYTES NFR BLD AUTO: 0 % (ref 0–0.5)
LYMPHOCYTES # BLD: 1.1 K/UL (ref 0.8–3.5)
LYMPHOCYTES NFR BLD: 13 % (ref 12–49)
MAGNESIUM SERPL-MCNC: 1.7 MG/DL (ref 1.6–2.4)
MCH RBC QN AUTO: 30.1 PG (ref 26–34)
MCHC RBC AUTO-ENTMCNC: 34.9 G/DL (ref 30–36.5)
MCV RBC AUTO: 86.2 FL (ref 80–99)
MONOCYTES # BLD: 0.6 K/UL (ref 0–1)
MONOCYTES NFR BLD: 7 % (ref 5–13)
NEUTS SEG # BLD: 6.5 K/UL (ref 1.8–8)
NEUTS SEG NFR BLD: 78 % (ref 32–75)
NRBC # BLD: 0 K/UL (ref 0–0.01)
NRBC BLD-RTO: 0 PER 100 WBC
PLATELET # BLD AUTO: 270 K/UL (ref 150–400)
PMV BLD AUTO: 8.7 FL (ref 8.9–12.9)
POTASSIUM SERPL-SCNC: 2.8 MMOL/L (ref 3.5–5.1)
RBC # BLD AUTO: 3.85 M/UL (ref 4.1–5.7)
SODIUM SERPL-SCNC: 135 MMOL/L (ref 136–145)
WBC # BLD AUTO: 8.3 K/UL (ref 4.1–11.1)

## 2024-12-04 PROCEDURE — 1100000003 HC PRIVATE W/ TELEMETRY

## 2024-12-04 PROCEDURE — 6360000002 HC RX W HCPCS: Performed by: STUDENT IN AN ORGANIZED HEALTH CARE EDUCATION/TRAINING PROGRAM

## 2024-12-04 PROCEDURE — 51798 US URINE CAPACITY MEASURE: CPT

## 2024-12-04 PROCEDURE — 94761 N-INVAS EAR/PLS OXIMETRY MLT: CPT

## 2024-12-04 PROCEDURE — 36415 COLL VENOUS BLD VENIPUNCTURE: CPT

## 2024-12-04 PROCEDURE — 83735 ASSAY OF MAGNESIUM: CPT

## 2024-12-04 PROCEDURE — 85025 COMPLETE CBC W/AUTO DIFF WBC: CPT

## 2024-12-04 PROCEDURE — 6370000000 HC RX 637 (ALT 250 FOR IP): Performed by: STUDENT IN AN ORGANIZED HEALTH CARE EDUCATION/TRAINING PROGRAM

## 2024-12-04 PROCEDURE — 80048 BASIC METABOLIC PNL TOTAL CA: CPT

## 2024-12-04 PROCEDURE — 6370000000 HC RX 637 (ALT 250 FOR IP): Performed by: INTERNAL MEDICINE

## 2024-12-04 PROCEDURE — 6360000002 HC RX W HCPCS: Performed by: INTERNAL MEDICINE

## 2024-12-04 PROCEDURE — 94640 AIRWAY INHALATION TREATMENT: CPT

## 2024-12-04 PROCEDURE — 2580000003 HC RX 258: Performed by: STUDENT IN AN ORGANIZED HEALTH CARE EDUCATION/TRAINING PROGRAM

## 2024-12-04 RX ORDER — MAGNESIUM SULFATE 1 G/100ML
1000 INJECTION INTRAVENOUS ONCE
Status: COMPLETED | OUTPATIENT
Start: 2024-12-04 | End: 2024-12-04

## 2024-12-04 RX ORDER — POTASSIUM CHLORIDE 7.45 MG/ML
10 INJECTION INTRAVENOUS
Status: COMPLETED | OUTPATIENT
Start: 2024-12-04 | End: 2024-12-04

## 2024-12-04 RX ADMIN — PANTOPRAZOLE SODIUM 40 MG: 40 TABLET, DELAYED RELEASE ORAL at 16:39

## 2024-12-04 RX ADMIN — IPRATROPIUM BROMIDE 0.5 MG: 0.5 SOLUTION RESPIRATORY (INHALATION) at 08:02

## 2024-12-04 RX ADMIN — SENNOSIDES AND DOCUSATE SODIUM 2 TABLET: 50; 8.6 TABLET ORAL at 09:22

## 2024-12-04 RX ADMIN — DONEPEZIL HYDROCHLORIDE 5 MG: 5 TABLET, FILM COATED ORAL at 20:35

## 2024-12-04 RX ADMIN — SODIUM CHLORIDE: 9 INJECTION, SOLUTION INTRAVENOUS at 16:55

## 2024-12-04 RX ADMIN — FOLIC ACID 1 MG: 1 TABLET ORAL at 09:22

## 2024-12-04 RX ADMIN — POTASSIUM BICARBONATE 40 MEQ: 782 TABLET, EFFERVESCENT ORAL at 18:25

## 2024-12-04 RX ADMIN — Medication 100 MG: at 09:22

## 2024-12-04 RX ADMIN — TAMSULOSIN HYDROCHLORIDE 0.4 MG: 0.4 CAPSULE ORAL at 09:22

## 2024-12-04 RX ADMIN — MAGNESIUM SULFATE HEPTAHYDRATE 1000 MG: 1 INJECTION, SOLUTION INTRAVENOUS at 18:27

## 2024-12-04 RX ADMIN — ARFORMOTEROL TARTRATE 15 MCG: 15 SOLUTION RESPIRATORY (INHALATION) at 19:30

## 2024-12-04 RX ADMIN — BUDESONIDE 1000 MCG: 0.5 INHALANT RESPIRATORY (INHALATION) at 19:30

## 2024-12-04 RX ADMIN — ARFORMOTEROL TARTRATE 15 MCG: 15 SOLUTION RESPIRATORY (INHALATION) at 08:01

## 2024-12-04 RX ADMIN — POTASSIUM BICARBONATE 40 MEQ: 782 TABLET, EFFERVESCENT ORAL at 15:23

## 2024-12-04 RX ADMIN — IPRATROPIUM BROMIDE 0.5 MG: 0.5 SOLUTION RESPIRATORY (INHALATION) at 15:00

## 2024-12-04 RX ADMIN — POTASSIUM CHLORIDE 10 MEQ: 7.46 INJECTION, SOLUTION INTRAVENOUS at 16:49

## 2024-12-04 RX ADMIN — PANTOPRAZOLE SODIUM 40 MG: 40 TABLET, DELAYED RELEASE ORAL at 06:22

## 2024-12-04 RX ADMIN — SODIUM CHLORIDE, PRESERVATIVE FREE 10 ML: 5 INJECTION INTRAVENOUS at 20:36

## 2024-12-04 RX ADMIN — BUDESONIDE 1000 MCG: 0.5 INHALANT RESPIRATORY (INHALATION) at 08:01

## 2024-12-04 RX ADMIN — MIRTAZAPINE 30 MG: 15 TABLET, FILM COATED ORAL at 20:35

## 2024-12-04 RX ADMIN — ACETAMINOPHEN 325MG 650 MG: 325 TABLET ORAL at 09:22

## 2024-12-04 RX ADMIN — POTASSIUM CHLORIDE 10 MEQ: 7.46 INJECTION, SOLUTION INTRAVENOUS at 15:29

## 2024-12-04 RX ADMIN — Medication 400 MG: at 09:22

## 2024-12-04 RX ADMIN — ASPIRIN 81 MG: 81 TABLET, COATED ORAL at 09:22

## 2024-12-04 RX ADMIN — SODIUM CHLORIDE, PRESERVATIVE FREE 10 ML: 5 INJECTION INTRAVENOUS at 09:25

## 2024-12-04 RX ADMIN — MONTELUKAST 10 MG: 10 TABLET, FILM COATED ORAL at 20:35

## 2024-12-04 RX ADMIN — POLYETHYLENE GLYCOL 3350 17 G: 17 POWDER, FOR SOLUTION ORAL at 09:21

## 2024-12-04 RX ADMIN — IPRATROPIUM BROMIDE 0.5 MG: 0.5 SOLUTION RESPIRATORY (INHALATION) at 19:30

## 2024-12-04 RX ADMIN — ENOXAPARIN SODIUM 30 MG: 100 INJECTION SUBCUTANEOUS at 09:25

## 2024-12-04 RX ADMIN — Medication 400 MG: at 20:35

## 2024-12-04 ASSESSMENT — PAIN DESCRIPTION - DESCRIPTORS
DESCRIPTORS: ACHING
DESCRIPTORS: POUNDING

## 2024-12-04 ASSESSMENT — PAIN DESCRIPTION - FREQUENCY
FREQUENCY: INTERMITTENT
FREQUENCY: INTERMITTENT

## 2024-12-04 ASSESSMENT — PAIN - FUNCTIONAL ASSESSMENT
PAIN_FUNCTIONAL_ASSESSMENT: ACTIVITIES ARE NOT PREVENTED
PAIN_FUNCTIONAL_ASSESSMENT: PREVENTS OR INTERFERES SOME ACTIVE ACTIVITIES AND ADLS

## 2024-12-04 ASSESSMENT — PAIN SCALES - GENERAL
PAINLEVEL_OUTOF10: 2
PAINLEVEL_OUTOF10: 9
PAINLEVEL_OUTOF10: 0

## 2024-12-04 ASSESSMENT — PAIN DESCRIPTION - LOCATION
LOCATION: BACK
LOCATION: ABDOMEN

## 2024-12-04 ASSESSMENT — PAIN DESCRIPTION - ORIENTATION
ORIENTATION: LOWER
ORIENTATION: UPPER;ANTERIOR

## 2024-12-04 ASSESSMENT — PAIN DESCRIPTION - PAIN TYPE
TYPE: ACUTE PAIN
TYPE: ACUTE PAIN

## 2024-12-04 ASSESSMENT — PAIN DESCRIPTION - ONSET
ONSET: SUDDEN
ONSET: GRADUAL

## 2024-12-04 NOTE — CARE COORDINATION
CM informed that pt will not d/c today due to labs.  D/C will resume on 12/5, if pt Is medically stable.    MEHNAZ Garcia   [phone]

## 2024-12-04 NOTE — CARE COORDINATION
12/04/24 0945   Services At/After Discharge   Transition of Care Consult (CM Consult) SNF  ([facility])   Partner SNF Yes   Services At/After Discharge Skilled Nursing Facility (SNF)  ([facility])    Resource Information Provided? No   Mode of Transport at Discharge BLS  ([facility] to be arranged)   Confirm Follow Up Transport Family   Condition of Participation: Discharge Planning   The Patient and/or Patient Representative was provided with a Choice of Provider? Patient Representative  ([facility])   The Patient and/Or Patient Representative agree with the Discharge Plan? Yes   Freedom of Choice list was provided with basic dialogue that supports the patient's individualized plan of care/goals, treatment preferences, and shares the quality data associated with the providers?  Yes     Transition of Care Plan to SNF/Rehab    Communication to Patient/Family:  Met with patient and family and they are agreeable to the transition plan. The Plan for Transition of Care is related to the following treatment goals:     CM aware that pt received auth from [employer] (ref #[id number]).  Pt will transition to [facility] on today.  CM will arrange transport with [facility].  CM informed pts daughter of the following.    The Patient and/or patient representative was provided with a choice of provider and agrees  with the discharge plan.      Yes [x] No []    A Freedom of choice list was provided with basic dialogue that supports the patient's individualized plan of care/goals and shares the quality data associated with the providers.       Yes [x] No []    SNF/Rehab Transition:  Patient has been accepted to [facility] SNF/Rehab and meets criteria for admission.   Date of Inpatient Status Order:   Patient will transported by [facility] and expected to leave at (time to be arranged).    Communication to SNF/Rehab:  Bedside RN, ONC Nurse , has been notified to update the transition plan to the facility and call report

## 2024-12-05 VITALS
HEIGHT: 65 IN | RESPIRATION RATE: 18 BRPM | WEIGHT: 95 LBS | OXYGEN SATURATION: 97 % | SYSTOLIC BLOOD PRESSURE: 109 MMHG | TEMPERATURE: 98.8 F | DIASTOLIC BLOOD PRESSURE: 58 MMHG | BODY MASS INDEX: 15.83 KG/M2 | HEART RATE: 62 BPM

## 2024-12-05 LAB
ANION GAP SERPL CALC-SCNC: 3 MMOL/L (ref 2–12)
BASOPHILS # BLD: 0.1 K/UL (ref 0–0.1)
BASOPHILS NFR BLD: 1 % (ref 0–1)
BUN SERPL-MCNC: 20 MG/DL (ref 6–20)
BUN/CREAT SERPL: 29 (ref 12–20)
CALCIUM SERPL-MCNC: 7.2 MG/DL (ref 8.5–10.1)
CHLORIDE SERPL-SCNC: 104 MMOL/L (ref 97–108)
CO2 SERPL-SCNC: 27 MMOL/L (ref 21–32)
CREAT SERPL-MCNC: 0.7 MG/DL (ref 0.7–1.3)
DIFFERENTIAL METHOD BLD: ABNORMAL
EOSINOPHIL # BLD: 0.2 K/UL (ref 0–0.4)
EOSINOPHIL NFR BLD: 2 % (ref 0–7)
ERYTHROCYTE [DISTWIDTH] IN BLOOD BY AUTOMATED COUNT: 15.8 % (ref 11.5–14.5)
GLUCOSE SERPL-MCNC: 82 MG/DL (ref 65–100)
HCT VFR BLD AUTO: 33.3 % (ref 36.6–50.3)
HGB BLD-MCNC: 11.3 G/DL (ref 12.1–17)
IMM GRANULOCYTES # BLD AUTO: 0.1 K/UL (ref 0–0.04)
IMM GRANULOCYTES NFR BLD AUTO: 1 % (ref 0–0.5)
LYMPHOCYTES # BLD: 1.1 K/UL (ref 0.8–3.5)
LYMPHOCYTES NFR BLD: 13 % (ref 12–49)
MCH RBC QN AUTO: 30.4 PG (ref 26–34)
MCHC RBC AUTO-ENTMCNC: 33.9 G/DL (ref 30–36.5)
MCV RBC AUTO: 89.5 FL (ref 80–99)
MONOCYTES # BLD: 0.7 K/UL (ref 0–1)
MONOCYTES NFR BLD: 8 % (ref 5–13)
NEUTS SEG # BLD: 6.7 K/UL (ref 1.8–8)
NEUTS SEG NFR BLD: 75 % (ref 32–75)
NRBC # BLD: 0 K/UL (ref 0–0.01)
NRBC BLD-RTO: 0 PER 100 WBC
PLATELET # BLD AUTO: 274 K/UL (ref 150–400)
PMV BLD AUTO: 8.8 FL (ref 8.9–12.9)
POTASSIUM SERPL-SCNC: 3.8 MMOL/L (ref 3.5–5.1)
RBC # BLD AUTO: 3.72 M/UL (ref 4.1–5.7)
SODIUM SERPL-SCNC: 134 MMOL/L (ref 136–145)
WBC # BLD AUTO: 8.9 K/UL (ref 4.1–11.1)

## 2024-12-05 PROCEDURE — 6360000002 HC RX W HCPCS: Performed by: STUDENT IN AN ORGANIZED HEALTH CARE EDUCATION/TRAINING PROGRAM

## 2024-12-05 PROCEDURE — 6370000000 HC RX 637 (ALT 250 FOR IP): Performed by: STUDENT IN AN ORGANIZED HEALTH CARE EDUCATION/TRAINING PROGRAM

## 2024-12-05 PROCEDURE — 94761 N-INVAS EAR/PLS OXIMETRY MLT: CPT

## 2024-12-05 PROCEDURE — 6370000000 HC RX 637 (ALT 250 FOR IP): Performed by: INTERNAL MEDICINE

## 2024-12-05 PROCEDURE — 80048 BASIC METABOLIC PNL TOTAL CA: CPT

## 2024-12-05 PROCEDURE — 85025 COMPLETE CBC W/AUTO DIFF WBC: CPT

## 2024-12-05 PROCEDURE — 2580000003 HC RX 258: Performed by: STUDENT IN AN ORGANIZED HEALTH CARE EDUCATION/TRAINING PROGRAM

## 2024-12-05 PROCEDURE — 36415 COLL VENOUS BLD VENIPUNCTURE: CPT

## 2024-12-05 PROCEDURE — 51798 US URINE CAPACITY MEASURE: CPT

## 2024-12-05 PROCEDURE — 94640 AIRWAY INHALATION TREATMENT: CPT

## 2024-12-05 RX ORDER — TAMSULOSIN HYDROCHLORIDE 0.4 MG/1
0.4 CAPSULE ORAL DAILY
Qty: 30 CAPSULE | Refills: 0 | Status: SHIPPED
Start: 2024-12-06

## 2024-12-05 RX ORDER — BUDESONIDE, GLYCOPYRROLATE, AND FORMOTEROL FUMARATE 160; 9; 4.8 UG/1; UG/1; UG/1
2 AEROSOL, METERED RESPIRATORY (INHALATION) 2 TIMES DAILY
COMMUNITY
Start: 2024-08-29

## 2024-12-05 RX ORDER — POLYETHYLENE GLYCOL 3350 17 G/17G
17 POWDER, FOR SOLUTION ORAL DAILY PRN
Qty: 527 G | Refills: 0 | Status: SHIPPED
Start: 2024-12-05 | End: 2025-01-05

## 2024-12-05 RX ORDER — SENNA AND DOCUSATE SODIUM 50; 8.6 MG/1; MG/1
1 TABLET, FILM COATED ORAL DAILY
Qty: 30 TABLET | Refills: 0 | Status: SHIPPED
Start: 2024-12-05

## 2024-12-05 RX ADMIN — Medication 100 MG: at 10:03

## 2024-12-05 RX ADMIN — IPRATROPIUM BROMIDE 0.5 MG: 0.5 SOLUTION RESPIRATORY (INHALATION) at 15:04

## 2024-12-05 RX ADMIN — ARFORMOTEROL TARTRATE 15 MCG: 15 SOLUTION RESPIRATORY (INHALATION) at 09:12

## 2024-12-05 RX ADMIN — ASPIRIN 81 MG: 81 TABLET, COATED ORAL at 10:03

## 2024-12-05 RX ADMIN — BUDESONIDE 1000 MCG: 0.5 INHALANT RESPIRATORY (INHALATION) at 09:12

## 2024-12-05 RX ADMIN — SODIUM CHLORIDE, PRESERVATIVE FREE 10 ML: 5 INJECTION INTRAVENOUS at 10:04

## 2024-12-05 RX ADMIN — PANTOPRAZOLE SODIUM 40 MG: 40 TABLET, DELAYED RELEASE ORAL at 06:18

## 2024-12-05 RX ADMIN — FOLIC ACID 1 MG: 1 TABLET ORAL at 10:03

## 2024-12-05 RX ADMIN — PANTOPRAZOLE SODIUM 40 MG: 40 TABLET, DELAYED RELEASE ORAL at 16:20

## 2024-12-05 RX ADMIN — ENOXAPARIN SODIUM 30 MG: 100 INJECTION SUBCUTANEOUS at 10:03

## 2024-12-05 RX ADMIN — TAMSULOSIN HYDROCHLORIDE 0.4 MG: 0.4 CAPSULE ORAL at 10:03

## 2024-12-05 RX ADMIN — IPRATROPIUM BROMIDE 0.5 MG: 0.5 SOLUTION RESPIRATORY (INHALATION) at 09:13

## 2024-12-05 ASSESSMENT — PAIN SCALES - GENERAL: PAINLEVEL_OUTOF10: 0

## 2024-12-05 NOTE — CARE COORDINATION
Case Management    Updated Note - 1139    CAMRYN received a return call from [facility] liaison.. They can accept pt today after 4:00pm. Nurse updated with d/c plan.    Room: 204A  Call Report to [phone]  [facility] to transport at 1700    1117am  [facility]RYN called [facility] to check on bed assignment. Left message with admission to return call.    VERNELL John,RN  Care   [facility]  Phone: [phone]

## 2024-12-05 NOTE — PLAN OF CARE
Problem: Occupational Therapy - Adult  Goal: By Discharge: Performs self-care activities at highest level of function for planned discharge setting.  See evaluation for individualized goals.  Description: FUNCTIONAL STATUS PRIOR TO ADMISSION:  Patient was ambulatory using SPC or walker. He has neighbor who takes care of his yard/leaves. He has \"service\" that will come in intermittently (1-3 x week if needed) for I-ADL. He is mod I for simple ADL, I-ADL as he cooks/warms up food/perfoms all I-ADL tasks when service isn't being utilized.    ,  ,  ,  ,  ,  ,  ,  ,  ,  , Active : No     HOME SUPPORT: Patient lived alone with hired help to provide assistance.    Occupational Therapy Goals:  Initiated 12/2/2024  1.  Patient will perform grooming with Modified [place] standing at sink for 4 tasks within 7 day(s).  2.  Patient will perform upper body dressing and lower body dressing with Modified [place] within 7 day(s).  3.  Patient will perform bathing with Modified [place] within 7 day(s).  4.  Patient will perform toilet transfers with Modified [place]  within 7 day(s).  5.  Patient will perform all aspects of toileting with Modified [place] within 7 day(s).    Outcome: Progressing    OCCUPATIONAL THERAPY EVALUATION    Patient: Anuel Bermudez (78 y.o. male)  Date: 12/2/2024  Primary Diagnosis: Hypokalemia [E87.6]  Hypomagnesemia [E83.42]  Urinary retention [R33.9]  Fecal impaction (HCC) [K56.41]         Precautions: Fall Risk, Bed Alarm                  ASSESSMENT :  The patient is limited by decreased functional mobility, independence in ADLs, high-level IADLs, ROM, strength, body mechanics, activity tolerance, endurance, safety awareness, balance with h/o lung CA s/p Chemo, fatigue.    Based on the impairments listed above this patient is currently min A to SBA for simple ADL and CGA for in room ADL related mobility which is below his PLOF of mod I for ADL and I-ADL. He exhibits 
  Problem: Pain  Goal: Verbalizes/displays adequate comfort level or baseline comfort level  Outcome: Progressing     
  Problem: Physical Therapy - Adult  Goal: By Discharge: Performs mobility at highest level of function for planned discharge setting.  See evaluation for individualized goals.  Description: FUNCTIONAL STATUS PRIOR TO ADMISSION: Pt lives alone, he normally is independent with his rolling walker in most areas and cane in bathroom as walker does not fit there. He has been doing his own ADLs. His neighbors assist with cutting grass and caring for his dog. He has a significant fall history of 12+ falls in the last year but states that he has only fallen once since a course of OPPT.    HOME SUPPORT PRIOR TO ADMISSION: The patient lived alone with no local support.    Physical Therapy Goals  Initiated 12/2/2024  1.  Patient will move from supine to sit and sit to supine, scoot up and down, and roll side to side in bed with independence within 7 day(s).    2.  Patient will perform sit to stand with independence within 7 day(s).  3.  Patient will transfer from bed to chair and chair to bed with modified independence using the least restrictive device within 7 day(s).  4.  Patient will ambulate with modified independence for 150 feet with the least restrictive device within 7 day(s).   5.  Patient will ascend/descend 4 stairs with one handrail(s) with contact guard assist within 7 day(s).    Outcome: Progressing   PHYSICAL THERAPY EVALUATION    Patient: Anuel Bermudez (78 y.o. male)  Date: 12/2/2024  Primary Diagnosis: Hypokalemia [E87.6]  Hypomagnesemia [E83.42]  Urinary retention [R33.9]  Fecal impaction (HCC) [K56.41]       Precautions: Restrictions/Precautions: Fall Risk, Bed Alarm                      ASSESSMENT :   DEFICITS/IMPAIRMENTS:   The patient is limited by decreased functional mobility, independence in ADLs, strength, activity tolerance, safety awareness, balance adm with constipation, facal impaction, urinary retention, hypokalemia and hypomagnesemia. He has O2 at home but states he rarely uses it, uses only 
  Problem: Respiratory - Adult  Goal: Achieves optimal ventilation and oxygenation  12/2/2024 2144 by Oniel Guadarrama, RN  Outcome: Progressing  12/2/2024 1349 by Carie Briseno, RT  Outcome: Progressing     Problem: Pain  Goal: Verbalizes/displays adequate comfort level or baseline comfort level  Outcome: Progressing     Problem: Safety - Adult  Goal: Free from fall injury  Outcome: Progressing     Problem: Skin/Tissue Integrity  Goal: Absence of new skin breakdown  Description: 1.  Monitor for areas of redness and/or skin breakdown  2.  Assess vascular access sites hourly  3.  Every 4-6 hours minimum:  Change oxygen saturation probe site  4.  Every 4-6 hours:  If on nasal continuous positive airway pressure, respiratory therapy assess nares and determine need for appliance change or resting period.  Outcome: Progressing     Problem: Physical Therapy - Adult  Goal: By Discharge: Performs mobility at highest level of function for planned discharge setting.  See evaluation for individualized goals.  Description: FUNCTIONAL STATUS PRIOR TO ADMISSION: Pt lives alone, he normally is independent with his rolling walker in most areas and cane in bathroom as walker does not fit there. He has been doing his own ADLs. His neighbors assist with cutting grass and caring for his dog. He has a significant fall history of 12+ falls in the last year but states that he has only fallen once since a course of OPPT.    HOME SUPPORT PRIOR TO ADMISSION: The patient lived alone with no local support.    Physical Therapy Goals  Initiated 12/2/2024  1.  Patient will move from supine to sit and sit to supine, scoot up and down, and roll side to side in bed with independence within 7 day(s).    2.  Patient will perform sit to stand with independence within 7 day(s).  3.  Patient will transfer from bed to chair and chair to bed with modified independence using the least restrictive device within 7 day(s).  4.  Patient will ambulate with 
  Problem: Respiratory - Adult  Goal: Achieves optimal ventilation and oxygenation  12/3/2024 2055 by Carolann Gould RCP  Outcome: Progressing  12/3/2024 0851 by Carie Briseno, RT  Outcome: Progressing     
  Problem: Respiratory - Adult  Goal: Achieves optimal ventilation and oxygenation  12/4/2024 2106 by Ciera Man, RN  Outcome: Progressing  12/4/2024 1507 by Carolann Ryder, RT  Outcome: Progressing  12/4/2024 1107 by Jovi De La Garza RCP  Outcome: Progressing     Problem: Pain  Goal: Verbalizes/displays adequate comfort level or baseline comfort level  Outcome: Progressing     Problem: Safety - Adult  Goal: Free from fall injury  Outcome: Progressing     Problem: Skin/Tissue Integrity  Goal: Absence of new skin breakdown  Description: 1.  Monitor for areas of redness and/or skin breakdown  2.  Assess vascular access sites hourly  3.  Every 4-6 hours minimum:  Change oxygen saturation probe site  4.  Every 4-6 hours:  If on nasal continuous positive airway pressure, respiratory therapy assess nares and determine need for appliance change or resting period.  Outcome: Progressing     
  Problem: Respiratory - Adult  Goal: Achieves optimal ventilation and oxygenation  12/4/2024 2309 by Carolann Gould, ISAIAH  Outcome: Progressing     
  Problem: Respiratory - Adult  Goal: Achieves optimal ventilation and oxygenation  Outcome: Progressing  Flowsheets (Taken 12/1/2024 0600 by Scarlett Cross, RN)  Achieves optimal ventilation and oxygenation: Assess for changes in respiratory status     Problem: Pain  Goal: Verbalizes/displays adequate comfort level or baseline comfort level  Outcome: Progressing  Flowsheets (Taken 12/1/2024 0547 by Scarlett Cross, RN)  Verbalizes/displays adequate comfort level or baseline comfort level: Assess pain using appropriate pain scale     Problem: Safety - Adult  Goal: Free from fall injury  Outcome: Progressing     
  Problem: Safety - Adult  Goal: Free from fall injury  12/5/2024 0457 by Jenni De La Garza RN  Outcome: Progressing     Problem: Safety - Adult  Goal: Free from fall injury  12/5/2024 0457 by Jenni De La Garza RN  Outcome: Progressing  12/4/2024 2106 by Ciera Man RN  Outcome: Progressing     Problem: Skin/Tissue Integrity  Goal: Absence of new skin breakdown  Description: 1.  Monitor for areas of redness and/or skin breakdown  2.  Assess vascular access sites hourly  3.  Every 4-6 hours minimum:  Change oxygen saturation probe site  4.  Every 4-6 hours:  If on nasal continuous positive airway pressure, respiratory therapy assess nares and determine need for appliance change or resting period.  12/5/2024 0457 by Jenni De La Garza RN  Outcome: Progressing  Problem: Safety - Adult  Goal: Free from fall injury       
chair with overall CGA-min A and cues for safe RW management. He reported feeling significantly fatigued after mobility and required a few minutes seated rest break. Pt stood again to assess standing vitals, BP overall soft but stable (see below). Throughout session pt demo'd impaired processing and mild confusion, not initially oriented to place and time. He remained in chair with needs met and alarm set at end of session. At this time pt is functioning below his baseline due to generalized weakness, decreased activity tolerance, impaired functional mobility and balance and confusion. He continues to benefit from skilled intervention to address the above impairments.  Sittin/65  After ambulatin/64  After seated rest break: 106/60       PLAN :  Patient continues to benefit from skilled intervention to address the above impairments.  Continue treatment per established plan of care to address goals.    Recommendation for discharge: (in order for the patient to meet his/her long term goals):   Moderate intensity short-term skilled occupational therapy up to 5x/week    Other factors to consider for discharge: lives alone, high risk for falls, and concern for safely navigating or managing the home environment    IF patient discharges home will need the following DME: continuing to assess with progress       SUBJECTIVE:   Patient stated “I feel wiped out.”    OBJECTIVE DATA SUMMARY:   Cognitive/Behavioral Status:   Alert   Oriented x2-3    Functional Mobility and Transfers for ADLs:  Bed Mobility:  Bed Mobility Training  Supine to Sit: Stand-by assistance     Transfers:   Transfer Training  Sit to Stand: Contact-guard assistance;Adaptive equipment;Additional time  Stand to Sit: Contact-guard assistance;Minimum assistance  Bed to Chair: Contact-guard assistance;Minimum assistance;Additional time;Adaptive equipment           Balance:     Balance  Sitting: Impaired  Sitting - Static: Good (unsupported)  Sitting - 
Education  Education Given To: Patient  Education Provided: Role of Therapy;Plan of Care;Transfer Training;Fall Prevention Strategies  Education Provided Comments: gait with walker  Education Method: Demonstration;Verbal;Teach Back  Barriers to Learning: Cognition  Education Outcome: Continued education needed      Ashley Perales, PT  Minutes: 24

## 2024-12-05 NOTE — DISCHARGE SUMMARY
HH/PT/OT/RN:    SNF/LTC: x   DENITA:    OTHER:            Code status: DNR  Recommended diet: regular diet  Recommended activity: activity as tolerated, fall precautions  Wound care: None      Follow up with:   PCP : Bonifacio Bassett MD    [facility] ([facility])  [address] [address]  [phone]        John Plata MD  [address] [address]  [phone]    Follow up            Total time in minutes spent coordinating this discharge (includes going over instructions, follow-up, prescriptions, and preparing report for sign off to her PCP) :  35 minutes

## 2024-12-05 NOTE — PROGRESS NOTES
POST FALL MANAGEMENT    Anuel Bermudez  MEDICAL RECORD NUMBER:  [id number]  AGE: 78 y.o.   GENDER: male  : 1945  TODAYS DATE:  2024    Details     Fall Occurred: Yes    Was the Fall Witnessed:  No      Brief Review of Event:Patient got up on his own to walk to the bathroom to have a bowel movement. Bed alarm was not engaged at the time. Patient started yelling \"help me\" and 2 RNs, CCL and PCT went to patient's room to find him in the bathroom floor with Laguna bag still hanging on the bed's Laguna hook being stretched out pretty taut. Patient had a large incontinence episode of stool prior to and at the time of fall.         Who found the patient: Registered nurse, PCT and CCL      Where was the patient at the time of the fall: in the bathroom on the floor      Patient Comments: Patient stated that he did not hit his head and that he was trying to make it to the bathroom so that he wouldn't make a mess in the bed.        Date Fall Occurred:  2024 .       Time Fall Occurred: 2:34p.m.     Assessment     Post Fall Head to Toe Assessment Completed: Yes    Post Fall Predictive Analytic Score Reviewed: Yes     Post Fall Vitals Completed: Yes    Post Fall Neuro Checks Completed: Yes    Injury Occurred(if yes, describe injury):  no           Did the Patient Experience:(Check Shahriar all that apply)    [] Patient hit head  [] Loss of consciousness  [] Change in mental status following the fall  [x] Patient is on an anticoagulant medication (lovenox and aspirin 81 mg)      CT Performed:  no- patient did not hit head    Follow-up     Persons Notified of Fall:  (Provide names of persons notified)   [x] Physician: Dr. Batista  [] DUSTIN:  [x] Nursing Supervisior: Gissell Lopez  [] Manager:  [] Pharmacist:  [] Family: Deja Bowden, daughter at bedside  [x] Other: SAMMI Sanders      Electronically signed by Ciera Man RN 2024 at 
      Hospitalist Progress Note    NAME:   Anuel Bermudez   : 1945   MRN: [id number]     Date/Time: 2024 11:38 AM  Patient PCP: Bonifacio Bassett MD    Estimated discharge date: 12/3  Barriers: Potassium improvement, placement  Dispo, PT/OT recommends rehab, [facility]    Assessment / Plan:    Fecal impaction with subsequent urinary retention   Bladder distension with bilateral hydroureter s/p badillo catheter   Hypokalemia   Hypomagnesemia   - 1 week lower abdominal pain with no bowel movement  - CT showing rectal fecal impaction with no SBO, bladder distension with associated b/l hydroureter   - S/p badillo catheter placement in the ED   - Cr 0.99 on admission    -Badillo placed, add Flomax.  Outpatient urology follow-up    -Received multiple enemas, with clearing of constipation, continue laxatives as needed    -Continue replacing potassium, recheck tomorrow      Hx of lung cancer s/p laser to L endobronchial lung lesion and chemotherapy  Hx of chronic respiratory failure on oxygen  Hx of COPD  -Not in exacerbation; continue home inhalers   -Plan for outpatient chemotherapy  -Had recent thoracentesis for malignant pleural effusion, chest x-ray repeated this admission without much fluid recommendation, outpatient heme-onc follow-up     Medical Decision Making:   I personally reviewed labs: CBC, CMP  I personally reviewed imaging: CT a/p  I personally reviewed EKG: N/A  Toxic drug monitoring: N/A        Code Status: DNR  DVT Prophylaxis: Lovenox  Diet: ADULT DIET; Regular  Consults: N/A    Subjective:     Chief Complaint / Reason for Physician Visit  \"Yesterday night he was given multiple enemas.  Had episode of vomiting yesterday night.  This morning he feels a lot better, he had multiple bowel movements overnight, no further vomiting.  Tolerating his diet.  Working with PT/OT\".  Discussed with RN events overnight.       Objective:     VITALS:   Last 24hrs VS reviewed since prior 
      Hospitalist Progress Note    NAME:   Anuel Bermudez   : 1945   MRN: [id number]     Date/Time: 2024 5:21 PM  Patient PCP: Bonifacio Bassett MD    Estimated discharge date: Stable for DC  Awaiting placement    Assessment / Plan:    Fecal impaction with subsequent urinary retention   Bladder distension with bilateral hydroureter s/p badillo catheter   Hypokalemia   Hypomagnesemia   - 1 week lower abdominal pain with no bowel movement  - CT showing rectal fecal impaction with no SBO, bladder distension with associated b/l hydroureter   - S/p badillo catheter placement in the ED   - Cr 0.99 on admission    -Badillo placed, voiding trial, outpatient urology follow-up.  Continue added Flomax, may discontinue if become orthostatic    -Regarding constipation received multiple medications, resolved.  Continue Senokot/MiraLAX daily    Acute hypokalemia:  Will replete potassium          Hx of lung cancer s/p laser to L endobronchial lung lesion and chemotherapy  Hx of chronic respiratory failure on oxygen  Hx of COPD  -Not in exacerbation; continue home inhalers   -Plan for outpatient chemotherapy  -Had recent thoracentesis for malignant pleural effusion, chest x-ray repeated this admission without much fluid reaccumulation outpatient heme-onc follow-up.  Currently on room air    History of perforated peptic ulcer status post laparoscopic Adarsh patch repair   Continue PPI     Medical Decision Making:   I personally reviewed labs: CBC, CMP- low potassium 2.8- will replete  I personally reviewed imaging  I personally reviewed EKG: N/A  Toxic drug monitoring: N/A        Code Status: DNR  DVT Prophylaxis: Lovenox  Diet: ADULT DIET; Regular  Consults: N/A    Subjective:     Chief Complaint / Reason for Physician Visit  Follow-up for constipation/hypokalemia.  No abdominal pain. on room air. no concerns reported     Later during the day, I was notified by RN that patient had fall. I went to patient's room to 
.End of Shift Note    Bedside shift change report given to JOSE DE JESUS Arteaga (oncoming nurse) by Inocencia Grimm RN (offgoing nurse).  Report included the following information SBAR, Kardex, and MAR    Shift worked: 7a-7p     Shift summary and any significant changes:    Upon morning assessment, patients badillo catheter had been pulled out. New 16F catheter placed. Medications given per MAR and education provided. Pt is up x1 w/ walker. Pt had 2 large BMS on shift.      Concerns for physician to address: N/A     [facility] phone for oncoming shift:  [phone]       Activity:  Level of Assistance: Moderate assist, patient does 50-74%  Number times ambulated in hallways past shift: 0  Number of times OOB to chair past shift: 2    Cardiac:   Cardiac Monitoring: No      Cardiac Rhythm: Sinus rhythm    Access:  Current line(s): PIV     Genitourinary:   Urinary Status: Badillo    Respiratory:   O2 Device: None (Room air)  Chronic home O2 use?: NO  Incentive spirometer at bedside: NO    GI:  Last BM (including prior to admit): 12/03/24  Current diet:  ADULT DIET; Regular  ADULT ORAL NUTRITION SUPPLEMENT; Breakfast, Lunch, Dinner; Standard High Calorie/High Protein Oral Supplement  Passing flatus: YES    Pain Management:   Patient states pain is manageable on current regimen: YES    Skin:  Eric Scale Score: 17  Interventions: Wound Offloading (Prevention Methods): Bed, pressure reduction mattress, Elevate heels, Pillows, Repositioning, Turning    Patient Safety:  Fall Risk: Nursing Judgement-Fall Risk High(Add Comments): Yes  Fall Risk Interventions  Nursing Judgement-Fall Risk High(Add Comments): Yes  Toilet Every 2 Hours-In Advance of Need: Yes  Hourly Visual Checks: Awake, In bed  Fall Visual Posted: Armband, Socks  Room Door Open: Yes  Alarm On: Bed  Patient Moved Closer to Nursing Station: No    Active Consults:   IP CONSULT TO SPIRITUAL SERVICES  IP CONSULT TO DIETITIAN  IP CONSULT TO SOCIAL WORK  IP CONSULT TO CASE MANAGEMENT  IP 
.End of Shift Note    Bedside shift change report given to JOSE DE JESUS Hagan (oncoming nurse) by Inocencia Grimm RN (offgoing nurse).  Report included the following information SBAR, Kardex, and MAR    Shift worked: 7a-7p     Shift summary and any significant changes:    Pt remains confused at times w/ avasys at bedside. Medications given per MAR and education provided. Pt is up x1 w/ walker. Laguna and incontinence care completed. Soap suds enema this AM given x1. Pt had two medium BM s today.      Concerns for physician to address: N/A     [facility] phone for oncoming shift:  [phone]       Activity:  Level of Assistance: Moderate assist, patient does 50-74%  Number times ambulated in hallways past shift: 0  Number of times OOB to chair past shift: 2    Cardiac:   Cardiac Monitoring: Yes      Cardiac Rhythm: Sinus rhythm    Access:  Current line(s): PIV     Genitourinary:   Urinary Status: Laguna    Respiratory:   O2 Device: None (Room air)  Chronic home O2 use?: NO  Incentive spirometer at bedside: NO    GI:  Last BM (including prior to admit): 12/02/24  Current diet:  ADULT DIET; Regular  ADULT ORAL NUTRITION SUPPLEMENT; Breakfast, Lunch, Dinner; Standard High Calorie/High Protein Oral Supplement  Passing flatus: YES    Pain Management:   Patient states pain is manageable on current regimen: YES    Skin:  Eric Scale Score: 17  Interventions: Wound Offloading (Prevention Methods): Bed, pressure reduction mattress, Elevate heels, Pillows, Repositioning, Turning    Patient Safety:  Fall Risk: Nursing Judgement-Fall Risk High(Add Comments): Yes  Fall Risk Interventions  Nursing Judgement-Fall Risk High(Add Comments): Yes  Toilet Every 2 Hours-In Advance of Need: Yes  Hourly Visual Checks: Awake, In bed  Fall Visual Posted: Armband  Room Door Open: Yes  Alarm On: Bed  Patient Moved Closer to Nursing Station: No    Active Consults:   IP CONSULT TO SPIRITUAL SERVICES  IP CONSULT TO DIETITIAN  IP CONSULT TO SOCIAL WORK  IP CONSULT TO 
End of Shift Note    Bedside shift change report given to Ciera DELA CRUZ (oncoming nurse) by Jenni De La Garza RN (offgoing nurse).  Report included the following information SBAR, Kardex, MAR, and Recent Results    Shift worked: 1397-6265   Shift summary and any significant changes:    Pt remained confused throughout shift, often setting the bed alarm off while trying to get out of bed without assistance. Pt had large diarrhea episode ~0100- pt pulled off diaper during diarrhea episode which made the badillo pull at urethra causing slight bleeding around badillo at urethral opening which stopped soon after- MD made aware- badillo was working with no further issues and did not need to be taken out. Scheduled meds given- no PRNs necessary. Caring rounds completed.        Jenni De La Garza RN    
End of Shift Note    Bedside shift change report given to Ciera DELA CRUZ (oncoming nurse) by Jenni De La Garza RN (offgoing nurse).  Report included the following information SBAR, Kardex, MAR, and Recent Results    Shift worked: 7185-0964   Shift summary and any significant changes:    Pt remained stable throughout shift. Scheduled meds given. Ordered labs drawn and sent. Caring rounds completed.        Jenni De La Garza RN    
End of Shift Note    Bedside shift change report given to JOSE DE JESUS Arteaga (oncoming nurse) by Ciera Man RN (offgoing nurse).  Report included the following information SBAR, Kardex, Intake/Output, MAR, and Recent Results    Shift worked:  7 am to 7:30 pm     Shift summary and any significant changes:     All scheduled medications administered. 1 dose PRN Tylenol administered for upper abdominal pain, see MAR. CBC/ BMP/Mag labs drawn. 2 doses IV K, 2 doses Effer-K and 1 dose IV Mag Sulfate administered for K 2.8, see MAR. Incontinence care and gown change provided; patient had several loose bowel movements. Badillo care completed; badillo removed for void trial at 1440. Patient voided twice since removal; post-void bladder scan 52 ml at 1725.     Patient had an unwitnessed fall during shift when he tried to get to the bathroom to have a bowel movement; see Post-Fall note. Patient's daughter and MD notified. Bed alarm engaged.    IV's flushed and patent. Patient set up for all meals. Bed alarm remains engaged. Nursing rounds and education completed.     Concerns for physician to address:  Patient to hopefully discharge tomorrow to [facility] pending Potassium level.     Zone phone for oncoming shift:   [phone]       Activity:  Level of Assistance: Moderate assist, patient does 50-74%    Cardiac:   Cardiac Monitoring:  no    Access:  Current line(s): PIV     Genitourinary:   Urinary Status: Voiding, Incontinent    Respiratory:   O2 Device: None (Room air)    GI:  Current diet: ADULT DIET; Regular  ADULT ORAL NUTRITION SUPPLEMENT; Breakfast, Lunch, Dinner; Standard High Calorie/High Protein Oral Supplement    Pain Management:   Patient states pain is manageable on current regimen: YES    Skin:  Eric Scale Score: 17  Interventions: Wound Offloading (Prevention Methods): Turning, Pillows, Repositioning  Pressure injury: no    Patient Safety:  Fall Score: Sanches Total Score: 100  Fall Risk Interventions  Nursing 
End of Shift Note    Bedside shift change report given to JOSE DE JESUS Bradford (oncoming nurse) by Scarlett Cross RN (offgoing nurse).  Report included the following information SBAR, Kardex, and MAR    Shift worked:  7p - 7a     Shift summary and any significant changes:    Medications administered per MAR with sips of water, otherwise patient remains NPO.  Patient reports pain has improved, denied offer of acetaminophen.  Abdomen is softer than on admission to [facility].  Patient noted to have intermittent periods of confusion, ie: wanting his direct TV remote, easily redirected.  Patient did get upset with nursing staff X1 when redirection attempted, deescalated successfully.  Incontinent of bowel.  IVF discontinued per order.  Patient got up without assistance, bed alarm alerted nursing staff.  This writer observed patient lower himself to his right knee.  No apparent injury, assisted back to bed.  Charge nurse, supervisor, on call md notified.  Will notify daughter in AM.  AM labs obtained per order, tolerated well.  Caring rounds completed.   Call placed to daughter to update on fall, VM left.   Concerns for physician to address:       Zone phone for oncoming shift:          Activity:       Cardiac:   Cardiac Monitoring:  yes - SR    Access:  Current line(s): PIV     Genitourinary:        Respiratory:   O2 Device: None (Room air)    GI:  Current diet: Diet NPO    Pain Management:   Patient states pain is manageable on current regimen: YES    Skin:  Eric Scale Score: 18  Interventions: Wound Offloading (Prevention Methods): Elevate heels, Repositioning, Pillows  Pressure injury: no    Patient Safety:  Fall Score: Sanches Total Score: 85  Fall Risk Interventions  Nursing Judgement-Fall Risk High(Add Comments): Yes  Hourly Visual Checks: Awake, In bed, Quiet  Fall Visual Posted: Socks  Room Door Open: Yes  Alarm On: Bed  Patient Moved Closer to Nursing Station: No    Active Consults:  IP CONSULT TO SPIRITUAL SERVICES  IP CONSULT 
End of Shift Note    Bedside shift change report given to JOSE DE JESUS Sunshine (oncoming nurse) by Scarlett Cross RN (offgoing nurse).  Report included the following information SBAR, Kardex, and MAR    Shift worked:  7p - 7a     Shift summary and any significant changes:    Patient received from the ED via stretcher, welcomed and oriented to unit and room.  Patient A&O X4, pleasant and cooperative.  Assessment completed, tolerated well.  IV potassium infusing per order.  SSE administered per order, tolerated fairly.  Patient had a small radha of stool and approximately 75 cc of brown liquid.  Caring rounds completed.   Concerns for physician to address:       Zone phone for oncoming shift:          Activity:       Cardiac:   Cardiac Monitoring:  yes - SR    Access:  Current line(s): PIV     Genitourinary:        Respiratory:   O2 Device: None (Room air)    GI:  Current diet: ADULT DIET; Regular    Pain Management:   Patient states pain is manageable on current regimen: NO    Skin:     Interventions:    Pressure injury: no    Patient Safety:  Fall Score:    Fall Risk Interventions  Hourly Visual Checks: Awake, In bed, Quiet  Fall Visual Posted: Socks  Room Door Open: Yes  Alarm On: Bed  Patient Moved Closer to Nursing Station: No    Active Consults:  None    Length of Stay:  Expected LOS: 3  Actual LOS: 0      Scarlett Cross RN    
End of Shift Note    Bedside shift change report given to Scarlett DELA CRUZ (oncoming nurse) by Bita Velazquez RN (offgoing nurse).  Report included the following information SBAR, Intake/Output, and MAR    Shift worked:  7A - 7P    Shift summary and any significant changes:    Pt tolerated care. Medications given per MAR. PRN tylenol given for pain.     Patient had a soap mayco enema today. In result, patient had 2 medium sized bowel movements. Another soap mayco enema to be given tomorrow. Oncoming nurse aware.     Patient vomited x2 (brown liquid). Charlie JENNINGS notified. Abdomen XR KUB ordered. Zofran PRN administered. Pt made NPO.     Patient pulled out left AC IV. IV fluids running at 75 in RAC IV.     Chest XR completed during shift.     Potassium level at 3.1 after 3 runs of IV potassium and oral potassium supplement.     Caring rounds completed      Concerns for physician to address:      Zone phone for oncoming shift:         Activity:       Cardiac:   Cardiac Monitoring:  yes - NSR    Access:  Current line(s): PIV     Genitourinary:        Respiratory:   O2 Device: None (Room air)    GI:  Current diet: Diet NPO    Pain Management:   Patient states pain is manageable on current regimen: NO    Skin:  Eric Scale Score: 18  Interventions: Wound Offloading (Prevention Methods): Elevate heels, Repositioning, Pillows  Pressure injury: no    Patient Safety:  Fall Score: Sanches Total Score: 85  Fall Risk Interventions  Nursing Judgement-Fall Risk High(Add Comments): Yes  Hourly Visual Checks: Awake, In bed, Quiet  Fall Visual Posted: Socks  Room Door Open: Yes  Alarm On: Bed  Patient Moved Closer to Nursing Station: No    Active Consults:  IP CONSULT TO SPIRITUAL SERVICES  IP CONSULT TO DIETITIAN  IP CONSULT TO SOCIAL WORK  IP CONSULT TO GI    Length of Stay:  Expected LOS: 3  Actual LOS: 0      Bita Velazquez RN   
I have reviewed discharge instructions with the patient and daughter Deja Bowden (at 1448 over the phone).  The patient and daughter verbalized understanding.  Discharge medications reviewed with patient and daughter and appropriate educational materials and side effects teaching were provided.  Follow-up appointments reviewed with patient.  Opportunity for questions or concerns given.  Venous access removed without difficulty, pt tolerated well.  Patients belongings gathered and sent with patient.  Patient is ready for discharge.     1654: Report called in to ROBIN Rosales at [facility]. Questions and concerns addressed.    1722: Patient discharged via [facility].    
Patient seen and examined.  Admitted earlier today for fecal impaction and bilateral hydronephrosis with hypokalemia.  No significant bowel movement yet  Add lactulose twice daily along with enema  Add IVF  Continue Laguna catheter  Replacing potassium, recheck tomorrow  
Physical Therapy    Orders received, chart reviewed and patient evaluated by physical therapy. Pending progression with skilled acute physical therapy, recommend:    Moderate intensity short-term skilled physical therapy up to 5x/week    Recommend with nursing OOB to chair 3x/day and walking daily with 1 assist and rolling walker. Thank you for completing as able in order to maintain patient strength, endurance and independence.     Full evaluation to follow.    
Spiritual Health History and Assessment/Progress Note  [facility]    Spiritual/Emotional Needs,  , Adjustment to illness, Life Adjustments,      Name: Anuel Bermudez MRN: [id number]    Age: 78 y.o.     Sex: male   Language: English   Hindu: Restorationist   Urinary retention     Date: 12/2/2024            Total Time Calculated: 28 min              Spiritual Assessment began in [facility]        Referral/Consult From: Multi-disciplinary team   Encounter Overview/Reason: Spiritual/Emotional Needs  Service Provided For: Patient and family together    Mariana, Belief, Meaning:   Patient is connected with a mariana tradition or spiritual practice and has beliefs or practices that help with coping during difficult times  Family/Friends are connected with a mariana tradition or spiritual practice and have beliefs or practices that help with coping during difficult times      Importance and Influence:  Patient has spiritual/personal beliefs that influence decisions regarding their health  Family/Friends have spiritual/personal beliefs that influence decisions regarding the patient's health    Community:  Patient is connected with a spiritual community and feels well-supported. Support system includes: Children, Mariana Community, Friends, and Extended family  Family/Friends are connected with a spiritual community: and feel well-supported. Support system includes: Children, Mariana Community, Friends, and Extended family    Assessment and Plan of Care:     Patient Interventions include: Facilitated expression of thoughts and feelings, Explored spiritual coping/struggle/distress, Affirmed coping skills/support systems, and Facilitated life review and/ or legacy  Family/Friends Interventions include: Facilitated expression of thoughts and feelings, Explored spiritual coping/struggle/distress, Engaged in theological reflection, Affirmed coping skills/support systems, and Facilitated life review and/or 
Needs:  Energy Requirements Based On: Kcal/kg  Weight Used for Energy Requirements: Current  Energy (kcal/day): 1509kcal (35kcal/kg)  Weight Used for Protein Requirements: Current  Protein (g/day): 65g (1.5g/kg)  Method Used for Fluid Requirements: 1 ml/kcal  Fluid (ml/day): 1509mL    Nutrition Diagnosis:   Severe malnutrition, in context of chronic illness related to catabolic illness, inadequate protein-energy intake as evidenced by criteria as identified in malnutrition assessment    Nutrition Interventions:   Food and/or Nutrient Delivery: Continue Current Diet, Start Oral Nutrition Supplement  Nutrition Education/Counseling: No recommendation at this time  Coordination of Nutrition Care: Continue to monitor while inpatient    Goals:  Goals: PO intake 75% or greater, by next RD assessment    Nutrition Monitoring and Evaluation:   Behavioral-Environmental Outcomes: None Identified  Food/Nutrient Intake Outcomes: Food and Nutrient Intake  Physical Signs/Symptoms Outcomes: Meal Time Behavior, Weight    Discharge Planning:    Too soon to determine     Bruna Stern RD  Contact: [phone]    
progress note. Most recent are:  Patient Vitals for the past 24 hrs:   BP Temp Temp src Pulse Resp SpO2 Height   12/03/24 0800 111/72 97.9 °F (36.6 °C) Oral 85 19 97 % --   12/03/24 0354 116/77 98 °F (36.7 °C) Oral 82 18 96 % --   12/03/24 0044 124/79 98.3 °F (36.8 °C) Oral 74 18 96 % --   12/02/24 1924 136/67 97.9 °F (36.6 °C) Oral 79 18 95 % --   12/02/24 1500 117/80 98.3 °F (36.8 °C) Oral 76 17 97 % --   12/02/24 1451 -- -- -- -- -- -- 1.651 m (5' 5\")         Intake/Output Summary (Last 24 hours) at 12/3/2024 1145  Last data filed at 12/3/2024 0709  Gross per 24 hour   Intake --   Output 1300 ml   Net -1300 ml        I had a face to face encounter and independently examined this patient on 12/3/2024, as outlined below:  PHYSICAL EXAM:  General: Alert, cooperative  EENT:  EOMI. Anicteric sclerae.  Resp:  CTA bilaterally, no wheezing or rales.  No accessory muscle use  CV:  Regular  rhythm,  No edema  GI:  Soft, Non distended, Non tender.  +Bowel sounds  Neurologic:  Alert and oriented X 3, normal speech,   Psych:   Good insight. Not anxious nor agitated  Skin:  No rashes.  No jaundice    Reviewed most current lab test results and cultures  YES  Reviewed most current radiology test results   YES  Review and summation of old records today    NO  Reviewed patient's current orders and MAR    YES  PMH/SH reviewed - no change compared to H&P  ________________________________________________________________________  Care Plan discussed with:    Comments   Patient x    Family      RN x    Care Manager     Consultant                        Multidiciplinary team rounds were held today with , nursing, pharmacist and clinical coordinator.  Patient's plan of care was discussed; medications were reviewed and discharge planning was addressed.     ________________________________________________________________________  Total NON critical care TIME:  53  Minutes    Total CRITICAL CARE TIME Spent:   Minutes non

## 2024-12-13 ENCOUNTER — HOSPITAL ENCOUNTER (EMERGENCY)
Facility: HOSPITAL | Age: 79
Discharge: HOME | End: 2024-12-13
Payer: MEDICARE

## 2024-12-13 ENCOUNTER — APPOINTMENT (OUTPATIENT)
Facility: HOSPITAL | Age: 79
End: 2024-12-13
Payer: MEDICARE

## 2024-12-13 VITALS
DIASTOLIC BLOOD PRESSURE: 81 MMHG | SYSTOLIC BLOOD PRESSURE: 140 MMHG | OXYGEN SATURATION: 98 % | RESPIRATION RATE: 16 BRPM | TEMPERATURE: 97.7 F | HEART RATE: 86 BPM

## 2024-12-13 DIAGNOSIS — S01.81XA LACERATION OF FOREHEAD, INITIAL ENCOUNTER: ICD-10-CM

## 2024-12-13 DIAGNOSIS — R63.4 WEIGHT LOSS: ICD-10-CM

## 2024-12-13 DIAGNOSIS — S09.90XA INJURY OF HEAD, INITIAL ENCOUNTER: Primary | ICD-10-CM

## 2024-12-13 PROCEDURE — 6360000002 HC RX W HCPCS: Performed by: PHYSICIAN ASSISTANT

## 2024-12-13 PROCEDURE — 99284 EMERGENCY DEPT VISIT MOD MDM: CPT

## 2024-12-13 PROCEDURE — 90471 IMMUNIZATION ADMIN: CPT | Performed by: PHYSICIAN ASSISTANT

## 2024-12-13 PROCEDURE — 12011 RPR F/E/E/N/L/M 2.5 CM/<: CPT

## 2024-12-13 PROCEDURE — 70486 CT MAXILLOFACIAL W/O DYE: CPT

## 2024-12-13 PROCEDURE — 70450 CT HEAD/BRAIN W/O DYE: CPT

## 2024-12-13 PROCEDURE — 90715 TDAP VACCINE 7 YRS/> IM: CPT | Performed by: PHYSICIAN ASSISTANT

## 2024-12-13 PROCEDURE — 72125 CT NECK SPINE W/O DYE: CPT

## 2024-12-13 PROCEDURE — 6370000000 HC RX 637 (ALT 250 FOR IP): Performed by: PHYSICIAN ASSISTANT

## 2024-12-13 RX ORDER — GINSENG 100 MG
CAPSULE ORAL 3 TIMES DAILY
Status: DISCONTINUED | OUTPATIENT
Start: 2024-12-13 | End: 2024-12-13 | Stop reason: HOSPADM

## 2024-12-13 RX ORDER — LIDOCAINE HCL/EPINEPHRINE/PF 2%-1:200K
20 VIAL (ML) INJECTION ONCE
Status: DISCONTINUED | OUTPATIENT
Start: 2024-12-13 | End: 2024-12-13 | Stop reason: HOSPADM

## 2024-12-13 RX ADMIN — BACITRACIN 1 EACH: 500 OINTMENT TOPICAL at 13:03

## 2024-12-13 RX ADMIN — TETANUS TOXOID, REDUCED DIPHTHERIA TOXOID AND ACELLULAR PERTUSSIS VACCINE, ADSORBED 0.5 ML: 5; 2.5; 8; 8; 2.5 SUSPENSION INTRAMUSCULAR at 10:23

## 2024-12-13 NOTE — DISCHARGE INSTRUCTIONS
Suture removal in 10 days.  If there are signs of infection such as redness, swelling, drainage you need to return to the emergency department

## 2024-12-13 NOTE — ED PROVIDER NOTES
FLOMAX  Take 1 capsule by mouth daily     vitamin B-1 100 MG tablet  Commonly known as: THIAMINE  Take 1 tablet by mouth daily                DISCONTINUED MEDICATIONS:  Current Discharge Medication List          {ED Attending Involvement (Optional):74670}    I am the Primary Clinician of Record.   Radha Tolbert PA-C (electronically signed)    (Please note that parts of this dictation were completed with voice recognition software. Quite often unanticipated grammatical, syntax, homophones, and other interpretive errors are inadvertently transcribed by the computer software. Please disregards these errors. Please excuse any errors that have escaped final proofreading.)       inadvertently transcribed by the computer software. Please disregards these errors. Please excuse any errors that have escaped final proofreading.)         Radha Tolbert PA-C  12/17/24 0869

## 2024-12-13 NOTE — ED NOTES
Pt discharged by [facility]. Discharge instructions discussed and pt given opportunity to ask questions. Pt taken to waiting room by wheelchair.

## 2025-01-09 ENCOUNTER — TRANSCRIBE ORDERS (OUTPATIENT)
Facility: HOSPITAL | Age: 80
End: 2025-01-09

## 2025-01-09 DIAGNOSIS — C34.02 MALIGNANT NEOPLASM OF LEFT MAIN BRONCHUS (HCC): Primary | ICD-10-CM

## 2025-01-22 ENCOUNTER — HOSPITAL ENCOUNTER (OUTPATIENT)
Facility: HOSPITAL | Age: 80
Discharge: HOME | End: 2025-01-25
Attending: INTERNAL MEDICINE
Payer: MEDICARE

## 2025-01-22 DIAGNOSIS — C34.02 MALIGNANT NEOPLASM OF LEFT MAIN BRONCHUS (HCC): ICD-10-CM

## 2025-01-22 PROCEDURE — 74177 CT ABD & PELVIS W/CONTRAST: CPT

## 2025-01-22 PROCEDURE — 6360000004 HC RX CONTRAST MEDICATION: Performed by: INTERNAL MEDICINE

## 2025-01-22 RX ORDER — IOPAMIDOL 755 MG/ML
100 INJECTION, SOLUTION INTRAVASCULAR
Status: COMPLETED | OUTPATIENT
Start: 2025-01-22 | End: 2025-01-22

## 2025-01-22 RX ADMIN — IOPAMIDOL 100 ML: 755 INJECTION, SOLUTION INTRAVENOUS at 09:37

## 2025-05-15 ENCOUNTER — HOSPITAL ENCOUNTER (INPATIENT)
Facility: HOSPITAL | Age: 80
LOS: 7 days | Discharge: HOME HEALTH | DRG: 872 | End: 2025-05-23
Attending: EMERGENCY MEDICINE | Admitting: STUDENT IN AN ORGANIZED HEALTH CARE EDUCATION/TRAINING PROGRAM
Payer: MEDICARE

## 2025-05-15 ENCOUNTER — APPOINTMENT (OUTPATIENT)
Facility: HOSPITAL | Age: 80
DRG: 872 | End: 2025-05-15
Payer: MEDICARE

## 2025-05-15 DIAGNOSIS — E87.6 HYPOKALEMIA: ICD-10-CM

## 2025-05-15 DIAGNOSIS — R53.1 GENERAL WEAKNESS: ICD-10-CM

## 2025-05-15 DIAGNOSIS — R33.9 URINARY RETENTION: ICD-10-CM

## 2025-05-15 DIAGNOSIS — R33.8 ACUTE URINARY RETENTION: Primary | ICD-10-CM

## 2025-05-15 DIAGNOSIS — J90 PLEURAL EFFUSION: ICD-10-CM

## 2025-05-15 LAB
ALBUMIN SERPL-MCNC: 2.7 G/DL (ref 3.5–5)
ALBUMIN/GLOB SERPL: 0.7 (ref 1.1–2.2)
ALP SERPL-CCNC: 87 U/L (ref 45–117)
ALT SERPL-CCNC: 21 U/L (ref 12–78)
ANION GAP SERPL CALC-SCNC: 11 MMOL/L (ref 2–12)
AST SERPL-CCNC: 19 U/L (ref 15–37)
BASOPHILS # BLD: 0.04 K/UL (ref 0–0.1)
BASOPHILS NFR BLD: 0.3 % (ref 0–1)
BILIRUB SERPL-MCNC: 0.3 MG/DL (ref 0.2–1)
BUN SERPL-MCNC: 34 MG/DL (ref 6–20)
BUN/CREAT SERPL: 19 (ref 12–20)
CALCIUM SERPL-MCNC: 10.2 MG/DL (ref 8.5–10.1)
CHLORIDE SERPL-SCNC: 106 MMOL/L (ref 97–108)
CO2 SERPL-SCNC: 19 MMOL/L (ref 21–32)
CREAT SERPL-MCNC: 1.8 MG/DL (ref 0.7–1.3)
DIFFERENTIAL METHOD BLD: ABNORMAL
EKG ATRIAL RATE: 75 BPM
EKG DIAGNOSIS: NORMAL
EKG P AXIS: 83 DEGREES
EKG P-R INTERVAL: 144 MS
EKG Q-T INTERVAL: 378 MS
EKG QRS DURATION: 96 MS
EKG QTC CALCULATION (BAZETT): 422 MS
EKG R AXIS: 71 DEGREES
EKG T AXIS: 100 DEGREES
EKG VENTRICULAR RATE: 75 BPM
EOSINOPHIL # BLD: 0 K/UL (ref 0–0.4)
EOSINOPHIL NFR BLD: 0 % (ref 0–7)
ERYTHROCYTE [DISTWIDTH] IN BLOOD BY AUTOMATED COUNT: 14.1 % (ref 11.5–14.5)
GLOBULIN SER CALC-MCNC: 4 G/DL (ref 2–4)
GLUCOSE SERPL-MCNC: 134 MG/DL (ref 65–100)
HCT VFR BLD AUTO: 33.7 % (ref 36.6–50.3)
HGB BLD-MCNC: 11.2 G/DL (ref 12.1–17)
IMM GRANULOCYTES # BLD AUTO: 0.07 K/UL (ref 0–0.04)
IMM GRANULOCYTES NFR BLD AUTO: 0.5 % (ref 0–0.5)
LIPASE SERPL-CCNC: 14 U/L (ref 13–75)
LYMPHOCYTES # BLD: 1.12 K/UL (ref 0.8–3.5)
LYMPHOCYTES NFR BLD: 8.4 % (ref 12–49)
MAGNESIUM SERPL-MCNC: 1.8 MG/DL (ref 1.6–2.4)
MCH RBC QN AUTO: 30 PG (ref 26–34)
MCHC RBC AUTO-ENTMCNC: 33.2 G/DL (ref 30–36.5)
MCV RBC AUTO: 90.3 FL (ref 80–99)
MONOCYTES # BLD: 0.88 K/UL (ref 0–1)
MONOCYTES NFR BLD: 6.6 % (ref 5–13)
NEUTS SEG # BLD: 11.3 K/UL (ref 1.8–8)
NEUTS SEG NFR BLD: 84.2 % (ref 32–75)
NRBC # BLD: 0 K/UL (ref 0–0.01)
NRBC BLD-RTO: 0 PER 100 WBC
PLATELET # BLD AUTO: 320 K/UL (ref 150–400)
PMV BLD AUTO: 8.7 FL (ref 8.9–12.9)
POTASSIUM SERPL-SCNC: 2.3 MMOL/L (ref 3.5–5.1)
PROT SERPL-MCNC: 6.7 G/DL (ref 6.4–8.2)
RBC # BLD AUTO: 3.73 M/UL (ref 4.1–5.7)
SODIUM SERPL-SCNC: 136 MMOL/L (ref 136–145)
WBC # BLD AUTO: 13.4 K/UL (ref 4.1–11.1)

## 2025-05-15 PROCEDURE — 83690 ASSAY OF LIPASE: CPT

## 2025-05-15 PROCEDURE — 72125 CT NECK SPINE W/O DYE: CPT

## 2025-05-15 PROCEDURE — 81001 URINALYSIS AUTO W/SCOPE: CPT

## 2025-05-15 PROCEDURE — 83735 ASSAY OF MAGNESIUM: CPT

## 2025-05-15 PROCEDURE — 99285 EMERGENCY DEPT VISIT HI MDM: CPT

## 2025-05-15 PROCEDURE — 6370000000 HC RX 637 (ALT 250 FOR IP): Performed by: EMERGENCY MEDICINE

## 2025-05-15 PROCEDURE — 6360000004 HC RX CONTRAST MEDICATION: Performed by: EMERGENCY MEDICINE

## 2025-05-15 PROCEDURE — 87086 URINE CULTURE/COLONY COUNT: CPT

## 2025-05-15 PROCEDURE — 87186 SC STD MICRODIL/AGAR DIL: CPT

## 2025-05-15 PROCEDURE — 96366 THER/PROPH/DIAG IV INF ADDON: CPT

## 2025-05-15 PROCEDURE — 87088 URINE BACTERIA CULTURE: CPT

## 2025-05-15 PROCEDURE — 96375 TX/PRO/DX INJ NEW DRUG ADDON: CPT

## 2025-05-15 PROCEDURE — 96365 THER/PROPH/DIAG IV INF INIT: CPT

## 2025-05-15 PROCEDURE — 2580000003 HC RX 258: Performed by: EMERGENCY MEDICINE

## 2025-05-15 PROCEDURE — 51702 INSERT TEMP BLADDER CATH: CPT

## 2025-05-15 PROCEDURE — 85025 COMPLETE CBC W/AUTO DIFF WBC: CPT

## 2025-05-15 PROCEDURE — 93005 ELECTROCARDIOGRAM TRACING: CPT | Performed by: EMERGENCY MEDICINE

## 2025-05-15 PROCEDURE — 6360000002 HC RX W HCPCS: Performed by: EMERGENCY MEDICINE

## 2025-05-15 PROCEDURE — 36415 COLL VENOUS BLD VENIPUNCTURE: CPT

## 2025-05-15 PROCEDURE — 80053 COMPREHEN METABOLIC PANEL: CPT

## 2025-05-15 PROCEDURE — 74177 CT ABD & PELVIS W/CONTRAST: CPT

## 2025-05-15 PROCEDURE — 71045 X-RAY EXAM CHEST 1 VIEW: CPT

## 2025-05-15 PROCEDURE — 70450 CT HEAD/BRAIN W/O DYE: CPT

## 2025-05-15 RX ORDER — POTASSIUM CHLORIDE 7.45 MG/ML
10 INJECTION INTRAVENOUS
Status: DISPENSED | OUTPATIENT
Start: 2025-05-15 | End: 2025-05-16

## 2025-05-15 RX ORDER — KETOROLAC TROMETHAMINE 30 MG/ML
15 INJECTION, SOLUTION INTRAMUSCULAR; INTRAVENOUS ONCE
Status: COMPLETED | OUTPATIENT
Start: 2025-05-15 | End: 2025-05-15

## 2025-05-15 RX ORDER — 0.9 % SODIUM CHLORIDE 0.9 %
1000 INTRAVENOUS SOLUTION INTRAVENOUS ONCE
Status: COMPLETED | OUTPATIENT
Start: 2025-05-15 | End: 2025-05-15

## 2025-05-15 RX ORDER — IOPAMIDOL 755 MG/ML
100 INJECTION, SOLUTION INTRAVASCULAR
Status: COMPLETED | OUTPATIENT
Start: 2025-05-15 | End: 2025-05-15

## 2025-05-15 RX ADMIN — IOPAMIDOL 100 ML: 755 INJECTION, SOLUTION INTRAVENOUS at 21:27

## 2025-05-15 RX ADMIN — SODIUM CHLORIDE 1000 ML: 0.9 INJECTION, SOLUTION INTRAVENOUS at 19:54

## 2025-05-15 RX ADMIN — POTASSIUM BICARBONATE 40 MEQ: 782 TABLET, EFFERVESCENT ORAL at 22:14

## 2025-05-15 RX ADMIN — KETOROLAC TROMETHAMINE 15 MG: 30 INJECTION, SOLUTION INTRAMUSCULAR at 19:51

## 2025-05-15 RX ADMIN — LIDOCAINE HYDROCHLORIDE 40 ML: 20 SOLUTION ORAL at 20:54

## 2025-05-15 RX ADMIN — POTASSIUM CHLORIDE 10 MEQ: 7.46 INJECTION, SOLUTION INTRAVENOUS at 23:22

## 2025-05-15 RX ADMIN — POTASSIUM CHLORIDE 10 MEQ: 7.46 INJECTION, SOLUTION INTRAVENOUS at 22:18

## 2025-05-15 ASSESSMENT — PAIN SCALES - GENERAL
PAINLEVEL_OUTOF10: 7
PAINLEVEL_OUTOF10: 6

## 2025-05-15 ASSESSMENT — PAIN DESCRIPTION - LOCATION: LOCATION: LEG

## 2025-05-15 ASSESSMENT — PAIN DESCRIPTION - ORIENTATION: ORIENTATION: RIGHT;LEFT

## 2025-05-15 ASSESSMENT — PAIN DESCRIPTION - DESCRIPTORS: DESCRIPTORS: PINS AND NEEDLES

## 2025-05-15 ASSESSMENT — PAIN - FUNCTIONAL ASSESSMENT: PAIN_FUNCTIONAL_ASSESSMENT: 0-10

## 2025-05-15 NOTE — ED PROVIDER NOTES
[facility] EMERGENCY DEPARTMENT  EMERGENCY DEPARTMENT ENCOUNTER       Pt Name: Anuel Bermudez  MRN: [id number]  Birthdate 1945  Date of evaluation: 5/15/2025  Provider: Anuel Wood DO   PCP: Bonifacio Bassett MD  Note Started: 7:54 PM EDT 5/15/25     CHIEF COMPLAINT       Chief Complaint   Patient presents with    Fatigue    Abdominal Pain     BIBEMS from home for generalized fatigue and weakness, diarrhea since Saturday. Arrives on 4 L NC WOB upon arrival; wears 2 L NC baseline; hx of lung cancer. Daughter concerned for fathers wellbeing bc he lives alone and is falling at home.        HISTORY OF PRESENT ILLNESS: 1 or more elements      History From: Patient, History limited by: none     Anuel Bermudez is a 79 y.o. male presents to the emergency department for evaluation of generalized weakness and fatigue.       Please See [facility] for Additional Details of the HPI/PMH  Nursing Notes were all reviewed and agreed with or any disagreements were addressed in the HPI.     REVIEW OF SYSTEMS        Positives and Pertinent negatives as per HPI.    PAST HISTORY     Past Medical History:  Past Medical History:   Diagnosis Date    Lung cancer (HCC)        Past Surgical History:  Past Surgical History:   Procedure Laterality Date    BRONCHOSCOPY N/A 3/20/2024    BRONCHOSCOPY performed by Ramez Fry MD at [facility] ENDOSCOPY    IR PORT PLACEMENT > 5 YEARS  2024    IR PORT PLACEMENT EQUAL OR GREATER THAN 5 YEARS 2024 Anastasia Chavez APRN - NP [facility] RAD ANGIO IR    LAPAROSCOPY N/A 2024    LAPAROSCOPIC REPAIR OF PERFORATED ANTRAL ULCER WITH GRAM PATCH performed by Rafael Hall MD at [facility] MAIN OR       Family History:  No family history on file.    Social History:  Social History     Tobacco Use    Smoking status: Former     Types: Cigars     Quit date:      Years since quittin.3    Smokeless tobacco: Never   Substance Use Topics    Alcohol use: Not Currently    Drug use: Never

## 2025-05-16 PROBLEM — E87.6 HYPOKALEMIA: Status: ACTIVE | Noted: 2025-05-16

## 2025-05-16 LAB
ANION GAP SERPL CALC-SCNC: 8 MMOL/L (ref 2–12)
APPEARANCE UR: ABNORMAL
BACTERIA URNS QL MICRO: ABNORMAL /HPF
BASOPHILS # BLD: 0.06 K/UL (ref 0–0.1)
BASOPHILS NFR BLD: 0.5 % (ref 0–1)
BILIRUB UR QL: NEGATIVE
BUN SERPL-MCNC: 33 MG/DL (ref 6–20)
BUN/CREAT SERPL: 20 (ref 12–20)
CALCIUM SERPL-MCNC: 9.4 MG/DL (ref 8.5–10.1)
CHLORIDE SERPL-SCNC: 112 MMOL/L (ref 97–108)
CO2 SERPL-SCNC: 19 MMOL/L (ref 21–32)
COLOR UR: ABNORMAL
CREAT SERPL-MCNC: 1.61 MG/DL (ref 0.7–1.3)
DIFFERENTIAL METHOD BLD: ABNORMAL
EOSINOPHIL # BLD: 0.07 K/UL (ref 0–0.4)
EOSINOPHIL NFR BLD: 0.5 % (ref 0–7)
EPITH CASTS URNS QL MICRO: ABNORMAL /LPF
ERYTHROCYTE [DISTWIDTH] IN BLOOD BY AUTOMATED COUNT: 14.3 % (ref 11.5–14.5)
GLUCOSE SERPL-MCNC: 96 MG/DL (ref 65–100)
GLUCOSE UR STRIP.AUTO-MCNC: NEGATIVE MG/DL
HCT VFR BLD AUTO: 28.5 % (ref 36.6–50.3)
HGB BLD-MCNC: 9.5 G/DL (ref 12.1–17)
HGB UR QL STRIP: ABNORMAL
IMM GRANULOCYTES # BLD AUTO: 0.05 K/UL (ref 0–0.04)
IMM GRANULOCYTES NFR BLD AUTO: 0.4 % (ref 0–0.5)
KETONES UR QL STRIP.AUTO: NEGATIVE MG/DL
LEUKOCYTE ESTERASE UR QL STRIP.AUTO: ABNORMAL
LYMPHOCYTES # BLD: 0.7 K/UL (ref 0.8–3.5)
LYMPHOCYTES NFR BLD: 5.4 % (ref 12–49)
MAGNESIUM SERPL-MCNC: 1.9 MG/DL (ref 1.6–2.4)
MCH RBC QN AUTO: 30 PG (ref 26–34)
MCHC RBC AUTO-ENTMCNC: 33.3 G/DL (ref 30–36.5)
MCV RBC AUTO: 89.9 FL (ref 80–99)
MONOCYTES # BLD: 0.88 K/UL (ref 0–1)
MONOCYTES NFR BLD: 6.8 % (ref 5–13)
NEUTS SEG # BLD: 11.2 K/UL (ref 1.8–8)
NEUTS SEG NFR BLD: 86.4 % (ref 32–75)
NITRITE UR QL STRIP.AUTO: NEGATIVE
NRBC # BLD: 0 K/UL (ref 0–0.01)
NRBC BLD-RTO: 0 PER 100 WBC
PH UR STRIP: 7 (ref 5–8)
PHOSPHATE SERPL-MCNC: 1.5 MG/DL (ref 2.6–4.7)
PLATELET # BLD AUTO: 329 K/UL (ref 150–400)
PMV BLD AUTO: 8.8 FL (ref 8.9–12.9)
POTASSIUM SERPL-SCNC: 2.7 MMOL/L (ref 3.5–5.1)
PROT UR STRIP-MCNC: ABNORMAL MG/DL
RBC # BLD AUTO: 3.17 M/UL (ref 4.1–5.7)
RBC #/AREA URNS HPF: ABNORMAL /HPF (ref 0–5)
SODIUM SERPL-SCNC: 139 MMOL/L (ref 136–145)
SP GR UR REFRACTOMETRY: 1.01
URINE CULTURE IF INDICATED: ABNORMAL
UROBILINOGEN UR QL STRIP.AUTO: 0.2 EU/DL (ref 0.2–1)
WBC # BLD AUTO: 13 K/UL (ref 4.1–11.1)
WBC URNS QL MICRO: ABNORMAL /HPF (ref 0–4)

## 2025-05-16 PROCEDURE — 97530 THERAPEUTIC ACTIVITIES: CPT | Performed by: OCCUPATIONAL THERAPIST

## 2025-05-16 PROCEDURE — 6360000002 HC RX W HCPCS: Performed by: STUDENT IN AN ORGANIZED HEALTH CARE EDUCATION/TRAINING PROGRAM

## 2025-05-16 PROCEDURE — 94640 AIRWAY INHALATION TREATMENT: CPT

## 2025-05-16 PROCEDURE — 85025 COMPLETE CBC W/AUTO DIFF WBC: CPT

## 2025-05-16 PROCEDURE — 2580000003 HC RX 258: Performed by: STUDENT IN AN ORGANIZED HEALTH CARE EDUCATION/TRAINING PROGRAM

## 2025-05-16 PROCEDURE — 97535 SELF CARE MNGMENT TRAINING: CPT | Performed by: OCCUPATIONAL THERAPIST

## 2025-05-16 PROCEDURE — 6360000002 HC RX W HCPCS: Performed by: EMERGENCY MEDICINE

## 2025-05-16 PROCEDURE — 83735 ASSAY OF MAGNESIUM: CPT

## 2025-05-16 PROCEDURE — 97162 PT EVAL MOD COMPLEX 30 MIN: CPT

## 2025-05-16 PROCEDURE — 96366 THER/PROPH/DIAG IV INF ADDON: CPT

## 2025-05-16 PROCEDURE — 6370000000 HC RX 637 (ALT 250 FOR IP): Performed by: STUDENT IN AN ORGANIZED HEALTH CARE EDUCATION/TRAINING PROGRAM

## 2025-05-16 PROCEDURE — 2500000003 HC RX 250 WO HCPCS: Performed by: EMERGENCY MEDICINE

## 2025-05-16 PROCEDURE — 2700000000 HC OXYGEN THERAPY PER DAY

## 2025-05-16 PROCEDURE — 2500000003 HC RX 250 WO HCPCS: Performed by: STUDENT IN AN ORGANIZED HEALTH CARE EDUCATION/TRAINING PROGRAM

## 2025-05-16 PROCEDURE — 97530 THERAPEUTIC ACTIVITIES: CPT

## 2025-05-16 PROCEDURE — 96375 TX/PRO/DX INJ NEW DRUG ADDON: CPT

## 2025-05-16 PROCEDURE — 84100 ASSAY OF PHOSPHORUS: CPT

## 2025-05-16 PROCEDURE — 80048 BASIC METABOLIC PNL TOTAL CA: CPT

## 2025-05-16 PROCEDURE — 36415 COLL VENOUS BLD VENIPUNCTURE: CPT

## 2025-05-16 PROCEDURE — 97166 OT EVAL MOD COMPLEX 45 MIN: CPT | Performed by: OCCUPATIONAL THERAPIST

## 2025-05-16 PROCEDURE — 2060000000 HC ICU INTERMEDIATE R&B

## 2025-05-16 RX ORDER — ACETAMINOPHEN 650 MG/1
650 SUPPOSITORY RECTAL EVERY 6 HOURS PRN
Status: DISCONTINUED | OUTPATIENT
Start: 2025-05-16 | End: 2025-05-23 | Stop reason: HOSPADM

## 2025-05-16 RX ORDER — DONEPEZIL HYDROCHLORIDE 5 MG/1
5 TABLET, FILM COATED ORAL NIGHTLY
Status: DISCONTINUED | OUTPATIENT
Start: 2025-05-16 | End: 2025-05-23 | Stop reason: HOSPADM

## 2025-05-16 RX ORDER — MONTELUKAST SODIUM 10 MG/1
10 TABLET ORAL NIGHTLY
Status: DISCONTINUED | OUTPATIENT
Start: 2025-05-16 | End: 2025-05-23 | Stop reason: HOSPADM

## 2025-05-16 RX ORDER — ARFORMOTEROL TARTRATE 15 UG/2ML
15 SOLUTION RESPIRATORY (INHALATION)
Status: DISCONTINUED | OUTPATIENT
Start: 2025-05-16 | End: 2025-05-17

## 2025-05-16 RX ORDER — FOLIC ACID 1 MG/1
1 TABLET ORAL DAILY
Status: DISCONTINUED | OUTPATIENT
Start: 2025-05-16 | End: 2025-05-23 | Stop reason: HOSPADM

## 2025-05-16 RX ORDER — POLYETHYLENE GLYCOL 3350 17 G/17G
17 POWDER, FOR SOLUTION ORAL DAILY PRN
Status: DISCONTINUED | OUTPATIENT
Start: 2025-05-16 | End: 2025-05-17

## 2025-05-16 RX ORDER — ONDANSETRON 2 MG/ML
4 INJECTION INTRAMUSCULAR; INTRAVENOUS EVERY 6 HOURS PRN
Status: DISCONTINUED | OUTPATIENT
Start: 2025-05-16 | End: 2025-05-23 | Stop reason: HOSPADM

## 2025-05-16 RX ORDER — SODIUM CHLORIDE, SODIUM LACTATE, POTASSIUM CHLORIDE, CALCIUM CHLORIDE 600; 310; 30; 20 MG/100ML; MG/100ML; MG/100ML; MG/100ML
INJECTION, SOLUTION INTRAVENOUS CONTINUOUS
Status: DISCONTINUED | OUTPATIENT
Start: 2025-05-16 | End: 2025-05-17

## 2025-05-16 RX ORDER — POLYETHYLENE GLYCOL 3350 17 G/17G
17 POWDER, FOR SOLUTION ORAL 2 TIMES DAILY
Status: DISCONTINUED | OUTPATIENT
Start: 2025-05-16 | End: 2025-05-16

## 2025-05-16 RX ORDER — SODIUM CHLORIDE 0.9 % (FLUSH) 0.9 %
5-40 SYRINGE (ML) INJECTION EVERY 12 HOURS SCHEDULED
Status: DISCONTINUED | OUTPATIENT
Start: 2025-05-16 | End: 2025-05-23 | Stop reason: HOSPADM

## 2025-05-16 RX ORDER — BUDESONIDE 0.5 MG/2ML
0.5 INHALANT ORAL
Status: DISCONTINUED | OUTPATIENT
Start: 2025-05-16 | End: 2025-05-17

## 2025-05-16 RX ORDER — PANTOPRAZOLE SODIUM 40 MG/1
40 TABLET, DELAYED RELEASE ORAL
Status: DISCONTINUED | OUTPATIENT
Start: 2025-05-16 | End: 2025-05-23 | Stop reason: HOSPADM

## 2025-05-16 RX ORDER — ACETAMINOPHEN 325 MG/1
650 TABLET ORAL EVERY 6 HOURS PRN
Status: DISCONTINUED | OUTPATIENT
Start: 2025-05-16 | End: 2025-05-23 | Stop reason: HOSPADM

## 2025-05-16 RX ORDER — POLYETHYLENE GLYCOL 3350 17 G/17G
17 POWDER, FOR SOLUTION ORAL DAILY PRN
Status: DISCONTINUED | OUTPATIENT
Start: 2025-05-16 | End: 2025-05-16

## 2025-05-16 RX ORDER — POTASSIUM CHLORIDE 1500 MG/1
40 TABLET, EXTENDED RELEASE ORAL
Status: COMPLETED | OUTPATIENT
Start: 2025-05-16 | End: 2025-05-16

## 2025-05-16 RX ORDER — ALBUTEROL SULFATE 90 UG/1
2 INHALANT RESPIRATORY (INHALATION) 4 TIMES DAILY PRN
Status: DISCONTINUED | OUTPATIENT
Start: 2025-05-16 | End: 2025-05-16

## 2025-05-16 RX ORDER — SODIUM CHLORIDE 0.9 % (FLUSH) 0.9 %
5-40 SYRINGE (ML) INJECTION PRN
Status: DISCONTINUED | OUTPATIENT
Start: 2025-05-16 | End: 2025-05-23 | Stop reason: HOSPADM

## 2025-05-16 RX ORDER — GAUZE BANDAGE 2" X 2"
100 BANDAGE TOPICAL DAILY
Status: DISCONTINUED | OUTPATIENT
Start: 2025-05-16 | End: 2025-05-23 | Stop reason: HOSPADM

## 2025-05-16 RX ORDER — ALBUTEROL SULFATE 0.83 MG/ML
2.5 SOLUTION RESPIRATORY (INHALATION) 4 TIMES DAILY PRN
Status: DISCONTINUED | OUTPATIENT
Start: 2025-05-16 | End: 2025-05-23 | Stop reason: HOSPADM

## 2025-05-16 RX ORDER — MIRTAZAPINE 15 MG/1
30 TABLET, FILM COATED ORAL NIGHTLY
Status: DISCONTINUED | OUTPATIENT
Start: 2025-05-16 | End: 2025-05-23 | Stop reason: HOSPADM

## 2025-05-16 RX ORDER — SODIUM CHLORIDE 9 MG/ML
INJECTION, SOLUTION INTRAVENOUS PRN
Status: DISCONTINUED | OUTPATIENT
Start: 2025-05-16 | End: 2025-05-23 | Stop reason: HOSPADM

## 2025-05-16 RX ORDER — ONDANSETRON 4 MG/1
4 TABLET, ORALLY DISINTEGRATING ORAL EVERY 8 HOURS PRN
Status: DISCONTINUED | OUTPATIENT
Start: 2025-05-16 | End: 2025-05-23 | Stop reason: HOSPADM

## 2025-05-16 RX ORDER — TAMSULOSIN HYDROCHLORIDE 0.4 MG/1
0.4 CAPSULE ORAL DAILY
Status: DISCONTINUED | OUTPATIENT
Start: 2025-05-16 | End: 2025-05-23 | Stop reason: HOSPADM

## 2025-05-16 RX ORDER — SENNA AND DOCUSATE SODIUM 50; 8.6 MG/1; MG/1
1 TABLET, FILM COATED ORAL DAILY
Status: DISCONTINUED | OUTPATIENT
Start: 2025-05-16 | End: 2025-05-23 | Stop reason: HOSPADM

## 2025-05-16 RX ORDER — MAGNESIUM SULFATE 1 G/100ML
1000 INJECTION INTRAVENOUS ONCE
Status: COMPLETED | OUTPATIENT
Start: 2025-05-16 | End: 2025-05-16

## 2025-05-16 RX ADMIN — MONTELUKAST 10 MG: 10 TABLET, FILM COATED ORAL at 21:16

## 2025-05-16 RX ADMIN — FOLIC ACID 1 MG: 1 TABLET ORAL at 10:00

## 2025-05-16 RX ADMIN — IPRATROPIUM BROMIDE 0.5 MG: 0.5 SOLUTION RESPIRATORY (INHALATION) at 14:13

## 2025-05-16 RX ADMIN — ACETAMINOPHEN 650 MG: 325 TABLET ORAL at 21:51

## 2025-05-16 RX ADMIN — WATER 1000 MG: 1 INJECTION INTRAMUSCULAR; INTRAVENOUS; SUBCUTANEOUS at 10:00

## 2025-05-16 RX ADMIN — POTASSIUM PHOSPHATE, MONOBASIC AND POTASSIUM PHOSPHATE, DIBASIC 30 MMOL: 224; 236 INJECTION, SOLUTION, CONCENTRATE INTRAVENOUS at 15:26

## 2025-05-16 RX ADMIN — Medication 100 MG: at 10:00

## 2025-05-16 RX ADMIN — MAGNESIUM SULFATE HEPTAHYDRATE 1000 MG: 1 INJECTION, SOLUTION INTRAVENOUS at 10:17

## 2025-05-16 RX ADMIN — CEFTRIAXONE 1000 MG: 1 INJECTION, POWDER, FOR SOLUTION INTRAMUSCULAR; INTRAVENOUS at 00:15

## 2025-05-16 RX ADMIN — TAMSULOSIN HYDROCHLORIDE 0.4 MG: 0.4 CAPSULE ORAL at 10:00

## 2025-05-16 RX ADMIN — SODIUM CHLORIDE, SODIUM LACTATE, POTASSIUM CHLORIDE, AND CALCIUM CHLORIDE: .6; .31; .03; .02 INJECTION, SOLUTION INTRAVENOUS at 22:49

## 2025-05-16 RX ADMIN — ARFORMOTEROL TARTRATE 15 MCG: 15 SOLUTION RESPIRATORY (INHALATION) at 21:02

## 2025-05-16 RX ADMIN — POTASSIUM CHLORIDE 40 MEQ: 1500 TABLET, EXTENDED RELEASE ORAL at 18:16

## 2025-05-16 RX ADMIN — BUDESONIDE 500 MCG: 0.5 INHALANT RESPIRATORY (INHALATION) at 21:02

## 2025-05-16 RX ADMIN — MIRTAZAPINE 30 MG: 15 TABLET, FILM COATED ORAL at 21:16

## 2025-05-16 RX ADMIN — SODIUM CHLORIDE, PRESERVATIVE FREE 10 ML: 5 INJECTION INTRAVENOUS at 21:17

## 2025-05-16 RX ADMIN — SENNOSIDES AND DOCUSATE SODIUM 1 TABLET: 50; 8.6 TABLET ORAL at 10:00

## 2025-05-16 RX ADMIN — POTASSIUM CHLORIDE 10 MEQ: 7.46 INJECTION, SOLUTION INTRAVENOUS at 00:39

## 2025-05-16 RX ADMIN — ARFORMOTEROL TARTRATE 15 MCG: 15 SOLUTION RESPIRATORY (INHALATION) at 08:43

## 2025-05-16 RX ADMIN — PANTOPRAZOLE SODIUM 40 MG: 40 TABLET, DELAYED RELEASE ORAL at 15:26

## 2025-05-16 RX ADMIN — IPRATROPIUM BROMIDE 0.5 MG: 0.5 SOLUTION RESPIRATORY (INHALATION) at 08:43

## 2025-05-16 RX ADMIN — POTASSIUM CHLORIDE 40 MEQ: 1500 TABLET, EXTENDED RELEASE ORAL at 15:26

## 2025-05-16 RX ADMIN — PANTOPRAZOLE SODIUM 40 MG: 40 TABLET, DELAYED RELEASE ORAL at 10:01

## 2025-05-16 RX ADMIN — IPRATROPIUM BROMIDE 0.5 MG: 0.5 SOLUTION RESPIRATORY (INHALATION) at 20:57

## 2025-05-16 RX ADMIN — DONEPEZIL HYDROCHLORIDE 5 MG: 5 TABLET, FILM COATED ORAL at 21:16

## 2025-05-16 RX ADMIN — SODIUM CHLORIDE, PRESERVATIVE FREE 10 ML: 5 INJECTION INTRAVENOUS at 10:01

## 2025-05-16 RX ADMIN — POLYETHYLENE GLYCOL 3350 17 G: 17 POWDER, FOR SOLUTION ORAL at 10:01

## 2025-05-16 RX ADMIN — BUDESONIDE 500 MCG: 0.5 INHALANT RESPIRATORY (INHALATION) at 08:43

## 2025-05-16 RX ADMIN — SODIUM CHLORIDE, SODIUM LACTATE, POTASSIUM CHLORIDE, AND CALCIUM CHLORIDE: .6; .31; .03; .02 INJECTION, SOLUTION INTRAVENOUS at 10:08

## 2025-05-16 ASSESSMENT — PAIN DESCRIPTION - LOCATION
LOCATION: ABDOMEN
LOCATION: ABDOMEN

## 2025-05-16 ASSESSMENT — PAIN SCALES - GENERAL
PAINLEVEL_OUTOF10: 0
PAINLEVEL_OUTOF10: 6

## 2025-05-16 ASSESSMENT — PAIN DESCRIPTION - DESCRIPTORS: DESCRIPTORS: ACHING;CRAMPING

## 2025-05-16 NOTE — ED NOTES
Assumed care of patient at this time, patient found to be resting in bed, patient found to be alert and oriented Xs 3, patient reports pain level of a 0/10, allowed time for questions, discussed current plan of care, bed in lowest position, call bell in place, no further needs at this time.

## 2025-05-16 NOTE — H&P
Urine Negative NEG mg/dL    Bilirubin, Urine Negative NEG      Blood, Urine MODERATE (A) NEG      Urobilinogen, Urine 0.2 0.2 - 1.0 EU/dL    Nitrite, Urine Negative NEG      Leukocyte Esterase, Urine LARGE (A) NEG      WBC, UA 20-50 0 - 4 /hpf    RBC, UA 20-50 0 - 5 /hpf    Epithelial Cells, UA FEW FEW /lpf    BACTERIA, URINE 4+ (A) NEG /hpf    Urine Culture if Indicated URINE CULTURE ORDERED (A) CNI           CT ABDOMEN PELVIS W IV CONTRAST Additional Contrast? None  Result Date: 5/15/2025  INDICATION: Abdominal pain, diarrhea COMPARISON: 11/30/2024 TECHNIQUE: Thin axial images were obtained through the abdomen and pelvis with intravenous iodinated contrast administration. Coronal and sagittal reconstructions were generated. Oral contrast was not administered. CT dose reduction was achieved through use of a standardized protocol tailored for this examination and automatic exposure control for dose modulation. FINDINGS: LIVER: No mass or biliary dilatation. GALLBLADDER: No calcified gallstone SPLEEN: Unremarkable PANCREAS: No mass or ductal dilatation. ADRENALS: Stable 1.9 cm left adrenal nodule KIDNEYS/URETERS: Mild renal cortical atrophy and scarring. Left upper pole renal cyst. Mild bilateral hydronephrosis and hydroureter. Unchanged 6 mm calculus overlying the distal left ureter ([id number]). PERITONEUM: No abdominal lymphadenopathy or ascites. BOWEL: Moderate constipation in the large bowel. No bowel obstruction. Normal appendix. STOMACH: Mild hiatal hernia with distal esophagitis. PELVIS: Markedly distended bladder with a couple small dependent calcified stones. No pelvic free fluid or lymphadenopathy. BONES: Stable appearance of the bones VISUALIZED THORAX: Left lower lobe collapse with mild to moderate left pleural effusion. Left lung volume loss with mediastinal shift to the left. ADDITIONAL COMMENTS: N/A     1. No evidence for acute abdominal abnormality. 2. Moderate fecal retention in the large bowel. 3.

## 2025-05-17 LAB
ANION GAP SERPL CALC-SCNC: 10 MMOL/L (ref 2–12)
BASOPHILS # BLD: 0.08 K/UL (ref 0–0.1)
BASOPHILS NFR BLD: 0.7 % (ref 0–1)
BUN SERPL-MCNC: 33 MG/DL (ref 6–20)
BUN/CREAT SERPL: 22 (ref 12–20)
CALCIUM SERPL-MCNC: 8.6 MG/DL (ref 8.5–10.1)
CHLORIDE SERPL-SCNC: 115 MMOL/L (ref 97–108)
CO2 SERPL-SCNC: 15 MMOL/L (ref 21–32)
CREAT SERPL-MCNC: 1.47 MG/DL (ref 0.7–1.3)
DIFFERENTIAL METHOD BLD: ABNORMAL
EOSINOPHIL # BLD: 0.29 K/UL (ref 0–0.4)
EOSINOPHIL NFR BLD: 2.5 % (ref 0–7)
ERYTHROCYTE [DISTWIDTH] IN BLOOD BY AUTOMATED COUNT: 14.6 % (ref 11.5–14.5)
FERRITIN SERPL-MCNC: 146 NG/ML (ref 26–388)
GLUCOSE SERPL-MCNC: 94 MG/DL (ref 65–100)
HCT VFR BLD AUTO: 30 % (ref 36.6–50.3)
HGB BLD-MCNC: 9.9 G/DL (ref 12.1–17)
IMM GRANULOCYTES # BLD AUTO: 0.06 K/UL (ref 0–0.04)
IMM GRANULOCYTES NFR BLD AUTO: 0.5 % (ref 0–0.5)
IRON SATN MFR SERPL: 13 % (ref 20–50)
IRON SERPL-MCNC: 25 UG/DL (ref 35–150)
LYMPHOCYTES # BLD: 1.39 K/UL (ref 0.8–3.5)
LYMPHOCYTES NFR BLD: 12.1 % (ref 12–49)
MAGNESIUM SERPL-MCNC: 1.9 MG/DL (ref 1.6–2.4)
MCH RBC QN AUTO: 30 PG (ref 26–34)
MCHC RBC AUTO-ENTMCNC: 33 G/DL (ref 30–36.5)
MCV RBC AUTO: 90.9 FL (ref 80–99)
MONOCYTES # BLD: 1.04 K/UL (ref 0–1)
MONOCYTES NFR BLD: 9.1 % (ref 5–13)
NEUTS SEG # BLD: 8.62 K/UL (ref 1.8–8)
NEUTS SEG NFR BLD: 75.1 % (ref 32–75)
NRBC # BLD: 0 K/UL (ref 0–0.01)
NRBC BLD-RTO: 0 PER 100 WBC
PHOSPHATE SERPL-MCNC: 2.5 MG/DL (ref 2.6–4.7)
PLATELET # BLD AUTO: 307 K/UL (ref 150–400)
PMV BLD AUTO: 8.6 FL (ref 8.9–12.9)
POTASSIUM SERPL-SCNC: 4.1 MMOL/L (ref 3.5–5.1)
RBC # BLD AUTO: 3.3 M/UL (ref 4.1–5.7)
SODIUM SERPL-SCNC: 140 MMOL/L (ref 136–145)
TIBC SERPL-MCNC: 186 UG/DL (ref 250–450)
WBC # BLD AUTO: 11.5 K/UL (ref 4.1–11.1)

## 2025-05-17 PROCEDURE — 2700000000 HC OXYGEN THERAPY PER DAY

## 2025-05-17 PROCEDURE — 85025 COMPLETE CBC W/AUTO DIFF WBC: CPT

## 2025-05-17 PROCEDURE — 2060000000 HC ICU INTERMEDIATE R&B

## 2025-05-17 PROCEDURE — 83735 ASSAY OF MAGNESIUM: CPT

## 2025-05-17 PROCEDURE — 6360000002 HC RX W HCPCS: Performed by: STUDENT IN AN ORGANIZED HEALTH CARE EDUCATION/TRAINING PROGRAM

## 2025-05-17 PROCEDURE — 83550 IRON BINDING TEST: CPT

## 2025-05-17 PROCEDURE — 80048 BASIC METABOLIC PNL TOTAL CA: CPT

## 2025-05-17 PROCEDURE — 6370000000 HC RX 637 (ALT 250 FOR IP): Performed by: STUDENT IN AN ORGANIZED HEALTH CARE EDUCATION/TRAINING PROGRAM

## 2025-05-17 PROCEDURE — 2500000003 HC RX 250 WO HCPCS: Performed by: STUDENT IN AN ORGANIZED HEALTH CARE EDUCATION/TRAINING PROGRAM

## 2025-05-17 PROCEDURE — 36415 COLL VENOUS BLD VENIPUNCTURE: CPT

## 2025-05-17 PROCEDURE — 6370000000 HC RX 637 (ALT 250 FOR IP): Performed by: HOSPITALIST

## 2025-05-17 PROCEDURE — 6360000002 HC RX W HCPCS: Performed by: HOSPITALIST

## 2025-05-17 PROCEDURE — 94640 AIRWAY INHALATION TREATMENT: CPT

## 2025-05-17 PROCEDURE — 6370000000 HC RX 637 (ALT 250 FOR IP)

## 2025-05-17 PROCEDURE — 83540 ASSAY OF IRON: CPT

## 2025-05-17 PROCEDURE — 82728 ASSAY OF FERRITIN: CPT

## 2025-05-17 PROCEDURE — 84100 ASSAY OF PHOSPHORUS: CPT

## 2025-05-17 PROCEDURE — 2500000003 HC RX 250 WO HCPCS: Performed by: HOSPITALIST

## 2025-05-17 PROCEDURE — 2580000003 HC RX 258: Performed by: HOSPITALIST

## 2025-05-17 RX ORDER — LEVOFLOXACIN 5 MG/ML
250 INJECTION, SOLUTION INTRAVENOUS EVERY 24 HOURS
Status: DISCONTINUED | OUTPATIENT
Start: 2025-05-17 | End: 2025-05-17

## 2025-05-17 RX ORDER — BISACODYL 10 MG
10 SUPPOSITORY, RECTAL RECTAL DAILY PRN
Status: DISCONTINUED | OUTPATIENT
Start: 2025-05-17 | End: 2025-05-23 | Stop reason: HOSPADM

## 2025-05-17 RX ORDER — ARFORMOTEROL TARTRATE 15 UG/2ML
15 SOLUTION RESPIRATORY (INHALATION)
Status: DISCONTINUED | OUTPATIENT
Start: 2025-05-17 | End: 2025-05-23 | Stop reason: HOSPADM

## 2025-05-17 RX ORDER — DICYCLOMINE HYDROCHLORIDE 10 MG/1
10 CAPSULE ORAL
Status: COMPLETED | OUTPATIENT
Start: 2025-05-17 | End: 2025-05-17

## 2025-05-17 RX ORDER — POLYETHYLENE GLYCOL 3350 17 G/17G
17 POWDER, FOR SOLUTION ORAL 2 TIMES DAILY
Status: DISCONTINUED | OUTPATIENT
Start: 2025-05-17 | End: 2025-05-23 | Stop reason: HOSPADM

## 2025-05-17 RX ORDER — BUDESONIDE 0.5 MG/2ML
0.5 INHALANT ORAL
Status: DISCONTINUED | OUTPATIENT
Start: 2025-05-17 | End: 2025-05-23 | Stop reason: HOSPADM

## 2025-05-17 RX ADMIN — POLYETHYLENE GLYCOL 3350 17 G: 17 POWDER, FOR SOLUTION ORAL at 20:19

## 2025-05-17 RX ADMIN — FOLIC ACID 1 MG: 1 TABLET ORAL at 09:17

## 2025-05-17 RX ADMIN — BUDESONIDE 500 MCG: 0.5 INHALANT RESPIRATORY (INHALATION) at 19:48

## 2025-05-17 RX ADMIN — PANTOPRAZOLE SODIUM 40 MG: 40 TABLET, DELAYED RELEASE ORAL at 15:36

## 2025-05-17 RX ADMIN — MIRTAZAPINE 30 MG: 15 TABLET, FILM COATED ORAL at 20:19

## 2025-05-17 RX ADMIN — MONTELUKAST 10 MG: 10 TABLET, FILM COATED ORAL at 20:19

## 2025-05-17 RX ADMIN — ARFORMOTEROL TARTRATE 15 MCG: 15 SOLUTION RESPIRATORY (INHALATION) at 19:48

## 2025-05-17 RX ADMIN — DONEPEZIL HYDROCHLORIDE 5 MG: 5 TABLET, FILM COATED ORAL at 20:18

## 2025-05-17 RX ADMIN — BUDESONIDE 500 MCG: 0.5 INHALANT RESPIRATORY (INHALATION) at 07:50

## 2025-05-17 RX ADMIN — IPRATROPIUM BROMIDE 0.5 MG: 0.5 SOLUTION RESPIRATORY (INHALATION) at 07:50

## 2025-05-17 RX ADMIN — IPRATROPIUM BROMIDE 0.5 MG: 0.5 SOLUTION RESPIRATORY (INHALATION) at 14:39

## 2025-05-17 RX ADMIN — SODIUM CHLORIDE, PRESERVATIVE FREE 5 ML: 5 INJECTION INTRAVENOUS at 09:17

## 2025-05-17 RX ADMIN — SODIUM BICARBONATE: 84 INJECTION, SOLUTION INTRAVENOUS at 11:02

## 2025-05-17 RX ADMIN — SODIUM CHLORIDE, PRESERVATIVE FREE 10 ML: 5 INJECTION INTRAVENOUS at 20:22

## 2025-05-17 RX ADMIN — IPRATROPIUM BROMIDE 0.5 MG: 0.5 SOLUTION RESPIRATORY (INHALATION) at 19:43

## 2025-05-17 RX ADMIN — IPRATROPIUM BROMIDE 0.5 MG: 0.5 SOLUTION RESPIRATORY (INHALATION) at 02:38

## 2025-05-17 RX ADMIN — ARFORMOTEROL TARTRATE 15 MCG: 15 SOLUTION RESPIRATORY (INHALATION) at 07:50

## 2025-05-17 RX ADMIN — PANTOPRAZOLE SODIUM 40 MG: 40 TABLET, DELAYED RELEASE ORAL at 06:13

## 2025-05-17 RX ADMIN — DICYCLOMINE HYDROCHLORIDE 10 MG: 10 CAPSULE ORAL at 23:33

## 2025-05-17 RX ADMIN — Medication 100 MG: at 09:17

## 2025-05-17 RX ADMIN — LEVOFLOXACIN 250 MG: 5 INJECTION, SOLUTION INTRAVENOUS at 11:02

## 2025-05-17 RX ADMIN — POLYETHYLENE GLYCOL 3350 17 G: 17 POWDER, FOR SOLUTION ORAL at 15:48

## 2025-05-17 RX ADMIN — SENNOSIDES AND DOCUSATE SODIUM 1 TABLET: 50; 8.6 TABLET ORAL at 09:17

## 2025-05-17 RX ADMIN — TAMSULOSIN HYDROCHLORIDE 0.4 MG: 0.4 CAPSULE ORAL at 09:17

## 2025-05-17 RX ADMIN — CEFTRIAXONE 1000 MG: 1 INJECTION, POWDER, FOR SOLUTION INTRAMUSCULAR; INTRAVENOUS at 15:36

## 2025-05-17 RX ADMIN — ACETAMINOPHEN 650 MG: 325 TABLET ORAL at 17:16

## 2025-05-17 ASSESSMENT — PAIN SCALES - GENERAL
PAINLEVEL_OUTOF10: 0
PAINLEVEL_OUTOF10: 7
PAINLEVEL_OUTOF10: 7
PAINLEVEL_OUTOF10: 0

## 2025-05-17 ASSESSMENT — PAIN - FUNCTIONAL ASSESSMENT: PAIN_FUNCTIONAL_ASSESSMENT: ACTIVITIES ARE NOT PREVENTED

## 2025-05-17 ASSESSMENT — PAIN DESCRIPTION - DESCRIPTORS: DESCRIPTORS: ACHING

## 2025-05-17 ASSESSMENT — PAIN DESCRIPTION - LOCATION: LOCATION: NECK

## 2025-05-18 ENCOUNTER — APPOINTMENT (OUTPATIENT)
Facility: HOSPITAL | Age: 80
DRG: 872 | End: 2025-05-18
Payer: MEDICARE

## 2025-05-18 LAB
ANION GAP SERPL CALC-SCNC: 8 MMOL/L (ref 2–12)
BACTERIA SPEC CULT: ABNORMAL
BASOPHILS # BLD: 0.07 K/UL (ref 0–0.1)
BASOPHILS NFR BLD: 0.5 % (ref 0–1)
BUN SERPL-MCNC: 27 MG/DL (ref 6–20)
BUN/CREAT SERPL: 20 (ref 12–20)
CALCIUM SERPL-MCNC: 8.1 MG/DL (ref 8.5–10.1)
CC UR VC: ABNORMAL
CHLORIDE SERPL-SCNC: 113 MMOL/L (ref 97–108)
CO2 SERPL-SCNC: 19 MMOL/L (ref 21–32)
CREAT SERPL-MCNC: 1.35 MG/DL (ref 0.7–1.3)
DIFFERENTIAL METHOD BLD: ABNORMAL
EOSINOPHIL # BLD: 0.36 K/UL (ref 0–0.4)
EOSINOPHIL NFR BLD: 2.4 % (ref 0–7)
ERYTHROCYTE [DISTWIDTH] IN BLOOD BY AUTOMATED COUNT: 14.6 % (ref 11.5–14.5)
GLUCOSE SERPL-MCNC: 103 MG/DL (ref 65–100)
HCT VFR BLD AUTO: 31.3 % (ref 36.6–50.3)
HGB BLD-MCNC: 10.4 G/DL (ref 12.1–17)
IMM GRANULOCYTES # BLD AUTO: 0.07 K/UL (ref 0–0.04)
IMM GRANULOCYTES NFR BLD AUTO: 0.5 % (ref 0–0.5)
LYMPHOCYTES # BLD: 1.5 K/UL (ref 0.8–3.5)
LYMPHOCYTES NFR BLD: 10.2 % (ref 12–49)
MAGNESIUM SERPL-MCNC: 1.7 MG/DL (ref 1.6–2.4)
MCH RBC QN AUTO: 30.1 PG (ref 26–34)
MCHC RBC AUTO-ENTMCNC: 33.2 G/DL (ref 30–36.5)
MCV RBC AUTO: 90.5 FL (ref 80–99)
MONOCYTES # BLD: 1.19 K/UL (ref 0–1)
MONOCYTES NFR BLD: 8.1 % (ref 5–13)
NEUTS SEG # BLD: 11.54 K/UL (ref 1.8–8)
NEUTS SEG NFR BLD: 78.3 % (ref 32–75)
NRBC # BLD: 0 K/UL (ref 0–0.01)
NRBC BLD-RTO: 0 PER 100 WBC
PHOSPHATE SERPL-MCNC: 2 MG/DL (ref 2.6–4.7)
PLATELET # BLD AUTO: 318 K/UL (ref 150–400)
PMV BLD AUTO: 8.6 FL (ref 8.9–12.9)
POTASSIUM SERPL-SCNC: 2.7 MMOL/L (ref 3.5–5.1)
RBC # BLD AUTO: 3.46 M/UL (ref 4.1–5.7)
SERVICE CMNT-IMP: ABNORMAL
SODIUM SERPL-SCNC: 140 MMOL/L (ref 136–145)
WBC # BLD AUTO: 14.7 K/UL (ref 4.1–11.1)

## 2025-05-18 PROCEDURE — 2580000003 HC RX 258: Performed by: HOSPITALIST

## 2025-05-18 PROCEDURE — 2500000003 HC RX 250 WO HCPCS: Performed by: HOSPITALIST

## 2025-05-18 PROCEDURE — 6370000000 HC RX 637 (ALT 250 FOR IP)

## 2025-05-18 PROCEDURE — 6370000000 HC RX 637 (ALT 250 FOR IP): Performed by: STUDENT IN AN ORGANIZED HEALTH CARE EDUCATION/TRAINING PROGRAM

## 2025-05-18 PROCEDURE — 94640 AIRWAY INHALATION TREATMENT: CPT

## 2025-05-18 PROCEDURE — 84100 ASSAY OF PHOSPHORUS: CPT

## 2025-05-18 PROCEDURE — 74018 RADEX ABDOMEN 1 VIEW: CPT

## 2025-05-18 PROCEDURE — 2500000003 HC RX 250 WO HCPCS: Performed by: STUDENT IN AN ORGANIZED HEALTH CARE EDUCATION/TRAINING PROGRAM

## 2025-05-18 PROCEDURE — 2060000000 HC ICU INTERMEDIATE R&B

## 2025-05-18 PROCEDURE — 6360000002 HC RX W HCPCS: Performed by: STUDENT IN AN ORGANIZED HEALTH CARE EDUCATION/TRAINING PROGRAM

## 2025-05-18 PROCEDURE — 83735 ASSAY OF MAGNESIUM: CPT

## 2025-05-18 PROCEDURE — 85025 COMPLETE CBC W/AUTO DIFF WBC: CPT

## 2025-05-18 PROCEDURE — 2700000000 HC OXYGEN THERAPY PER DAY

## 2025-05-18 PROCEDURE — 80048 BASIC METABOLIC PNL TOTAL CA: CPT

## 2025-05-18 PROCEDURE — 36415 COLL VENOUS BLD VENIPUNCTURE: CPT

## 2025-05-18 PROCEDURE — 6370000000 HC RX 637 (ALT 250 FOR IP): Performed by: HOSPITALIST

## 2025-05-18 RX ORDER — POTASSIUM CHLORIDE 7.45 MG/ML
10 INJECTION INTRAVENOUS
Status: DISCONTINUED | OUTPATIENT
Start: 2025-05-18 | End: 2025-05-18

## 2025-05-18 RX ORDER — POTASSIUM CHLORIDE 1500 MG/1
40 TABLET, EXTENDED RELEASE ORAL ONCE
Status: COMPLETED | OUTPATIENT
Start: 2025-05-18 | End: 2025-05-18

## 2025-05-18 RX ADMIN — SODIUM CHLORIDE, PRESERVATIVE FREE 10 ML: 5 INJECTION INTRAVENOUS at 09:10

## 2025-05-18 RX ADMIN — POTASSIUM & SODIUM PHOSPHATES POWDER PACK 280-160-250 MG 250 MG: 280-160-250 PACK at 20:45

## 2025-05-18 RX ADMIN — PANTOPRAZOLE SODIUM 40 MG: 40 TABLET, DELAYED RELEASE ORAL at 15:30

## 2025-05-18 RX ADMIN — DONEPEZIL HYDROCHLORIDE 5 MG: 5 TABLET, FILM COATED ORAL at 20:45

## 2025-05-18 RX ADMIN — POTASSIUM & SODIUM PHOSPHATES POWDER PACK 280-160-250 MG 250 MG: 280-160-250 PACK at 15:30

## 2025-05-18 RX ADMIN — ARFORMOTEROL TARTRATE 15 MCG: 15 SOLUTION RESPIRATORY (INHALATION) at 19:49

## 2025-05-18 RX ADMIN — SENNOSIDES AND DOCUSATE SODIUM 1 TABLET: 50; 8.6 TABLET ORAL at 09:10

## 2025-05-18 RX ADMIN — BUDESONIDE 500 MCG: 0.5 INHALANT RESPIRATORY (INHALATION) at 09:38

## 2025-05-18 RX ADMIN — SODIUM CHLORIDE, PRESERVATIVE FREE 10 ML: 5 INJECTION INTRAVENOUS at 20:45

## 2025-05-18 RX ADMIN — TAMSULOSIN HYDROCHLORIDE 0.4 MG: 0.4 CAPSULE ORAL at 09:10

## 2025-05-18 RX ADMIN — POTASSIUM BICARBONATE 40 MEQ: 782 TABLET, EFFERVESCENT ORAL at 06:49

## 2025-05-18 RX ADMIN — MIRTAZAPINE 30 MG: 15 TABLET, FILM COATED ORAL at 20:45

## 2025-05-18 RX ADMIN — POTASSIUM & SODIUM PHOSPHATES POWDER PACK 280-160-250 MG 250 MG: 280-160-250 PACK at 11:46

## 2025-05-18 RX ADMIN — Medication 100 MG: at 09:10

## 2025-05-18 RX ADMIN — SODIUM BICARBONATE: 84 INJECTION, SOLUTION INTRAVENOUS at 01:39

## 2025-05-18 RX ADMIN — IPRATROPIUM BROMIDE 0.5 MG: 0.5 SOLUTION RESPIRATORY (INHALATION) at 19:44

## 2025-05-18 RX ADMIN — ARFORMOTEROL TARTRATE 15 MCG: 15 SOLUTION RESPIRATORY (INHALATION) at 09:38

## 2025-05-18 RX ADMIN — BUDESONIDE 500 MCG: 0.5 INHALANT RESPIRATORY (INHALATION) at 19:49

## 2025-05-18 RX ADMIN — POTASSIUM CHLORIDE 40 MEQ: 1500 TABLET, EXTENDED RELEASE ORAL at 11:46

## 2025-05-18 RX ADMIN — MONTELUKAST 10 MG: 10 TABLET, FILM COATED ORAL at 20:45

## 2025-05-18 RX ADMIN — IPRATROPIUM BROMIDE 0.5 MG: 0.5 SOLUTION RESPIRATORY (INHALATION) at 14:42

## 2025-05-18 RX ADMIN — SODIUM BICARBONATE: 84 INJECTION, SOLUTION INTRAVENOUS at 20:41

## 2025-05-18 RX ADMIN — IPRATROPIUM BROMIDE 0.5 MG: 0.5 SOLUTION RESPIRATORY (INHALATION) at 09:38

## 2025-05-18 RX ADMIN — FOLIC ACID 1 MG: 1 TABLET ORAL at 09:10

## 2025-05-18 RX ADMIN — PANTOPRAZOLE SODIUM 40 MG: 40 TABLET, DELAYED RELEASE ORAL at 06:49

## 2025-05-18 ASSESSMENT — PAIN SCALES - GENERAL: PAINLEVEL_OUTOF10: 0

## 2025-05-19 ENCOUNTER — APPOINTMENT (OUTPATIENT)
Facility: HOSPITAL | Age: 80
DRG: 872 | End: 2025-05-19
Payer: MEDICARE

## 2025-05-19 LAB
ANION GAP SERPL CALC-SCNC: 6 MMOL/L (ref 2–12)
BASOPHILS # BLD: 0.06 K/UL (ref 0–0.1)
BASOPHILS NFR BLD: 0.5 % (ref 0–1)
BUN SERPL-MCNC: 18 MG/DL (ref 6–20)
BUN/CREAT SERPL: 17 (ref 12–20)
CALCIUM SERPL-MCNC: 7.4 MG/DL (ref 8.5–10.1)
CHLORIDE SERPL-SCNC: 110 MMOL/L (ref 97–108)
CO2 SERPL-SCNC: 22 MMOL/L (ref 21–32)
CREAT SERPL-MCNC: 1.06 MG/DL (ref 0.7–1.3)
DIFFERENTIAL METHOD BLD: ABNORMAL
EOSINOPHIL # BLD: 0.27 K/UL (ref 0–0.4)
EOSINOPHIL NFR BLD: 2.2 % (ref 0–7)
ERYTHROCYTE [DISTWIDTH] IN BLOOD BY AUTOMATED COUNT: 14.6 % (ref 11.5–14.5)
GLUCOSE SERPL-MCNC: 104 MG/DL (ref 65–100)
HCT VFR BLD AUTO: 29.2 % (ref 36.6–50.3)
HGB BLD-MCNC: 9.4 G/DL (ref 12.1–17)
IMM GRANULOCYTES # BLD AUTO: 0.07 K/UL (ref 0–0.04)
IMM GRANULOCYTES NFR BLD AUTO: 0.6 % (ref 0–0.5)
LYMPHOCYTES # BLD: 1.49 K/UL (ref 0.8–3.5)
LYMPHOCYTES NFR BLD: 12.3 % (ref 12–49)
MCH RBC QN AUTO: 29.5 PG (ref 26–34)
MCHC RBC AUTO-ENTMCNC: 32.2 G/DL (ref 30–36.5)
MCV RBC AUTO: 91.5 FL (ref 80–99)
MONOCYTES # BLD: 1.08 K/UL (ref 0–1)
MONOCYTES NFR BLD: 8.9 % (ref 5–13)
NEUTS SEG # BLD: 9.17 K/UL (ref 1.8–8)
NEUTS SEG NFR BLD: 75.5 % (ref 32–75)
NRBC # BLD: 0 K/UL (ref 0–0.01)
NRBC BLD-RTO: 0 PER 100 WBC
PLATELET # BLD AUTO: 296 K/UL (ref 150–400)
PMV BLD AUTO: 8.5 FL (ref 8.9–12.9)
POTASSIUM SERPL-SCNC: 3 MMOL/L (ref 3.5–5.1)
RBC # BLD AUTO: 3.19 M/UL (ref 4.1–5.7)
SODIUM SERPL-SCNC: 138 MMOL/L (ref 136–145)
WBC # BLD AUTO: 12.1 K/UL (ref 4.1–11.1)

## 2025-05-19 PROCEDURE — 36415 COLL VENOUS BLD VENIPUNCTURE: CPT

## 2025-05-19 PROCEDURE — 97535 SELF CARE MNGMENT TRAINING: CPT

## 2025-05-19 PROCEDURE — 6360000002 HC RX W HCPCS: Performed by: STUDENT IN AN ORGANIZED HEALTH CARE EDUCATION/TRAINING PROGRAM

## 2025-05-19 PROCEDURE — 2700000000 HC OXYGEN THERAPY PER DAY

## 2025-05-19 PROCEDURE — 6370000000 HC RX 637 (ALT 250 FOR IP): Performed by: HOSPITALIST

## 2025-05-19 PROCEDURE — 2060000000 HC ICU INTERMEDIATE R&B

## 2025-05-19 PROCEDURE — 73521 X-RAY EXAM HIPS BI 2 VIEWS: CPT

## 2025-05-19 PROCEDURE — 85025 COMPLETE CBC W/AUTO DIFF WBC: CPT

## 2025-05-19 PROCEDURE — 6370000000 HC RX 637 (ALT 250 FOR IP): Performed by: STUDENT IN AN ORGANIZED HEALTH CARE EDUCATION/TRAINING PROGRAM

## 2025-05-19 PROCEDURE — 2500000003 HC RX 250 WO HCPCS: Performed by: STUDENT IN AN ORGANIZED HEALTH CARE EDUCATION/TRAINING PROGRAM

## 2025-05-19 PROCEDURE — 80048 BASIC METABOLIC PNL TOTAL CA: CPT

## 2025-05-19 PROCEDURE — 6360000002 HC RX W HCPCS: Performed by: HOSPITALIST

## 2025-05-19 PROCEDURE — 94640 AIRWAY INHALATION TREATMENT: CPT

## 2025-05-19 PROCEDURE — 97530 THERAPEUTIC ACTIVITIES: CPT

## 2025-05-19 PROCEDURE — 97116 GAIT TRAINING THERAPY: CPT

## 2025-05-19 RX ORDER — DOXYCYCLINE HYCLATE 100 MG
100 TABLET ORAL EVERY 12 HOURS SCHEDULED
Status: DISCONTINUED | OUTPATIENT
Start: 2025-05-19 | End: 2025-05-23 | Stop reason: HOSPADM

## 2025-05-19 RX ORDER — OXYCODONE HYDROCHLORIDE 5 MG/1
5 TABLET ORAL EVERY 4 HOURS PRN
Status: DISCONTINUED | OUTPATIENT
Start: 2025-05-19 | End: 2025-05-23 | Stop reason: HOSPADM

## 2025-05-19 RX ORDER — POTASSIUM CHLORIDE 1500 MG/1
40 TABLET, EXTENDED RELEASE ORAL ONCE
Status: COMPLETED | OUTPATIENT
Start: 2025-05-19 | End: 2025-05-19

## 2025-05-19 RX ORDER — POTASSIUM CHLORIDE 7.45 MG/ML
10 INJECTION INTRAVENOUS
Status: COMPLETED | OUTPATIENT
Start: 2025-05-19 | End: 2025-05-19

## 2025-05-19 RX ADMIN — BUDESONIDE 500 MCG: 0.5 INHALANT RESPIRATORY (INHALATION) at 08:04

## 2025-05-19 RX ADMIN — SODIUM CHLORIDE, PRESERVATIVE FREE 10 ML: 5 INJECTION INTRAVENOUS at 20:16

## 2025-05-19 RX ADMIN — OXYCODONE 5 MG: 5 TABLET ORAL at 20:12

## 2025-05-19 RX ADMIN — ACETAMINOPHEN 650 MG: 325 TABLET ORAL at 13:00

## 2025-05-19 RX ADMIN — DONEPEZIL HYDROCHLORIDE 5 MG: 5 TABLET, FILM COATED ORAL at 20:13

## 2025-05-19 RX ADMIN — MIRTAZAPINE 30 MG: 15 TABLET, FILM COATED ORAL at 20:13

## 2025-05-19 RX ADMIN — ARFORMOTEROL TARTRATE 15 MCG: 15 SOLUTION RESPIRATORY (INHALATION) at 08:04

## 2025-05-19 RX ADMIN — IPRATROPIUM BROMIDE 0.5 MG: 0.5 SOLUTION RESPIRATORY (INHALATION) at 08:00

## 2025-05-19 RX ADMIN — IPRATROPIUM BROMIDE 0.5 MG: 0.5 SOLUTION RESPIRATORY (INHALATION) at 13:10

## 2025-05-19 RX ADMIN — PANTOPRAZOLE SODIUM 40 MG: 40 TABLET, DELAYED RELEASE ORAL at 17:19

## 2025-05-19 RX ADMIN — Medication 100 MG: at 09:24

## 2025-05-19 RX ADMIN — POLYETHYLENE GLYCOL 3350 17 G: 17 POWDER, FOR SOLUTION ORAL at 20:14

## 2025-05-19 RX ADMIN — POTASSIUM & SODIUM PHOSPHATES POWDER PACK 280-160-250 MG 250 MG: 280-160-250 PACK at 09:23

## 2025-05-19 RX ADMIN — POLYETHYLENE GLYCOL 3350 17 G: 17 POWDER, FOR SOLUTION ORAL at 09:22

## 2025-05-19 RX ADMIN — POTASSIUM & SODIUM PHOSPHATES POWDER PACK 280-160-250 MG 250 MG: 280-160-250 PACK at 13:00

## 2025-05-19 RX ADMIN — FOLIC ACID 1 MG: 1 TABLET ORAL at 09:24

## 2025-05-19 RX ADMIN — POTASSIUM CHLORIDE 40 MEQ: 1500 TABLET, EXTENDED RELEASE ORAL at 09:23

## 2025-05-19 RX ADMIN — DOXYCYCLINE HYCLATE 100 MG: 100 TABLET, COATED ORAL at 20:15

## 2025-05-19 RX ADMIN — POTASSIUM & SODIUM PHOSPHATES POWDER PACK 280-160-250 MG 250 MG: 280-160-250 PACK at 20:13

## 2025-05-19 RX ADMIN — PANTOPRAZOLE SODIUM 40 MG: 40 TABLET, DELAYED RELEASE ORAL at 09:41

## 2025-05-19 RX ADMIN — POTASSIUM CHLORIDE 10 MEQ: 7.46 INJECTION, SOLUTION INTRAVENOUS at 09:37

## 2025-05-19 RX ADMIN — TAMSULOSIN HYDROCHLORIDE 0.4 MG: 0.4 CAPSULE ORAL at 09:22

## 2025-05-19 RX ADMIN — POTASSIUM CHLORIDE 10 MEQ: 7.46 INJECTION, SOLUTION INTRAVENOUS at 10:49

## 2025-05-19 RX ADMIN — SODIUM CHLORIDE, PRESERVATIVE FREE 10 ML: 5 INJECTION INTRAVENOUS at 09:24

## 2025-05-19 RX ADMIN — MONTELUKAST 10 MG: 10 TABLET, FILM COATED ORAL at 20:13

## 2025-05-19 RX ADMIN — POTASSIUM & SODIUM PHOSPHATES POWDER PACK 280-160-250 MG 250 MG: 280-160-250 PACK at 17:18

## 2025-05-19 RX ADMIN — SENNOSIDES AND DOCUSATE SODIUM 1 TABLET: 50; 8.6 TABLET ORAL at 09:23

## 2025-05-19 ASSESSMENT — PAIN DESCRIPTION - ORIENTATION
ORIENTATION: RIGHT

## 2025-05-19 ASSESSMENT — PAIN SCALES - GENERAL
PAINLEVEL_OUTOF10: 6
PAINLEVEL_OUTOF10: 0
PAINLEVEL_OUTOF10: 4
PAINLEVEL_OUTOF10: 6

## 2025-05-19 ASSESSMENT — PAIN DESCRIPTION - LOCATION
LOCATION: HIP

## 2025-05-19 ASSESSMENT — PAIN DESCRIPTION - DESCRIPTORS: DESCRIPTORS: ACHING

## 2025-05-20 LAB
ANION GAP SERPL CALC-SCNC: 6 MMOL/L (ref 2–12)
BASOPHILS # BLD: 0.07 K/UL (ref 0–0.1)
BASOPHILS NFR BLD: 0.4 % (ref 0–1)
BUN SERPL-MCNC: 14 MG/DL (ref 6–20)
BUN/CREAT SERPL: 14 (ref 12–20)
CALCIUM SERPL-MCNC: 7.7 MG/DL (ref 8.5–10.1)
CHLORIDE SERPL-SCNC: 110 MMOL/L (ref 97–108)
CO2 SERPL-SCNC: 22 MMOL/L (ref 21–32)
CREAT SERPL-MCNC: 1.03 MG/DL (ref 0.7–1.3)
DIFFERENTIAL METHOD BLD: ABNORMAL
EOSINOPHIL # BLD: 0.33 K/UL (ref 0–0.4)
EOSINOPHIL NFR BLD: 1.9 % (ref 0–7)
ERYTHROCYTE [DISTWIDTH] IN BLOOD BY AUTOMATED COUNT: 14.6 % (ref 11.5–14.5)
GLUCOSE SERPL-MCNC: 95 MG/DL (ref 65–100)
HCT VFR BLD AUTO: 33.1 % (ref 36.6–50.3)
HGB BLD-MCNC: 10.7 G/DL (ref 12.1–17)
IMM GRANULOCYTES # BLD AUTO: 0.09 K/UL (ref 0–0.04)
IMM GRANULOCYTES NFR BLD AUTO: 0.5 % (ref 0–0.5)
LYMPHOCYTES # BLD: 2.14 K/UL (ref 0.8–3.5)
LYMPHOCYTES NFR BLD: 12.6 % (ref 12–49)
MCH RBC QN AUTO: 29.8 PG (ref 26–34)
MCHC RBC AUTO-ENTMCNC: 32.3 G/DL (ref 30–36.5)
MCV RBC AUTO: 92.2 FL (ref 80–99)
MONOCYTES # BLD: 1.36 K/UL (ref 0–1)
MONOCYTES NFR BLD: 8 % (ref 5–13)
NEUTS SEG # BLD: 12.96 K/UL (ref 1.8–8)
NEUTS SEG NFR BLD: 76.6 % (ref 32–75)
NRBC # BLD: 0 K/UL (ref 0–0.01)
NRBC BLD-RTO: 0 PER 100 WBC
PLATELET # BLD AUTO: 325 K/UL (ref 150–400)
PMV BLD AUTO: 8.2 FL (ref 8.9–12.9)
POTASSIUM SERPL-SCNC: 3.4 MMOL/L (ref 3.5–5.1)
RBC # BLD AUTO: 3.59 M/UL (ref 4.1–5.7)
SODIUM SERPL-SCNC: 138 MMOL/L (ref 136–145)
WBC # BLD AUTO: 17 K/UL (ref 4.1–11.1)

## 2025-05-20 PROCEDURE — 6370000000 HC RX 637 (ALT 250 FOR IP): Performed by: HOSPITALIST

## 2025-05-20 PROCEDURE — 2500000003 HC RX 250 WO HCPCS: Performed by: STUDENT IN AN ORGANIZED HEALTH CARE EDUCATION/TRAINING PROGRAM

## 2025-05-20 PROCEDURE — 6370000000 HC RX 637 (ALT 250 FOR IP): Performed by: STUDENT IN AN ORGANIZED HEALTH CARE EDUCATION/TRAINING PROGRAM

## 2025-05-20 PROCEDURE — 80048 BASIC METABOLIC PNL TOTAL CA: CPT

## 2025-05-20 PROCEDURE — 94640 AIRWAY INHALATION TREATMENT: CPT

## 2025-05-20 PROCEDURE — 1100000000 HC RM PRIVATE

## 2025-05-20 PROCEDURE — 36415 COLL VENOUS BLD VENIPUNCTURE: CPT

## 2025-05-20 PROCEDURE — 85025 COMPLETE CBC W/AUTO DIFF WBC: CPT

## 2025-05-20 PROCEDURE — 6360000002 HC RX W HCPCS: Performed by: STUDENT IN AN ORGANIZED HEALTH CARE EDUCATION/TRAINING PROGRAM

## 2025-05-20 PROCEDURE — 2700000000 HC OXYGEN THERAPY PER DAY

## 2025-05-20 RX ORDER — POTASSIUM CHLORIDE 1500 MG/1
40 TABLET, EXTENDED RELEASE ORAL ONCE
Status: COMPLETED | OUTPATIENT
Start: 2025-05-20 | End: 2025-05-20

## 2025-05-20 RX ADMIN — ALBUTEROL SULFATE 2.5 MG: 2.5 SOLUTION RESPIRATORY (INHALATION) at 05:58

## 2025-05-20 RX ADMIN — POTASSIUM & SODIUM PHOSPHATES POWDER PACK 280-160-250 MG 250 MG: 280-160-250 PACK at 16:59

## 2025-05-20 RX ADMIN — POTASSIUM & SODIUM PHOSPHATES POWDER PACK 280-160-250 MG 250 MG: 280-160-250 PACK at 20:38

## 2025-05-20 RX ADMIN — SODIUM CHLORIDE, PRESERVATIVE FREE 10 ML: 5 INJECTION INTRAVENOUS at 20:40

## 2025-05-20 RX ADMIN — ARFORMOTEROL TARTRATE 15 MCG: 15 SOLUTION RESPIRATORY (INHALATION) at 07:15

## 2025-05-20 RX ADMIN — POTASSIUM & SODIUM PHOSPHATES POWDER PACK 280-160-250 MG 250 MG: 280-160-250 PACK at 09:16

## 2025-05-20 RX ADMIN — IPRATROPIUM BROMIDE 0.5 MG: 0.5 SOLUTION RESPIRATORY (INHALATION) at 20:55

## 2025-05-20 RX ADMIN — DOXYCYCLINE HYCLATE 100 MG: 100 TABLET, COATED ORAL at 20:38

## 2025-05-20 RX ADMIN — BUDESONIDE 500 MCG: 0.5 INHALANT RESPIRATORY (INHALATION) at 07:15

## 2025-05-20 RX ADMIN — IPRATROPIUM BROMIDE 0.5 MG: 0.5 SOLUTION RESPIRATORY (INHALATION) at 07:21

## 2025-05-20 RX ADMIN — PANTOPRAZOLE SODIUM 40 MG: 40 TABLET, DELAYED RELEASE ORAL at 16:59

## 2025-05-20 RX ADMIN — Medication 100 MG: at 09:16

## 2025-05-20 RX ADMIN — SODIUM CHLORIDE, PRESERVATIVE FREE 10 ML: 5 INJECTION INTRAVENOUS at 09:16

## 2025-05-20 RX ADMIN — BUDESONIDE 500 MCG: 0.5 INHALANT RESPIRATORY (INHALATION) at 21:01

## 2025-05-20 RX ADMIN — TAMSULOSIN HYDROCHLORIDE 0.4 MG: 0.4 CAPSULE ORAL at 09:15

## 2025-05-20 RX ADMIN — DONEPEZIL HYDROCHLORIDE 5 MG: 5 TABLET, FILM COATED ORAL at 20:37

## 2025-05-20 RX ADMIN — FOLIC ACID 1 MG: 1 TABLET ORAL at 09:15

## 2025-05-20 RX ADMIN — ARFORMOTEROL TARTRATE 15 MCG: 15 SOLUTION RESPIRATORY (INHALATION) at 21:01

## 2025-05-20 RX ADMIN — POLYETHYLENE GLYCOL 3350 17 G: 17 POWDER, FOR SOLUTION ORAL at 20:37

## 2025-05-20 RX ADMIN — MONTELUKAST 10 MG: 10 TABLET, FILM COATED ORAL at 20:37

## 2025-05-20 RX ADMIN — SENNOSIDES AND DOCUSATE SODIUM 1 TABLET: 50; 8.6 TABLET ORAL at 09:16

## 2025-05-20 RX ADMIN — MIRTAZAPINE 30 MG: 15 TABLET, FILM COATED ORAL at 20:37

## 2025-05-20 RX ADMIN — IPRATROPIUM BROMIDE 0.5 MG: 0.5 SOLUTION RESPIRATORY (INHALATION) at 15:25

## 2025-05-20 RX ADMIN — PANTOPRAZOLE SODIUM 40 MG: 40 TABLET, DELAYED RELEASE ORAL at 09:15

## 2025-05-20 RX ADMIN — DOXYCYCLINE HYCLATE 100 MG: 100 TABLET, COATED ORAL at 09:17

## 2025-05-20 RX ADMIN — POTASSIUM CHLORIDE 40 MEQ: 1500 TABLET, EXTENDED RELEASE ORAL at 09:15

## 2025-05-20 RX ADMIN — POLYETHYLENE GLYCOL 3350 17 G: 17 POWDER, FOR SOLUTION ORAL at 09:16

## 2025-05-20 RX ADMIN — POTASSIUM & SODIUM PHOSPHATES POWDER PACK 280-160-250 MG 250 MG: 280-160-250 PACK at 13:47

## 2025-05-21 LAB
ANION GAP SERPL CALC-SCNC: 6 MMOL/L (ref 2–12)
BASOPHILS # BLD: 0.06 K/UL (ref 0–0.1)
BASOPHILS NFR BLD: 0.5 % (ref 0–1)
BUN SERPL-MCNC: 12 MG/DL (ref 6–20)
BUN/CREAT SERPL: 12 (ref 12–20)
CALCIUM SERPL-MCNC: 7.3 MG/DL (ref 8.5–10.1)
CHLORIDE SERPL-SCNC: 115 MMOL/L (ref 97–108)
CO2 SERPL-SCNC: 22 MMOL/L (ref 21–32)
CREAT SERPL-MCNC: 1.02 MG/DL (ref 0.7–1.3)
DIFFERENTIAL METHOD BLD: ABNORMAL
EOSINOPHIL # BLD: 0.24 K/UL (ref 0–0.4)
EOSINOPHIL NFR BLD: 2 % (ref 0–7)
ERYTHROCYTE [DISTWIDTH] IN BLOOD BY AUTOMATED COUNT: 14.4 % (ref 11.5–14.5)
GLUCOSE SERPL-MCNC: 87 MG/DL (ref 65–100)
HCT VFR BLD AUTO: 28.2 % (ref 36.6–50.3)
HGB BLD-MCNC: 9.2 G/DL (ref 12.1–17)
IMM GRANULOCYTES # BLD AUTO: 0.08 K/UL (ref 0–0.04)
IMM GRANULOCYTES NFR BLD AUTO: 0.7 % (ref 0–0.5)
LYMPHOCYTES # BLD: 1.84 K/UL (ref 0.8–3.5)
LYMPHOCYTES NFR BLD: 15.3 % (ref 12–49)
MCH RBC QN AUTO: 30 PG (ref 26–34)
MCHC RBC AUTO-ENTMCNC: 32.6 G/DL (ref 30–36.5)
MCV RBC AUTO: 91.9 FL (ref 80–99)
MONOCYTES # BLD: 0.91 K/UL (ref 0–1)
MONOCYTES NFR BLD: 7.5 % (ref 5–13)
NEUTS SEG # BLD: 8.93 K/UL (ref 1.8–8)
NEUTS SEG NFR BLD: 74 % (ref 32–75)
NRBC # BLD: 0 K/UL (ref 0–0.01)
NRBC BLD-RTO: 0 PER 100 WBC
PLATELET # BLD AUTO: 287 K/UL (ref 150–400)
PMV BLD AUTO: 8.3 FL (ref 8.9–12.9)
POTASSIUM SERPL-SCNC: 3.7 MMOL/L (ref 3.5–5.1)
RBC # BLD AUTO: 3.07 M/UL (ref 4.1–5.7)
SODIUM SERPL-SCNC: 143 MMOL/L (ref 136–145)
WBC # BLD AUTO: 12.1 K/UL (ref 4.1–11.1)

## 2025-05-21 PROCEDURE — 36415 COLL VENOUS BLD VENIPUNCTURE: CPT

## 2025-05-21 PROCEDURE — 97530 THERAPEUTIC ACTIVITIES: CPT

## 2025-05-21 PROCEDURE — 85025 COMPLETE CBC W/AUTO DIFF WBC: CPT

## 2025-05-21 PROCEDURE — 94640 AIRWAY INHALATION TREATMENT: CPT

## 2025-05-21 PROCEDURE — 1100000000 HC RM PRIVATE

## 2025-05-21 PROCEDURE — 6360000002 HC RX W HCPCS: Performed by: STUDENT IN AN ORGANIZED HEALTH CARE EDUCATION/TRAINING PROGRAM

## 2025-05-21 PROCEDURE — 6370000000 HC RX 637 (ALT 250 FOR IP): Performed by: STUDENT IN AN ORGANIZED HEALTH CARE EDUCATION/TRAINING PROGRAM

## 2025-05-21 PROCEDURE — 2500000003 HC RX 250 WO HCPCS: Performed by: STUDENT IN AN ORGANIZED HEALTH CARE EDUCATION/TRAINING PROGRAM

## 2025-05-21 PROCEDURE — 2700000000 HC OXYGEN THERAPY PER DAY

## 2025-05-21 PROCEDURE — 97116 GAIT TRAINING THERAPY: CPT

## 2025-05-21 PROCEDURE — 6370000000 HC RX 637 (ALT 250 FOR IP): Performed by: HOSPITALIST

## 2025-05-21 PROCEDURE — 80048 BASIC METABOLIC PNL TOTAL CA: CPT

## 2025-05-21 PROCEDURE — 97535 SELF CARE MNGMENT TRAINING: CPT

## 2025-05-21 RX ADMIN — SODIUM CHLORIDE, PRESERVATIVE FREE 10 ML: 5 INJECTION INTRAVENOUS at 09:36

## 2025-05-21 RX ADMIN — POTASSIUM & SODIUM PHOSPHATES POWDER PACK 280-160-250 MG 250 MG: 280-160-250 PACK at 09:35

## 2025-05-21 RX ADMIN — DOXYCYCLINE HYCLATE 100 MG: 100 TABLET, COATED ORAL at 21:07

## 2025-05-21 RX ADMIN — MONTELUKAST 10 MG: 10 TABLET, FILM COATED ORAL at 21:07

## 2025-05-21 RX ADMIN — ARFORMOTEROL TARTRATE 15 MCG: 15 SOLUTION RESPIRATORY (INHALATION) at 07:47

## 2025-05-21 RX ADMIN — POLYETHYLENE GLYCOL 3350 17 G: 17 POWDER, FOR SOLUTION ORAL at 09:35

## 2025-05-21 RX ADMIN — FOLIC ACID 1 MG: 1 TABLET ORAL at 09:35

## 2025-05-21 RX ADMIN — Medication 100 MG: at 09:35

## 2025-05-21 RX ADMIN — SODIUM CHLORIDE, PRESERVATIVE FREE 10 ML: 5 INJECTION INTRAVENOUS at 21:08

## 2025-05-21 RX ADMIN — BUDESONIDE 500 MCG: 0.5 INHALANT RESPIRATORY (INHALATION) at 07:47

## 2025-05-21 RX ADMIN — TAMSULOSIN HYDROCHLORIDE 0.4 MG: 0.4 CAPSULE ORAL at 09:35

## 2025-05-21 RX ADMIN — SENNOSIDES AND DOCUSATE SODIUM 1 TABLET: 50; 8.6 TABLET ORAL at 09:35

## 2025-05-21 RX ADMIN — IPRATROPIUM BROMIDE 0.5 MG: 0.5 SOLUTION RESPIRATORY (INHALATION) at 20:27

## 2025-05-21 RX ADMIN — PANTOPRAZOLE SODIUM 40 MG: 40 TABLET, DELAYED RELEASE ORAL at 17:49

## 2025-05-21 RX ADMIN — MIRTAZAPINE 30 MG: 15 TABLET, FILM COATED ORAL at 21:07

## 2025-05-21 RX ADMIN — BUDESONIDE 500 MCG: 0.5 INHALANT RESPIRATORY (INHALATION) at 20:33

## 2025-05-21 RX ADMIN — DONEPEZIL HYDROCHLORIDE 5 MG: 5 TABLET, FILM COATED ORAL at 21:07

## 2025-05-21 RX ADMIN — IPRATROPIUM BROMIDE 0.5 MG: 0.5 SOLUTION RESPIRATORY (INHALATION) at 07:47

## 2025-05-21 RX ADMIN — ARFORMOTEROL TARTRATE 15 MCG: 15 SOLUTION RESPIRATORY (INHALATION) at 20:33

## 2025-05-21 RX ADMIN — IPRATROPIUM BROMIDE 0.5 MG: 0.5 SOLUTION RESPIRATORY (INHALATION) at 13:40

## 2025-05-21 RX ADMIN — DOXYCYCLINE HYCLATE 100 MG: 100 TABLET, COATED ORAL at 09:35

## 2025-05-21 RX ADMIN — PANTOPRAZOLE SODIUM 40 MG: 40 TABLET, DELAYED RELEASE ORAL at 06:30

## 2025-05-21 ASSESSMENT — PAIN SCALES - GENERAL
PAINLEVEL_OUTOF10: 0
PAINLEVEL_OUTOF10: 0

## 2025-05-22 LAB
ANION GAP SERPL CALC-SCNC: 7 MMOL/L (ref 2–12)
BASOPHILS # BLD: 0.07 K/UL (ref 0–0.1)
BASOPHILS NFR BLD: 0.6 % (ref 0–1)
BUN SERPL-MCNC: 13 MG/DL (ref 6–20)
BUN/CREAT SERPL: 14 (ref 12–20)
CALCIUM SERPL-MCNC: 7.6 MG/DL (ref 8.5–10.1)
CHLORIDE SERPL-SCNC: 109 MMOL/L (ref 97–108)
CO2 SERPL-SCNC: 20 MMOL/L (ref 21–32)
CREAT SERPL-MCNC: 0.94 MG/DL (ref 0.7–1.3)
DIFFERENTIAL METHOD BLD: ABNORMAL
EOSINOPHIL # BLD: 0.22 K/UL (ref 0–0.4)
EOSINOPHIL NFR BLD: 1.8 % (ref 0–7)
ERYTHROCYTE [DISTWIDTH] IN BLOOD BY AUTOMATED COUNT: 14 % (ref 11.5–14.5)
GLUCOSE SERPL-MCNC: 95 MG/DL (ref 65–100)
HCT VFR BLD AUTO: 29 % (ref 36.6–50.3)
HGB BLD-MCNC: 9.5 G/DL (ref 12.1–17)
IMM GRANULOCYTES # BLD AUTO: 0.09 K/UL (ref 0–0.04)
IMM GRANULOCYTES NFR BLD AUTO: 0.7 % (ref 0–0.5)
LYMPHOCYTES # BLD: 1.42 K/UL (ref 0.8–3.5)
LYMPHOCYTES NFR BLD: 11.7 % (ref 12–49)
MCH RBC QN AUTO: 30 PG (ref 26–34)
MCHC RBC AUTO-ENTMCNC: 32.8 G/DL (ref 30–36.5)
MCV RBC AUTO: 91.5 FL (ref 80–99)
MONOCYTES # BLD: 1.01 K/UL (ref 0–1)
MONOCYTES NFR BLD: 8.3 % (ref 5–13)
NEUTS SEG # BLD: 9.35 K/UL (ref 1.8–8)
NEUTS SEG NFR BLD: 76.9 % (ref 32–75)
NRBC # BLD: 0 K/UL (ref 0–0.01)
NRBC BLD-RTO: 0 PER 100 WBC
PLATELET # BLD AUTO: 333 K/UL (ref 150–400)
PMV BLD AUTO: 8.4 FL (ref 8.9–12.9)
POTASSIUM SERPL-SCNC: 3.6 MMOL/L (ref 3.5–5.1)
RBC # BLD AUTO: 3.17 M/UL (ref 4.1–5.7)
SODIUM SERPL-SCNC: 136 MMOL/L (ref 136–145)
WBC # BLD AUTO: 12.2 K/UL (ref 4.1–11.1)

## 2025-05-22 PROCEDURE — 97116 GAIT TRAINING THERAPY: CPT

## 2025-05-22 PROCEDURE — 97530 THERAPEUTIC ACTIVITIES: CPT

## 2025-05-22 PROCEDURE — 2500000003 HC RX 250 WO HCPCS: Performed by: STUDENT IN AN ORGANIZED HEALTH CARE EDUCATION/TRAINING PROGRAM

## 2025-05-22 PROCEDURE — 80048 BASIC METABOLIC PNL TOTAL CA: CPT

## 2025-05-22 PROCEDURE — 36415 COLL VENOUS BLD VENIPUNCTURE: CPT

## 2025-05-22 PROCEDURE — 85025 COMPLETE CBC W/AUTO DIFF WBC: CPT

## 2025-05-22 PROCEDURE — 6370000000 HC RX 637 (ALT 250 FOR IP): Performed by: STUDENT IN AN ORGANIZED HEALTH CARE EDUCATION/TRAINING PROGRAM

## 2025-05-22 PROCEDURE — 6370000000 HC RX 637 (ALT 250 FOR IP): Performed by: HOSPITALIST

## 2025-05-22 PROCEDURE — 94640 AIRWAY INHALATION TREATMENT: CPT

## 2025-05-22 PROCEDURE — 6360000002 HC RX W HCPCS: Performed by: STUDENT IN AN ORGANIZED HEALTH CARE EDUCATION/TRAINING PROGRAM

## 2025-05-22 PROCEDURE — 97535 SELF CARE MNGMENT TRAINING: CPT

## 2025-05-22 PROCEDURE — 1100000000 HC RM PRIVATE

## 2025-05-22 RX ORDER — FERROUS SULFATE 325(65) MG
325 TABLET ORAL
Status: DISCONTINUED | OUTPATIENT
Start: 2025-05-23 | End: 2025-05-23 | Stop reason: HOSPADM

## 2025-05-22 RX ADMIN — SODIUM CHLORIDE, PRESERVATIVE FREE 10 ML: 5 INJECTION INTRAVENOUS at 09:41

## 2025-05-22 RX ADMIN — TAMSULOSIN HYDROCHLORIDE 0.4 MG: 0.4 CAPSULE ORAL at 09:40

## 2025-05-22 RX ADMIN — IPRATROPIUM BROMIDE 0.5 MG: 0.5 SOLUTION RESPIRATORY (INHALATION) at 07:16

## 2025-05-22 RX ADMIN — FOLIC ACID 1 MG: 1 TABLET ORAL at 09:40

## 2025-05-22 RX ADMIN — IPRATROPIUM BROMIDE 0.5 MG: 0.5 SOLUTION RESPIRATORY (INHALATION) at 14:56

## 2025-05-22 RX ADMIN — DONEPEZIL HYDROCHLORIDE 5 MG: 5 TABLET, FILM COATED ORAL at 21:48

## 2025-05-22 RX ADMIN — MIRTAZAPINE 30 MG: 15 TABLET, FILM COATED ORAL at 21:48

## 2025-05-22 RX ADMIN — SODIUM CHLORIDE, PRESERVATIVE FREE 10 ML: 5 INJECTION INTRAVENOUS at 21:49

## 2025-05-22 RX ADMIN — PANTOPRAZOLE SODIUM 40 MG: 40 TABLET, DELAYED RELEASE ORAL at 06:01

## 2025-05-22 RX ADMIN — PANTOPRAZOLE SODIUM 40 MG: 40 TABLET, DELAYED RELEASE ORAL at 16:18

## 2025-05-22 RX ADMIN — ARFORMOTEROL TARTRATE 15 MCG: 15 SOLUTION RESPIRATORY (INHALATION) at 22:56

## 2025-05-22 RX ADMIN — DOXYCYCLINE HYCLATE 100 MG: 100 TABLET, COATED ORAL at 09:40

## 2025-05-22 RX ADMIN — DOXYCYCLINE HYCLATE 100 MG: 100 TABLET, COATED ORAL at 19:01

## 2025-05-22 RX ADMIN — ARFORMOTEROL TARTRATE 15 MCG: 15 SOLUTION RESPIRATORY (INHALATION) at 07:24

## 2025-05-22 RX ADMIN — BUDESONIDE 500 MCG: 0.5 INHALANT RESPIRATORY (INHALATION) at 22:56

## 2025-05-22 RX ADMIN — MONTELUKAST 10 MG: 10 TABLET, FILM COATED ORAL at 21:48

## 2025-05-22 RX ADMIN — ACETAMINOPHEN 650 MG: 325 TABLET ORAL at 04:09

## 2025-05-22 RX ADMIN — Medication 100 MG: at 09:40

## 2025-05-22 RX ADMIN — IPRATROPIUM BROMIDE 0.5 MG: 0.5 SOLUTION RESPIRATORY (INHALATION) at 22:51

## 2025-05-22 RX ADMIN — BUDESONIDE 500 MCG: 0.5 INHALANT RESPIRATORY (INHALATION) at 07:24

## 2025-05-22 ASSESSMENT — PAIN DESCRIPTION - LOCATION: LOCATION: HEAD

## 2025-05-22 ASSESSMENT — PAIN DESCRIPTION - DESCRIPTORS: DESCRIPTORS: ACHING

## 2025-05-22 ASSESSMENT — PAIN SCALES - GENERAL
PAINLEVEL_OUTOF10: 9
PAINLEVEL_OUTOF10: 0
PAINLEVEL_OUTOF10: 0

## 2025-05-23 VITALS
BODY MASS INDEX: 18.48 KG/M2 | OXYGEN SATURATION: 94 % | WEIGHT: 110.89 LBS | RESPIRATION RATE: 20 BRPM | TEMPERATURE: 98.4 F | HEIGHT: 65 IN | SYSTOLIC BLOOD PRESSURE: 125 MMHG | DIASTOLIC BLOOD PRESSURE: 70 MMHG | HEART RATE: 80 BPM

## 2025-05-23 PROCEDURE — 6360000002 HC RX W HCPCS: Performed by: STUDENT IN AN ORGANIZED HEALTH CARE EDUCATION/TRAINING PROGRAM

## 2025-05-23 PROCEDURE — 6370000000 HC RX 637 (ALT 250 FOR IP): Performed by: STUDENT IN AN ORGANIZED HEALTH CARE EDUCATION/TRAINING PROGRAM

## 2025-05-23 PROCEDURE — 94640 AIRWAY INHALATION TREATMENT: CPT

## 2025-05-23 PROCEDURE — 6370000000 HC RX 637 (ALT 250 FOR IP): Performed by: HOSPITALIST

## 2025-05-23 RX ORDER — DOXYCYCLINE HYCLATE 100 MG
100 TABLET ORAL EVERY 12 HOURS SCHEDULED
Qty: 2 TABLET | Refills: 0 | Status: ON HOLD | OUTPATIENT
Start: 2025-05-23 | End: 2025-05-24

## 2025-05-23 RX ORDER — FERROUS SULFATE 325(65) MG
325 TABLET ORAL
Qty: 30 TABLET | Refills: 3 | Status: ON HOLD | OUTPATIENT
Start: 2025-05-24

## 2025-05-23 RX ADMIN — IPRATROPIUM BROMIDE 0.5 MG: 0.5 SOLUTION RESPIRATORY (INHALATION) at 07:45

## 2025-05-23 RX ADMIN — FERROUS SULFATE TAB 325 MG (65 MG ELEMENTAL FE) 325 MG: 325 (65 FE) TAB at 09:41

## 2025-05-23 RX ADMIN — Medication 100 MG: at 09:41

## 2025-05-23 RX ADMIN — ARFORMOTEROL TARTRATE 15 MCG: 15 SOLUTION RESPIRATORY (INHALATION) at 07:41

## 2025-05-23 RX ADMIN — TAMSULOSIN HYDROCHLORIDE 0.4 MG: 0.4 CAPSULE ORAL at 09:41

## 2025-05-23 RX ADMIN — PANTOPRAZOLE SODIUM 40 MG: 40 TABLET, DELAYED RELEASE ORAL at 06:07

## 2025-05-23 RX ADMIN — BUDESONIDE 500 MCG: 0.5 INHALANT RESPIRATORY (INHALATION) at 07:41

## 2025-05-23 RX ADMIN — DOXYCYCLINE HYCLATE 100 MG: 100 TABLET, COATED ORAL at 06:07

## 2025-05-23 RX ADMIN — FOLIC ACID 1 MG: 1 TABLET ORAL at 09:41

## 2025-05-23 ASSESSMENT — PAIN SCALES - GENERAL: PAINLEVEL_OUTOF10: 0

## 2025-05-23 NOTE — DISCHARGE SUMMARY
patient is currently not bronchospastic  Continue breathing treatment.   Advised against thoracentesis at this time.  If worsening dyspnea MAY NEED Aspira drain placed   Continue inhalers      CHIRAG-serum creatinine 1.47 baseline 0.70  Normal anion gap metabolic acidosis due to CHIRAG  Hypokalemia  Post obstructive  Continue [place] for bladder decompression  Monitor renal indicis, IV fluids  5/18; Renal indicis improving.  Continue sodium bicarbonate drip  Repeat BMP in a.m.  5/19: resolved. Acidosis resolved  Potassium normalized     Orthostatic vitals positive for hypotension  Blood pressure stable     Ambulatory dysfunction  Failure to thrive nodules, BMI 18.45  PT/OT evaluation- advised for SNF - was denied      Chronic anemia  Monitor H&H  Hemoglobin stable     History of perforated peptic ulcer s/p laparoscopic Adarsh patch repair  Continue with PPI     Normocytic anemia  Monitor H&H  Iron studies with % sat 13 , iron - 25 , tibc - 186  Started oral iron      Hip pain  xray normal. No fracture     Hospital acquired delirium  Supportive care  Continue HOME MEDS :  with PTA donepezil, folic acid, mirtazapine, montelukast, tamsulosin, thiamine.    Discharge Exam:  Patient seen and examined by me on discharge day.  Pertinent Findings:  Patient Vitals for the past 24 hrs:   BP Temp Temp src Pulse Resp SpO2   05/23/25 0815 125/70 98.4 °F (36.9 °C) Oral 80 20 --   05/23/25 0814 -- -- -- 87 -- --   05/23/25 0740 -- -- -- -- -- 99 %   05/23/25 0420 -- -- -- -- 18 --   05/23/25 0419 (!) 147/86 97.8 °F (36.6 °C) Oral 85 23 98 %   05/22/25 2306 -- -- -- 84 24 --   05/22/25 2256 -- -- -- 89 22 --   05/22/25 2251 -- -- -- 93 25 --   05/22/25 2100 -- -- -- 63 21 99 %   05/22/25 2045 127/67 97.8 °F (36.6 °C) Oral 90 20 (!) 82 %   05/22/25 1549 132/80 97.5 °F (36.4 °C) Oral 94 18 94 %   05/22/25 1510 -- -- -- -- -- 99 %   05/22/25 1456 -- -- -- 87 21 100 %   05/22/25 1101 (!) 137/93 97.9 °F (36.6 °C) Oral 82 19 96 %

## 2025-05-23 NOTE — PROGRESS NOTES
Pulmonary, Critical Care, and Sleep Medicine~Progress Note    Name: Anuel Bermudez MRN: [id number]   : 1945 Hospital: [facility]   Date: 2025 8:58 AM Admission: 5/15/2025     Impression Plan   Chronic Hypoxic Respiratory Fx, on home 2L   Suspected COPD, not in acute exacerbation  Left pleural effusion with chronic left lower lobe collapse, prior thoracentesis 2024 with malignant cytology, similar in appearance to 2025 imaging.   Squamous Cell Carcinoma s/p laser to left endobronchial lung lesion ([facility]), chemo, immunotherapy with Keytruda, followed with Dr. Herrera @ [facility], seen in 2025 and hospice was recommended.   Hypokalemia rt multiple episodes of diarrhea  GLF, CT head negative   Acute Urinary retention, suspect related to UTI, s/p badillo O2 to maintain goal sat >90%  Do not think he would benefit from a Thoracentesis today. Fluid appears chronic. He reports his breathing feels stable.   Not complaining of acute dyspnea but could consider Aspira if worsening dyspnea. Does not seem to be needed a present.   Continue bronchodilators, discharge with home prescription for Breztri  OT/PT, mobilize as able  Replete K  Continue goals of care discussion     Will sign off. Please call with questions.        Daily Progression:   Pt seen this morning lying in bed. States his breathing is pretty close to baseline. Has occasional cough with scant mucus. On 2L NC.     25:   Pt has been feeling pretty good. Still some cough and dyspnea.   Has not had much sputum production.   Denies any chest pain, no back pain. Has some mild foot pain.   Denies fever, chills, sweats.   Slept ok last pm.  ON NC oxygen.       CONSULT NOTE:   Consulted by hospitalist, known to Dr. Fry. Presented to the ER with increased fatigue and weakness. Endorsed fall and multiple episodes of diarrhea. K 2.3. Head CT negative. CT abdomen with left pleural effusion with chronic left lower lobe 
      Hospitalist Progress Note    NAME:   Anuel Bermudez   : 1945   MRN: [id number]     Date/Time: 2025 4:01 PM  Patient PCP: Bonifacio Bassett MD    Estimated discharge date:  Barriers:       Assessment / Plan:  UTI this is based on large leukocyte esterase on urine.  Urine culture growing probable Staphylococcus species coagulase-negative greater than 100,000 colonies.  --Patient received Rocephin 1 g IV, we will continue with Rocephin both monitor closely due to allergies to penicillin  Urine culture growing Staphylococcus simulans  : Continue Rocephin 1 g IV daily for 5 days.  Follow-up with sensitivity reports  : urine cx: Stap simulans resist to cipro. We will switch Rocephin to doxycycline 100 mg PO BID x 5 days    Acute on chronic urinary retention;  CT of the abdomen with markedly distended bladder with bilateral hydronephrosis and hydroureter  -Will continue Badillo catheter  - Consult urology.  Had tamsulosin 0.4 mg p.o. daily  - Maintain badillo catheter for 10-14 days and then voiding trial. Outpatient follow up with urology    Constipation/obstipation with overflow diarrhea  Continue MiraLAX but changed to twice daily  Aggressive stool regimen  - Continue stool regimen.      Chronic hypoxic respiratory failure  Baseline home oxygen 2 L  Malignant left pleural effusion with chronic left lower lobe collapse, prior thoracocentesis 2024 with malignant cytology.  Imaging similar to prior.  Squamous cell carcinoma s/p laser to left endobronchial lung lesion, chemo, immunotherapy with Keytruda  COPD; patient is currently not bronchospastic  - Continue breathing treatment.  Pulmonary following  - Continue to wean off oxygen to room air    CHIRAG-serum creatinine 1.47 baseline 0.70  Normal anion gap metabolic acidosis due to CHIRAG  Post obstructive  Continue Badillo for bladder decompression  Monitor renal indicis, IV fluids  - ; Renal indicis improving.  Continue sodium bicarbonate 
      Hospitalist Progress Note    NAME:   Anuel Bermudez   : 1945   MRN: [id number]     Date/Time: 2025 5:27 PM  Patient PCP: Bonifacio Bassett MD    Estimated discharge date:  Barriers:       Assessment / Plan:  UTI this is based on large leukocyte esterase on urine.  Urine culture growing probable Staphylococcus species coagulase-negative greater than 100,000 colonies.  --Patient received Rocephin 1 g IV, we will continue with Rocephin both monitor closely due to allergies to penicillin  Urine culture growing Staphylococcus simulans  : Continue Rocephin 1 g IV daily for 5 days.  Follow-up with sensitivity reports  : urine cx: Stap simulans resist to cipro. We will switch Rocephin to doxycycline 100 mg PO BID x 5 days  : continue doxy 100 mg PO BID x 5 days    Acute on chronic urinary retention;  CT of the abdomen with markedly distended bladder with bilateral hydronephrosis and hydroureter  -Will continue Bdaillo catheter  - Consult urology.  Had tamsulosin 0.4 mg p.o. daily  - Maintain badillo catheter for 10-14 days and then voiding trial. Outpatient follow up with urology    Constipation/obstipation with overflow diarrhea  Continue MiraLAX but changed to twice daily  Aggressive stool regimen  - Continue stool regimen.    --reduce miralax to once daily. Having good bowel movement    Chronic hypoxic respiratory failure  Baseline home oxygen 2 L  Malignant left pleural effusion with chronic left lower lobe collapse, prior thoracocentesis 2024 with malignant cytology.  Imaging similar to prior.  Squamous cell carcinoma s/p laser to left endobronchial lung lesion, chemo, immunotherapy with Keytruda  COPD; patient is currently not bronchospastic  - Continue breathing treatment.  Pulmonary following  - Continue to wean off oxygen to room air    CHIRAG-serum creatinine 1.47 baseline 0.70  Normal anion gap metabolic acidosis due to CHIRAG  Post obstructive  Continue Badillo for bladder 
      Hospitalist Progress Note    NAME:   Anuel Bermudez   : 1945   MRN: [id number]     Date/Time: 2025 3:45 PM  Patient PCP: Bonifacio Bassett MD    Estimated discharge date:  Barriers:       Assessment / Plan:  UTI this is based on large leukocyte esterase on urine.  Urine culture growing probable Staphylococcus species coagulase-negative greater than 100,000 colonies.  --Patient received Rocephin 1 g IV, we will continue with Rocephin both monitor closely due to allergies to penicillin    Acute on chronic urinary retention;  CT of the abdomen with markedly distended bladder with bilateral hydronephrosis and hydroureter  -Will continue Laguna catheter    Constipation/obstipation with overflow diarrhea  Continue MiraLAX but changed to twice daily  Aggressive stool regimen    Chronic hypoxic respiratory failure  Baseline home oxygen 2 L  Malignant left pleural effusion with chronic left lower lobe collapse, prior thoracocentesis 2024 with malignant cytology.  Imaging similar to prior.  Squamous cell carcinoma s/p laser to left endobronchial lung lesion, chemo, immunotherapy with Keytruda  COPD; patient is currently not bronchospastic  - Continue breathing treatment.  Pulmonary following      CHIRAG-serum creatinine 1.47 baseline 0.70  Post obstructive  Continue Laguna for bladder decompression  Monitor renal indicis, IV fluids    Normal anion gap metabolic acidosis due to CHIRAG  Start sodium bicarbonate IV fluid hydration       Orthostatic vitals positive for hypotension  -Continue with IV fluid.     Ambulatory dysfunction  Failure to thrive nodules, BMI 18.45  PT/OT evaluation     Chronic anemia  Monitor H&H     History of perforated peptic ulcer s/p laparoscopic Adarsh patch repair  -Continue with PPI     Continue with PTA donepezil, folic acid, mirtazapine, montelukast, tamsulosin, thiamine.      Medical Decision Making:   I personally reviewed labs:  I personally reviewed imaging:  I personally 
      Hospitalist Progress Note    NAME:   Anuel Bermudez   : 1945   MRN: [id number]     Date/Time: 2025 9:05 AM  Patient PCP: Bonifacio Bassett MD    Estimated discharge date:  Barriers: NEEDS SNF     Assessment / Plan:  UTI   Patient admitted to the stepdown unit with telemetry  Urine culture growing probable Staphylococcus species coagulase-negative greater than 100,000 colonies.  --Patient received Rocephin 1 g IV, we will continue with Rocephin both monitor closely due to allergies to penicillin  Urine culture growing Staphylococcus simulans  : Continue Rocephin 1 g IV daily for 5 days.  Follow-up with sensitivity reports  : urine cx: Stap simulans resist to cipro. We will switch Rocephin to doxycycline 100 mg PO BID x 5 days  : continue doxy 100 mg PO BID x 5 days  Waiting for SNF    Acute on chronic urinary retention  CT of the abdomen done on 5/15/2025 with markedly distended bladder with bilateral hydronephrosis and hydroureter  Will continue Badillo catheter  Consult urology.  Had tamsulosin 0.4 mg p.o. daily  Maintain badillo catheter for 10-14 days and then voiding trial. Outpatient follow up with urology    Constipation/obstipation with overflow diarrhea  Continue MiraLAX but changed to twice daily  Aggressive stool regimen  Continue stool regimen- reduced miralax to once daily. Having good bowel movement    Chronic hypoxic respiratory failure  Baseline home oxygen 2 L  Malignant left pleural effusion with chronic left lower lobe collapse, prior thoracocentesis 2024 with malignant cytology.  Imaging similar to prior.  Squamous cell carcinoma s/p laser to left endobronchial lung lesion, chemo, immunotherapy with Keytruda  COPD; patient is currently not bronchospastic  Continue breathing treatment.   Continue to wean off oxygen to room air  Advised against thoracentesis at this time.  If worsening dyspnea MAY NEED Aspira drain placed   Continue inhalers     CHIRAG-serum 
   05/16/25 1211 05/16/25 1214 05/16/25 1218   Vital Signs   Pulse 90 98  (Simultaneous filing. User may not have seen previous data.) 97   Heart Rate Source Monitor Monitor Monitor   BP (!) 112/92 (!) 106/47 (!) 97/49   MAP (Calculated) 99 67 65   BP Location Left upper arm Left upper arm Left upper arm   BP Method Automatic Automatic Automatic   Patient Position Semi fowlers Sitting Sitting  (sitting after standing attempt)   RASS   Vazquez Agitation Sedation Scale (RASS)  --  0  --    Oxygen Therapy   SpO2 96 % 95 %  --    O2 Device Nasal cannula Nasal cannula  --    O2 Flow Rate (L/min) 2 L/min 2 L/min  --       05/16/25 1224   Vital Signs   Pulse 85   Heart Rate Source Monitor   BP (!) 109/57   MAP (Calculated) 74   BP Location Left upper arm   BP Method Automatic   Patient Position Supine   RASS   Vazquez Agitation Sedation Scale (RASS)  --    Oxygen Therapy   SpO2  --    O2 Device  --    O2 Flow Rate (L/min)  --        
  Physician Progress Note      PATIENT:               SISI SILVER  [facility] #:                  [id number]  :                       1945  ADMIT DATE:       5/15/2025 7:13 PM  DISCH DATE:  RESPONDING  PROVIDER #:        Joel Osborne MD          QUERY TEXT:    UTI is documented in the medical record by H&P by Carlita Galaviz MD   2025.  Please clarify any associated diagnoses:    The clinical indicators include:  05/15/25 19:47 WBC: 13.4 (H)  25 09:39 WBC: 13.0 (H)  25 02:00 WBC: 14.7 (H)  25 06:19 WBC: 12.1 (H)  > H&P by Carlita Galaviz MD- UTI  > Culture, Urine-Staphyloc  >  HR 99, RR 32  Options provided:  -- Sepsis, present on admission  -- Sepsis, present on admission, now resolved  -- Sepsis developed following admission  -- Localized infection without sepsis  -- Other - I will add my own diagnosis  -- Disagree - Not applicable / Not valid  -- Disagree - Clinically unable to determine / Unknown  -- Refer to Clinical Documentation Reviewer    PROVIDER RESPONSE TEXT:    This patient has sepsis which was present on admission.    Query created by: Nya Khan on 2025 12:26 PM      QUERY TEXT:    Please clarify the patient?s nutritional status:    The clinical indicators include:  > H&P by Carlita Galaviz MD2025- \"Ambulatory dysfunction  Failure to thrive nodules, BMI 18.45\"  \"PT/OT evaluation\"  Options provided:  -- Protein calorie malnutrition mild  -- Protein calorie malnutrition moderate  -- Protein calorie malnutrition severe  -- Underweight with BMI 18.45 kg/m?  -- Cachexia  -- Abnormal weight loss  -- Other - I will add my own diagnosis  -- Disagree - Not applicable / Not valid  -- Disagree - Clinically unable to determine / Unknown  -- Refer to Clinical Documentation Reviewer    PROVIDER RESPONSE TEXT:    This patient has mild protein calorie malnutrition.    Query created by: Nya Khan on 2025 12:49 PM      Electronically signed by:  
 Hospital follow-up PCP transitional care appointment has been scheduled with Dr. Bonifacio Bassett on 5/29/25 at 1430 . This is the first available appt due to limited provider availability. PCP office does not offer alternate provider option for hospital follow up. Pending patient discharge. Lisa Mercer, Care Management Assistant   
0700: Bedside and Verbal shift change report given to Josi Rodriguez RN   (oncoming nurse) by JOSE DE JESUS Sesay (offgoing nurse). Report included the following information Nurse Handoff Report, Index, Adult Overview, MAR, and Cardiac Rhythm NSR .     1055: Bedside and Verbal shift change report given to JOSE DE JESUS Nunez (oncoming nurse) by Josi Rodriguez RN (offgoing nurse). Report included the following information Nurse Handoff Report, Index, Intake/Output, and Cardiac Rhythm NSR .     
0700: Bedside and Verbal shift change report given to Lisa Zamarripa RN (oncoming nurse) by JOSE DE JESUS Harris (offgoing nurse). Report included the following information Nurse Handoff Report, Adult Overview, Intake/Output, MAR, and Recent Results.     1900: Bedside and Verbal shift change report given to JOSE DE JESUS Sesay (oncoming nurse) by Lisa Zamarripa RN (offgoing nurse). Report included the following information Nurse Handoff Report, Adult Overview, Intake/Output, MAR, and Recent Results.     
0700: Bedside and Verbal shift change report given to Lisa Zamarripa RN (oncoming nurse) by JOSE DE JESUS Sesay (offgoing nurse). Report included the following information Nurse Handoff Report, Adult Overview, Intake/Output, MAR, and Recent Results.     1900: Bedside and Verbal shift change report given to JOSE DE JESUS Sesay (oncoming nurse) by Lisa Zamarripa RN (offgoing nurse). Report included the following information Nurse Handoff Report, Adult Overview, Intake/Output, MAR, and Recent Results.     
0700: Bedside shift change report given to JOSE DE JESUS Agudelo (oncoming nurse) by JOSE DE JESUS Sesay (offgoing nurse). Report included the following information Nurse Handoff Report and Index.     
0715: Bedside and Verbal shift change report given to Taylor Garcia RN & Jordan RN (oncoming nurse) by JOSE DE JESUS Xavier (offgoing nurse). Report included the following information Nurse Handoff Report, Index, ED Encounter Summary, Intake/Output, MAR, and Recent Results.    
0735: Bedside and Verbal shift change report given to Taylor Garcia RN & Jordan RN (oncoming nurse) by JOSE DE JESUS Xavier (offgoing nurse). Report included the following information Nurse Handoff Report, Index, ED Encounter Summary, Intake/Output, MAR, Recent Results, and Cardiac Rhythm NSR .    
1900 Bedside and Verbal shift change report given to Jaswinder DELA CRUZ (oncoming nurse) by RN (offgoing nurse). Report included the following information Nurse Handoff Report, MAR, Recent Results, and Cardiac Rhythm NSR/S Rosales.     0700 Bedside shift change report given to RN (oncoming nurse) by Jaswinder Blanco RN (offgoing nurse).  Report included the following information SBAR, MAR, Recent Results, and Cardiac Rhythm NSR/S Rosales  
ADULT PROTOCOL: JET AEROSOL ASSESSMENT    Patient  Anuel Bermudez     79 y.o.   male     5/17/2025  3:52 AM    Breath Sounds Pre Procedure: Breath Sounds Pre-Tx JOSELITO: Clear, Diminished                                  Breath Sounds Pre-Tx LLL: Diminished        Breath Sounds Pre-Tx RUL: Clear, Diminished        Breath Sounds Pre-Tx RML: Clear, Diminished        Breath Sounds Pre-Tx RLL: Diminished  Breath Sounds Post Procedure: Breath Sounds Post-Tx JOSELITO: Clear, Diminished          Breath Sounds Post-Tx LLL: Diminished          Breath Sounds Post-Tx RUL: Clear, Diminished          Breath Sounds Post-Tx RML: Clear, Diminished          Breath Sounds Post-Tx RLL: Diminished                                       Heart Rate: Pre procedure Pre-Tx Pulse: 86           Post procedure Post-Tx Pulse: 94    Resp Rate: Pre procedure Pre-Tx Resps: 21           Post procedure Post-Tx Resps: 24    Peak Flow: Pre bronchodilator             Post bronchodilator           Oxygen: O2 Therapy: Oxygen   nasal cannula 2L at home.      Changed: No    SpO2:  SpO2: 92 %   with Oxygen                Nebulizer Therapy: Current medications Medications: Ipratropium Bromide      Changed: Yes        Problem List:   Patient Active Problem List   Diagnosis    Collapse of left lung    Pneumoperitoneum    Bowel perforation (HCC)    Goals of care, counseling/discussion    DNR (do not resuscitate) discussion    Frailty    Severe protein-calorie malnutrition    Pain at surgical site    Advance care planning    Urinary retention    Hypokalemia       Respiratory Therapist: Brittany Michelle Ormond, RCP    
Bedside and Verbal shift change report given to JOSE DE JESUS Ortega (oncoming nurse) by JOSE DE JESUS Xavier (offgoing nurse). Report included the following information Nurse Handoff Report, Index, Adult Overview, Intake/Output, MAR, Recent Results, and Cardiac Rhythm NSR .      End of Shift Note    Bedside shift change report given to JOSE DE JESUS Tavera (oncoming nurse) by Jordan Swenson RN (offgoing nurse).  Report included the following information SBAR, Kardex, Intake/Output, MAR, Recent Results, and Cardiac Rhythm NSR    Shift worked:  8046-7165     Shift summary and any significant changes:     3 large soft bowel movements,      Concerns for physician to address:      Zone phone for oncoming shift:          Activity:  Level of Assistance: Minimal assist, patient does 75% or more  Number times ambulated in hallways past shift: 0  Number of times OOB to chair past shift: 2    Cardiac:   Cardiac Monitoring: Yes      Cardiac Rhythm: Sinus rhythm    Access:  Current line(s): PIV     Genitourinary:   Urinary Status: Draining, Laguna    Respiratory:   O2 Device: Nasal cannula  Chronic home O2 use?: NO  Incentive spirometer at bedside: NO    GI:  Last BM (including prior to admit): 05/19/25  Current diet:  ADULT DIET; Regular  DIET ONE TIME MESSAGE;  Passing flatus: YES    Pain Management:   Patient states pain is manageable on current regimen: YES    Skin:  Eric Scale Score: 18  Interventions: Wound Offloading (Prevention Methods): Pillows, Repositioning    Patient Safety:  Fall Risk: Nursing Judgement-Fall Risk High(Add Comments): Yes  Fall Risk Interventions  Nursing Judgement-Fall Risk High(Add Comments): Yes  Toilet Every 2 Hours-In Advance of Need: Yes  Hourly Visual Checks: Awake, In bed  Fall Visual Posted: Armband, Socks, Fall sign posted  Room Door Open: Yes  Alarm On: Bed  Patient Moved Closer to Nursing Station: No    Active Consults:   IP CONSULT TO PULMONOLOGY  IP CONSULT TO CASE MANAGEMENT  IP CONSULT TO UROLOGY  IP CONSULT TO 
Bedside and Verbal shift change report given to Jordan Swenson RN (oncoming nurse) by JOSE DE JESUS Xavier (offgoing nurse). Report included the following information Nurse Handoff Report, Index, Adult Overview, Intake/Output, MAR, Recent Results, and Cardiac Rhythm NSR .      End of Shift Note    Bedside shift change report given to JOSE DE JESUS Harris (oncoming nurse) by Jordan Swenson RN (offgoing nurse).  Report included the following information SBAR, Kardex, Intake/Output, MAR, Recent Results, and Cardiac Rhythm NSR    Shift worked:  6951-6983     Shift summary and any significant changes:     Three BM's, VSS, Downgraded to acute level of care     Concerns for physician to address:       Zone phone for oncoming shift:          Activity:  Level of Assistance: Moderate assist, patient does 50-74%  Number times ambulated in hallways past shift: 0  Number of times OOB to chair past shift: 3    Cardiac:   Cardiac Monitoring: Yes      Cardiac Rhythm: Sinus rhythm, Sinus tachy    Access:  Current line(s): PIV     Genitourinary:   Urinary Status: Draining, Laguna    Respiratory:   O2 Device: Nasal cannula  Chronic home O2 use?: YES  Incentive spirometer at bedside: NO    GI:  Last BM (including prior to admit): 05/19/25  Current diet:  ADULT DIET; Regular  DIET ONE TIME MESSAGE;  Passing flatus: YES    Pain Management:   Patient states pain is manageable on current regimen: YES    Skin:  Eric Scale Score: 17  Interventions: Wound Offloading (Prevention Methods): Pillows, Repositioning, Elevate heels    Patient Safety:  Fall Risk: Nursing Judgement-Fall Risk High(Add Comments): Yes  Fall Risk Interventions  Nursing Judgement-Fall Risk High(Add Comments): Yes  Toilet Every 2 Hours-In Advance of Need: Yes  Hourly Visual Checks: Awake, In chair  Fall Visual Posted: Armband, Socks, Fall sign posted  Room Door Open: Deferred to promote rest  Alarm On: Bed, Chair  Patient Moved Closer to Nursing Station: No    Active Consults:   IP CONSULT TO 
End of Shift Note    Bedside shift change report given to Morenita DELA CRUZ (oncoming nurse) by Bronwyn Wiley RN (offgoing nurse).  Report included the following information SBAR, ED Summary, MAR, Recent Results, and Cardiac Rhythm NSR/tach    Shift worked:  0204-6927     Shift summary and any significant changes:     Urology re consulted for hydronephrosis. No other significant changes.     Concerns for physician to address:  none     Zone phone for oncoming shift:   1810       
End of Shift Note    Bedside shift change report given to Taylor (oncoming nurse) by Damendy Saintelus, RN (offgoing nurse).  Report included the following information SBAR, Kardex, Intake/Output, MAR, Recent Results, and Cardiac Rhythm Sinus rythm    Shift worked:  2378-8275     Shift summary and any significant changes:     No acute events overnight.      Concerns for physician to address:  N/A     Zone phone for oncoming shift:   1810         Activity:  Level of Assistance: Minimal assist, patient does 75% or more  Number times ambulated in hallways past shift: 0  Number of times OOB to chair past shift: 0    Cardiac:   Cardiac Monitoring: Yes      Cardiac Rhythm: Sinus rhythm    Access:  Current line(s): PIV     Genitourinary:   Urinary Status: Laguna, Draining    Respiratory:   O2 Device: Nasal cannula  Chronic home O2 use?: YES  Incentive spirometer at bedside: NO    GI:  Last BM (including prior to admit): 05/18/25  Current diet:  ADULT DIET; Regular  DIET ONE TIME MESSAGE;  Passing flatus: YES    Pain Management:   Patient states pain is manageable on current regimen: YES    Skin:  Eric Scale Score: 18  Interventions: Wound Offloading (Prevention Methods): Bed, pressure reduction mattress, Elevate heels, Repositioning, Turning    Patient Safety:  Fall Risk: Nursing Judgement-Fall Risk High(Add Comments): Yes  Fall Risk Interventions  Nursing Judgement-Fall Risk High(Add Comments): Yes  Toilet Every 2 Hours-In Advance of Need: Yes  Hourly Visual Checks: Awake, In bed  Fall Visual Posted: Armband, Socks, Fall sign posted  Room Door Open: Yes  Alarm On: Bed  Patient Moved Closer to Nursing Station: No    Active Consults:   IP CONSULT TO PULMONOLOGY  IP CONSULT TO CASE MANAGEMENT  IP CONSULT TO UROLOGY  IP CONSULT TO CASE MANAGEMENT  IP CONSULT TO UROLOGY    Length of Stay:  Expected LOS: 4  Actual LOS: 3    Damendy Saintelus, RN                            
End of Shift Note    Bedside shift change report given to Taylor (oncoming nurse) by Damendy Saintelus, RN (offgoing nurse).  Report included the following information SBAR, Kardex, Intake/Output, MAR, Recent Results, and Cardiac Rhythm sinus rhythm, sinus tachy.     Shift worked:  2678-8368     Shift summary and any significant changes:     No acute events overnight, VSS, oxycodone PRN added to pain management regimen.      Concerns for physician to address:       Zone phone for oncoming shift:          Activity:  Level of Assistance: Moderate assist, patient does 50-74%  Number times ambulated in hallways past shift: 0  Number of times OOB to chair past shift: 1    Cardiac:   Cardiac Monitoring: Yes      Cardiac Rhythm: Sinus tachy    Access:  Current line(s): PIV     Genitourinary:   Urinary Status: Laguna    Respiratory:   O2 Device: Nasal cannula  Chronic home O2 use?: YES  Incentive spirometer at bedside: NO    GI:  Last BM (including prior to admit): 05/19/25  Current diet:  ADULT DIET; Regular  DIET ONE TIME MESSAGE;  Passing flatus: YES    Pain Management:   Patient states pain is manageable on current regimen: YES    Skin:  Eric Scale Score: 18  Interventions: Wound Offloading (Prevention Methods): Bed, pressure reduction mattress, Elevate heels, Pillows, Turning    Patient Safety:  Fall Risk: Nursing Judgement-Fall Risk High(Add Comments): Yes  Fall Risk Interventions  Nursing Judgement-Fall Risk High(Add Comments): Yes  Toilet Every 2 Hours-In Advance of Need: Yes  Hourly Visual Checks: Awake, In bed  Fall Visual Posted: Armband, Socks, Fall sign posted  Room Door Open: Yes  Alarm On: Bed  Patient Moved Closer to Nursing Station: No    Active Consults:   IP CONSULT TO PULMONOLOGY  IP CONSULT TO CASE MANAGEMENT  IP CONSULT TO UROLOGY  IP CONSULT TO CASE MANAGEMENT  IP CONSULT TO UROLOGY    Length of Stay:  Expected LOS: 5  Actual LOS: 4    Damendy Saintelus, RN                            
I have reviewed and am in agreement with the assessment and documentation as performed by my orientee, JOSE DE JESUS Ortega. Please refer to Jordan's progress note for update on pt's daily events.    
I have reviewed and am in agreement with the assessment and documentation as performed by my orientee, JOSE DE JESUS Ortega. Please refer to Jordan's progress note for update on pt's daily events.    
I have reviewed discharge instructions with the patient. The patient verbalized understanding. Discharge medications reviewed with patient and appropriate educational materials and side effects teaching were provided. Follow-up appointments reviewed. Opportunity for questions and clarification was provided.  Venous access removed without difficulty.  Patient's belongings gathered and sent with patient. Patient is ready for discharge. Patient transferred by [facility] via stretcher. Daughter (Deja Bowden) notified at 13:00    hPyllis De Leon RN   
Mr. Bermudez had two episodes of diarrhea today and sat up in his chair for several hours.  After moving back to the bed, he slept for several hours.  
Patient has been having frequent BM which have been soft formed, but patient has also been having intermittent pain/cramping in which he yells out or grunting out in pain. Patient has history of constipation/obstipation in which he states has been going on  for a while at home.  Patient states the doctors suggested he take miralax BID in which has been doing.  The pain is unbearable for this patient.  Notified the hospitalist\"s NP on call.   
CONSULT TO CASE MANAGEMENT    Length of Stay:  Expected LOS: 8  Actual LOS: 7    Wes Díaz RN                            
dyspnea MAY NEED Aspira drain placed   Continue inhalers     CHIRAG-serum creatinine 1.47 baseline 0.70  Normal anion gap metabolic acidosis due to CHIRAG  Hypokalemia  Post obstructive  Continue Laguna for bladder decompression  Monitor renal indicis, IV fluids  5/18; Renal indicis improving.  Continue sodium bicarbonate drip  Repeat BMP in a.m.  5/19: resolved. Acidosis resolved  Potassium normalized    Orthostatic vitals positive for hypotension  Blood pressure stable     Ambulatory dysfunction  Failure to thrive nodules, BMI 18.45  PT/OT evaluation- advised for SNF      Chronic anemia  Monitor H&H  Hemoglobin stable     History of perforated peptic ulcer s/p laparoscopic Adarsh patch repair  Continue with PPI     Normocytic anemia  Monitor H&H  Iron studies with % sat 13 , iron - 25 , tibc - 186  Will start oral iron     Hip pain  xray normal. No fracture    Hospital acquired delirium  Supportive care  Continue HOME MEDS :  with PTA donepezil, folic acid, mirtazapine, montelukast, tamsulosin, thiamine.    Medical Decision Making:   I personally reviewed labs: CBC , BMP , iron studies   I personally reviewed imaging:none   I personally reviewed EKG:NONE   Toxic drug monitoring: NONE   Discussed case with: patient , rn       Code Status: Full  DVT Prophylaxis: Heparin  GI Prophylaxis:ppi     Subjective:     Chief Complaint / Reason for Physician Visit  \" Follow-up for CHIRAG\".      Present chart reviewed patient examined and overnight events noted.  Patient appeared to be comfortable in no apparent distress.    Objective:     VITALS:   Last 24hrs VS reviewed since prior progress note. Most recent are:  Patient Vitals for the past 24 hrs:   BP Temp Temp src Pulse Resp SpO2   05/22/25 0735 -- -- -- -- -- 97 %   05/22/25 0730 (!) 117/58 98.2 °F (36.8 °C) Oral 75 16 96 %   05/22/25 0716 -- -- -- (!) 17 (!) 99 --   05/22/25 0406 -- 98.2 °F (36.8 °C) Oral -- -- --   05/21/25 2300 108/69 98.6 °F (37 °C) Oral 85 26 99 %   05/21/25 
edema  GI:  Soft, Non distended, Non tender.  +Bowel sounds  Neurologic:  Alert and oriented X 3, normal speech,   Psych:   Good insight. Not anxious nor agitated  Skin:  No rashes.  No jaundice    Reviewed most current lab test results and cultures  YES  Reviewed most current radiology test results   YES  Review and summation of old records today    NO  Reviewed patient's current orders and MAR    YES  PMH/SH reviewed - no change compared to H&P    Procedures: see electronic medical records for all procedures/Xrays and details which were not copied into this note but were reviewed prior to creation of Plan.      LABS:  I reviewed today's most current labs and imaging studies.  Pertinent labs include:  Recent Labs     05/16/25  0939 05/17/25  0303 05/18/25  0200   WBC 13.0* 11.5* 14.7*   HGB 9.5* 9.9* 10.4*   HCT 28.5* 30.0* 31.3*    307 318     Recent Labs     05/15/25  1947 05/16/25  0939 05/17/25  0303 05/18/25  0200    139 140 140   K 2.3* 2.7* 4.1 2.7*    112* 115* 113*   CO2 19* 19* 15* 19*   GLUCOSE 134* 96 94 103*   BUN 34* 33* 33* 27*   CREATININE 1.80* 1.61* 1.47* 1.35*   CALCIUM 10.2* 9.4 8.6 8.1*   MG 1.8 1.9 1.9 1.7   PHOS  --  1.5* 2.5* 2.0*   BILITOT 0.3  --   --   --    AST 19  --   --   --    ALT 21  --   --   --        Signed: Jole Osborne MD

## 2025-05-23 NOTE — PLAN OF CARE
Problem: Discharge Planning  Goal: Discharge to home or other facility with appropriate resources  5/20/2025 1329 by Jordan Swenson RN  Outcome: Progressing  5/20/2025 0250 by Saintelus, Damendy, RN  Outcome: Progressing  Flowsheets (Taken 5/19/2025 1927)  Discharge to home or other facility with appropriate resources:   Identify barriers to discharge with patient and caregiver   Arrange for needed discharge resources and transportation as appropriate   Identify discharge learning needs (meds, wound care, etc)   Refer to discharge planning if patient needs post-hospital services based on physician order or complex needs related to functional status, cognitive ability or social support system     Problem: Pain  Goal: Verbalizes/displays adequate comfort level or baseline comfort level  5/20/2025 1329 by Jordan Swenson RN  Outcome: Progressing  Flowsheets (Taken 5/20/2025 0715 by Taylor Garcia, RN)  Verbalizes/displays adequate comfort level or baseline comfort level: Encourage patient to monitor pain and request assistance  5/20/2025 0250 by Saintelus, Damendy, RN  Outcome: Progressing     Problem: Skin/Tissue Integrity  Goal: Skin integrity remains intact  Description: 1.  Monitor for areas of redness and/or skin breakdown2.  Assess vascular access sites hourly3.  Every 4-6 hours minimum:  Change oxygen saturation probe site4.  Every 4-6 hours:  If on nasal continuous positive airway pressure, respiratory therapy assess nares and determine need for appliance change or resting period  5/20/2025 1329 by Jordan Swenson RN  Outcome: Progressing  Flowsheets (Taken 5/20/2025 0746 by Taylor Garcia, RN)  Skin Integrity Remains Intact: Monitor for areas of redness and/or skin breakdown  5/20/2025 0250 by Saintelus, Damendy, RN  Outcome: Progressing  Flowsheets (Taken 5/19/2025 1927)  Skin Integrity Remains Intact:   Monitor for areas of redness and/or skin breakdown   Every 4-6 hours:  If on nasal continuous positive 
  Problem: Discharge Planning  Goal: Discharge to home or other facility with appropriate resources  Outcome: Progressing     Problem: Pain  Goal: Verbalizes/displays adequate comfort level or baseline comfort level  5/21/2025 0810 by Lisa Zamarripa RN  Outcome: Progressing  5/20/2025 2154 by Steven Lange RN  Outcome: Progressing     Problem: Skin/Tissue Integrity  Goal: Skin integrity remains intact  Description: 1.  Monitor for areas of redness and/or skin breakdown2.  Assess vascular access sites hourly3.  Every 4-6 hours minimum:  Change oxygen saturation probe site4.  Every 4-6 hours:  If on nasal continuous positive airway pressure, respiratory therapy assess nares and determine need for appliance change or resting period  5/21/2025 0810 by Lisa Zamarripa RN  Outcome: Progressing  5/20/2025 2154 by Steven Lange RN  Outcome: Progressing     Problem: ABCDS Injury Assessment  Goal: Absence of physical injury  Outcome: Progressing     Problem: Safety - Adult  Goal: Free from fall injury  5/21/2025 0810 by Lisa Zamarripa RN  Outcome: Progressing  5/20/2025 2154 by Steven Lange RN  Outcome: Progressing     Problem: Respiratory - Adult  Goal: Achieves optimal ventilation and oxygenation  5/21/2025 0810 by Lisa Zamarripa RN  Outcome: Progressing  5/21/2025 0314 by Nik Townsend RCP  Outcome: Progressing     
  Problem: Discharge Planning  Goal: Discharge to home or other facility with appropriate resources  Outcome: Progressing     Problem: Pain  Goal: Verbalizes/displays adequate comfort level or baseline comfort level  Outcome: Progressing     Problem: Skin/Tissue Integrity  Goal: Skin integrity remains intact  Description: 1.  Monitor for areas of redness and/or skin breakdown2.  Assess vascular access sites hourly3.  Every 4-6 hours minimum:  Change oxygen saturation probe site4.  Every 4-6 hours:  If on nasal continuous positive airway pressure, respiratory therapy assess nares and determine need for appliance change or resting period  Outcome: Progressing     Problem: ABCDS Injury Assessment  Goal: Absence of physical injury  Outcome: Progressing     Problem: Safety - Adult  Goal: Free from fall injury  Outcome: Progressing     
  Problem: Discharge Planning  Goal: Discharge to home or other facility with appropriate resources  Outcome: Progressing     Problem: Pain  Goal: Verbalizes/displays adequate comfort level or baseline comfort level  Outcome: Progressing     Problem: Skin/Tissue Integrity  Goal: Skin integrity remains intact  Description: 1.  Monitor for areas of redness and/or skin breakdown2.  Assess vascular access sites hourly3.  Every 4-6 hours minimum:  Change oxygen saturation probe site4.  Every 4-6 hours:  If on nasal continuous positive airway pressure, respiratory therapy assess nares and determine need for appliance change or resting period  Outcome: Progressing     Problem: ABCDS Injury Assessment  Goal: Absence of physical injury  Outcome: Progressing     Problem: Safety - Adult  Goal: Free from fall injury  Outcome: Progressing     Problem: Respiratory - Adult  Goal: Achieves optimal ventilation and oxygenation  5/18/2025 1054 by Bronwyn Wiley RN  Outcome: Progressing  5/18/2025 0942 by Norman Mohan, RT  Outcome: Progressing     
  Problem: Discharge Planning  Goal: Discharge to home or other facility with appropriate resources  Outcome: Progressing  Flowsheets (Taken 5/22/2025 1402 by Mayra Rodrigez, RN)  Discharge to home or other facility with appropriate resources: Identify barriers to discharge with patient and caregiver     Problem: Pain  Goal: Verbalizes/displays adequate comfort level or baseline comfort level  Outcome: Progressing     Problem: Skin/Tissue Integrity  Goal: Skin integrity remains intact  Description: 1.  Monitor for areas of redness and/or skin breakdown2.  Assess vascular access sites hourly3.  Every 4-6 hours minimum:  Change oxygen saturation probe site4.  Every 4-6 hours:  If on nasal continuous positive airway pressure, respiratory therapy assess nares and determine need for appliance change or resting period  Outcome: Progressing  Flowsheets (Taken 5/22/2025 1402 by Mayra Rodrigez, RN)  Skin Integrity Remains Intact: Monitor for areas of redness and/or skin breakdown     Problem: ABCDS Injury Assessment  Goal: Absence of physical injury  Outcome: Progressing     Problem: Safety - Adult  Goal: Free from fall injury  Outcome: Progressing     Problem: Physical Therapy - Adult  Goal: By Discharge: Performs mobility at highest level of function for planned discharge setting.  See evaluation for individualized goals.  Description: FUNCTIONAL STATUS PRIOR TO ADMISSION: Patient is mod I with tripod rollator at baseline. Independent with ADLs. + fall history. Neighbor assists with yard work. Family is not local, however assists with groceries and occasional housework. Uses 2 L/min O2 NC at baseline.    HOME SUPPORT PRIOR TO ADMISSION: The patient lived alone with neighbor and occasional family to provide assistance.    Physical Therapy Goals  Initiated 5/16/2025 re-evaluated on 5/22/2025 and goals remain appropriate.  1.  Patient will move from supine to sit and sit to supine, scoot up and down, and roll side to side in 
  Problem: Occupational Therapy - Adult  Goal: By Discharge: Performs self-care activities at highest level of function for planned discharge setting.  See evaluation for individualized goals.  Description: FUNCTIONAL STATUS PRIOR TO ADMISSION:  ambulated in home with tripod rollator, frequency of falls, sedentary, performed ADLS on his own, on 2L NC 24/7, reports decreased endurance causing difficulty with cooking at times, gets paid assist with housekeeping and yard work   ,  ,  ,  ,  ,  ,  ,  ,  ,  ,       HOME SUPPORT: Patient lived alone and neighbor is supportive (has a key to his home).  Pts sister and daughter don't live locally, but assist with shopping needs.    Occupational Therapy Goals:  Initiated 5/16/2025  1.  Patient will perform grooming with Supervision within 7 day(s).  2.  Patient will perform upper body dressing with Supervision within 7 day(s).  3.  Patient will perform lower body dressing with Supervision within 7 day(s).  4.  Patient will perform toilet transfers with Supervision  within 7 day(s).  5.  Patient will perform all aspects of toileting with Supervision within 7 day(s).  6.  Patient will utilize energy conservation techniques during functional activities with no cues within 7 day(s).    Outcome: Progressing    OCCUPATIONAL THERAPY TREATMENT  Patient: Anuel Bermudez (79 y.o. male)  Date: 5/19/2025  Primary Diagnosis: Hypokalemia [E87.6]  Urinary retention [R33.9]  Pleural effusion [J90]  General weakness [R53.1]  Acute urinary retention [R33.8]       Precautions: Fall Risk                Chart, occupational therapy assessment, plan of care, and goals were reviewed.    ASSESSMENT  Patient continues to benefit from skilled OT services and is slowly progressing towards goals. Patient received in bed and agreeable to therapy. Patient required mod A for bed mobility, min A using RW for transfers and setup to max A for ADLs in sitting and standing. Patient VSS throughout session on 2L 
  Problem: Occupational Therapy - Adult  Goal: By Discharge: Performs self-care activities at highest level of function for planned discharge setting.  See evaluation for individualized goals.  Description: FUNCTIONAL STATUS PRIOR TO ADMISSION:  ambulated in home with tripod rollator, frequency of falls, sedentary, performed ADLS on his own, on 2L NC 24/7, reports decreased endurance causing difficulty with cooking at times, gets paid assist with housekeeping and yard work   ,  ,  ,  ,  ,  ,  ,  ,  ,  ,       HOME SUPPORT: Patient lived alone and neighbor is supportive (has a key to his home).  Pts sister and daughter don't live locally, but assist with shopping needs.    Occupational Therapy Goals:  Initiated 5/16/2025  1.  Patient will perform grooming with Supervision within 7 day(s).  2.  Patient will perform upper body dressing with Supervision within 7 day(s).  3.  Patient will perform lower body dressing with Supervision within 7 day(s).  4.  Patient will perform toilet transfers with Supervision  within 7 day(s).  5.  Patient will perform all aspects of toileting with Supervision within 7 day(s).  6.  Patient will utilize energy conservation techniques during functional activities with no cues within 7 day(s).    Outcome: Progressing   OCCUPATIONAL THERAPY TREATMENT  Patient: Anuel Bermudez (79 y.o. male)  Date: 5/21/2025  Primary Diagnosis: Hypokalemia [E87.6]  Urinary retention [R33.9]  Pleural effusion [J90]  General weakness [R53.1]  Acute urinary retention [R33.8]       Precautions: Fall Risk                Chart, occupational therapy assessment, plan of care, and goals were reviewed.    ASSESSMENT  Patient continues to benefit from skilled OT services and is progressing towards goals. Patient received semisupine in bed on 2L O2 and cleared for therapy by nursing. He completed bed mobility with additional time and stand-by assist requiring cues for hand placement. Patient demonstrated intact sitting 
  Problem: Pain  Goal: Verbalizes/displays adequate comfort level or baseline comfort level  5/20/2025 2154 by Steven Lange RN  Outcome: Progressing  5/20/2025 1329 by Jordan Swenson RN  Outcome: Progressing  Flowsheets (Taken 5/20/2025 0715 by Taylor Garcia, RN)  Verbalizes/displays adequate comfort level or baseline comfort level: Encourage patient to monitor pain and request assistance     Problem: Skin/Tissue Integrity  Goal: Skin integrity remains intact  Description: 1.  Monitor for areas of redness and/or skin breakdown2.  Assess vascular access sites hourly3.  Every 4-6 hours minimum:  Change oxygen saturation probe site4.  Every 4-6 hours:  If on nasal continuous positive airway pressure, respiratory therapy assess nares and determine need for appliance change or resting period  5/20/2025 2154 by Steven Lange RN  Outcome: Progressing  5/20/2025 1329 by Jordan Swenson RN  Outcome: Progressing  Flowsheets (Taken 5/20/2025 0746 by Taylor Garcia, RN)  Skin Integrity Remains Intact: Monitor for areas of redness and/or skin breakdown     Problem: Safety - Adult  Goal: Free from fall injury  5/20/2025 2154 by Steven Lange RN  Outcome: Progressing  5/20/2025 1329 by Jordan Swenson RN  Outcome: Progressing     
  Problem: Pain  Goal: Verbalizes/displays adequate comfort level or baseline comfort level  Outcome: Progressing     Problem: Skin/Tissue Integrity  Goal: Skin integrity remains intact  Description: 1.  Monitor for areas of redness and/or skin breakdown2.  Assess vascular access sites hourly3.  Every 4-6 hours minimum:  Change oxygen saturation probe site4.  Every 4-6 hours:  If on nasal continuous positive airway pressure, respiratory therapy assess nares and determine need for appliance change or resting period  Outcome: Progressing  Flowsheets (Taken 5/18/2025 1959)  Skin Integrity Remains Intact:   Monitor for areas of redness and/or skin breakdown   Turn and reposition as indicated     Problem: ABCDS Injury Assessment  Goal: Absence of physical injury  Outcome: Progressing  Flowsheets (Taken 5/18/2025 1959)  Absence of Physical Injury: Implement safety measures based on patient assessment     Problem: Safety - Adult  Goal: Free from fall injury  Outcome: Progressing     Problem: Respiratory - Adult  Goal: Achieves optimal ventilation and oxygenation  5/19/2025 0624 by Saintelus, Damendy, RN  Outcome: Progressing  Flowsheets (Taken 5/18/2025 1959)  Achieves optimal ventilation and oxygenation:   Assess for changes in respiratory status   Assess for changes in mentation and behavior   Position to facilitate oxygenation and minimize respiratory effort   Oxygen supplementation based on oxygen saturation or arterial blood gases   Encourage broncho-pulmonary hygiene including cough, deep breathe, incentive spirometry   Assess and instruct to report shortness of breath or any respiratory difficulty   Respiratory therapy support as indicated  5/18/2025 1945 by Ormond, Brittany Michelle, RCP  Outcome: Progressing     
  Problem: Physical Therapy - Adult  Goal: By Discharge: Performs mobility at highest level of function for planned discharge setting.  See evaluation for individualized goals.  Description: FUNCTIONAL STATUS PRIOR TO ADMISSION: Patient is mod I with tripod rollator at baseline. Independent with ADLs. + fall history. Neighbor assists with yard work. Family is not local, however assists with groceries and occasional housework. Uses 2 L/min O2 NC at baseline.    HOME SUPPORT PRIOR TO ADMISSION: The patient lived alone with neighbor and occasional family to provide assistance.    Physical Therapy Goals  Initiated 5/16/2025  1.  Patient will move from supine to sit and sit to supine, scoot up and down, and roll side to side in bed with modified independence within 7 day(s).    2.  Patient will perform sit to stand with modified independence within 7 day(s).  3.  Patient will transfer from bed to chair and chair to bed with modified independence using the least restrictive device within 7 day(s).  4.  Patient will ambulate with minimal assistance for 70 feet with the least restrictive device within 7 day(s).   5.  Patient will ascend/descend 4 stairs with 1 handrail(s) with modified independence within 7 day(s).   5/16/2025 1232 by Lucia Armendariz, PT  Outcome: Not Progressing     Problem: Occupational Therapy - Adult  Goal: By Discharge: Performs self-care activities at highest level of function for planned discharge setting.  See evaluation for individualized goals.  Description: FUNCTIONAL STATUS PRIOR TO ADMISSION:  ambulated in home with tripod rollator, frequency of falls, sedentary, performed ADLS on his own, on 2L NC 24/7, reports decreased endurance causing difficulty with cooking at times, gets paid assist with housekeeping and yard work   ,  ,  ,  ,  ,  ,  ,  ,  ,  ,       HOME SUPPORT: Patient lived alone and neighbor is supportive (has a key to his home).  Pts sister and daughter don't live locally, but 
  Problem: Physical Therapy - Adult  Goal: By Discharge: Performs mobility at highest level of function for planned discharge setting.  See evaluation for individualized goals.  Description: FUNCTIONAL STATUS PRIOR TO ADMISSION: Patient is mod I with tripod rollator at baseline. Independent with ADLs. + fall history. Neighbor assists with yard work. Family is not local, however assists with groceries and occasional housework. Uses 2 L/min O2 NC at baseline.    HOME SUPPORT PRIOR TO ADMISSION: The patient lived alone with neighbor and occasional family to provide assistance.    Physical Therapy Goals  Initiated 5/16/2025  1.  Patient will move from supine to sit and sit to supine, scoot up and down, and roll side to side in bed with modified independence within 7 day(s).    2.  Patient will perform sit to stand with modified independence within 7 day(s).  3.  Patient will transfer from bed to chair and chair to bed with modified independence using the least restrictive device within 7 day(s).  4.  Patient will ambulate with minimal assistance for 70 feet with the least restrictive device within 7 day(s).   5.  Patient will ascend/descend 4 stairs with 1 handrail(s) with modified independence within 7 day(s).   Outcome: Progressing     PHYSICAL THERAPY TREATMENT    Patient: Anuel Bermudez (79 y.o. male)  Date: 5/19/2025  Diagnosis: Hypokalemia [E87.6]  Urinary retention [R33.9]  Pleural effusion [J90]  General weakness [R53.1]  Acute urinary retention [R33.8] Hypokalemia      Precautions: Restrictions/Precautions  Restrictions/Precautions: Fall Risk            ASSESSMENT:  Patient continues to benefit from skilled PT services and is progressing towards goals. Patient tolerated session well, engaging in multiple trials of sit <> stand, static standing for hygiene/dressing, and multidirectional gait training to transfer surfaces. Patient required Min A x 2 initially but progressed to CGA for transfers. He required 
  Problem: Physical Therapy - Adult  Goal: By Discharge: Performs mobility at highest level of function for planned discharge setting.  See evaluation for individualized goals.  Description: FUNCTIONAL STATUS PRIOR TO ADMISSION: Patient is mod I with tripod rollator at baseline. Independent with ADLs. + fall history. Neighbor assists with yard work. Family is not local, however assists with groceries and occasional housework. Uses 2 L/min O2 NC at baseline.    HOME SUPPORT PRIOR TO ADMISSION: The patient lived alone with neighbor and occasional family to provide assistance.    Physical Therapy Goals  Initiated 5/16/2025 re-evaluated on 5/22/2025 and goals remain appropriate.  1.  Patient will move from supine to sit and sit to supine, scoot up and down, and roll side to side in bed with modified independence within 7 day(s).    2.  Patient will perform sit to stand with modified independence within 7 day(s).  3.  Patient will transfer from bed to chair and chair to bed with modified independence using the least restrictive device within 7 day(s).  4.  Patient will ambulate with minimal assistance for 70 feet with the least restrictive device within 7 day(s).   5.  Patient will ascend/descend 4 stairs with 1 handrail(s) with modified independence within 7 day(s).   Outcome: Progressing   PHYSICAL THERAPY TREATMENT: WEEKLY REASSESSMENT    Patient: Anuel Bermudez (79 y.o. male)  Date: 5/22/2025  Primary Diagnosis: Hypokalemia [E87.6]  Urinary retention [R33.9]  Pleural effusion [J90]  General weakness [R53.1]  Acute urinary retention [R33.8]       Precautions: Restrictions/Precautions  Restrictions/Precautions: Fall Risk, Bed Alarm          ASSESSMENT :  Patient continues to benefit from skilled PT services and is progressing towards goals. Patient received resting in bed, agreeable and cleared for therapy. Patient with overall mobility improvement, requiring CGA-min A with RW. With seated rest breaks, patient able 
Problem: Occupational Therapy - Adult  Goal: By Discharge: Performs self-care activities at highest level of function for planned discharge setting.  See evaluation for individualized goals.  Description: FUNCTIONAL STATUS PRIOR TO ADMISSION:  ambulated in home with tripod rollator, frequency of falls, sedentary, performed ADLS on his own, on 2L NC 24/7, reports decreased endurance causing difficulty with cooking at times, gets paid assist with housekeeping and yard work   ,  ,  ,  ,  ,  ,  ,  ,  ,  ,       HOME SUPPORT: Patient lived alone and neighbor is supportive (has a key to his home).  Pts sister and daughter don't live locally, but assist with shopping needs.    Occupational Therapy Goals:  Initiated 5/16/2025  1.  Patient will perform grooming with Supervision within 7 day(s).  2.  Patient will perform upper body dressing with Supervision within 7 day(s).  3.  Patient will perform lower body dressing with Supervision within 7 day(s).  4.  Patient will perform toilet transfers with Supervision  within 7 day(s).  5.  Patient will perform all aspects of toileting with Supervision within 7 day(s).  6.  Patient will utilize energy conservation techniques during functional activities with no cues within 7 day(s).    Outcome: Not Progressing  OCCUPATIONAL THERAPY EVALUATION    Patient: Anuel Bermudez (79 y.o. male)  Date: 5/16/2025  Primary Diagnosis: Hypokalemia [E87.6]  Urinary retention [R33.9]  Pleural effusion [J90]  General weakness [R53.1]  Acute urinary retention [R33.8]         Precautions: Fall Risk                  ASSESSMENT :  The patient is limited by decreased functional mobility, independence in ADLs, strength, body mechanics, activity tolerance, balance, posture, orthostatic hypotension.    Based on the impairments listed above pt was supine in bed upon arrival requesting something to drink.  Pt was cleared for session.  He reports three falls prior to admit.  One fall was mechanical (tripped 
back to the chair. He fatigued quickly.           PLAN:  Patient continues to benefit from skilled intervention to address the above impairments.  Continue treatment per established plan of care.        Recommendation for discharge: (in order for the patient to meet his/her long term goals):   Moderate intensity short-term skilled physical therapy up to 5x/week    Other factors to consider for discharge: concern for safely navigating or managing the home environment    IF patient discharges home will need the following DME: continuing to assess with progress       SUBJECTIVE:   Patient stated, \"the tooth brush here is like brushing with a feather.\"    OBJECTIVE DATA SUMMARY:   Critical Behavior:  Orientation  Overall Orientation Status: Within Functional Limits  Orientation Level: Oriented X4  Cognition  Overall Cognitive Status: WFL    Functional Mobility Training:  Bed Mobility:  Bed Mobility Training  Bed Mobility Training: Yes  Rolling: Stand by assistance  Supine to Sit: Stand by assistance  Scooting: Stand by assistance  Transfers:  Transfer Training  Transfer Training: Yes  Sit to Stand: Contact guard assistance  Stand to Sit: Contact guard assistance  Bed to Chair: Contact guard assistance  Balance:  Balance  Sitting: Intact  Standing: Impaired;With support  Standing - Static: Good;Constant support  Standing - Dynamic: Fair;Constant support   Ambulation/Gait Training:     Gait  Gait Training: Yes  Overall Level of Assistance: Contact guard assistance  Distance (ft): 20 Feet  Assistive Device: Gait belt;Walker, rolling  Base of Support: Widened  Speed/Carolyn: Pace decreased (< 100 feet/min);Shuffled  Step Length: Right shortened;Left shortened  Gait Abnormalities: Decreased step clearance;Trunk sway increased;Shuffling gait        Neuro Re-Education:                    Pain Intervention(s):       Activity Tolerance:   Good    After treatment:   Patient left in no apparent distress sitting up in chair, Call 
semisupine in bed on RA and cleared for therapy by nursing. 2L O2 placed back on as he wears O2 at baseline. He completed bed mobiltiy with stand-by assist and was able to adjust socks while sitting EOB. While seated, patient found soiled with stool and walked into bathroom using rolling walker with min assist. Patient transferred onto toilet with contact guard assist and required up to mod assist for rear pericare. He returned to recliner chair secondary to increased fatigue with VSS. Following seated rest break, patient was able to walk to sink in room using rolling walker and complete grooming in standing. He required additional seated rest break and returned to recliner chair. Patient continues to be limited by decreased endurance and requires rest breaks throughout session. Patient was left sitting with all needs met, VSS and chair alarmed.              PLAN  Goals have been updated based on progression since last assessment.  Patient continues to benefit from skilled intervention to address the above impairments.    Recommendations and Planned Interventions:   self care training, therapeutic activities, functional mobility training, balance training, therapeutic exercise, endurance activities, cognitive retraining, patient education, home safety training, and family training/education    Frequency/Duration: OT Plan of Care: 4 times/week    Recommendation for discharge: (in order for the patient to meet his/her long term goals):   Moderate intensity short-term skilled occupational therapy up to 5x/week    Other factors to consider for discharge: patient's current support system is unable to meet their requirements for physical assistance, poor safety awareness, impaired cognition, high risk for falls, not safe to be alone, and concern for safely navigating or managing the home environment    IF patient discharges home will need the following DME: continuing to assess with progress     SUBJECTIVE:   Patient 
bed with modified independence within 7 day(s).    2.  Patient will perform sit to stand with modified independence within 7 day(s).  3.  Patient will transfer from bed to chair and chair to bed with modified independence using the least restrictive device within 7 day(s).  4.  Patient will ambulate with minimal assistance for 70 feet with the least restrictive device within 7 day(s).   5.  Patient will ascend/descend 4 stairs with 1 handrail(s) with modified independence within 7 day(s).   5/19/2025 1252 by Jovi Frederick, PT  Outcome: Progressing     Problem: Respiratory - Adult  Goal: Achieves optimal ventilation and oxygenation  5/20/2025 0250 by Saintelus, Damendy, RN  Outcome: Progressing  Flowsheets (Taken 5/19/2025 1927)  Achieves optimal ventilation and oxygenation:   Assess for changes in respiratory status   Assess for changes in mentation and behavior   Position to facilitate oxygenation and minimize respiratory effort   Oxygen supplementation based on oxygen saturation or arterial blood gases   Encourage broncho-pulmonary hygiene including cough, deep breathe, incentive spirometry   Assess and instruct to report shortness of breath or any respiratory difficulty   Respiratory therapy support as indicated  5/19/2025 1552 by Jordan Swenson, RN  Outcome: Progressing  5/19/2025 1551 by Jordan Swenson, RN  Outcome: Progressing     
goals.  Description: FUNCTIONAL STATUS PRIOR TO ADMISSION:  ambulated in home with tripod rollator, frequency of falls, sedentary, performed ADLS on his own, on 2L NC 24/7, reports decreased endurance causing difficulty with cooking at times, gets paid assist with housekeeping and yard work   ,  ,  ,  ,  ,  ,  ,  ,  ,  ,       HOME SUPPORT: Patient lived alone and neighbor is supportive (has a key to his home).  Pts sister and daughter don't live locally, but assist with shopping needs.    Occupational Therapy Goals:  Initiated 5/16/2025  1.  Patient will perform grooming with Supervision within 7 day(s).  2.  Patient will perform upper body dressing with Supervision within 7 day(s).  3.  Patient will perform lower body dressing with Supervision within 7 day(s).  4.  Patient will perform toilet transfers with Supervision  within 7 day(s).  5.  Patient will perform all aspects of toileting with Supervision within 7 day(s).  6.  Patient will utilize energy conservation techniques during functional activities with no cues within 7 day(s).    5/16/2025 1231 by Rebeka Vang, OTR/L  Outcome: Not Progressing     
shortness of breath or any respiratory difficulty   Respiratory therapy support as indicated     
\"6-Clicks\" Basic Mobility Scores Predict Discharge Destination After Acute Care Hospitalization in Select Patient Groups: A Retrospective, Observational Study. Arch Rehabil Res Clin Transl. 2022 Jul 16;4(3):821821. doi: 10.1016/j.arrct.2022.239086. PMID: 78393934; PMCID: KOB0478424.  4. Alexey HILLIARD, Danielle S, Dedra W, Yue VELASCO. AM-PAC Short Forms Manual 4.0. Revised 2/2020.                                                                                                                                                                                                                               Pain Rating:  Reported generalized pain from falling  Pain Intervention(s):   nursing notified, rest, and repositioning    Activity Tolerance:   Poor, requires frequent rest breaks, and signs and symptoms of orthostatic hypotension    After treatment:   Patient left in no apparent distress in bed, Call bell within reach, Caregiver / family present, and Side rails x3    COMMUNICATION/EDUCATION:   The patient's plan of care was discussed with: physical therapist, occupational therapist, and registered nurse    Patient Education  Education Given To: Patient  Education Provided: Role of Therapy;Plan of Care;Energy Conservation;Transfer Training;Fall Prevention Strategies  Education Method: Verbal  Barriers to Learning: Cognition  Education Outcome: Continued education needed    Thank you for this referral.  Lucia Armendariz, PT, DPT  Minutes: 20      Physical Therapy Evaluation Charge Determination   History Examination Presentation Decision-Making   HIGH Complexity :3+ comorbidities / personal factors will impact the outcome/ POC  HIGH Complexity : 4+ Standardized tests and measures addressing body structure, function, activity limitation and / or participation in recreation  MEDIUM Complexity : Evolving with changing characteristics  AM-PAC  MEDIUM   Based on the above components, the patient evaluation is determined to be of the following

## 2025-05-24 ENCOUNTER — APPOINTMENT (OUTPATIENT)
Facility: HOSPITAL | Age: 80
DRG: 542 | End: 2025-05-24
Payer: MEDICARE

## 2025-05-24 ENCOUNTER — HOSPITAL ENCOUNTER (INPATIENT)
Facility: HOSPITAL | Age: 80
LOS: 5 days | Discharge: HOSPICE | DRG: 542 | End: 2025-05-29
Attending: EMERGENCY MEDICINE | Admitting: STUDENT IN AN ORGANIZED HEALTH CARE EDUCATION/TRAINING PROGRAM
Payer: MEDICARE
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
## 2025-05-24 DIAGNOSIS — M84.551A PATHOLOGICAL FRACTURE OF RIGHT HIP DUE TO NEOPLASTIC DISEASE, INITIAL ENCOUNTER (HCC): Primary | ICD-10-CM

## 2025-05-24 PROBLEM — M84.451A PATHOLOGICAL FRACTURE OF RIGHT HIP (HCC): Status: ACTIVE | Noted: 2025-05-24

## 2025-05-24 PROBLEM — S72.001A CLOSED RIGHT HIP FRACTURE, INITIAL ENCOUNTER (HCC): Status: ACTIVE | Noted: 2025-05-24

## 2025-05-24 PROBLEM — E83.42 HYPOMAGNESEMIA: Status: RESOLVED | Noted: 2024-12-05 | Resolved: 2025-05-24

## 2025-05-24 PROBLEM — S72.111A FRACTURE OF GREATER TROCHANTER OF RIGHT FEMUR (HCC): Status: ACTIVE | Noted: 2025-05-24

## 2025-05-24 PROBLEM — R62.7 FTT (FAILURE TO THRIVE) IN ADULT: Status: ACTIVE | Noted: 2025-05-24

## 2025-05-24 PROBLEM — E87.6 HYPOKALEMIA: Status: RESOLVED | Noted: 2024-12-01 | Resolved: 2025-05-24

## 2025-05-24 PROBLEM — K56.41 FECAL IMPACTION (HCC): Status: RESOLVED | Noted: 2024-12-05 | Resolved: 2025-05-24

## 2025-05-24 PROBLEM — E87.1 HYPONATREMIA: Status: RESOLVED | Noted: 2024-01-23 | Resolved: 2025-05-24

## 2025-05-24 PROBLEM — R53.2 COMPLETE IMMOBILITY DUE TO SEVERE PHYSICAL DISABILITY OR FRAILTY (HCC): Status: ACTIVE | Noted: 2025-05-24

## 2025-05-24 PROBLEM — C34.90 METASTATIC LUNG CANCER (METASTASIS FROM LUNG TO OTHER SITE) (HCC): Status: ACTIVE | Noted: 2025-05-24

## 2025-05-24 PROBLEM — K63.1: Status: RESOLVED | Noted: 2024-08-11 | Resolved: 2025-05-24

## 2025-05-24 PROBLEM — J44.9 CHRONIC OBSTRUCTIVE PULMONARY DISEASE, UNSPECIFIED (HCC): Status: RESOLVED | Noted: 2024-12-05 | Resolved: 2025-05-24

## 2025-05-24 PROBLEM — J69.0 ASPIRATION PNEUMONIA (HCC): Status: RESOLVED | Noted: 2024-01-23 | Resolved: 2025-05-24

## 2025-05-24 LAB
ALBUMIN SERPL-MCNC: 2.4 G/DL (ref 3.5–5)
ALBUMIN/GLOB SERPL: 0.6 (ref 1.1–2.2)
ALP SERPL-CCNC: 88 U/L (ref 45–117)
ALT SERPL-CCNC: 32 U/L (ref 12–78)
ANION GAP SERPL CALC-SCNC: 7 MMOL/L (ref 2–12)
AST SERPL-CCNC: 27 U/L (ref 15–37)
BASOPHILS # BLD: 0.06 K/UL (ref 0–0.1)
BASOPHILS NFR BLD: 0.3 % (ref 0–1)
BILIRUB SERPL-MCNC: 0.4 MG/DL (ref 0.2–1)
BUN SERPL-MCNC: 23 MG/DL (ref 6–20)
BUN/CREAT SERPL: 22 (ref 12–20)
CALCIUM SERPL-MCNC: 8.6 MG/DL (ref 8.5–10.1)
CHLORIDE SERPL-SCNC: 108 MMOL/L (ref 97–108)
CO2 SERPL-SCNC: 21 MMOL/L (ref 21–32)
CREAT SERPL-MCNC: 1.03 MG/DL (ref 0.7–1.3)
DIFFERENTIAL METHOD BLD: ABNORMAL
EKG ATRIAL RATE: 89 BPM
EKG DIAGNOSIS: NORMAL
EKG P AXIS: 79 DEGREES
EKG P-R INTERVAL: 134 MS
EKG Q-T INTERVAL: 382 MS
EKG QRS DURATION: 104 MS
EKG QTC CALCULATION (BAZETT): 464 MS
EKG R AXIS: 66 DEGREES
EKG T AXIS: 73 DEGREES
EKG VENTRICULAR RATE: 89 BPM
EOSINOPHIL # BLD: 0.09 K/UL (ref 0–0.4)
EOSINOPHIL NFR BLD: 0.5 % (ref 0–7)
ERYTHROCYTE [DISTWIDTH] IN BLOOD BY AUTOMATED COUNT: 14 % (ref 11.5–14.5)
GLOBULIN SER CALC-MCNC: 4.2 G/DL (ref 2–4)
GLUCOSE SERPL-MCNC: 96 MG/DL (ref 65–100)
HCT VFR BLD AUTO: 29.2 % (ref 36.6–50.3)
HGB BLD-MCNC: 9.5 G/DL (ref 12.1–17)
IMM GRANULOCYTES # BLD AUTO: 0.13 K/UL (ref 0–0.04)
IMM GRANULOCYTES NFR BLD AUTO: 0.7 % (ref 0–0.5)
LYMPHOCYTES # BLD: 0.8 K/UL (ref 0.8–3.5)
LYMPHOCYTES NFR BLD: 4.4 % (ref 12–49)
MCH RBC QN AUTO: 29.6 PG (ref 26–34)
MCHC RBC AUTO-ENTMCNC: 32.5 G/DL (ref 30–36.5)
MCV RBC AUTO: 91 FL (ref 80–99)
MONOCYTES # BLD: 0.83 K/UL (ref 0–1)
MONOCYTES NFR BLD: 4.6 % (ref 5–13)
NEUTS SEG # BLD: 16.19 K/UL (ref 1.8–8)
NEUTS SEG NFR BLD: 89.5 % (ref 32–75)
NRBC # BLD: 0 K/UL (ref 0–0.01)
NRBC BLD-RTO: 0 PER 100 WBC
PLATELET # BLD AUTO: 321 K/UL (ref 150–400)
PMV BLD AUTO: 8 FL (ref 8.9–12.9)
POTASSIUM SERPL-SCNC: 3.6 MMOL/L (ref 3.5–5.1)
PROT SERPL-MCNC: 6.6 G/DL (ref 6.4–8.2)
RBC # BLD AUTO: 3.21 M/UL (ref 4.1–5.7)
SODIUM SERPL-SCNC: 136 MMOL/L (ref 136–145)
WBC # BLD AUTO: 18.1 K/UL (ref 4.1–11.1)

## 2025-05-24 PROCEDURE — 6360000002 HC RX W HCPCS: Performed by: STUDENT IN AN ORGANIZED HEALTH CARE EDUCATION/TRAINING PROGRAM

## 2025-05-24 PROCEDURE — 51702 INSERT TEMP BLADDER CATH: CPT

## 2025-05-24 PROCEDURE — 71045 X-RAY EXAM CHEST 1 VIEW: CPT

## 2025-05-24 PROCEDURE — 80053 COMPREHEN METABOLIC PANEL: CPT

## 2025-05-24 PROCEDURE — 96374 THER/PROPH/DIAG INJ IV PUSH: CPT

## 2025-05-24 PROCEDURE — 94640 AIRWAY INHALATION TREATMENT: CPT

## 2025-05-24 PROCEDURE — 85025 COMPLETE CBC W/AUTO DIFF WBC: CPT

## 2025-05-24 PROCEDURE — 6370000000 HC RX 637 (ALT 250 FOR IP): Performed by: STUDENT IN AN ORGANIZED HEALTH CARE EDUCATION/TRAINING PROGRAM

## 2025-05-24 PROCEDURE — 2580000003 HC RX 258: Performed by: STUDENT IN AN ORGANIZED HEALTH CARE EDUCATION/TRAINING PROGRAM

## 2025-05-24 PROCEDURE — 6360000002 HC RX W HCPCS: Performed by: EMERGENCY MEDICINE

## 2025-05-24 PROCEDURE — 1100000000 HC RM PRIVATE

## 2025-05-24 PROCEDURE — 93005 ELECTROCARDIOGRAM TRACING: CPT | Performed by: EMERGENCY MEDICINE

## 2025-05-24 PROCEDURE — 99285 EMERGENCY DEPT VISIT HI MDM: CPT

## 2025-05-24 PROCEDURE — 73502 X-RAY EXAM HIP UNI 2-3 VIEWS: CPT

## 2025-05-24 PROCEDURE — 72192 CT PELVIS W/O DYE: CPT

## 2025-05-24 PROCEDURE — 36415 COLL VENOUS BLD VENIPUNCTURE: CPT

## 2025-05-24 RX ORDER — ARFORMOTEROL TARTRATE 15 UG/2ML
15 SOLUTION RESPIRATORY (INHALATION)
Status: DISCONTINUED | OUTPATIENT
Start: 2025-05-24 | End: 2025-05-25

## 2025-05-24 RX ORDER — MAGNESIUM SULFATE IN WATER 40 MG/ML
2000 INJECTION, SOLUTION INTRAVENOUS PRN
Status: DISCONTINUED | OUTPATIENT
Start: 2025-05-24 | End: 2025-05-29 | Stop reason: HOSPADM

## 2025-05-24 RX ORDER — DONEPEZIL HYDROCHLORIDE 5 MG/1
5 TABLET, FILM COATED ORAL NIGHTLY
Status: DISCONTINUED | OUTPATIENT
Start: 2025-05-24 | End: 2025-05-29 | Stop reason: HOSPADM

## 2025-05-24 RX ORDER — MORPHINE SULFATE 4 MG/ML
4 INJECTION, SOLUTION INTRAMUSCULAR; INTRAVENOUS EVERY 4 HOURS PRN
Refills: 0 | Status: DISCONTINUED | OUTPATIENT
Start: 2025-05-24 | End: 2025-05-29 | Stop reason: HOSPADM

## 2025-05-24 RX ORDER — MORPHINE SULFATE 4 MG/ML
4 INJECTION, SOLUTION INTRAMUSCULAR; INTRAVENOUS
Status: COMPLETED | OUTPATIENT
Start: 2025-05-24 | End: 2025-05-24

## 2025-05-24 RX ORDER — POTASSIUM CHLORIDE 1500 MG/1
40 TABLET, EXTENDED RELEASE ORAL PRN
Status: DISCONTINUED | OUTPATIENT
Start: 2025-05-24 | End: 2025-05-29 | Stop reason: HOSPADM

## 2025-05-24 RX ORDER — SODIUM CHLORIDE 9 MG/ML
INJECTION, SOLUTION INTRAVENOUS PRN
Status: DISCONTINUED | OUTPATIENT
Start: 2025-05-24 | End: 2025-05-29 | Stop reason: HOSPADM

## 2025-05-24 RX ORDER — ENOXAPARIN SODIUM 100 MG/ML
30 INJECTION SUBCUTANEOUS DAILY
Status: DISCONTINUED | OUTPATIENT
Start: 2025-05-24 | End: 2025-05-27

## 2025-05-24 RX ORDER — POTASSIUM CHLORIDE 7.45 MG/ML
10 INJECTION INTRAVENOUS PRN
Status: DISCONTINUED | OUTPATIENT
Start: 2025-05-24 | End: 2025-05-29 | Stop reason: HOSPADM

## 2025-05-24 RX ORDER — HYDROMORPHONE HYDROCHLORIDE 1 MG/ML
0.5 INJECTION, SOLUTION INTRAMUSCULAR; INTRAVENOUS; SUBCUTANEOUS
Status: ACTIVE | OUTPATIENT
Start: 2025-05-24 | End: 2025-05-26

## 2025-05-24 RX ORDER — GAUZE BANDAGE 2" X 2"
100 BANDAGE TOPICAL DAILY
Status: DISCONTINUED | OUTPATIENT
Start: 2025-05-25 | End: 2025-05-29 | Stop reason: HOSPADM

## 2025-05-24 RX ORDER — OXYCODONE HYDROCHLORIDE 5 MG/1
10 TABLET ORAL EVERY 4 HOURS PRN
Refills: 0 | Status: DISCONTINUED | OUTPATIENT
Start: 2025-05-24 | End: 2025-05-29 | Stop reason: HOSPADM

## 2025-05-24 RX ORDER — ACETAMINOPHEN 325 MG/1
650 TABLET ORAL EVERY 4 HOURS PRN
Status: DISCONTINUED | OUTPATIENT
Start: 2025-05-24 | End: 2025-05-24 | Stop reason: SDUPTHER

## 2025-05-24 RX ORDER — LIDOCAINE 4 G/G
1 PATCH TOPICAL DAILY
Status: DISCONTINUED | OUTPATIENT
Start: 2025-05-24 | End: 2025-05-29 | Stop reason: HOSPADM

## 2025-05-24 RX ORDER — FERROUS SULFATE 325(65) MG
325 TABLET ORAL
Status: DISCONTINUED | OUTPATIENT
Start: 2025-05-25 | End: 2025-05-29 | Stop reason: HOSPADM

## 2025-05-24 RX ORDER — BUDESONIDE 0.5 MG/2ML
0.5 INHALANT ORAL
Status: DISCONTINUED | OUTPATIENT
Start: 2025-05-24 | End: 2025-05-25

## 2025-05-24 RX ORDER — TAMSULOSIN HYDROCHLORIDE 0.4 MG/1
0.4 CAPSULE ORAL DAILY
Status: DISCONTINUED | OUTPATIENT
Start: 2025-05-24 | End: 2025-05-29 | Stop reason: HOSPADM

## 2025-05-24 RX ORDER — SENNA AND DOCUSATE SODIUM 50; 8.6 MG/1; MG/1
2 TABLET, FILM COATED ORAL NIGHTLY
Status: DISCONTINUED | OUTPATIENT
Start: 2025-05-24 | End: 2025-05-29 | Stop reason: HOSPADM

## 2025-05-24 RX ORDER — ACETAMINOPHEN 325 MG/1
650 TABLET ORAL EVERY 6 HOURS PRN
Status: DISCONTINUED | OUTPATIENT
Start: 2025-05-24 | End: 2025-05-29 | Stop reason: HOSPADM

## 2025-05-24 RX ORDER — MONTELUKAST SODIUM 10 MG/1
10 TABLET ORAL NIGHTLY
Status: DISCONTINUED | OUTPATIENT
Start: 2025-05-24 | End: 2025-05-29 | Stop reason: HOSPADM

## 2025-05-24 RX ORDER — FOLIC ACID 1 MG/1
1 TABLET ORAL DAILY
Status: DISCONTINUED | OUTPATIENT
Start: 2025-05-25 | End: 2025-05-29 | Stop reason: HOSPADM

## 2025-05-24 RX ORDER — POLYETHYLENE GLYCOL 3350 17 G/17G
17 POWDER, FOR SOLUTION ORAL DAILY PRN
Status: DISCONTINUED | OUTPATIENT
Start: 2025-05-24 | End: 2025-05-29 | Stop reason: HOSPADM

## 2025-05-24 RX ORDER — PANTOPRAZOLE SODIUM 40 MG/1
40 TABLET, DELAYED RELEASE ORAL
Status: DISCONTINUED | OUTPATIENT
Start: 2025-05-25 | End: 2025-05-29 | Stop reason: HOSPADM

## 2025-05-24 RX ORDER — SODIUM CHLORIDE 0.9 % (FLUSH) 0.9 %
5-40 SYRINGE (ML) INJECTION EVERY 12 HOURS SCHEDULED
Status: DISCONTINUED | OUTPATIENT
Start: 2025-05-24 | End: 2025-05-29 | Stop reason: HOSPADM

## 2025-05-24 RX ORDER — OXYCODONE HYDROCHLORIDE 5 MG/1
5 TABLET ORAL EVERY 4 HOURS PRN
Refills: 0 | Status: DISCONTINUED | OUTPATIENT
Start: 2025-05-24 | End: 2025-05-29 | Stop reason: HOSPADM

## 2025-05-24 RX ORDER — ACETAMINOPHEN 650 MG/1
650 SUPPOSITORY RECTAL EVERY 6 HOURS PRN
Status: DISCONTINUED | OUTPATIENT
Start: 2025-05-24 | End: 2025-05-29 | Stop reason: HOSPADM

## 2025-05-24 RX ORDER — MIRTAZAPINE 15 MG/1
30 TABLET, FILM COATED ORAL NIGHTLY
Status: DISCONTINUED | OUTPATIENT
Start: 2025-05-24 | End: 2025-05-29 | Stop reason: HOSPADM

## 2025-05-24 RX ORDER — SODIUM CHLORIDE, SODIUM LACTATE, POTASSIUM CHLORIDE, CALCIUM CHLORIDE 600; 310; 30; 20 MG/100ML; MG/100ML; MG/100ML; MG/100ML
INJECTION, SOLUTION INTRAVENOUS CONTINUOUS
Status: ACTIVE | OUTPATIENT
Start: 2025-05-24 | End: 2025-05-25

## 2025-05-24 RX ORDER — KETOROLAC TROMETHAMINE 30 MG/ML
15 INJECTION, SOLUTION INTRAMUSCULAR; INTRAVENOUS EVERY 6 HOURS
Status: COMPLETED | OUTPATIENT
Start: 2025-05-24 | End: 2025-05-26

## 2025-05-24 RX ORDER — ALBUTEROL SULFATE 0.83 MG/ML
2.5 SOLUTION RESPIRATORY (INHALATION) 4 TIMES DAILY PRN
Status: DISCONTINUED | OUTPATIENT
Start: 2025-05-24 | End: 2025-05-29 | Stop reason: HOSPADM

## 2025-05-24 RX ORDER — ONDANSETRON 4 MG/1
4 TABLET, ORALLY DISINTEGRATING ORAL EVERY 8 HOURS PRN
Status: DISCONTINUED | OUTPATIENT
Start: 2025-05-24 | End: 2025-05-29 | Stop reason: HOSPADM

## 2025-05-24 RX ORDER — ONDANSETRON 2 MG/ML
4 INJECTION INTRAMUSCULAR; INTRAVENOUS EVERY 6 HOURS PRN
Status: DISCONTINUED | OUTPATIENT
Start: 2025-05-24 | End: 2025-05-29 | Stop reason: HOSPADM

## 2025-05-24 RX ADMIN — DONEPEZIL HYDROCHLORIDE 5 MG: 5 TABLET, FILM COATED ORAL at 20:36

## 2025-05-24 RX ADMIN — BUDESONIDE 500 MCG: 0.5 INHALANT RESPIRATORY (INHALATION) at 20:02

## 2025-05-24 RX ADMIN — DOCUSATE SODIUM AND SENNOSIDES 2 TABLET: 8.6; 5 TABLET, FILM COATED ORAL at 20:36

## 2025-05-24 RX ADMIN — IPRATROPIUM BROMIDE 0.5 MG: 0.5 SOLUTION RESPIRATORY (INHALATION) at 20:02

## 2025-05-24 RX ADMIN — MIRTAZAPINE 30 MG: 15 TABLET, FILM COATED ORAL at 20:36

## 2025-05-24 RX ADMIN — SODIUM CHLORIDE, SODIUM LACTATE, POTASSIUM CHLORIDE, AND CALCIUM CHLORIDE: .6; .31; .03; .02 INJECTION, SOLUTION INTRAVENOUS at 19:05

## 2025-05-24 RX ADMIN — MONTELUKAST 10 MG: 10 TABLET, FILM COATED ORAL at 20:39

## 2025-05-24 RX ADMIN — MORPHINE SULFATE 4 MG: 4 INJECTION, SOLUTION INTRAMUSCULAR; INTRAVENOUS at 13:12

## 2025-05-24 RX ADMIN — OXYCODONE 5 MG: 5 TABLET ORAL at 15:58

## 2025-05-24 RX ADMIN — KETOROLAC TROMETHAMINE 15 MG: 30 INJECTION, SOLUTION INTRAMUSCULAR at 22:08

## 2025-05-24 RX ADMIN — ENOXAPARIN SODIUM 30 MG: 100 INJECTION SUBCUTANEOUS at 16:27

## 2025-05-24 RX ADMIN — ARFORMOTEROL TARTRATE 15 MCG: 15 SOLUTION RESPIRATORY (INHALATION) at 20:02

## 2025-05-24 RX ADMIN — TAMSULOSIN HYDROCHLORIDE 0.4 MG: 0.4 CAPSULE ORAL at 20:36

## 2025-05-24 RX ADMIN — KETOROLAC TROMETHAMINE 15 MG: 30 INJECTION, SOLUTION INTRAMUSCULAR at 16:27

## 2025-05-24 RX ADMIN — SODIUM CHLORIDE, SODIUM LACTATE, POTASSIUM CHLORIDE, AND CALCIUM CHLORIDE: .6; .31; .03; .02 INJECTION, SOLUTION INTRAVENOUS at 16:24

## 2025-05-24 ASSESSMENT — PAIN DESCRIPTION - ORIENTATION
ORIENTATION: RIGHT
ORIENTATION: RIGHT

## 2025-05-24 ASSESSMENT — PAIN DESCRIPTION - DESCRIPTORS: DESCRIPTORS: SHARP

## 2025-05-24 ASSESSMENT — PAIN DESCRIPTION - LOCATION
LOCATION: HIP
LOCATION: HIP

## 2025-05-24 ASSESSMENT — PAIN - FUNCTIONAL ASSESSMENT: PAIN_FUNCTIONAL_ASSESSMENT: 0-10

## 2025-05-24 ASSESSMENT — PAIN DESCRIPTION - PAIN TYPE: TYPE: ACUTE PAIN

## 2025-05-24 ASSESSMENT — PAIN SCALES - GENERAL
PAINLEVEL_OUTOF10: 7
PAINLEVEL_OUTOF10: 7
PAINLEVEL_OUTOF10: 10
PAINLEVEL_OUTOF10: 7

## 2025-05-24 NOTE — H&P
Hemoglobin 9.5 (L) 12.1 - 17.0 g/dL    Hematocrit 29.2 (L) 36.6 - 50.3 %    MCV 91.0 80.0 - 99.0 FL    MCH 29.6 26.0 - 34.0 PG    MCHC 32.5 30.0 - 36.5 g/dL    RDW 14.0 11.5 - 14.5 %    Platelets 321 150 - 400 K/uL    MPV 8.0 (L) 8.9 - 12.9 FL    Nucleated RBCs 0.0 0  WBC    nRBC 0.00 0.00 - 0.01 K/uL    Neutrophils % 89.5 (H) 32.0 - 75.0 %    Lymphocytes % 4.4 (L) 12.0 - 49.0 %    Monocytes % 4.6 (L) 5.0 - 13.0 %    Eosinophils % 0.5 0.0 - 7.0 %    Basophils % 0.3 0.0 - 1.0 %    Immature Granulocytes % 0.7 (H) 0.0 - 0.5 %    Neutrophils Absolute 16.19 (H) 1.80 - 8.00 K/UL    Lymphocytes Absolute 0.80 0.80 - 3.50 K/UL    Monocytes Absolute 0.83 0.00 - 1.00 K/UL    Eosinophils Absolute 0.09 0.00 - 0.40 K/UL    Basophils Absolute 0.06 0.00 - 0.10 K/UL    Immature Granulocytes Absolute 0.13 (H) 0.00 - 0.04 K/UL    Differential Type AUTOMATED     Comprehensive Metabolic Panel   Result Value Ref Range    Sodium 136 136 - 145 mmol/L    Potassium 3.6 3.5 - 5.1 mmol/L    Chloride 108 97 - 108 mmol/L    CO2 21 21 - 32 mmol/L    Anion Gap 7 2 - 12 mmol/L    Glucose 96 65 - 100 mg/dL    BUN 23 (H) 6 - 20 MG/DL    Creatinine 1.03 0.70 - 1.30 MG/DL    BUN/Creatinine Ratio 22 (H) 12 - 20      Est, Glom Filt Rate 73 >60 ml/min/1.73m2    Calcium 8.6 8.5 - 10.1 MG/DL    Total Bilirubin 0.4 0.2 - 1.0 MG/DL    ALT 32 12 - 78 U/L    AST 27 15 - 37 U/L    Alk Phosphatase 88 45 - 117 U/L    Total Protein 6.6 6.4 - 8.2 g/dL    Albumin 2.4 (L) 3.5 - 5.0 g/dL    Globulin 4.2 (H) 2.0 - 4.0 g/dL    Albumin/Globulin Ratio 0.6 (L) 1.1 - 2.2     EKG 12 Lead   Result Value Ref Range    Ventricular Rate 89 BPM    Atrial Rate 89 BPM    P-R Interval 134 ms    QRS Duration 104 ms    Q-T Interval 382 ms    QTc Calculation (Bazett) 464 ms    P Axis 79 degrees    R Axis 66 degrees    T Axis 73 degrees    Diagnosis       Normal sinus rhythm  Moderate voltage criteria for LVH, may be normal variant ( Sokolow-Fall ,   [facility] product

## 2025-05-24 NOTE — ED NOTES
Assumed care of patient from EMS at this time. Patient placed on monitor x 3. Resting in a stretcher at this time, caregiver at bedside.

## 2025-05-24 NOTE — ED PROVIDER NOTES
[facility] EMERGENCY DEPARTMENT  EMERGENCY DEPARTMENT ENCOUNTER       Pt Name: Anuel Bermudez  MRN: [id number]  Birthdate 1945  Date of evaluation: 5/24/2025  Provider: Abelino Sidhu MD   PCP: Bonifacio Bassett MD  Note Started: 6:03 PM 5/24/25     CHIEF COMPLAINT       Chief Complaint   Patient presents with    Fall     Pt BIBEMS with a cc of right upper leg pain secondary to GLF that happened this am; pt denies LOC, hitting head and does not take blood thinners; pt lives at home by himself         HISTORY OF PRESENT ILLNESS: 1 or more elements      History From: Patient, EMS, History limited by: No limitations     Anuel Bermudez is a 79 y.o. male with history of lung cancer, chronic respiratory failure, chronic urinary retention who presents with chief complaint of fall, right hip pain.  This occurred earlier today, he was trying to open the door for his caregiver to come in when he fell.  Does not know if he syncopized.  Has not been able to bear weight.  Complains of severe pain to the right hip.  Denies any other injury.  Patient was discharged after hospitalization yesterday.     Nursing Notes were all reviewed and agreed with or any disagreements were addressed in the HPI.     REVIEW OF SYSTEMS        Positives and Pertinent negatives as per HPI.    PAST HISTORY     Past Medical History:  Past Medical History:   Diagnosis Date    Aspiration pneumonia (HCC) 01/23/2024    Chronic obstructive pulmonary disease, unspecified (HCC) 12/05/2024    Fecal impaction (HCC) 12/05/2024    Hypokalemia 12/01/2024    Hypomagnesemia 12/05/2024    Hyponatremia 01/23/2024    Lung cancer (HCC)     Perforation of intestine (nontraumatic) (HCC) 08/11/2024       Past Surgical History:  Past Surgical History:   Procedure Laterality Date    BRONCHOSCOPY N/A 3/20/2024    BRONCHOSCOPY performed by Ramez Fry MD at [facility]    IR PORT PLACEMENT > 5 YEARS  4/18/2024    IR PORT PLACEMENT EQUAL OR GREATER THAN
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
## 2025-05-25 LAB
ALBUMIN SERPL-MCNC: 1.9 G/DL (ref 3.5–5)
ALBUMIN/GLOB SERPL: 0.5 (ref 1.1–2.2)
ALP SERPL-CCNC: 68 U/L (ref 45–117)
ALT SERPL-CCNC: 27 U/L (ref 12–78)
ANION GAP SERPL CALC-SCNC: 7 MMOL/L (ref 2–12)
AST SERPL-CCNC: 26 U/L (ref 15–37)
BASOPHILS # BLD: 0.07 K/UL (ref 0–0.1)
BASOPHILS NFR BLD: 0.6 % (ref 0–1)
BILIRUB SERPL-MCNC: 0.6 MG/DL (ref 0.2–1)
BUN SERPL-MCNC: 34 MG/DL (ref 6–20)
BUN/CREAT SERPL: 24 (ref 12–20)
CALCIUM SERPL-MCNC: 8 MG/DL (ref 8.5–10.1)
CHLORIDE SERPL-SCNC: 110 MMOL/L (ref 97–108)
CK SERPL-CCNC: 459 U/L (ref 39–308)
CO2 SERPL-SCNC: 22 MMOL/L (ref 21–32)
CREAT SERPL-MCNC: 1.41 MG/DL (ref 0.7–1.3)
DIFFERENTIAL METHOD BLD: ABNORMAL
EOSINOPHIL # BLD: 0.08 K/UL (ref 0–0.4)
EOSINOPHIL NFR BLD: 0.7 % (ref 0–7)
ERYTHROCYTE [DISTWIDTH] IN BLOOD BY AUTOMATED COUNT: 14.1 % (ref 11.5–14.5)
GLOBULIN SER CALC-MCNC: 3.6 G/DL (ref 2–4)
GLUCOSE SERPL-MCNC: 106 MG/DL (ref 65–100)
HCT VFR BLD AUTO: 24.6 % (ref 36.6–50.3)
HGB BLD-MCNC: 7.9 G/DL (ref 12.1–17)
IMM GRANULOCYTES # BLD AUTO: 0.06 K/UL (ref 0–0.04)
IMM GRANULOCYTES NFR BLD AUTO: 0.6 % (ref 0–0.5)
LYMPHOCYTES # BLD: 1.38 K/UL (ref 0.8–3.5)
LYMPHOCYTES NFR BLD: 12.8 % (ref 12–49)
MAGNESIUM SERPL-MCNC: 1.6 MG/DL (ref 1.6–2.4)
MCH RBC QN AUTO: 29.6 PG (ref 26–34)
MCHC RBC AUTO-ENTMCNC: 32.1 G/DL (ref 30–36.5)
MCV RBC AUTO: 92.1 FL (ref 80–99)
MONOCYTES # BLD: 0.87 K/UL (ref 0–1)
MONOCYTES NFR BLD: 8 % (ref 5–13)
NEUTS SEG # BLD: 8.35 K/UL (ref 1.8–8)
NEUTS SEG NFR BLD: 77.3 % (ref 32–75)
NRBC # BLD: 0 K/UL (ref 0–0.01)
NRBC BLD-RTO: 0 PER 100 WBC
PHOSPHATE SERPL-MCNC: 3.4 MG/DL (ref 2.6–4.7)
PLATELET # BLD AUTO: 285 K/UL (ref 150–400)
PMV BLD AUTO: 8.2 FL (ref 8.9–12.9)
POTASSIUM SERPL-SCNC: 3.3 MMOL/L (ref 3.5–5.1)
PROT SERPL-MCNC: 5.5 G/DL (ref 6.4–8.2)
RBC # BLD AUTO: 2.67 M/UL (ref 4.1–5.7)
SODIUM SERPL-SCNC: 139 MMOL/L (ref 136–145)
WBC # BLD AUTO: 10.8 K/UL (ref 4.1–11.1)

## 2025-05-25 PROCEDURE — 82550 ASSAY OF CK (CPK): CPT

## 2025-05-25 PROCEDURE — 6370000000 HC RX 637 (ALT 250 FOR IP): Performed by: STUDENT IN AN ORGANIZED HEALTH CARE EDUCATION/TRAINING PROGRAM

## 2025-05-25 PROCEDURE — 97165 OT EVAL LOW COMPLEX 30 MIN: CPT

## 2025-05-25 PROCEDURE — 6360000002 HC RX W HCPCS: Performed by: STUDENT IN AN ORGANIZED HEALTH CARE EDUCATION/TRAINING PROGRAM

## 2025-05-25 PROCEDURE — 97535 SELF CARE MNGMENT TRAINING: CPT

## 2025-05-25 PROCEDURE — 6370000000 HC RX 637 (ALT 250 FOR IP)

## 2025-05-25 PROCEDURE — 97530 THERAPEUTIC ACTIVITIES: CPT

## 2025-05-25 PROCEDURE — 2500000003 HC RX 250 WO HCPCS: Performed by: STUDENT IN AN ORGANIZED HEALTH CARE EDUCATION/TRAINING PROGRAM

## 2025-05-25 PROCEDURE — 83735 ASSAY OF MAGNESIUM: CPT

## 2025-05-25 PROCEDURE — 99222 1ST HOSP IP/OBS MODERATE 55: CPT | Performed by: STUDENT IN AN ORGANIZED HEALTH CARE EDUCATION/TRAINING PROGRAM

## 2025-05-25 PROCEDURE — 84100 ASSAY OF PHOSPHORUS: CPT

## 2025-05-25 PROCEDURE — 85025 COMPLETE CBC W/AUTO DIFF WBC: CPT

## 2025-05-25 PROCEDURE — 2580000003 HC RX 258: Performed by: STUDENT IN AN ORGANIZED HEALTH CARE EDUCATION/TRAINING PROGRAM

## 2025-05-25 PROCEDURE — 94640 AIRWAY INHALATION TREATMENT: CPT

## 2025-05-25 PROCEDURE — 51702 INSERT TEMP BLADDER CATH: CPT

## 2025-05-25 PROCEDURE — 1100000000 HC RM PRIVATE

## 2025-05-25 PROCEDURE — 94761 N-INVAS EAR/PLS OXIMETRY MLT: CPT

## 2025-05-25 PROCEDURE — 80053 COMPREHEN METABOLIC PANEL: CPT

## 2025-05-25 PROCEDURE — 36415 COLL VENOUS BLD VENIPUNCTURE: CPT

## 2025-05-25 PROCEDURE — 97162 PT EVAL MOD COMPLEX 30 MIN: CPT

## 2025-05-25 RX ORDER — POTASSIUM CHLORIDE 1500 MG/1
20 TABLET, EXTENDED RELEASE ORAL ONCE
Status: COMPLETED | OUTPATIENT
Start: 2025-05-25 | End: 2025-05-25

## 2025-05-25 RX ORDER — BUDESONIDE 0.5 MG/2ML
0.5 INHALANT ORAL
Status: DISCONTINUED | OUTPATIENT
Start: 2025-05-25 | End: 2025-05-29 | Stop reason: HOSPADM

## 2025-05-25 RX ORDER — 0.9 % SODIUM CHLORIDE 0.9 %
500 INTRAVENOUS SOLUTION INTRAVENOUS ONCE
Status: COMPLETED | OUTPATIENT
Start: 2025-05-25 | End: 2025-05-25

## 2025-05-25 RX ORDER — ARFORMOTEROL TARTRATE 15 UG/2ML
15 SOLUTION RESPIRATORY (INHALATION)
Status: DISCONTINUED | OUTPATIENT
Start: 2025-05-25 | End: 2025-05-29 | Stop reason: HOSPADM

## 2025-05-25 RX ADMIN — KETOROLAC TROMETHAMINE 15 MG: 30 INJECTION, SOLUTION INTRAMUSCULAR at 16:11

## 2025-05-25 RX ADMIN — KETOROLAC TROMETHAMINE 15 MG: 30 INJECTION, SOLUTION INTRAMUSCULAR at 04:42

## 2025-05-25 RX ADMIN — BUDESONIDE 500 MCG: 0.5 INHALANT RESPIRATORY (INHALATION) at 20:23

## 2025-05-25 RX ADMIN — FOLIC ACID 1 MG: 1 TABLET ORAL at 09:25

## 2025-05-25 RX ADMIN — KETOROLAC TROMETHAMINE 15 MG: 30 INJECTION, SOLUTION INTRAMUSCULAR at 21:22

## 2025-05-25 RX ADMIN — PANTOPRAZOLE SODIUM 40 MG: 40 TABLET, DELAYED RELEASE ORAL at 05:38

## 2025-05-25 RX ADMIN — POTASSIUM CHLORIDE 20 MEQ: 1500 TABLET, EXTENDED RELEASE ORAL at 12:30

## 2025-05-25 RX ADMIN — IPRATROPIUM BROMIDE 0.5 MG: 0.5 SOLUTION RESPIRATORY (INHALATION) at 09:03

## 2025-05-25 RX ADMIN — ARFORMOTEROL TARTRATE 15 MCG: 15 SOLUTION RESPIRATORY (INHALATION) at 20:23

## 2025-05-25 RX ADMIN — KETOROLAC TROMETHAMINE 15 MG: 30 INJECTION, SOLUTION INTRAMUSCULAR at 10:53

## 2025-05-25 RX ADMIN — IPRATROPIUM BROMIDE 0.5 MG: 0.5 SOLUTION RESPIRATORY (INHALATION) at 20:15

## 2025-05-25 RX ADMIN — ARFORMOTEROL TARTRATE 15 MCG: 15 SOLUTION RESPIRATORY (INHALATION) at 09:03

## 2025-05-25 RX ADMIN — PANTOPRAZOLE SODIUM 40 MG: 40 TABLET, DELAYED RELEASE ORAL at 16:11

## 2025-05-25 RX ADMIN — MONTELUKAST 10 MG: 10 TABLET, FILM COATED ORAL at 21:22

## 2025-05-25 RX ADMIN — ENOXAPARIN SODIUM 30 MG: 100 INJECTION SUBCUTANEOUS at 09:25

## 2025-05-25 RX ADMIN — DOCUSATE SODIUM AND SENNOSIDES 2 TABLET: 8.6; 5 TABLET, FILM COATED ORAL at 21:22

## 2025-05-25 RX ADMIN — SODIUM CHLORIDE, PRESERVATIVE FREE 10 ML: 5 INJECTION INTRAVENOUS at 09:38

## 2025-05-25 RX ADMIN — Medication 100 MG: at 09:25

## 2025-05-25 RX ADMIN — TAMSULOSIN HYDROCHLORIDE 0.4 MG: 0.4 CAPSULE ORAL at 09:25

## 2025-05-25 RX ADMIN — FERROUS SULFATE TAB 325 MG (65 MG ELEMENTAL FE) 325 MG: 325 (65 FE) TAB at 09:25

## 2025-05-25 RX ADMIN — SODIUM CHLORIDE, PRESERVATIVE FREE 10 ML: 5 INJECTION INTRAVENOUS at 21:23

## 2025-05-25 RX ADMIN — SODIUM CHLORIDE 500 ML: 0.9 INJECTION, SOLUTION INTRAVENOUS at 05:29

## 2025-05-25 RX ADMIN — DONEPEZIL HYDROCHLORIDE 5 MG: 5 TABLET, FILM COATED ORAL at 21:22

## 2025-05-25 RX ADMIN — MIRTAZAPINE 30 MG: 15 TABLET, FILM COATED ORAL at 21:22

## 2025-05-25 RX ADMIN — BUDESONIDE 500 MCG: 0.5 INHALANT RESPIRATORY (INHALATION) at 09:03

## 2025-05-25 ASSESSMENT — PAIN SCALES - GENERAL
PAINLEVEL_OUTOF10: 6
PAINLEVEL_OUTOF10: 5

## 2025-05-25 ASSESSMENT — PAIN DESCRIPTION - ORIENTATION
ORIENTATION: OUTER
ORIENTATION: OUTER

## 2025-05-25 ASSESSMENT — PAIN DESCRIPTION - DESCRIPTORS
DESCRIPTORS: ACHING
DESCRIPTORS: ACHING

## 2025-05-25 ASSESSMENT — PAIN DESCRIPTION - LOCATION
LOCATION: HIP
LOCATION: HIP

## 2025-05-25 NOTE — PROGRESS NOTES
Physical Therapy Note    Orders acknowledged. Chart reviewed. Noted PT orders to begin 5/25/25 at 12:00. Noted patient with GLF PTA and admitted with hip pain. CT indicative of pathologic fracture of R greater trochanter. Ortho consult pending and strict bedrest orders in place. Will hold PT evaluation at this time and follow up as medically appropriate.    Thank you,  Lucia Amrendariz, PT, DPT

## 2025-05-25 NOTE — PROGRESS NOTES
Occupational Therapy    Orders received, acknowledged, and patient chart reviewed up to date. Patient admitted s/p fall with hip pain. Note CT indicative of pathologic fracture of R greater trochanter. Ortho consult pending and strict bedrest orders in place.     Will hold evaluation at this time and follow-up as appropriate per plan of care.     Thank you.  Adriana Peters OTR/L

## 2025-05-25 NOTE — PROGRESS NOTES
Hospitalist Progress Note     Demographics    Patient Name  Anuel Bermudez   Date of Birth 1945   Medical Record Number  [id number]      Age  79 y.o.   PCP Bonifacio Bassett MD   Admit date:  5/24/2025 10:32 AM     Room Number  117/01  @ [facility]           Admission Diagnoses:  Pathological fracture of right hip (HCC)   Admission Summary:  \" ED Report // Course --> \"61 y.o. yo male with recent admission discharged home, fell today complaining of right hip pain. Workup with new pathologic fracture of the right greater trochanter. Still cannot ambulate, likely needs placement. Lives alone with only partial caregiver support. \"      In my discussion with patient: he confirmed the history conferred from discussion with ED, details reflected within their note. He seemed worn down, lethargic, and likely had just gotten pain meds. Was awake and alert, but was agreeable and didn't have any additional questions or concerns. Provided him an explanation of the tentative plan, he was appreciative and didn't have any issues with having palliative involved. No family/caregivers present, can reassess in AM.      Assessment // Plan (see active problem list above)   79 y.o. male with an updated PMHx below, admitted for intractable right hip pain 2/2 pathologic R greater trochanteric hip fracture following mechanical fall. Unable to determine acuity, as patient had pain predating the diagnosis. Had just been discharged home yesterday and caregiver came to see him for the first time and he unfortunately had mechanical fall. Has history of metastatic lung cancer to the bone, hip pain has predated the fracture but interesting, plain films on 5/19 and today were negative with CT revealing (ED ordered as patient was behaving like hip fracture which was a good catch.) Requested Oncology be made aware, because given his trajectory, I am not sure which direction he will want to go. Can consider

## 2025-05-25 NOTE — CONSULTS
Orthopedic Surgery Consult Note:  CC: Right hip pain    Subjective:  HPI: Anuel Berumdez is a 79 y.o. male who complains of right hip pain.  He was recently discharged from the hospital on 5/23.  He did sustain a fall where he hit his right hip.  Since then he has had difficulty time walking and has had tenderness overlying his right hip.  He does have history of lung cancer.  Orthopedics was consulted due to the concern of a greater trochanteric pathological fracture seen on CT scan.  He currently endorses pain to the right hip but is worse with movement and better with rest.  He denies pain to bilateral upper extremities or left lower extremity.  Denies chest pain, shortness breath, fever, nausea, vomiting, chills, stations, headache.    ROS: With the exception of pertinent positives and negatives, all other systems reviewed are negative.  PMH:  has a past medical history of Aspiration pneumonia ([facility]) (01/23/2024), Chronic obstructive pulmonary disease, unspecified ([facility]) (12/05/2024), Fecal impaction ([facility]) (12/05/2024), Hypokalemia (12/01/2024), Hypomagnesemia (12/05/2024), Hyponatremia (01/23/2024), Lung cancer ([facility]), and Perforation of intestine (nontraumatic) ([facility]) (08/11/2024).  PSH:  has a past surgical history that includes bronchoscopy (N/A, 3/20/2024); IR PORT PLACEMENT > 5 YEARS (4/18/2024); and laparoscopy (N/A, 8/11/2024).   Medications: Budeson-Glycopyrrol-Formoterol, albuterol sulfate HFA, donepezil, ferrous sulfate, folic acid, mirtazapine, montelukast, pantoprazole, sennosides-docusate sodium, tamsulosin, and vitamin B-1   Allergies: Egg-derived products, Fish-derived products, and Penicillins   FH: family history is not on file.  SH:  reports that he quit smoking about 2 years ago. His smoking use included cigars. He has never used smokeless tobacco. He reports that he does not currently use alcohol. He reports that he does not use drugs.     Objective:  General: \"A&O x 3.    Physical

## 2025-05-26 LAB
ALBUMIN SERPL-MCNC: 1.8 G/DL (ref 3.5–5)
ALBUMIN/GLOB SERPL: 0.5 (ref 1.1–2.2)
ALP SERPL-CCNC: 73 U/L (ref 45–117)
ALT SERPL-CCNC: 28 U/L (ref 12–78)
ANION GAP SERPL CALC-SCNC: 9 MMOL/L (ref 2–12)
APPEARANCE UR: CLEAR
AST SERPL-CCNC: 29 U/L (ref 15–37)
BACTERIA URNS QL MICRO: NEGATIVE /HPF
BASOPHILS # BLD: 0.06 K/UL (ref 0–0.1)
BASOPHILS NFR BLD: 0.4 % (ref 0–1)
BILIRUB SERPL-MCNC: 0.3 MG/DL (ref 0.2–1)
BILIRUB UR QL: NEGATIVE
BUN SERPL-MCNC: 29 MG/DL (ref 6–20)
BUN/CREAT SERPL: 19 (ref 12–20)
CALCIUM SERPL-MCNC: 8 MG/DL (ref 8.5–10.1)
CHLORIDE SERPL-SCNC: 111 MMOL/L (ref 97–108)
CO2 SERPL-SCNC: 17 MMOL/L (ref 21–32)
COLOR UR: ABNORMAL
CREAT SERPL-MCNC: 1.49 MG/DL (ref 0.7–1.3)
DIFFERENTIAL METHOD BLD: ABNORMAL
EOSINOPHIL # BLD: 0.11 K/UL (ref 0–0.4)
EOSINOPHIL NFR BLD: 0.8 % (ref 0–7)
EPITH CASTS URNS QL MICRO: ABNORMAL /LPF
ERYTHROCYTE [DISTWIDTH] IN BLOOD BY AUTOMATED COUNT: 14.3 % (ref 11.5–14.5)
GLOBULIN SER CALC-MCNC: 3.8 G/DL (ref 2–4)
GLUCOSE SERPL-MCNC: 96 MG/DL (ref 65–100)
GLUCOSE UR STRIP.AUTO-MCNC: NEGATIVE MG/DL
HCT VFR BLD AUTO: 28.4 % (ref 36.6–50.3)
HGB BLD-MCNC: 8.7 G/DL (ref 12.1–17)
HGB UR QL STRIP: NEGATIVE
HYALINE CASTS URNS QL MICRO: ABNORMAL /LPF (ref 0–2)
IMM GRANULOCYTES # BLD AUTO: 0.09 K/UL (ref 0–0.04)
IMM GRANULOCYTES NFR BLD AUTO: 0.6 % (ref 0–0.5)
KETONES UR QL STRIP.AUTO: NEGATIVE MG/DL
LEUKOCYTE ESTERASE UR QL STRIP.AUTO: ABNORMAL
LYMPHOCYTES # BLD: 1.1 K/UL (ref 0.8–3.5)
LYMPHOCYTES NFR BLD: 7.6 % (ref 12–49)
MAGNESIUM SERPL-MCNC: 1.6 MG/DL (ref 1.6–2.4)
MCH RBC QN AUTO: 29.6 PG (ref 26–34)
MCHC RBC AUTO-ENTMCNC: 30.6 G/DL (ref 30–36.5)
MCV RBC AUTO: 96.6 FL (ref 80–99)
MONOCYTES # BLD: 0.89 K/UL (ref 0–1)
MONOCYTES NFR BLD: 6.1 % (ref 5–13)
NEUTS SEG # BLD: 12.27 K/UL (ref 1.8–8)
NEUTS SEG NFR BLD: 84.5 % (ref 32–75)
NITRITE UR QL STRIP.AUTO: NEGATIVE
NRBC # BLD: 0 K/UL (ref 0–0.01)
NRBC BLD-RTO: 0 PER 100 WBC
PH UR STRIP: 7 (ref 5–8)
PHOSPHATE SERPL-MCNC: 3.2 MG/DL (ref 2.6–4.7)
PLATELET # BLD AUTO: 322 K/UL (ref 150–400)
PMV BLD AUTO: 8.4 FL (ref 8.9–12.9)
POTASSIUM SERPL-SCNC: 3.2 MMOL/L (ref 3.5–5.1)
PROT SERPL-MCNC: 5.6 G/DL (ref 6.4–8.2)
PROT UR STRIP-MCNC: ABNORMAL MG/DL
RBC # BLD AUTO: 2.94 M/UL (ref 4.1–5.7)
RBC #/AREA URNS HPF: ABNORMAL /HPF (ref 0–5)
SODIUM SERPL-SCNC: 137 MMOL/L (ref 136–145)
SP GR UR REFRACTOMETRY: 1.01
URINE CULTURE IF INDICATED: ABNORMAL
UROBILINOGEN UR QL STRIP.AUTO: 0.2 EU/DL (ref 0.2–1)
WBC # BLD AUTO: 14.5 K/UL (ref 4.1–11.1)
WBC URNS QL MICRO: ABNORMAL /HPF (ref 0–4)

## 2025-05-26 PROCEDURE — 81001 URINALYSIS AUTO W/SCOPE: CPT

## 2025-05-26 PROCEDURE — 6370000000 HC RX 637 (ALT 250 FOR IP): Performed by: STUDENT IN AN ORGANIZED HEALTH CARE EDUCATION/TRAINING PROGRAM

## 2025-05-26 PROCEDURE — 83735 ASSAY OF MAGNESIUM: CPT

## 2025-05-26 PROCEDURE — 85025 COMPLETE CBC W/AUTO DIFF WBC: CPT

## 2025-05-26 PROCEDURE — 84100 ASSAY OF PHOSPHORUS: CPT

## 2025-05-26 PROCEDURE — 6360000002 HC RX W HCPCS: Performed by: NURSE PRACTITIONER

## 2025-05-26 PROCEDURE — 51702 INSERT TEMP BLADDER CATH: CPT

## 2025-05-26 PROCEDURE — 80053 COMPREHEN METABOLIC PANEL: CPT

## 2025-05-26 PROCEDURE — 2500000003 HC RX 250 WO HCPCS: Performed by: STUDENT IN AN ORGANIZED HEALTH CARE EDUCATION/TRAINING PROGRAM

## 2025-05-26 PROCEDURE — 94640 AIRWAY INHALATION TREATMENT: CPT

## 2025-05-26 PROCEDURE — 36415 COLL VENOUS BLD VENIPUNCTURE: CPT

## 2025-05-26 PROCEDURE — 6360000002 HC RX W HCPCS: Performed by: STUDENT IN AN ORGANIZED HEALTH CARE EDUCATION/TRAINING PROGRAM

## 2025-05-26 PROCEDURE — 6370000000 HC RX 637 (ALT 250 FOR IP): Performed by: NURSE PRACTITIONER

## 2025-05-26 PROCEDURE — 94761 N-INVAS EAR/PLS OXIMETRY MLT: CPT

## 2025-05-26 PROCEDURE — 2580000003 HC RX 258: Performed by: STUDENT IN AN ORGANIZED HEALTH CARE EDUCATION/TRAINING PROGRAM

## 2025-05-26 PROCEDURE — 1100000000 HC RM PRIVATE

## 2025-05-26 RX ORDER — SULFAMETHOXAZOLE AND TRIMETHOPRIM 400; 80 MG/1; MG/1
1 TABLET ORAL EVERY 12 HOURS SCHEDULED
Status: DISCONTINUED | OUTPATIENT
Start: 2025-05-26 | End: 2025-05-29 | Stop reason: HOSPADM

## 2025-05-26 RX ORDER — POTASSIUM CHLORIDE 1500 MG/1
40 TABLET, EXTENDED RELEASE ORAL 2 TIMES DAILY
Status: DISCONTINUED | OUTPATIENT
Start: 2025-05-26 | End: 2025-05-29 | Stop reason: HOSPADM

## 2025-05-26 RX ORDER — SULFAMETHOXAZOLE AND TRIMETHOPRIM 800; 160 MG/1; MG/1
1 TABLET ORAL ONCE
Status: COMPLETED | OUTPATIENT
Start: 2025-05-26 | End: 2025-05-26

## 2025-05-26 RX ORDER — HYDROXYZINE HYDROCHLORIDE 10 MG/1
10 TABLET, FILM COATED ORAL 3 TIMES DAILY PRN
Status: DISCONTINUED | OUTPATIENT
Start: 2025-05-26 | End: 2025-05-29 | Stop reason: HOSPADM

## 2025-05-26 RX ORDER — MAGNESIUM SULFATE 1 G/100ML
1000 INJECTION INTRAVENOUS ONCE
Status: COMPLETED | OUTPATIENT
Start: 2025-05-26 | End: 2025-05-26

## 2025-05-26 RX ORDER — SULFAMETHOXAZOLE AND TRIMETHOPRIM 800; 160 MG/1; MG/1
1 TABLET ORAL EVERY 12 HOURS SCHEDULED
Status: DISCONTINUED | OUTPATIENT
Start: 2025-05-26 | End: 2025-05-26 | Stop reason: DRUGHIGH

## 2025-05-26 RX ADMIN — ENOXAPARIN SODIUM 30 MG: 100 INJECTION SUBCUTANEOUS at 09:11

## 2025-05-26 RX ADMIN — IPRATROPIUM BROMIDE 0.5 MG: 0.5 SOLUTION RESPIRATORY (INHALATION) at 20:50

## 2025-05-26 RX ADMIN — HYDROXYZINE HYDROCHLORIDE 10 MG: 10 TABLET ORAL at 16:23

## 2025-05-26 RX ADMIN — DONEPEZIL HYDROCHLORIDE 5 MG: 5 TABLET, FILM COATED ORAL at 21:31

## 2025-05-26 RX ADMIN — ARFORMOTEROL TARTRATE 15 MCG: 15 SOLUTION RESPIRATORY (INHALATION) at 20:50

## 2025-05-26 RX ADMIN — TAMSULOSIN HYDROCHLORIDE 0.4 MG: 0.4 CAPSULE ORAL at 09:10

## 2025-05-26 RX ADMIN — FERROUS SULFATE TAB 325 MG (65 MG ELEMENTAL FE) 325 MG: 325 (65 FE) TAB at 09:10

## 2025-05-26 RX ADMIN — KETOROLAC TROMETHAMINE 15 MG: 30 INJECTION, SOLUTION INTRAMUSCULAR at 04:11

## 2025-05-26 RX ADMIN — SODIUM CHLORIDE, PRESERVATIVE FREE 10 ML: 5 INJECTION INTRAVENOUS at 09:14

## 2025-05-26 RX ADMIN — PANTOPRAZOLE SODIUM 40 MG: 40 TABLET, DELAYED RELEASE ORAL at 06:36

## 2025-05-26 RX ADMIN — BUDESONIDE 500 MCG: 0.5 INHALANT RESPIRATORY (INHALATION) at 09:49

## 2025-05-26 RX ADMIN — MIRTAZAPINE 30 MG: 15 TABLET, FILM COATED ORAL at 21:31

## 2025-05-26 RX ADMIN — SULFAMETHOXAZOLE AND TRIMETHOPRIM 1 TABLET: 800; 160 TABLET ORAL at 09:34

## 2025-05-26 RX ADMIN — POTASSIUM CHLORIDE 40 MEQ: 1500 TABLET, EXTENDED RELEASE ORAL at 09:34

## 2025-05-26 RX ADMIN — SODIUM CHLORIDE, PRESERVATIVE FREE 10 ML: 5 INJECTION INTRAVENOUS at 21:32

## 2025-05-26 RX ADMIN — POTASSIUM CHLORIDE 40 MEQ: 1500 TABLET, EXTENDED RELEASE ORAL at 21:31

## 2025-05-26 RX ADMIN — MONTELUKAST 10 MG: 10 TABLET, FILM COATED ORAL at 21:31

## 2025-05-26 RX ADMIN — KETOROLAC TROMETHAMINE 15 MG: 30 INJECTION, SOLUTION INTRAMUSCULAR at 09:10

## 2025-05-26 RX ADMIN — ARFORMOTEROL TARTRATE 15 MCG: 15 SOLUTION RESPIRATORY (INHALATION) at 09:49

## 2025-05-26 RX ADMIN — IPRATROPIUM BROMIDE 0.5 MG: 0.5 SOLUTION RESPIRATORY (INHALATION) at 09:49

## 2025-05-26 RX ADMIN — Medication 100 MG: at 09:10

## 2025-05-26 RX ADMIN — SULFAMETHOXAZOLE AND TRIMETHOPRIM 1 TABLET: 400; 80 TABLET ORAL at 21:32

## 2025-05-26 RX ADMIN — PANTOPRAZOLE SODIUM 40 MG: 40 TABLET, DELAYED RELEASE ORAL at 16:17

## 2025-05-26 RX ADMIN — SODIUM CHLORIDE: 0.9 INJECTION, SOLUTION INTRAVENOUS at 09:44

## 2025-05-26 RX ADMIN — FOLIC ACID 1 MG: 1 TABLET ORAL at 09:10

## 2025-05-26 RX ADMIN — MAGNESIUM SULFATE HEPTAHYDRATE 1000 MG: 1 INJECTION, SOLUTION INTRAVENOUS at 09:48

## 2025-05-26 RX ADMIN — BUDESONIDE 500 MCG: 0.5 INHALANT RESPIRATORY (INHALATION) at 20:53

## 2025-05-26 ASSESSMENT — PAIN DESCRIPTION - ORIENTATION: ORIENTATION: OUTER

## 2025-05-26 ASSESSMENT — PAIN SCALES - GENERAL: PAINLEVEL_OUTOF10: 0

## 2025-05-26 ASSESSMENT — PAIN DESCRIPTION - DESCRIPTORS: DESCRIPTORS: ACHING

## 2025-05-26 ASSESSMENT — PAIN - FUNCTIONAL ASSESSMENT: PAIN_FUNCTIONAL_ASSESSMENT: ACTIVITIES ARE NOT PREVENTED

## 2025-05-26 NOTE — PROGRESS NOTES
Comprehensive Nutrition Assessment    Type and Reason for Visit:  Initial, Consult    Nutrition Recommendations/Plan:   Continue regular diet  Continue [email or web address] ONS TID     Malnutrition Assessment:  Malnutrition Status:  Severe malnutrition (05/26/25 1304)    Context:  Chronic Illness     Findings of the 6 clinical characteristics of malnutrition:  Energy Intake:  Unable to assess  Weight Loss:  No weight loss     Body Fat Loss:  Severe body fat loss Orbital   Muscle Mass Loss:  Severe muscle mass loss Clavicles (pectoralis & deltoids), Temples (temporalis)  Fluid Accumulation:  No fluid accumulation     Strength:  Not Performed    Nutrition Assessment:    Patient medically noted for right hip fracture, severe malnutrition, and metastatic lung cancer. Patient receiving a regular diet. Sleeping soundly at time of visit. No documented PO intake at this time. Patient was receiving ensure plus high protein TID but RN adjusted to [employer] today; RN reports patient stated that the ensure gave him diarrhea. Will continue liberalized diet and supplements as ordered. Encourage intake of meals as tolerated.     Nutrition Related Findings:    Na 137, K+ 3.2, Mg 1.6, BG 96, Phos 3.2   BM 5/24   Aricept, Iron, Folic Acid, Remeron, Protonix, KCl, Senokot, Thiamine   Wound Type: None       Current Nutrition Intake & Therapies:    Average Meal Intake: Unable to assess  Average Supplements Intake: Unable to assess  ADULT DIET; Regular; Egg Allergy  ADULT ORAL NUTRITION SUPPLEMENT; Breakfast, Lunch, Dinner; Plant Based Oral Supplement  Diet NPO Exceptions are: Sips of Water with Meds    Anthropometric Measures:  Height: 165.1 cm (5' 5\")  Ideal Body Weight (IBW): 136 lbs (62 kg)       Current Body Weight: 43.1 kg (95 lb 0.3 oz),   IBW.    Current BMI (kg/m2): 15.8                             BMI Categories: Underweight (BMI less than 18.5)    Estimated Daily Nutrient Needs:  Energy Requirements Based On: Kcal/kg  Weight Used

## 2025-05-26 NOTE — PROGRESS NOTES
Progress Note; [facility]    S:   EMR reviewed  Known patient of Dr. Montaño    See below for diagnosis/ treatment history:    Seen by Dr. Liriano with orthopedics this hospitalization and conservative approach has been recommended.    Today he reports being \"down.\"   No new clinical issues    Comprehensive ROS performed and o/w negative    Last CT scan of c/a/p was from 1/25:   IMPRESSION:  1. Enlarged left hilar lymphadenopathy or pulmonary mass, appears similar to the prior study.  2. There is complete obstruction or involvement of the left mainstem bronchus.  3. There is complete atelectasis of the left lung with mediastinal shift towards the left and compensatory hyperexpansion of the right lung.  4. Moderate size left-sided pleural effusion.  5. Irregular opacity in the posterior aspect of the right upper lobe. Other atelectatic changes in the dependent portions of the right lung.  6. No evidence of metastatic disease in the abdomen or pelvis.  7. Very large amount of stool in the rectum is noted and could represent fecal impaction.       Primary Diagnosis: 1) Squamous cell carcinoma of the lung. Date of Diagnosis: 03/2024. Stage of Diagnosis: IV, M1a (tumor on right and left lung) NGS: Tempus TPS5%, BRCA1 somatic mutation, TMB 6.8. Prior Treatment: 1) [facility] interventional pulmonology Laser to Left endobronchial lung lesion was able to open 50% per Dr Rothman 2) carboplatin AUC of 4, Taxol, dose reduced 25% with Keytruda a. c1d1 4/24/24, C2 5/15/24, C3 6/5/24, C4 6/26/24 b. Keytruda maintenance started 10/9/24 - now      O: 98.2: 90; 161/68; 97%  NAD  Anicteric  No respiratory distress  No abd distention  Alert, normal speech      14.5>8.7<322; Cr 1.49; alb 1.8    CT scan pelvis (5/24/25): There is a mildly displaced, pathologic fracture through a lucent mass in the right greater trochanter. This may represent a metastasis or myeloma. No other concerning osseous lesions. Incidental note is made of lipomas or focal

## 2025-05-26 NOTE — CASE COMMUNICATION
[facility] called for an update from case management concerning discharge plans.     [phone]

## 2025-05-26 NOTE — PROGRESS NOTES
End of Shift Note    Bedside shift change report given to JOSE DE JESUS Arteaga (oncoming nurse) by Renny Triplett RN (offgoing nurse).  Report included the following information SBAR and MAR    Shift worked:  Night     Shift summary and any significant changes:    Patient recorded stable vital signs, A&OX4, voiding with badillo, no complaints of pain throughout the shift, had a bowel movement this morning, patient is stable.     Concerns for physician to address:  None     Zone phone for oncoming shift:   [phone]       Activity:     Number times ambulated in hallways past shift: 0  Number of times OOB to chair past shift: 0    Cardiac:   Cardiac Monitoring: No           Access:  Current line(s): PIV    Genitourinary:   Urinary Status: Badillo    Respiratory:   O2 Device: None (Room air)  Chronic home O2 use?: NO  Incentive spirometer at bedside: YES    GI:  Last BM (including prior to admit): 05/24/25  Current diet:  ADULT ORAL NUTRITION SUPPLEMENT; Breakfast, Lunch, Dinner; Standard High Calorie/High Protein Oral Supplement  ADULT DIET; Regular; Egg Allergy  Passing flatus: YES    Pain Management:   Patient states pain is manageable on current regimen: YES    Skin:  Eric Scale Score: 19  Interventions: Wound Offloading (Prevention Methods): Bed, pressure reduction mattress, Elevate heels, Repositioning, Pillows, Turning    Patient Safety:  Fall Risk: Nursing Judgement-Fall Risk High(Add Comments): Yes  Fall Risk Interventions  Nursing Judgement-Fall Risk High(Add Comments): Yes  Toilet Every 2 Hours-In Advance of Need: Yes  Hourly Visual Checks: Awake, In bed, Quiet  Fall Visual Posted: Armband, Fall sign posted, Socks  Room Door Open: Deferred to promote rest  Alarm On: Bed  Patient Moved Closer to Nursing Station: No    Active Consults:   IP CONSULT TO ONCOLOGY  IP CONSULT TO ORTHOPEDIC SURGERY  IP CONSULT TO SOCIAL WORK  IP CONSULT TO SPIRITUAL SERVICES  IP CONSULT TO DIETITIAN  IP CONSULT TO PALLIATIVE CARE  IP CONSULT TO

## 2025-05-26 NOTE — PROGRESS NOTES
Hospitalist Progress Note    NAME:   Anuel Bermudez   : 1945   MRN: [id number]     Date/Time: 2025 10:59 AM  Patient PCP: Bonifacio Bassett MD    Estimated discharge date:  Barriers: electrolyte stability, resolution of WBC, urine results,       Assessment / Plan:  Pathologic right greater trochanter fracture   Mechanical GLF  Hip x-ray - no acute bony abnormality  CT pelvis - pathologic fracture of the right greater trochanter.    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 Ruiz outpatient- recommending biopsy   Oncology, palliative care, dietician and spiritual services consulted, appreciate expertise and recommendations     Chronic hypoxic respiratory failure  Baseline home oxygen 2L  Continue formulary substitution nebulizer  Continue Singulair     Chronic urinary retention  CHIRAG  Admission Creatinine 1.41  Continue PTA Flomax  Continuous LR infusion      Hypokalemia  Replete to keep K>4 mag >2     Chronic anemia  Continue PTA iron, Folvite  Trend CBC     History of perforated peptic ulcer s/p laparoscopic Adarsh patch repair  Continue PPI     Neurologic conditions  Continue PTA donepezil, mirtazapine    Constipation   Add miralax daily   -laculose x 1      Medical Decision Making:   I personally reviewed labs:y  I personally reviewed imaging:y  I personally reviewed EKG:-  Toxic drug monitoring: lovenox   Discussed case with:  PAIN SCALE 7 OF 10.

## 2025-05-26 NOTE — PROGRESS NOTES
Spiritual Health History and Assessment/Progress Note  [facility]    Spiritual/Emotional Needs,  , Life Adjustments, Adjustment to illness,      Name: Anuel Bermudez MRN: [id number]    Age: 79 y.o.     Sex: male   Language: English   Cheondoism: Mosque   Pathological fracture of right hip (HCC)     Date: 5/26/2025            Total Time Calculated: 29 min              Spiritual Assessment began in [facility] ORTHOPEDICS            Encounter Overview/Reason: Spiritual/Emotional Needs  Service Provided For: Patient and family together    Mariana, Belief, Meaning:   Patient is connected with a mariana tradition or spiritual practice and has beliefs or practices that help with coping during difficult times  Family/Friends are connected with a mariana tradition or spiritual practice and have beliefs or practices that help with coping during difficult times      Importance and Influence:  Patient has spiritual/personal beliefs that influence decisions regarding their health  Family/Friends have spiritual/personal beliefs that influence decisions regarding the patient's health    Community:  Patient feels well-supported. Support system includes: Mariana Community and Extended family  Family/Friends feel well-supported. Support system includes: [place] Community and Extended family    Assessment and Plan of Care:     Patient Interventions include: Affirmed coping skills/support systems  Family/Friends Interventions include: Affirmed coping skills/support systems    Patient Plan of Care: Spiritual Care available upon further referral  Family/Friends Plan of Care: Spiritual Care available upon further referral    Electronically signed by NIKA Arshad on 5/26/2025 at 12:15 PM

## 2025-05-27 LAB
ALBUMIN SERPL-MCNC: 1.9 G/DL (ref 3.5–5)
ALBUMIN/GLOB SERPL: 0.6 (ref 1.1–2.2)
ALP SERPL-CCNC: 77 U/L (ref 45–117)
ALT SERPL-CCNC: 34 U/L (ref 12–78)
ANION GAP SERPL CALC-SCNC: 5 MMOL/L (ref 2–12)
AST SERPL-CCNC: 26 U/L (ref 15–37)
BASOPHILS # BLD: 0.05 K/UL (ref 0–0.1)
BASOPHILS NFR BLD: 0.4 % (ref 0–1)
BILIRUB SERPL-MCNC: 0.3 MG/DL (ref 0.2–1)
BUN SERPL-MCNC: 24 MG/DL (ref 6–20)
BUN/CREAT SERPL: 16 (ref 12–20)
CALCIUM SERPL-MCNC: 8 MG/DL (ref 8.5–10.1)
CHLORIDE SERPL-SCNC: 116 MMOL/L (ref 97–108)
CO2 SERPL-SCNC: 20 MMOL/L (ref 21–32)
CREAT SERPL-MCNC: 1.46 MG/DL (ref 0.7–1.3)
DIFFERENTIAL METHOD BLD: ABNORMAL
EOSINOPHIL # BLD: 0.17 K/UL (ref 0–0.4)
EOSINOPHIL NFR BLD: 1.4 % (ref 0–7)
ERYTHROCYTE [DISTWIDTH] IN BLOOD BY AUTOMATED COUNT: 14.4 % (ref 11.5–14.5)
GLOBULIN SER CALC-MCNC: 3.4 G/DL (ref 2–4)
GLUCOSE BLD STRIP.AUTO-MCNC: 94 MG/DL (ref 65–117)
GLUCOSE SERPL-MCNC: 106 MG/DL (ref 65–100)
HCT VFR BLD AUTO: 24.8 % (ref 36.6–50.3)
HGB BLD-MCNC: 8 G/DL (ref 12.1–17)
IMM GRANULOCYTES # BLD AUTO: 0.06 K/UL (ref 0–0.04)
IMM GRANULOCYTES NFR BLD AUTO: 0.5 % (ref 0–0.5)
LYMPHOCYTES # BLD: 1.24 K/UL (ref 0.8–3.5)
LYMPHOCYTES NFR BLD: 10.5 % (ref 12–49)
MAGNESIUM SERPL-MCNC: 1.9 MG/DL (ref 1.6–2.4)
MCH RBC QN AUTO: 29.5 PG (ref 26–34)
MCHC RBC AUTO-ENTMCNC: 32.3 G/DL (ref 30–36.5)
MCV RBC AUTO: 91.5 FL (ref 80–99)
MONOCYTES # BLD: 0.9 K/UL (ref 0–1)
MONOCYTES NFR BLD: 7.6 % (ref 5–13)
NEUTS SEG # BLD: 9.44 K/UL (ref 1.8–8)
NEUTS SEG NFR BLD: 79.6 % (ref 32–75)
NRBC # BLD: 0 K/UL (ref 0–0.01)
NRBC BLD-RTO: 0 PER 100 WBC
PHOSPHATE SERPL-MCNC: 1.8 MG/DL (ref 2.6–4.7)
PLATELET # BLD AUTO: 351 K/UL (ref 150–400)
PMV BLD AUTO: 8.1 FL (ref 8.9–12.9)
POTASSIUM SERPL-SCNC: 4.4 MMOL/L (ref 3.5–5.1)
PROT SERPL-MCNC: 5.3 G/DL (ref 6.4–8.2)
RBC # BLD AUTO: 2.71 M/UL (ref 4.1–5.7)
SERVICE CMNT-IMP: NORMAL
SODIUM SERPL-SCNC: 141 MMOL/L (ref 136–145)
WBC # BLD AUTO: 11.9 K/UL (ref 4.1–11.1)

## 2025-05-27 PROCEDURE — 6370000000 HC RX 637 (ALT 250 FOR IP): Performed by: STUDENT IN AN ORGANIZED HEALTH CARE EDUCATION/TRAINING PROGRAM

## 2025-05-27 PROCEDURE — 82962 GLUCOSE BLOOD TEST: CPT

## 2025-05-27 PROCEDURE — 83735 ASSAY OF MAGNESIUM: CPT

## 2025-05-27 PROCEDURE — 99223 1ST HOSP IP/OBS HIGH 75: CPT | Performed by: INTERNAL MEDICINE

## 2025-05-27 PROCEDURE — 85025 COMPLETE CBC W/AUTO DIFF WBC: CPT

## 2025-05-27 PROCEDURE — 94640 AIRWAY INHALATION TREATMENT: CPT

## 2025-05-27 PROCEDURE — 84100 ASSAY OF PHOSPHORUS: CPT

## 2025-05-27 PROCEDURE — 97530 THERAPEUTIC ACTIVITIES: CPT

## 2025-05-27 PROCEDURE — 51702 INSERT TEMP BLADDER CATH: CPT

## 2025-05-27 PROCEDURE — 80053 COMPREHEN METABOLIC PANEL: CPT

## 2025-05-27 PROCEDURE — 94761 N-INVAS EAR/PLS OXIMETRY MLT: CPT

## 2025-05-27 PROCEDURE — 6360000002 HC RX W HCPCS: Performed by: STUDENT IN AN ORGANIZED HEALTH CARE EDUCATION/TRAINING PROGRAM

## 2025-05-27 PROCEDURE — 1100000000 HC RM PRIVATE

## 2025-05-27 PROCEDURE — 2500000003 HC RX 250 WO HCPCS: Performed by: STUDENT IN AN ORGANIZED HEALTH CARE EDUCATION/TRAINING PROGRAM

## 2025-05-27 PROCEDURE — 6370000000 HC RX 637 (ALT 250 FOR IP): Performed by: NURSE PRACTITIONER

## 2025-05-27 PROCEDURE — 36415 COLL VENOUS BLD VENIPUNCTURE: CPT

## 2025-05-27 RX ORDER — HEPARIN SODIUM 5000 [USP'U]/ML
5000 INJECTION, SOLUTION INTRAVENOUS; SUBCUTANEOUS 2 TIMES DAILY
Status: DISCONTINUED | OUTPATIENT
Start: 2025-05-28 | End: 2025-05-29 | Stop reason: HOSPADM

## 2025-05-27 RX ADMIN — SODIUM CHLORIDE, PRESERVATIVE FREE 10 ML: 5 INJECTION INTRAVENOUS at 20:37

## 2025-05-27 RX ADMIN — TAMSULOSIN HYDROCHLORIDE 0.4 MG: 0.4 CAPSULE ORAL at 08:56

## 2025-05-27 RX ADMIN — POTASSIUM CHLORIDE 40 MEQ: 1500 TABLET, EXTENDED RELEASE ORAL at 20:35

## 2025-05-27 RX ADMIN — IPRATROPIUM BROMIDE 0.5 MG: 0.5 SOLUTION RESPIRATORY (INHALATION) at 09:57

## 2025-05-27 RX ADMIN — SULFAMETHOXAZOLE AND TRIMETHOPRIM 1 TABLET: 400; 80 TABLET ORAL at 20:34

## 2025-05-27 RX ADMIN — PANTOPRAZOLE SODIUM 40 MG: 40 TABLET, DELAYED RELEASE ORAL at 17:13

## 2025-05-27 RX ADMIN — BUDESONIDE 500 MCG: 0.5 INHALANT RESPIRATORY (INHALATION) at 10:02

## 2025-05-27 RX ADMIN — ARFORMOTEROL TARTRATE 15 MCG: 15 SOLUTION RESPIRATORY (INHALATION) at 10:02

## 2025-05-27 RX ADMIN — ENOXAPARIN SODIUM 30 MG: 100 INJECTION SUBCUTANEOUS at 09:01

## 2025-05-27 RX ADMIN — MIRTAZAPINE 30 MG: 15 TABLET, FILM COATED ORAL at 20:35

## 2025-05-27 RX ADMIN — POTASSIUM CHLORIDE 40 MEQ: 1500 TABLET, EXTENDED RELEASE ORAL at 08:56

## 2025-05-27 RX ADMIN — FERROUS SULFATE TAB 325 MG (65 MG ELEMENTAL FE) 325 MG: 325 (65 FE) TAB at 08:56

## 2025-05-27 RX ADMIN — DOCUSATE SODIUM AND SENNOSIDES 2 TABLET: 8.6; 5 TABLET, FILM COATED ORAL at 20:35

## 2025-05-27 RX ADMIN — MONTELUKAST 10 MG: 10 TABLET, FILM COATED ORAL at 20:35

## 2025-05-27 RX ADMIN — Medication 100 MG: at 08:56

## 2025-05-27 RX ADMIN — DONEPEZIL HYDROCHLORIDE 5 MG: 5 TABLET, FILM COATED ORAL at 20:35

## 2025-05-27 RX ADMIN — FOLIC ACID 1 MG: 1 TABLET ORAL at 08:57

## 2025-05-27 RX ADMIN — OXYCODONE 10 MG: 5 TABLET ORAL at 13:09

## 2025-05-27 RX ADMIN — SULFAMETHOXAZOLE AND TRIMETHOPRIM 1 TABLET: 400; 80 TABLET ORAL at 08:56

## 2025-05-27 RX ADMIN — PANTOPRAZOLE SODIUM 40 MG: 40 TABLET, DELAYED RELEASE ORAL at 06:19

## 2025-05-27 ASSESSMENT — PAIN SCALES - GENERAL
PAINLEVEL_OUTOF10: 0
PAINLEVEL_OUTOF10: 10

## 2025-05-27 ASSESSMENT — PAIN DESCRIPTION - LOCATION: LOCATION: CHEST;BACK

## 2025-05-27 ASSESSMENT — PAIN DESCRIPTION - DESCRIPTORS: DESCRIPTORS: PRESSURE;SORE

## 2025-05-27 ASSESSMENT — PAIN DESCRIPTION - ORIENTATION: ORIENTATION: POSTERIOR

## 2025-05-27 NOTE — PROGRESS NOTES
Hospital Medicine: Attestation of advanced practitioner note     Additional Recommendations:   This patient's clinical data and care plan were discussed with Ms. Siobhan Kent   Agree with current plan of care   Anthony discussions with family/MPOA at bedside, palliative, and oncology. Patient wanted to try for SNF. Given his amount of pain-not able to stay awake enough on appropriate pain management to participate in therapy. Family working on caregiver set up tomorrow. Plan for AM DC on Thursday.     PHYSICAL EXAM:  General:          Thin frail Alert, cooperative  EENT:              EOMI. Anicteric sclerae.  Resp:               CTA bilaterally, no wheezing or rales.  No accessory muscle use  CV:                  Regular  rhythm,  No edema  GI:                   +Bowel sounds  Neurologic:       Alert and oriented X 3, normal speech,   Psych:   Good insight. Not anxious nor agitated-drowsy  Skin:                No rashes.  No jaundice    The patient was seen and evaluated by hospitalist advanced practitioner.  Collaborative approach of chart review, discussion of clinical plan of care completed with above advanced practitioner .     I personally made/approved the management plan and take responsibility for the patient management.     Medical Decision Making:    [x] High (any 2)     A. Problems (any 1)  [x] Acute/Chronic Illness/injury posing threat to life or bodily function:  Pathologic right greater trochanter fracture, squamous cell lung ca  [] Severe exacerbation of chronic illness:    ---------------------------------------------------------------------  B. Risk of Treatment (any 1)   [x] Drugs/treatments that require intensive monitoring for toxicity include:    [] IV ABX requiring serial renal monitoring for nephrotoxicity:     [x] IV/PO Narcotic analgesia for adverse drug reaction  [] Aggressive IV diuresis requiring serial monitoring for renal impairment and electrolyte derangements  [] Critical electrolyte

## 2025-05-27 NOTE — PROGRESS NOTES
Hematology / Oncology ([facility])    -Primary Oncologist- Dr. Ruiz  -CC- \"okay\"    -S-  continues to have hip pain. He is feeling weaker than normal as well. Decreased energy level    -O-    Patient Vitals for the past 24 hrs:   Temp Pulse Resp BP SpO2   05/27/25 1130 99 °F (37.2 °C) 88 16 119/64 100 %   05/27/25 1016 -- -- -- -- 99 %   05/27/25 0957 -- 87 20 -- 99 %   05/27/25 0851 97.3 °F (36.3 °C) 83 18 123/69 100 %   05/27/25 0001 99.1 °F (37.3 °C) 93 22 117/67 97 %   05/26/25 2057 -- 92 20 -- 92 %   05/26/25 2053 -- 99 18 -- 92 %   05/26/25 2006 97.7 °F (36.5 °C) 93 17 103/88 99 %   05/26/25 1530 98.4 °F (36.9 °C) 95 16 (!) 118/59 100 %   05/26/25 1522 98.5 °F (36.9 °C) 88 16 110/62 --     No intake/output data recorded.    ROS: 12 point obtained and negative other than stated in HPI    Physical Exam  Constitutional:       Appearance: He is ill-appearing.   Cardiovascular:      Rate and Rhythm: Normal rate.   Pulmonary:      Effort: Pulmonary effort is normal.   Musculoskeletal:      Right hip: Decreased range of motion.   Skin:     General: Skin is warm.   Neurological:      Mental Status: He is alert and oriented to person, place, and time.   Psychiatric:         Mood and Affect: Mood normal.         Behavior: Behavior normal.         -Labs-    Recent Labs     05/25/25  0401 05/26/25  0400 05/27/25  0355   WBC 10.8 14.5* 11.9*   HGB 7.9* 8.7* 8.0*    322 351    137 141   K 3.3* 3.2* 4.4   BUN 34* 29* 24*   ALT 27 28 34   MG 1.6 1.6 1.9   PHOS 3.4 3.2 1.8*       -Imaging-   CT Result (most recent):  CT PELVIS WO CONTRAST 05/24/2025    Narrative  CT PELVIS  WITHOUT CONTRAST. 5/24/2025 1:11 PM    INDICATION: Right hip pain and unable to ambulate after fall.    COMPARISON: CT abdomen pelvis 5/15/2025.    TECHNIQUE: CT of the pelvis was performed without contrast. CT dose reduction  was achieved through use of a standardized protocol tailored for this  examination and automatic exposure control for

## 2025-05-27 NOTE — CONSULTS
Radiation Oncology New Patient Note  Patient Preferred Name: Anuel Bermudez      Patient Legal Name: Anuel Bermudez   :  1945  (Age:  79 y.o.)  Sex:  male    Address: [address] [place] [id number]   Phone: [phone]  Email: No e-mail address on record    Radiation Oncologist: Leda Aguilar MD  Physician Author:  Leda Aguilar MD    This patient was seen as an inpatient consultation at [facility] ([facility]). This is an inpatient consult service provided by [facility] Radiation Oncology. The consultation was performed at the request of [facility] staff and the consultation note was entered into the [facility] EHR. This note may be accessible though [facility]  Everywhere. 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  Stage IV lung cancer    - Current disease status is: distant metastasis (symptomatic).  - Performance Status: 60, Requires occasional assistance, but is able to care for most of their personal needs (ECOG equivalent 2)           Plan:  - Patient unable to have conversation due to drowsiness and no family available.  Will revisit tomorrow when family is present.     Patient was drowsy and unable to engage in conversation about palliative radiation.  Will plan to return tomorrow when family is present for better discussion.  However, one could consider a single fraction of radiation to the hip with the goal of palliating pain.  If pain has been severe and constant even before the fracture, this may provide some relief, though even that can take some time to have an effect and patient likely has a very short life expectancy.  If pain worsened after fall, then some of current pain may be due to the break itself and radiation may not be as useful.  Additionally, will need to consider if patient would be able to tolerate treatment.      Subjective:  History of Present Illness:

## 2025-05-27 NOTE — CARE COORDINATION
Care Management Initial Assessment       RUR: 24%   Readmission? No  1st IM letter given? No  1st  letter given: No      CM completed room visit with pts family regarding d/c needs and plans.  Pts family are requesting a referral to be sent to [facility].  Pt will transition back to his home with 24hr caregiver provided by [facility].     CM sent referral, via careport, and currently waiting on a response.    CM will continue to follow.    MEHNAZ Garcia CM  [phone]       05/27/25 0904   Service Assessment   Patient Orientation Unable to Assess   Cognition Other (see comment)  (HOSPICE)   History Provided By Child/Family   Primary Caregiver Other (Comment)  (will have caregiver support)   Support Systems Family Members   PCP Verified by CM Yes   Last Visit to PCP Within last 3 months   Prior Functional Level Assistance with the following:;Bathing;Dressing;Toileting;Mobility;Shopping;Housework;Cooking;Feeding   Current Functional Level Bathing;Assistance with the following:;Dressing;Toileting;Mobility;Shopping;Housework;Cooking;Feeding   Can patient return to prior living arrangement Yes  (w/caregiver)   Ability to make needs known: Unable   Family able to assist with home care needs: Yes   Would you like for me to discuss the discharge plan with any other family members/significant others, and if so, who? Yes   Financial Resources Medicare   Social/Functional History   Lives With Family   Type of Home House   Active  No   Mode of Transportation Other  (medical clearance)   Discharge Planning   Type of Residence House   Living Arrangements Family Members;Other (Comment)  (caregiver)

## 2025-05-27 NOTE — PROGRESS NOTES
End of Shift Note    Bedside shift change report given to JOSE DE JESUS Motta (oncoming nurse) by SWATHI DUBOSE RN (offgoing nurse).  Report included the following information SBAR, Kardex, Intake/Output, and MAR    Shift worked:  5618-8575     Shift summary and any significant changes:     Patient transferred from Ortho. Scheduled medications given per MAR. No PRN's required and no complaints.       Plan for patient to discharge home with hospice on Thursday.     Concerns for physician to address:     Zone phone for oncoming shift:   [phone]       Activity:  Level of Assistance: Moderate assist, patient does 50-74%  Number times ambulated in hallways past shift: 0  Number of times OOB to chair past shift: 0    Cardiac:   Cardiac Monitoring: No           Access:  Current line(s): PIV     Genitourinary:   Urinary Status: Laguna, Draining    Respiratory:   O2 Device: None (Room air)  Chronic home O2 use?: N/A  Incentive spirometer at bedside: N/A    GI:  Last BM (including prior to admit): 05/26/25  Current diet:  ADULT DIET; Easy to Chew  Passing flatus: YES    Pain Management:   Patient states pain is manageable on current regimen: N/A    Skin:  Eric Scale Score: 16  Interventions: Wound Offloading (Prevention Methods): Repositioning, Turning    Patient Safety:  Fall Risk: Nursing Judgement-Fall Risk High(Add Comments): Yes  Fall Risk Interventions  Nursing Judgement-Fall Risk High(Add Comments): Yes  Toilet Every 2 Hours-In Advance of Need: Yes  Hourly Visual Checks: Awake, In bed, Quiet  Fall Visual Posted: Armband, Socks, Fall sign posted  Room Door Open: Deferred to promote rest  Alarm On: Bed  Patient Moved Closer to Nursing Station: No    Active Consults:   IP CONSULT TO ONCOLOGY  IP CONSULT TO ORTHOPEDIC SURGERY  IP CONSULT TO SOCIAL WORK  IP CONSULT TO SPIRITUAL SERVICES  IP CONSULT TO DIETITIAN  IP CONSULT TO PALLIATIVE CARE  IP CONSULT TO CASE MANAGEMENT  IP CONSULT TO CASE MANAGEMENT  IP CONSULT TO

## 2025-05-27 NOTE — CONSULTS
Palliative Medicine  Patient Name: Anuel Bermudez  YOB: 1945  MRN: [id number]  Age: 79 y.o.  Gender: male    Date of Initial Consult: 5/27/2025  Date of Service: 5/27/2025  Time: 11:13 AM  Provider: Rima Tejada MD  Hospital Day: 4  Admit Date: 5/24/2025  Referring Provider: Hospitalist      Reasons for Consultation:  Overwhelming Symptoms    HISTORY OF PRESENT ILLNESS (HPI):   Anuel Bermudez is a 79 y.o. 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    Psychosocial: Patient lives at home independently with his dog Wilfred.  He is a very poor historian, initially told me he has no one to help him but then told me that his daughter Deja has been helping with doctors appointments.    Right greater trochanter fracture- conservative management per Ortho. Recommend pain management and inpatient PT.        ASSESSMENT AND PLAN:   Is a very poor historian, overall poor understanding of his medical condition, notes that he is not on chemo for a long time now, Dr. Ruiz discontinued Keytruda in January because of age, poor baseline QOL per daughter.  He has been feeling poorly for a while, hip pain has been present for weeks.  Long conversation with daughter- dad lives alone but has care givers- 1 hour in the morning and 4 hours in the evening.  Was given a prognosis of less than 6 months back in February, daughter spoke with the hospice agency at that time but did not sign up.  Now interested in comfort oriented care with the support of hospice but would like to hear from oncology team about whether they should biopsy the new mass.  Also needs to find a nursing home/group home.  Discussed that

## 2025-05-27 NOTE — PROGRESS NOTES
End of Shift Note    Bedside shift change report given to JOSE DE JESUS Whitten (oncoming nurse) by Renny Triplett RN (offgoing nurse).  Report included the following information SBAR and MAR    Shift worked:  Night     Shift summary and any significant changes:    Vital signs stable, A&OX4, voiding with badillo, no complaints of pain for prn meds, NPO after midnight, no other form of distress noticed.   Concerns for physician to address:  None     Zone phone for oncoming shift:   None       Activity:  Level of Assistance: Moderate assist, patient does 50-74%  Number times ambulated in hallways past shift: 0  Number of times OOB to chair past shift: 0    Cardiac:   Cardiac Monitoring: No           Access:  Current line(s): PIV    Genitourinary:   Urinary Status: Badillo    Respiratory:   O2 Device: None (Room air)  Chronic home O2 use?: NO  Incentive spirometer at bedside: YES    GI:  Last BM (including prior to admit): 05/26/25  Current diet:  Diet NPO Exceptions are: Sips of Water with Meds  Passing flatus: YES    Pain Management:   Patient states pain is manageable on current regimen: YES    Skin:  Eric Scale Score: 17  Interventions: Wound Offloading (Prevention Methods): Bed, pressure reduction mattress, Pillows, Repositioning, Turning, Elevate heels    Patient Safety:  Fall Risk: Nursing Judgement-Fall Risk High(Add Comments): Yes  Fall Risk Interventions  Nursing Judgement-Fall Risk High(Add Comments): Yes  Toilet Every 2 Hours-In Advance of Need: Yes  Hourly Visual Checks: Awake, In bed, Quiet  Fall Visual Posted: Armband, Fall sign posted, Socks  Room Door Open: Deferred to promote rest  Alarm On: Bed  Patient Moved Closer to Nursing Station: No    Active Consults:   IP CONSULT TO ONCOLOGY  IP CONSULT TO ORTHOPEDIC SURGERY  IP CONSULT TO SOCIAL WORK  IP CONSULT TO SPIRITUAL SERVICES  IP CONSULT TO DIETITIAN  IP CONSULT TO PALLIATIVE CARE  IP CONSULT TO CASE MANAGEMENT  IP CONSULT TO INTERVENTIONAL RADIOLOGY  IP CONSULT

## 2025-05-27 NOTE — PROGRESS NOTES
Anticoagulation Dosing    Indication:  DVT ppx    Estimated Creatinine Clearance: 25 mL/min (A) (based on SCr of 1.46 mg/dL (H)).    Recent Labs     05/25/25  0401 05/26/25  0400 05/27/25  0355   HGB 7.9* 8.7* 8.0*   HCT 24.6* 28.4* 24.8*    322 351   ALBUMIN 1.9* 1.8* 1.9*       Wt Readings from Last 1 Encounters:   05/24/25 43.1 kg (95 lb)     Ht Readings from Last 1 Encounters:   05/24/25 1.651 m (5' 5\")     Body mass index is 15.81 kg/m².    Plan:  Lovenox changed to Heparin 5000un SQ q12h due to patient renal function and weight.       Thank you,  BONG IBARRA, [facility]

## 2025-05-27 NOTE — PROGRESS NOTES
Occupational Therapy    Chart reviewed and interventions attempted. Transport present to take pt to new room on oncology. Will defer and continue to follow.

## 2025-05-28 LAB
ALBUMIN SERPL-MCNC: 2.1 G/DL (ref 3.5–5)
ALBUMIN/GLOB SERPL: 0.5 (ref 1.1–2.2)
ALP SERPL-CCNC: 88 U/L (ref 45–117)
ALT SERPL-CCNC: 31 U/L (ref 12–78)
ANION GAP SERPL CALC-SCNC: 10 MMOL/L (ref 2–12)
AST SERPL-CCNC: 19 U/L (ref 15–37)
BASOPHILS # BLD: 0.05 K/UL (ref 0–0.1)
BASOPHILS NFR BLD: 0.5 % (ref 0–1)
BILIRUB SERPL-MCNC: 0.3 MG/DL (ref 0.2–1)
BUN SERPL-MCNC: 17 MG/DL (ref 6–20)
BUN/CREAT SERPL: 13 (ref 12–20)
CALCIUM SERPL-MCNC: 8.9 MG/DL (ref 8.5–10.1)
CHLORIDE SERPL-SCNC: 109 MMOL/L (ref 97–108)
CO2 SERPL-SCNC: 15 MMOL/L (ref 21–32)
CREAT SERPL-MCNC: 1.34 MG/DL (ref 0.7–1.3)
DIFFERENTIAL METHOD BLD: ABNORMAL
EOSINOPHIL # BLD: 0.24 K/UL (ref 0–0.4)
EOSINOPHIL NFR BLD: 2.2 % (ref 0–7)
ERYTHROCYTE [DISTWIDTH] IN BLOOD BY AUTOMATED COUNT: 14 % (ref 11.5–14.5)
GLOBULIN SER CALC-MCNC: 4.5 G/DL (ref 2–4)
GLUCOSE SERPL-MCNC: 131 MG/DL (ref 65–100)
HCT VFR BLD AUTO: 29.8 % (ref 36.6–50.3)
HGB BLD-MCNC: 9.3 G/DL (ref 12.1–17)
IMM GRANULOCYTES # BLD AUTO: 0.09 K/UL (ref 0–0.04)
IMM GRANULOCYTES NFR BLD AUTO: 0.8 % (ref 0–0.5)
LYMPHOCYTES # BLD: 1.07 K/UL (ref 0.8–3.5)
LYMPHOCYTES NFR BLD: 9.7 % (ref 12–49)
MAGNESIUM SERPL-MCNC: 1.9 MG/DL (ref 1.6–2.4)
MCH RBC QN AUTO: 29.2 PG (ref 26–34)
MCHC RBC AUTO-ENTMCNC: 31.2 G/DL (ref 30–36.5)
MCV RBC AUTO: 93.7 FL (ref 80–99)
MONOCYTES # BLD: 0.64 K/UL (ref 0–1)
MONOCYTES NFR BLD: 5.8 % (ref 5–13)
NEUTS SEG # BLD: 8.89 K/UL (ref 1.8–8)
NEUTS SEG NFR BLD: 81 % (ref 32–75)
NRBC # BLD: 0 K/UL (ref 0–0.01)
NRBC BLD-RTO: 0 PER 100 WBC
PHOSPHATE SERPL-MCNC: 2.7 MG/DL (ref 2.6–4.7)
PLATELET # BLD AUTO: 365 K/UL (ref 150–400)
PMV BLD AUTO: 8 FL (ref 8.9–12.9)
POTASSIUM SERPL-SCNC: 4.9 MMOL/L (ref 3.5–5.1)
PROT SERPL-MCNC: 6.6 G/DL (ref 6.4–8.2)
RBC # BLD AUTO: 3.18 M/UL (ref 4.1–5.7)
SODIUM SERPL-SCNC: 134 MMOL/L (ref 136–145)
WBC # BLD AUTO: 11 K/UL (ref 4.1–11.1)

## 2025-05-28 PROCEDURE — 83735 ASSAY OF MAGNESIUM: CPT

## 2025-05-28 PROCEDURE — 84100 ASSAY OF PHOSPHORUS: CPT

## 2025-05-28 PROCEDURE — 1100000000 HC RM PRIVATE

## 2025-05-28 PROCEDURE — 2500000003 HC RX 250 WO HCPCS: Performed by: STUDENT IN AN ORGANIZED HEALTH CARE EDUCATION/TRAINING PROGRAM

## 2025-05-28 PROCEDURE — 85025 COMPLETE CBC W/AUTO DIFF WBC: CPT

## 2025-05-28 PROCEDURE — 80053 COMPREHEN METABOLIC PANEL: CPT

## 2025-05-28 PROCEDURE — 6360000002 HC RX W HCPCS: Performed by: STUDENT IN AN ORGANIZED HEALTH CARE EDUCATION/TRAINING PROGRAM

## 2025-05-28 PROCEDURE — 36415 COLL VENOUS BLD VENIPUNCTURE: CPT

## 2025-05-28 PROCEDURE — 6370000000 HC RX 637 (ALT 250 FOR IP): Performed by: STUDENT IN AN ORGANIZED HEALTH CARE EDUCATION/TRAINING PROGRAM

## 2025-05-28 PROCEDURE — 94640 AIRWAY INHALATION TREATMENT: CPT

## 2025-05-28 PROCEDURE — 94761 N-INVAS EAR/PLS OXIMETRY MLT: CPT

## 2025-05-28 PROCEDURE — 6370000000 HC RX 637 (ALT 250 FOR IP): Performed by: NURSE PRACTITIONER

## 2025-05-28 PROCEDURE — 51702 INSERT TEMP BLADDER CATH: CPT

## 2025-05-28 RX ADMIN — HEPARIN SODIUM 5000 UNITS: 5000 INJECTION INTRAVENOUS; SUBCUTANEOUS at 10:19

## 2025-05-28 RX ADMIN — MIRTAZAPINE 30 MG: 15 TABLET, FILM COATED ORAL at 20:35

## 2025-05-28 RX ADMIN — ARFORMOTEROL TARTRATE 15 MCG: 15 SOLUTION RESPIRATORY (INHALATION) at 08:52

## 2025-05-28 RX ADMIN — FERROUS SULFATE TAB 325 MG (65 MG ELEMENTAL FE) 325 MG: 325 (65 FE) TAB at 10:19

## 2025-05-28 RX ADMIN — POTASSIUM CHLORIDE 40 MEQ: 1500 TABLET, EXTENDED RELEASE ORAL at 20:35

## 2025-05-28 RX ADMIN — PANTOPRAZOLE SODIUM 40 MG: 40 TABLET, DELAYED RELEASE ORAL at 06:07

## 2025-05-28 RX ADMIN — SODIUM CHLORIDE, PRESERVATIVE FREE 10 ML: 5 INJECTION INTRAVENOUS at 10:25

## 2025-05-28 RX ADMIN — PANTOPRAZOLE SODIUM 40 MG: 40 TABLET, DELAYED RELEASE ORAL at 18:26

## 2025-05-28 RX ADMIN — SULFAMETHOXAZOLE AND TRIMETHOPRIM 1 TABLET: 400; 80 TABLET ORAL at 20:35

## 2025-05-28 RX ADMIN — FOLIC ACID 1 MG: 1 TABLET ORAL at 10:19

## 2025-05-28 RX ADMIN — BUDESONIDE 500 MCG: 0.5 INHALANT RESPIRATORY (INHALATION) at 08:52

## 2025-05-28 RX ADMIN — POTASSIUM CHLORIDE 40 MEQ: 1500 TABLET, EXTENDED RELEASE ORAL at 10:20

## 2025-05-28 RX ADMIN — SULFAMETHOXAZOLE AND TRIMETHOPRIM 1 TABLET: 400; 80 TABLET ORAL at 10:20

## 2025-05-28 RX ADMIN — MONTELUKAST 10 MG: 10 TABLET, FILM COATED ORAL at 20:35

## 2025-05-28 RX ADMIN — HEPARIN SODIUM 5000 UNITS: 5000 INJECTION INTRAVENOUS; SUBCUTANEOUS at 20:35

## 2025-05-28 RX ADMIN — Medication 100 MG: at 10:19

## 2025-05-28 RX ADMIN — IPRATROPIUM BROMIDE 0.5 MG: 0.5 SOLUTION RESPIRATORY (INHALATION) at 08:57

## 2025-05-28 RX ADMIN — SODIUM CHLORIDE, PRESERVATIVE FREE 10 ML: 5 INJECTION INTRAVENOUS at 20:37

## 2025-05-28 RX ADMIN — TAMSULOSIN HYDROCHLORIDE 0.4 MG: 0.4 CAPSULE ORAL at 10:19

## 2025-05-28 RX ADMIN — DONEPEZIL HYDROCHLORIDE 5 MG: 5 TABLET, FILM COATED ORAL at 20:35

## 2025-05-28 ASSESSMENT — PAIN SCALES - GENERAL
PAINLEVEL_OUTOF10: 0
PAINLEVEL_OUTOF10: 0

## 2025-05-28 NOTE — CARE COORDINATION
CM informed that pt was accepted to [facility].  Family will be prepared to accept pt on 5/29/25 at , with [facility].    CM will prepare pt for d/c.    MEHNAZ Garcia CM  [phone]

## 2025-05-28 NOTE — PROGRESS NOTES
End of Shift Note    Bedside shift change report given to Ángela DELA CRUZ (oncoming nurse) by SANDY COLE RN (offgoing nurse).  Report included the following information SBAR and MAR    Shift worked:  7am - 7pm     Shift summary and any significant changes:     Medication given per Mar and education complete. Personal caregiver at bedside and assisted with a.m. care. Pt reports no pain, N/V or any other difficulties. Safety and caring rounds complete.      Concerns for physician to address:       Zone phone for oncoming shift:          Activity:  Level of Assistance: Moderate assist, patient does 50-74%  Number times ambulated in hallways past shift: 0  Number of times OOB to chair past shift: 0    Cardiac:   Cardiac Monitoring: Yes           Access:  Current line(s): PIV     Genitourinary:   Urinary Status: Laguna    Respiratory:   O2 Device: None (Room air)  Chronic home O2 use?: NO  Incentive spirometer at bedside: NO    GI:  Last BM (including prior to admit): 05/26/25  Current diet:  ADULT DIET; Easy to Chew  Passing flatus: YES    Pain Management:   Patient states pain is manageable on current regimen: YES    Skin:  Eric Scale Score: 20  Interventions: Wound Offloading (Prevention Methods): Turning, Repositioning    Patient Safety:  Fall Risk: Nursing Judgement-Fall Risk High(Add Comments): Yes  Fall Risk Interventions  Nursing Judgement-Fall Risk High(Add Comments): Yes  Toilet Every 2 Hours-In Advance of Need: Yes  Hourly Visual Checks: Awake, In bed  Fall Visual Posted: Armband, Fall sign posted, Socks  Room Door Open: Deferred to decrease stimulation  Alarm On: Bed  Patient Moved Closer to Nursing Station: No    Active Consults:   IP CONSULT TO ONCOLOGY  IP CONSULT TO ORTHOPEDIC SURGERY  IP CONSULT TO SOCIAL WORK  IP CONSULT TO SPIRITUAL SERVICES  IP CONSULT TO DIETITIAN  IP CONSULT TO PALLIATIVE CARE  IP CONSULT TO CASE MANAGEMENT  IP CONSULT TO CASE MANAGEMENT  IP CONSULT TO HOSPICE  IP CONSULT TO

## 2025-05-28 NOTE — PROGRESS NOTES
.End of Shift Note    Bedside shift change report given to JOSE DE JESUS DELGADO (oncoming nurse) by Ángela Alexander RN (offgoing nurse).  Report included the following information SBAR, Kardex, and MAR    Shift worked:  [id number]     Shift summary and any significant changes:    Medications given per MAR and education provided. Patient denied pain throughout the shift. Patient had a moment of confusion early morning but was reoriented. Patient is a x2 to the bedside commode if needed. Laguna care complete, in place and draining. No acute distress overnight. Rounding and hourly care complete.     Concerns for physician to address:  **         Ángela Alexander, RN

## 2025-05-28 NOTE — PROGRESS NOTES
Occupational Therapy   05.28.2025    Chart reviewed in prep for OT treatment, RN reporting patient in significant pain this morning, requesting therapy defer this morning and sign off as patient returning home with hospice. Will sign off at this time.     Shira Marcum MS, OTR/L

## 2025-05-28 NOTE — PROGRESS NOTES
Hematology / Oncology ([facility])    -Primary Oncologist- Dr. Ruiz  -CC- \"I'm good\"    -S-  feeling okay today. Continues to have R hip pain. He is hoping to go home soon to spend time with his dog. He is interested in radiation if it will improve his pain for a more comfortable discharge     -O-    Patient Vitals for the past 24 hrs:   Temp Pulse Resp BP SpO2   05/28/25 0857 -- -- -- -- 96 %   05/28/25 0803 98.2 °F (36.8 °C) 86 16 (!) 111/56 96 %   05/27/25 1958 98.2 °F (36.8 °C) 88 19 (!) 110/56 96 %   05/27/25 1449 98.6 °F (37 °C) 88 16 (!) 114/52 100 %   05/27/25 1130 99 °F (37.2 °C) 88 16 119/64 100 %   05/27/25 1016 -- -- -- -- 99 %     05/28 0701 - 05/28 1900  In: -   Out: 450 [Urine:450]    ROS: 12 point obtained and negative other than stated in HPI    Physical Exam  Constitutional:       Appearance: He is ill-appearing.   Cardiovascular:      Rate and Rhythm: Normal rate.   Pulmonary:      Effort: Pulmonary effort is normal.   Musculoskeletal:      Right hip: Decreased range of motion.   Skin:     General: Skin is warm.   Neurological:      Mental Status: He is alert and oriented to person, place, and time.   Psychiatric:         Mood and Affect: Mood normal.         Behavior: Behavior normal.         -Labs-    Recent Labs     05/26/25  0400 05/27/25  0355 05/28/25  0619   WBC 14.5* 11.9* 11.0   HGB 8.7* 8.0* 9.3*    351 365    141 134*   K 3.2* 4.4 4.9   BUN 29* 24* 17   ALT 28 34 31   MG 1.6 1.9 1.9   PHOS 3.2 1.8* 2.7       -Imaging-   CT Result (most recent):  CT PELVIS WO CONTRAST 05/24/2025    Narrative  CT PELVIS  WITHOUT CONTRAST. 5/24/2025 1:11 PM    INDICATION: Right hip pain and unable to ambulate after fall.    COMPARISON: CT abdomen pelvis 5/15/2025.    TECHNIQUE: CT of the pelvis was performed without contrast. CT dose reduction  was achieved through use of a standardized protocol tailored for this  examination and automatic exposure control for dose

## 2025-05-28 NOTE — PROGRESS NOTES
Hospitalist Progress Note    NAME:   Anuel Bermudez   : 1945   MRN: [id number]     Date/Time: 2025 1:43 PM  Patient PCP: Bonifacio Bassett MD    Estimated discharge date:   Barriers: home with hospice       Assessment / Plan:    Pathologic right greater trochanter fracture   Mechanical GLF  Hip x-ray - no acute bony abnormality  CT pelvis - pathologic fracture of the right greater trochanter.    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 Ruiz outpatient- recommending biopsy   Oncology, palliative care, dietician and spiritual services consulted, appreciate expertise and recommendations  Bone bx not recommended, family agreeable with decision, hospice has been consulted. Pt maybe a candidate for palliative radiation      Chronic hypoxic respiratory failure  Baseline home oxygen 2L  Continue formulary substitution nebulizer  Continue Singulair     Chronic urinary retention  CHIRAG  Admission Creatinine 1.41  Continue PTA Flomax  Continuous LR infusion      Hypokalemia  Replete to keep K>4 mag >2     Chronic anemia  Continue PTA iron, Folvite  Trend CBC     History of perforated peptic ulcer s/p laparoscopic Adarsh patch repair  Continue PPI     Neurologic conditions  Continue PTA donepezil, mirtazapine     Constipation   Add miralax daily   -laculose x 1    Medical Decision

## 2025-05-28 NOTE — CARE COORDINATION
05/28/25 1130   Services At/After Discharge   Transition of Care Consult (CM Consult) Hospice  ([facility])   Internal Home Health No   Internal Hospice No   Services At/After Discharge Hospice  ([facility])   [place] Resource Information Provided? No   Mode of Transport at Discharge BLS  ([facility] at [facility])   Confirm Follow Up Transport Family   Condition of Participation: Discharge Planning   The Patient and/or Patient Representative was provided with a Choice of Provider? Patient Representative  (pts daughter)   The Patient and/Or Patient Representative agree with the Discharge Plan? Yes   Freedom of Choice list was provided with basic dialogue that supports the patient's individualized plan of care/goals, treatment preferences, and shares the quality data associated with the providers?  Yes     CM aware that pt will transition home with family support, personal caregivers (24hrs), and hospice.  Hospice provided by [facility].  CM spoke with daughter and she will meet pt at his home.  Pts daughter requested transport time to be arranged at 1P.    CM completed the need of the pt at this time.    MEHNAZ Garcia CM  [phone]

## 2025-05-28 NOTE — PROGRESS NOTES
Physical Therapy    Chart reviewed with note of plan for pt return home with hospice care. Will sign off.    Ashley Kent, PT, MPT

## 2025-05-29 VITALS
OXYGEN SATURATION: 94 % | HEIGHT: 65 IN | WEIGHT: 95 LBS | DIASTOLIC BLOOD PRESSURE: 62 MMHG | SYSTOLIC BLOOD PRESSURE: 120 MMHG | RESPIRATION RATE: 18 BRPM | HEART RATE: 89 BPM | TEMPERATURE: 97.5 F | BODY MASS INDEX: 15.83 KG/M2

## 2025-05-29 LAB
MAGNESIUM SERPL-MCNC: 2 MG/DL (ref 1.6–2.4)
PHOSPHATE SERPL-MCNC: 3 MG/DL (ref 2.6–4.7)

## 2025-05-29 PROCEDURE — 94761 N-INVAS EAR/PLS OXIMETRY MLT: CPT

## 2025-05-29 PROCEDURE — 84100 ASSAY OF PHOSPHORUS: CPT

## 2025-05-29 PROCEDURE — 36415 COLL VENOUS BLD VENIPUNCTURE: CPT

## 2025-05-29 PROCEDURE — 83735 ASSAY OF MAGNESIUM: CPT

## 2025-05-29 PROCEDURE — 6360000002 HC RX W HCPCS: Performed by: STUDENT IN AN ORGANIZED HEALTH CARE EDUCATION/TRAINING PROGRAM

## 2025-05-29 PROCEDURE — 94640 AIRWAY INHALATION TREATMENT: CPT

## 2025-05-29 PROCEDURE — 6370000000 HC RX 637 (ALT 250 FOR IP): Performed by: NURSE PRACTITIONER

## 2025-05-29 PROCEDURE — 6370000000 HC RX 637 (ALT 250 FOR IP): Performed by: STUDENT IN AN ORGANIZED HEALTH CARE EDUCATION/TRAINING PROGRAM

## 2025-05-29 PROCEDURE — 2500000003 HC RX 250 WO HCPCS: Performed by: STUDENT IN AN ORGANIZED HEALTH CARE EDUCATION/TRAINING PROGRAM

## 2025-05-29 RX ORDER — GUAIFENESIN/DEXTROMETHORPHAN 100-10MG/5
5 SYRUP ORAL EVERY 4 HOURS PRN
Status: DISCONTINUED | OUTPATIENT
Start: 2025-05-29 | End: 2025-05-29 | Stop reason: HOSPADM

## 2025-05-29 RX ORDER — GUAIFENESIN/DEXTROMETHORPHAN 100-10MG/5
5 SYRUP ORAL 3 TIMES DAILY PRN
Qty: 120 ML | Refills: 0 | Status: SHIPPED | OUTPATIENT
Start: 2025-05-29 | End: 2025-06-08

## 2025-05-29 RX ADMIN — HEPARIN SODIUM 5000 UNITS: 5000 INJECTION INTRAVENOUS; SUBCUTANEOUS at 08:53

## 2025-05-29 RX ADMIN — BUDESONIDE 500 MCG: 0.5 INHALANT RESPIRATORY (INHALATION) at 08:00

## 2025-05-29 RX ADMIN — SULFAMETHOXAZOLE AND TRIMETHOPRIM 1 TABLET: 400; 80 TABLET ORAL at 08:52

## 2025-05-29 RX ADMIN — IPRATROPIUM BROMIDE 0.5 MG: 0.5 SOLUTION RESPIRATORY (INHALATION) at 07:55

## 2025-05-29 RX ADMIN — POTASSIUM CHLORIDE 40 MEQ: 1500 TABLET, EXTENDED RELEASE ORAL at 08:52

## 2025-05-29 RX ADMIN — SODIUM CHLORIDE, PRESERVATIVE FREE 10 ML: 5 INJECTION INTRAVENOUS at 08:53

## 2025-05-29 RX ADMIN — TAMSULOSIN HYDROCHLORIDE 0.4 MG: 0.4 CAPSULE ORAL at 08:53

## 2025-05-29 RX ADMIN — FOLIC ACID 1 MG: 1 TABLET ORAL at 08:53

## 2025-05-29 RX ADMIN — FERROUS SULFATE TAB 325 MG (65 MG ELEMENTAL FE) 325 MG: 325 (65 FE) TAB at 08:53

## 2025-05-29 RX ADMIN — PANTOPRAZOLE SODIUM 40 MG: 40 TABLET, DELAYED RELEASE ORAL at 06:29

## 2025-05-29 RX ADMIN — Medication 100 MG: at 08:52

## 2025-05-29 RX ADMIN — ARFORMOTEROL TARTRATE 15 MCG: 15 SOLUTION RESPIRATORY (INHALATION) at 08:00

## 2025-05-29 RX ADMIN — OXYCODONE 10 MG: 5 TABLET ORAL at 11:09

## 2025-05-29 ASSESSMENT — PAIN SCALES - GENERAL
PAINLEVEL_OUTOF10: 3
PAINLEVEL_OUTOF10: 7

## 2025-05-29 ASSESSMENT — PAIN DESCRIPTION - PAIN TYPE: TYPE: ACUTE PAIN

## 2025-05-29 ASSESSMENT — PAIN DESCRIPTION - ORIENTATION: ORIENTATION: RIGHT

## 2025-05-29 ASSESSMENT — PAIN DESCRIPTION - FREQUENCY: FREQUENCY: INTERMITTENT

## 2025-05-29 ASSESSMENT — PAIN DESCRIPTION - ONSET: ONSET: GRADUAL

## 2025-05-29 ASSESSMENT — PAIN - FUNCTIONAL ASSESSMENT: PAIN_FUNCTIONAL_ASSESSMENT: PREVENTS OR INTERFERES SOME ACTIVE ACTIVITIES AND ADLS

## 2025-05-29 ASSESSMENT — PAIN DESCRIPTION - DESCRIPTORS: DESCRIPTORS: THROBBING

## 2025-05-29 ASSESSMENT — PAIN DESCRIPTION - LOCATION: LOCATION: HIP

## 2025-05-29 NOTE — PROGRESS NOTES
Physician Progress Note      PATIENT:               SISI SILVER  [facility] #:                  [id number]  :                       1945  ADMIT DATE:       2025 10:32 AM  DISCH DATE:        2025 2:35 PM  RESPONDING  PROVIDER #:        Janice Schafer MD          QUERY TEXT:    Based on your medical judgment, please clarify these findings and document if   any of the following are being evaluated and/or treated:    The clinical indicators include:  OT Note : \"ADL Assessment:    Feeding: Setup;Stand by assistance    Grooming: Setup;Stand by assistance    UE Bathing: Minimal assistance    LE Bathing: Moderate assistance    UE Dressing: Minimal assistance    LE Dressing: Maximum assistance    Toileting: Maximum assistance    Functional Mobility: Minimal assistance;Increased time to complete\"    PT Note : \"Needed min-mod A x 1-2 for all functional mobility this date.\"   \" Pt able to complete short side steps at EOB with RW prior to returning   supine in bed.\"    H&P Active Problem List: \"Complete immobility due to severe physical   disability or frailty (HCC) 2025  Priority: High\"  Options provided:  -- Functional quadriplegia as evidenced by, Please document the evidence.  -- Impaired mobility related to pathological fracture of R hip  -- Other - I will add my own diagnosis  -- Disagree - Not applicable / Not valid  -- Disagree - Clinically unable to determine / Unknown  -- Refer to Clinical Documentation Reviewer    PROVIDER RESPONSE TEXT:    This patient has functional quadriplegia as evidenced by fracture and ca    Query created by: Bonny Garzon on 2025 11:43 AM      Electronically signed by:  Janice Schafer MD 2025 5:05 PM

## 2025-05-29 NOTE — DISCHARGE SUMMARY
Discharge Summary    Name: Anuel Bermudez  [id number]  YOB: 1945 (Age: 79 y.o.)   Date of Admission: 5/24/2025  Date of Discharge: 5/29/2025  Attending Physician: Emeli Guerra MD    Discharge Diagnosis:   Pathologic right greater trochanter fracture  POA- non operative management recommended by ortho, cont pain meds-- >GOING HOME ON HOSPICE as arranged  Mechanical GLF POA  Squamous cell lung carcinoma with metastatic disease involving the bone POA- (hospice Diagnosis)  Severe protein-calorie malnutrition  Chronic hypoxic respiratory failure- Baseline home oxygen 2L  Chronic urinary retention s/p badillo- can cont for comfort under hospice  CHIRAG  History of perforated peptic ulcer s/p laparoscopic Adarsh patch repair  Chronic anemia  DNR    Consultations:  IP CONSULT TO ONCOLOGY  IP CONSULT TO ORTHOPEDIC SURGERY  IP CONSULT TO SOCIAL WORK  IP CONSULT TO SPIRITUAL SERVICES  IP CONSULT TO DIETITIAN  IP CONSULT TO PALLIATIVE CARE  IP CONSULT TO CASE MANAGEMENT  IP CONSULT TO CASE MANAGEMENT  IP CONSULT TO HOSPICE  IP CONSULT TO RADIATION ONCOLOGY  IP CONSULT TO CASE MANAGEMENT  IP CONSULT TO CASE MANAGEMENT      Brief Admission History/Reason for Admission Per Manoj Rolon MD:   \"79 y.o. male with an updated PMHx below, admitted for intractable right hip pain 2/2 pathologic R greater trochanteric hip fracture following mechanical fall. Unable to determine acuity, as patient had pain predating the diagnosis. Had just been discharged home yesterday and caregiver came to see him for the first time and he unfortunately had mechanical fall. Has history of metastatic lung cancer to the bone, hip pain has predated the fracture but interesting, plain films on 5/19 and today were negative with CT revealing (ED ordered as patient was behaving like hip fracture which was a good catch.) Requested Oncology be made aware, because given his trajectory, I am not sure which

## 2025-05-29 NOTE — PROGRESS NOTES
I have reviewed discharge instructions with the patient. The patient verbalized understanding. Discharge medications reviewed with patient and appropriate educational materials and side effects teaching were provided. Follow-up appointments reviewed. Opportunity for questions and clarification was provided.  Venous access removed without difficulty.  Patient's belongings gathered and sent with patient. Patient is ready for discharge.     Pt discharged home with hospice via [facility].     JEANNIE PILLAI RN

## 2025-05-29 NOTE — DISCHARGE INSTRUCTIONS
HOSPITALIST DISCHARGE INSTRUCTIONS    NAME: Anuel Bermudez   :  1945   MRN:  [id number]     Date/Time:  2025 11:51 AM    ADMIT DATE: 2025     DISCHARGE DATE: 2025     DISCHARGE DIAGNOSIS:  Pathologic right greater trochanter fracture  POA- non operative management recommended by orthobryan pain meds-- >GOING HOME ON HOSPICE as arranged  Mechanical GLF POA  Squamous cell lung carcinoma with metastatic disease involving the bone POA- (hospice Diagnosis)  Severe protein-calorie malnutrition  Chronic hypoxic respiratory failure- Baseline home oxygen 2L  Chronic urinary retention s/p badillo- can cont for comfort under hospice  CHIRAG  History of perforated peptic ulcer s/p laparoscopic Adarsh patch repair  Chronic anemia  DNR    MEDICATIONS:  As per medication reconciliation  list  It is important that you take the medication exactly as they are prescribed.   Keep your medication in the bottles provided by the pharmacist and keep a list of the medication names, dosages, and times to be taken in your wallet.   Do not take other medications without consulting your doctor.     Pain Management: per above medications    What to do at Home    Recommended diet:  easy to chew diet under Hospice philosophy    Recommended activity: activity as tolerated    If you have questions regarding the hospital related prescriptions or hospital related issues please call at .      Follow Up:  [facility] at home today as arranged      Information obtained by :  I understand that if any problems occur once I am at home I am to contact my physician.    I understand and acknowledge receipt of the instructions indicated above.                                                                                                                                           Physician's or R.N.'s Signature                                                                  Date/Time

## 2025-05-29 NOTE — PROGRESS NOTES
.End of Shift Note    Bedside shift change report given to JOSE DE JESUS DENNIS (oncoming nurse) by Ángela Alexander RN (offgoing nurse).  Report included the following information SBAR, Kardex, and MAR    Shift worked:  [id number]     Shift summary and any significant changes:    Medications given per MAR and education provided. Patient is a x2 assist to the Bedside commode but has been bedrest. Patient had a large bowel movement at the start of the shift. No acute distress overnight. Complete linen change, gown changed. [place] care complete, draining and output documented. Bed alarm engaged. Patient denied pain, nausea and any symptoms. Rounding and hourly care complete. Patient is to be discharged today.     Concerns for physician to address:  **       Ángela Alexander RN

## 2025-05-29 NOTE — PROGRESS NOTES
Hematology / Oncology ([facility])    -Primary Oncologist- Dr. Ruiz  -CC- \"feeling good\"    -S-  looking forward to seeing his dog. Doing well today, no complaints     -O-    Patient Vitals for the past 24 hrs:   Temp Pulse Resp BP SpO2   05/29/25 0813 -- 89 18 -- 94 %   05/29/25 0757 97.5 °F (36.4 °C) 84 18 120/62 94 %   05/29/25 0755 -- 81 18 -- 95 %   05/28/25 2001 98.1 °F (36.7 °C) (!) 101 17 110/66 96 %     No intake/output data recorded.    ROS: 12 point obtained and negative other than stated in HPI    Physical Exam  Constitutional:       Appearance: He is ill-appearing.   Cardiovascular:      Rate and Rhythm: Normal rate.   Pulmonary:      Effort: Pulmonary effort is normal.   Musculoskeletal:      Right hip: Decreased range of motion.   Skin:     General: Skin is warm.   Neurological:      Mental Status: He is alert and oriented to person, place, and time.   Psychiatric:         Mood and Affect: Mood normal.         Behavior: Behavior normal.         -Labs-    Recent Labs     05/27/25  0355 05/28/25  0619 05/29/25  0633   WBC 11.9* 11.0  --    HGB 8.0* 9.3*  --     365  --     134*  --    K 4.4 4.9  --    BUN 24* 17  --    ALT 34 31  --    MG 1.9 1.9 2.0   PHOS 1.8* 2.7 3.0       -Imaging-   CT Result (most recent):  CT PELVIS WO CONTRAST 05/24/2025    Narrative  CT PELVIS  WITHOUT CONTRAST. 5/24/2025 1:11 PM    INDICATION: Right hip pain and unable to ambulate after fall.    COMPARISON: CT abdomen pelvis 5/15/2025.    TECHNIQUE: CT of the pelvis was performed without contrast. CT dose reduction  was achieved through use of a standardized protocol tailored for this  examination and automatic exposure control for dose modulation.    FINDINGS:  There is a mildly displaced, pathologic fracture through a lucent mass in the  right greater trochanter. This may represent a metastasis or myeloma. No other  concerning osseous lesions. Incidental note is made of lipomas or focal fatty  infiltration in

## 2025-05-29 NOTE — PLAN OF CARE
Problem: Pain  Goal: Verbalizes/displays adequate comfort level or baseline comfort level  5/27/2025 1020 by Fish Hernandez RN  Outcome: Progressing  5/27/2025 0324 by Renny Triplett RN  Outcome: Progressing     Problem: Safety - Adult  Goal: Free from fall injury  5/27/2025 1020 by Fish Hernandez, RN  Outcome: Progressing  5/27/2025 0324 by Renny Triplett RN  Outcome: Progressing     
  Problem: Pain  Goal: Verbalizes/displays adequate comfort level or baseline comfort level  5/27/2025 1631 by Bisi Aranda RN  Outcome: Progressing  5/27/2025 1020 by Fish Hernandez RN  Outcome: Progressing  5/27/2025 0324 by Renny Triplett RN  Outcome: Progressing     Problem: Respiratory - Adult  Goal: Achieves optimal ventilation and oxygenation  5/27/2025 1631 by Bisi Aranda RN  Outcome: Progressing  5/27/2025 1007 by Shauna Desai, RT  Outcome: Progressing  5/27/2025 0324 by Renny Triplett RN  Outcome: Progressing     Problem: Safety - Adult  Goal: Free from fall injury  5/27/2025 1631 by Bisi Aranda RN  Outcome: Progressing  5/27/2025 1020 by Fish Hernandez RN  Outcome: Progressing  5/27/2025 0324 by Renny Triplett RN  Outcome: Progressing     Problem: Physical Therapy - Adult  Goal: By Discharge: Performs mobility at highest level of function for planned discharge setting.  See evaluation for individualized goals.  Description: FUNCTIONAL STATUS PRIOR TO ADMISSION: Patient is ambulatory with tripod rollator at baseline. Mostly independent with ADLs, however caregiver has been assisting with bathing and dressing. + fall history. Recent hospitalization; was home for ~ 1 day prior to GLF bringing patient back to hospital.    HOME SUPPORT PRIOR TO ADMISSION: Patient lived alone, however has caregivers a few days a week.    Physical Therapy Goals  Initiated 5/25/2025  1.  Patient will move from supine to sit and sit to supine, scoot up and down, and roll side to side in bed with contact guard assist within 7 day(s).    2.  Patient will perform sit to stand with contact guard assist within 7 day(s).  3.  Patient will transfer from bed to chair and chair to bed with minimal assistance using the least restrictive device within 7 day(s).  4.  Patient will ambulate with minimal assistance for 75 feet with the least restrictive device within 7 day(s).   5.  Patient will 
  Problem: Pain  Goal: Verbalizes/displays adequate comfort level or baseline comfort level  5/28/2025 0443 by Ángela Alexander RN  Outcome: Progressing  Flowsheets (Taken 5/27/2025 1958)  Verbalizes/displays adequate comfort level or baseline comfort level:   Encourage patient to monitor pain and request assistance   Assess pain using appropriate pain scale   Administer analgesics based on type and severity of pain and evaluate response   Implement non-pharmacological measures as appropriate and evaluate response  5/27/2025 1631 by Bisi Aranda RN  Outcome: Progressing     Problem: Respiratory - Adult  Goal: Achieves optimal ventilation and oxygenation  5/28/2025 0443 by Ángela Alexander RN  Outcome: Progressing  Flowsheets (Taken 5/27/2025 1958)  Achieves optimal ventilation and oxygenation: Assess for changes in respiratory status  5/27/2025 1631 by Bisi Aranda RN  Outcome: Progressing     Problem: Safety - Adult  Goal: Free from fall injury  5/28/2025 0443 by Ángela Alexander RN  Outcome: Progressing  Flowsheets (Taken 5/27/2025 1958)  Free From Fall Injury: Instruct family/caregiver on patient safety  5/27/2025 1631 by Bisi Aranda RN  Outcome: Progressing     Problem: Nutrition Deficit:  Goal: Optimize nutritional status  5/27/2025 1631 by Bisi Aranda RN  Outcome: Progressing     Problem: Skin/Tissue Integrity  Goal: Skin integrity remains intact  Description: 1.  Monitor for areas of redness and/or skin breakdown2.  Assess vascular access sites hourly3.  Every 4-6 hours minimum:  Change oxygen saturation probe site4.  Every 4-6 hours:  If on nasal continuous positive airway pressure, respiratory therapy assess nares and determine need for appliance change or resting period  5/28/2025 0443 by Ángela Alexander RN  Outcome: Progressing  Flowsheets (Taken 5/27/2025 1958)  Skin Integrity Remains Intact: Monitor for areas of redness and/or skin breakdown  5/27/2025 
  Problem: Pain  Goal: Verbalizes/displays adequate comfort level or baseline comfort level  Outcome: Progressing     Problem: Respiratory - Adult  Goal: Achieves optimal ventilation and oxygenation  5/25/2025 0321 by Renny Triplett RN  Outcome: Progressing  5/24/2025 2008 by Enrique Velasquez RCP  Outcome: Progressing     
  Problem: Pain  Goal: Verbalizes/displays adequate comfort level or baseline comfort level  Outcome: Progressing     Problem: Respiratory - Adult  Goal: Achieves optimal ventilation and oxygenation  5/27/2025 0324 by Renny Triplett RN  Outcome: Progressing  5/26/2025 2054 by Enrique Velasquez RCP  Outcome: Progressing     Problem: Safety - Adult  Goal: Free from fall injury  Outcome: Progressing     Problem: Nutrition Deficit:  Goal: Optimize nutritional status  Outcome: Progressing     
  Problem: Pain  Goal: Verbalizes/displays adequate comfort level or baseline comfort level  Outcome: Progressing     Problem: Respiratory - Adult  Goal: Achieves optimal ventilation and oxygenation  Outcome: Progressing  Flowsheets (Taken 5/28/2025 2001)  Achieves optimal ventilation and oxygenation: Assess for changes in respiratory status     Problem: Safety - Adult  Goal: Free from fall injury  Outcome: Progressing  Flowsheets (Taken 5/28/2025 2001)  Free From Fall Injury: Instruct family/caregiver on patient safety     Problem: Nutrition Deficit:  Goal: Optimize nutritional status  Outcome: Progressing     Problem: Skin/Tissue Integrity  Goal: Skin integrity remains intact  Description: 1.  Monitor for areas of redness and/or skin breakdown2.  Assess vascular access sites hourly3.  Every 4-6 hours minimum:  Change oxygen saturation probe site4.  Every 4-6 hours:  If on nasal continuous positive airway pressure, respiratory therapy assess nares and determine need for appliance change or resting period  Outcome: Progressing  Flowsheets (Taken 5/28/2025 2001)  Skin Integrity Remains Intact: Monitor for areas of redness and/or skin breakdown     Problem: Skin/Tissue Integrity - Adult  Goal: Skin integrity remains intact  Description: 1.  Monitor for areas of redness and/or skin breakdown2.  Assess vascular access sites hourly3.  Every 4-6 hours minimum:  Change oxygen saturation probe site4.  Every 4-6 hours:  If on nasal continuous positive airway pressure, respiratory therapy assess nares and determine need for appliance change or resting period  Outcome: Progressing  Flowsheets (Taken 5/28/2025 2001)  Skin Integrity Remains Intact: Monitor for areas of redness and/or skin breakdown     Problem: Genitourinary - Adult  Goal: Absence of urinary retention  Outcome: Progressing  Flowsheets (Taken 5/28/2025 2001)  Absence of urinary retention: Assess patient’s ability to void and empty bladder     
  Problem: Physical Therapy - Adult  Goal: By Discharge: Performs mobility at highest level of function for planned discharge setting.  See evaluation for individualized goals.  Description: FUNCTIONAL STATUS PRIOR TO ADMISSION: Patient is ambulatory with tripod rollator at baseline. Mostly independent with ADLs, however caregiver has been assisting with bathing and dressing. + fall history. Recent hospitalization; was home for ~ 1 day prior to GLF bringing patient back to hospital.    HOME SUPPORT PRIOR TO ADMISSION: Patient lived alone, however has caregivers a few days a week.    Physical Therapy Goals  Initiated 5/25/2025  1.  Patient will move from supine to sit and sit to supine, scoot up and down, and roll side to side in bed with contact guard assist within 7 day(s).    2.  Patient will perform sit to stand with contact guard assist within 7 day(s).  3.  Patient will transfer from bed to chair and chair to bed with minimal assistance using the least restrictive device within 7 day(s).  4.  Patient will ambulate with minimal assistance for 75 feet with the least restrictive device within 7 day(s).   5.  Patient will ascend/descend 4 stairs with 1 handrail(s) with moderate assistance within 7 day(s).   Outcome: Progressing     PHYSICAL THERAPY TREATMENT    Patient: Anuel Bermudez (79 y.o. 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 
  Problem: Physical Therapy - Adult  Goal: By Discharge: Performs mobility at highest level of function for planned discharge setting.  See evaluation for individualized goals.  Description: FUNCTIONAL STATUS PRIOR TO ADMISSION: Patient is ambulatory with tripod rollator at baseline. Mostly independent with ADLs, however caregiver has been assisting with bathing and dressing. + fall history. Recent hospitalization; was home for ~ 1 day prior to GLF bringing patient back to hospital.    HOME SUPPORT PRIOR TO ADMISSION: Patient lived alone, however has caregivers a few days a week.    Physical Therapy Goals  Initiated 5/25/2025  1.  Patient will move from supine to sit and sit to supine, scoot up and down, and roll side to side in bed with contact guard assist within 7 day(s).    2.  Patient will perform sit to stand with contact guard assist within 7 day(s).  3.  Patient will transfer from bed to chair and chair to bed with minimal assistance using the least restrictive device within 7 day(s).  4.  Patient will ambulate with minimal assistance for 75 feet with the least restrictive device within 7 day(s).   5.  Patient will ascend/descend 4 stairs with 1 handrail(s) with moderate assistance within 7 day(s).   Outcome: Progressing   PHYSICAL THERAPY EVALUATION    Patient: Anuel Bermudez (79 y.o. male)  Date: 5/25/2025  Primary Diagnosis: Fall, initial encounter [W19.XXXA]  Closed right hip fracture, initial encounter (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) [M84.551A]       Precautions: Restrictions/Precautions  Restrictions/Precautions: Weight Bearing, Fall Risk, Bed Alarm Lower Extremity Weight Bearing Restrictions  Right Lower Extremity Weight Bearing: Weight Bearing As Tolerated   Position Activity Restriction  Other Position/Activity Restrictions: No active R hip abduction      ASSESSMENT :   DEFICITS/IMPAIRMENTS:   The patient is limited by 
Patient will ambulate with minimal assistance for 75 feet with the least restrictive device within 7 day(s).   5.  Patient will ascend/descend 4 stairs with 1 handrail(s) with moderate assistance within 7 day(s).   5/25/2025 1428 by Lucia Armendariz, PT  Outcome: Progressing     Problem: Occupational Therapy - Adult  Goal: By Discharge: Performs self-care activities at highest level of function for planned discharge setting.  See evaluation for individualized goals.  Description: FUNCTIONAL STATUS PRIOR TO ADMISSION:  Patient was modified independent for functional mobility using tripod rollator. He reports he is largely independent - modified independent in ADLs, however caregiver will assist with bathing and dressing prn when present (only there several times per week). Patient with history of numerous falls.     HOME SUPPORT: Patient lived alone with paid caregiver support several days per week.    Occupational Therapy Goals:  Initiated 5/25/2025  1.  Patient will perform standing with unilateral UE support via RW in prep for standing ADL independence with Standby Assist within 7 day(s).  2.  Patient will perform seated bathing with Minimal Assist using AE prn within 7 day(s).  3.  Patient will perform RW lower body dressing with Minimal Assist using AE prn within 7 day(s).  4.  Patient will perform RW toilet transfers with Stand by Assist  within 7 day(s).  5.  Patient will perform all aspects of RW toileting with Minimal Assist within 7 day(s).  6.  Patient will participate in upper extremity therapeutic exercise/activities with Supervision for 7 minutes within 7 day(s).    7.  Patient will utilize energy conservation and fall prevention techniques during functional activities with verbal cues within 7 day(s).  5/25/2025 1618 by Adriana Peters, OT  Outcome: Progressing     
SUMMARY:     Past Medical History:   Diagnosis Date    Aspiration pneumonia (HCC) 01/23/2024    Chronic obstructive pulmonary disease, unspecified (HCC) 12/05/2024    Fecal impaction (HCC) 12/05/2024    Hypokalemia 12/01/2024    Hypomagnesemia 12/05/2024    Hyponatremia 01/23/2024    Lung cancer (HCC)     Perforation of intestine (nontraumatic) (HCC) 08/11/2024     Past Surgical History:   Procedure Laterality Date    BRONCHOSCOPY N/A 3/20/2024    BRONCHOSCOPY performed by Ramez Fry MD at [facility] ENDOSCOPY    IR PORT PLACEMENT > 5 YEARS  4/18/2024    IR PORT PLACEMENT EQUAL OR GREATER THAN 5 YEARS 4/18/2024 Anastasia Chavez APRN - NP [facility] RAD ANGIO IR    LAPAROSCOPY N/A 8/11/2024    LAPAROSCOPIC REPAIR OF PERFORATED ANTRAL ULCER WITH GRAM PATCH performed by Rafael Hall MD at [facility] MAIN OR     Expanded or extensive additional review of patient history:   Social/Functional History  Lives With: Alone  Type of Home: House  Home Layout: One level  Home Access: Stairs to enter with rails  Entrance Stairs - Number of Steps: 4  Entrance Stairs - Rails: Right  Bathroom Shower/Tub: Tub/Shower unit  Bathroom Equipment: Shower chair  Home Equipment: Rollator, Walker - Rolling, Cane (tripod rollator)  Has the patient had two or more falls in the past year or any fall with injury in the past year?: Yes    Hand Dominance: right     EXAMINATION OF PERFORMANCE DEFICITS:    Cognitive/Behavioral Status:  Orientation  Orientation Level: Oriented X4  Cognition  Overall Cognitive Status: Exceptions  Arousal/Alertness: Appears intact  Following Commands: Appears intact  Attention Span: Attends with cues to redirect  Safety Judgement: Decreased awareness of need for assistance;Decreased awareness of need for safety  Problem Solving: Impaired  Insights: Decreased awareness of deficits  Initiation: Requires cues for some  Sequencing: Requires cues for some    Skin:     Edema:     Hearing:    [place]    Vision/Perceptual:

## (undated) DEVICE — GENERAL LAPAROSCOPY-MRMC: Brand: MEDLINE INDUSTRIES, INC.

## (undated) DEVICE — PAD PT POS 36 IN SURGYPAD DISP

## (undated) DEVICE — CO2 INSUFFLATION NEEDLE: Brand: CORE DYNAMICS

## (undated) DEVICE — SUTURE MONOCRYL SZ 4-0 L27IN ABSRB UD L19MM PS-2 1/2 CIR PRIM Y426H

## (undated) DEVICE — SET ADMIN 16ML TBNG L100IN 2 Y INJ SITE IV PIGGY BK DISP (ORDER IN MULIPLES OF 48)

## (undated) DEVICE — SYRINGE MEDICAL 3ML CLEAR PLASTIC STANDARD NON CONTROL LUERLOCK TIP DISPOSABLE

## (undated) DEVICE — 3M™ IOBAN™ 2 ANTIMICROBIAL INCISE DRAPE 6650EZ: Brand: IOBAN™ 2

## (undated) DEVICE — SUTURE NONABSORBABLE MONOFILAMENT 3-0 PS-1 18 IN BLK ETHILON 1663H

## (undated) DEVICE — Device

## (undated) DEVICE — FORCEPS BX L100CM DIA1.8MM WRK CHN 2MM PULM S STL RAD JAW 4

## (undated) DEVICE — 1LYRTR 16FR10ML100%SIL UMS SNP: Brand: MEDLINE INDUSTRIES, INC.

## (undated) DEVICE — HYPODERMIC SAFETY NEEDLE: Brand: MAGELLAN

## (undated) DEVICE — SENSOR PLSE OXMTR NEO AD 40KG ADH DISP NELLCOR

## (undated) DEVICE — SOLUTION IV 1000ML 0.9% SOD CHL

## (undated) DEVICE — SPONGE GZ W4XL4IN COT 12 PLY TYP VII WVN C FLD DSGN STERILE

## (undated) DEVICE — 1200 GUARD II KIT W/5MM TUBE W/O VAC TUBE: Brand: GUARDIAN

## (undated) DEVICE — TROCAR: Brand: KII® SLEEVE

## (undated) DEVICE — STOPCOCK IV 4 W TRNSPAR

## (undated) DEVICE — TROCAR: Brand: KII SLEEVE

## (undated) DEVICE — SYRINGE MED 10ML LUERLOCK TIP W/O SFTY DISP

## (undated) DEVICE — SOLIDIFIER FLD 2OZ 1500CC N DISINF IN BTL DISP SAFESORB

## (undated) DEVICE — 4-PORT MANIFOLD: Brand: NEPTUNE 2

## (undated) DEVICE — SUTURE VICRYL SZ 1 L36IN ABSRB UD L36MM CT-1 1/2 CIR J947H

## (undated) DEVICE — SUTURE VLOC 90 2/0 VL 6 GS-22 VLOCM2105

## (undated) DEVICE — ELECTRODE,RADIOTRANSLUCENT,FOAM,5PK: Brand: MEDLINE

## (undated) DEVICE — STRIP,CLOSURE,WOUND,MEDI-STRIP,1/2X4: Brand: MEDLINE

## (undated) DEVICE — YANKAUER,BULB TIP,W/O VENT,RIGID,STERILE: Brand: MEDLINE

## (undated) DEVICE — CATHETER IV 20 GAX1 IN N WNG KINK RESIST INSYT AUTOGRD

## (undated) DEVICE — DRAIN SURG 19FR 0.25IN SIL RND W/ TRCR INDIC DOT RADPQ FULL

## (undated) DEVICE — GLOVE SURG SZ 7 CRM LTX FREE POLYISOPRENE POLYMER BEAD ANTI

## (undated) DEVICE — SYRINGE MED 20ML STD CLR PLAS LUERSLIP TIP N CTRL DISP

## (undated) DEVICE — BASIN EMSIS 16OZ GRAPHITE PLAS KID SHP MOLD GRAD FOR ORAL

## (undated) DEVICE — BLADE CLIPPER GEN PURP NS

## (undated) DEVICE — YANKAUER,TAPERED BULBOUS TIP,W/O VENT: Brand: MEDLINE

## (undated) DEVICE — GLOVE SURG SZ 7 L12IN FNGR THK79MIL GRN LTX FREE

## (undated) DEVICE — SINGLE USE BIOPSY VALVE MAJ-210: Brand: SINGLE USE BIOPSY VALVE (STERILE)

## (undated) DEVICE — FALCON® SAMPLE CONTAINER, WITH LID, 4.5OZ (110ML), INDIVIDUALLY WRAPPED, STERILE, 100/CASE: Brand: FALCON A CORNING BRAND

## (undated) DEVICE — TUBING INSUF FLTR STD FLO DEHP FREE

## (undated) DEVICE — TROCAR: Brand: KII FIOS FIRST ENTRY

## (undated) DEVICE — CUFF BLD PRSS AD CLTH SGL TB W/ BAYNT CONN ROUNDED CORNER

## (undated) DEVICE — IV STRT KT 3282] LSL INDUSTRIES INC]

## (undated) DEVICE — SINGLE USE SUCTION VALVE MAJ-209: Brand: SINGLE USE SUCTION VALVE (STERILE)